# Patient Record
Sex: FEMALE | Race: WHITE | HISPANIC OR LATINO | Employment: OTHER | URBAN - METROPOLITAN AREA
[De-identification: names, ages, dates, MRNs, and addresses within clinical notes are randomized per-mention and may not be internally consistent; named-entity substitution may affect disease eponyms.]

---

## 2019-04-04 ENCOUNTER — HOSPITAL ENCOUNTER (EMERGENCY)
Facility: MEDICAL CENTER | Age: 64
End: 2019-04-04

## 2019-04-04 VITALS
WEIGHT: 139.33 LBS | BODY MASS INDEX: 27.35 KG/M2 | OXYGEN SATURATION: 97 % | HEART RATE: 79 BPM | TEMPERATURE: 97.2 F | SYSTOLIC BLOOD PRESSURE: 134 MMHG | DIASTOLIC BLOOD PRESSURE: 80 MMHG | RESPIRATION RATE: 16 BRPM | HEIGHT: 60 IN

## 2019-04-04 LAB
ALBUMIN SERPL BCP-MCNC: 3.9 G/DL (ref 3.2–4.9)
ALBUMIN/GLOB SERPL: 1.1 G/DL
ALP SERPL-CCNC: 84 U/L (ref 30–99)
ALT SERPL-CCNC: 12 U/L (ref 2–50)
ANION GAP SERPL CALC-SCNC: 7 MMOL/L (ref 0–11.9)
ANISOCYTOSIS BLD QL SMEAR: ABNORMAL
AST SERPL-CCNC: 22 U/L (ref 12–45)
BASOPHILS # BLD AUTO: 0.8 % (ref 0–1.8)
BASOPHILS # BLD: 0.04 K/UL (ref 0–0.12)
BILIRUB SERPL-MCNC: 0.4 MG/DL (ref 0.1–1.5)
BUN SERPL-MCNC: 18 MG/DL (ref 8–22)
CALCIUM SERPL-MCNC: 9.4 MG/DL (ref 8.5–10.5)
CHLORIDE SERPL-SCNC: 106 MMOL/L (ref 96–112)
CO2 SERPL-SCNC: 27 MMOL/L (ref 20–33)
CREAT SERPL-MCNC: 0.64 MG/DL (ref 0.5–1.4)
EOSINOPHIL # BLD AUTO: 0.21 K/UL (ref 0–0.51)
EOSINOPHIL NFR BLD: 4.1 % (ref 0–6.9)
ERYTHROCYTE [DISTWIDTH] IN BLOOD BY AUTOMATED COUNT: 67.2 FL (ref 35.9–50)
GLOBULIN SER CALC-MCNC: 3.6 G/DL (ref 1.9–3.5)
GLUCOSE SERPL-MCNC: 97 MG/DL (ref 65–99)
HCT VFR BLD AUTO: 39.9 % (ref 37–47)
HGB BLD-MCNC: 13.1 G/DL (ref 12–16)
LIPASE SERPL-CCNC: 23 U/L (ref 11–82)
LYMPHOCYTES # BLD AUTO: 0.84 K/UL (ref 1–4.8)
LYMPHOCYTES NFR BLD: 16.1 % (ref 22–41)
MACROCYTES BLD QL SMEAR: ABNORMAL
MANUAL DIFF BLD: NORMAL
MCH RBC QN AUTO: 34.3 PG (ref 27–33)
MCHC RBC AUTO-ENTMCNC: 32.8 G/DL (ref 33.6–35)
MCV RBC AUTO: 104.5 FL (ref 81.4–97.8)
MONOCYTES # BLD AUTO: 0.21 K/UL (ref 0–0.85)
MONOCYTES NFR BLD AUTO: 4 % (ref 0–13.4)
MORPHOLOGY BLD-IMP: NORMAL
NEUTROPHILS # BLD AUTO: 3.9 K/UL (ref 2–7.15)
NEUTROPHILS NFR BLD: 74.2 % (ref 44–72)
NEUTS BAND NFR BLD MANUAL: 0.8 % (ref 0–10)
NRBC # BLD AUTO: 0 K/UL
NRBC BLD-RTO: 0 /100 WBC
PLATELET # BLD AUTO: 236 K/UL (ref 164–446)
PLATELET BLD QL SMEAR: NORMAL
PMV BLD AUTO: 9.4 FL (ref 9–12.9)
POTASSIUM SERPL-SCNC: 3.9 MMOL/L (ref 3.6–5.5)
PROT SERPL-MCNC: 7.5 G/DL (ref 6–8.2)
RBC # BLD AUTO: 3.82 M/UL (ref 4.2–5.4)
RBC BLD AUTO: PRESENT
SODIUM SERPL-SCNC: 140 MMOL/L (ref 135–145)
WBC # BLD AUTO: 5.2 K/UL (ref 4.8–10.8)

## 2019-04-04 PROCEDURE — 83690 ASSAY OF LIPASE: CPT

## 2019-04-04 PROCEDURE — 85027 COMPLETE CBC AUTOMATED: CPT

## 2019-04-04 PROCEDURE — 85007 BL SMEAR W/DIFF WBC COUNT: CPT

## 2019-04-04 PROCEDURE — 302449 STATCHG TRIAGE ONLY (STATISTIC)

## 2019-04-04 PROCEDURE — 80053 COMPREHEN METABOLIC PANEL: CPT

## 2019-04-05 NOTE — ED TRIAGE NOTES
.  Chief Complaint   Patient presents with   • Abdominal Pain     2-3 day    • Low Back Pain   • Dizziness     Ambulated to triage with daughter. Pt moved to [place] from [place] 1 month ago. Has hx breast and ovarian ca with mets to bone. Pt c/o abdominal pain and low back pain. X 2-3 days. Nausea w/o emesis.

## 2019-05-14 ENCOUNTER — HOSPITAL ENCOUNTER (OUTPATIENT)
Dept: RADIOLOGY | Facility: MEDICAL CENTER | Age: 64
End: 2019-05-14
Attending: INTERNAL MEDICINE
Payer: MEDICAID

## 2019-05-14 DIAGNOSIS — C50.919 MALIGNANT NEOPLASM OF FEMALE BREAST, UNSPECIFIED ESTROGEN RECEPTOR STATUS, UNSPECIFIED LATERALITY, UNSPECIFIED SITE OF BREAST (HCC): ICD-10-CM

## 2019-05-14 PROCEDURE — A9503 TC99M MEDRONATE: HCPCS

## 2019-05-20 PROCEDURE — 99358 PROLONG SERVICE W/O CONTACT: CPT | Performed by: MEDICAL GENETICS

## 2019-05-20 NOTE — PROGRESS NOTES
"  Non face to face prolonged services of clinical data and chart information reviewed for a total time of 30 minutes performed on 5/20 for Christine Malhotra    Reviewed were:    Referral    Previous encounters    Medications    Imaging    Previous procedures    Other Orders    Letters    Notes    Media    Miscellaneous reports    Patient summaries        Counseling, testing information and materials prepared    \"I spent 30 minutes  from 1300 to 1330 reviewing past records, prior to visit pertaining to genetic risk.\"     Caleb Fernández M.D.    "

## 2019-05-21 ENCOUNTER — OFFICE VISIT (OUTPATIENT)
Dept: HEMATOLOGY ONCOLOGY | Facility: MEDICAL CENTER | Age: 64
End: 2019-05-21
Payer: MEDICAID

## 2019-05-21 ENCOUNTER — NON-PROVIDER VISIT (OUTPATIENT)
Dept: HEMATOLOGY ONCOLOGY | Facility: MEDICAL CENTER | Age: 64
End: 2019-05-21
Payer: MEDICAID

## 2019-05-21 VITALS
WEIGHT: 138 LBS | HEART RATE: 71 BPM | HEIGHT: 55 IN | DIASTOLIC BLOOD PRESSURE: 60 MMHG | RESPIRATION RATE: 16 BRPM | OXYGEN SATURATION: 99 % | BODY MASS INDEX: 31.94 KG/M2 | SYSTOLIC BLOOD PRESSURE: 118 MMHG | TEMPERATURE: 98.1 F

## 2019-05-21 VITALS
DIASTOLIC BLOOD PRESSURE: 60 MMHG | WEIGHT: 138.01 LBS | SYSTOLIC BLOOD PRESSURE: 118 MMHG | HEIGHT: 55 IN | HEART RATE: 71 BPM | OXYGEN SATURATION: 99 % | RESPIRATION RATE: 16 BRPM | TEMPERATURE: 98.1 F | BODY MASS INDEX: 31.94 KG/M2

## 2019-05-21 DIAGNOSIS — Z80.3 FAMILY HISTORY OF MALIGNANT NEOPLASM OF BREAST: ICD-10-CM

## 2019-05-21 DIAGNOSIS — Z15.01 BREAST CANCER GENETIC SUSCEPTIBILITY: ICD-10-CM

## 2019-05-21 DIAGNOSIS — Z80.0 FAMILY HISTORY OF MALIGNANT NEOPLASM OF GASTROINTESTINAL TRACT: ICD-10-CM

## 2019-05-21 DIAGNOSIS — Z80.3 FAMILY HISTORY OF BREAST CANCER: ICD-10-CM

## 2019-05-21 DIAGNOSIS — C50.411 MALIGNANT NEOPLASM OF UPPER-OUTER QUADRANT OF RIGHT FEMALE BREAST, UNSPECIFIED ESTROGEN RECEPTOR STATUS (HCC): ICD-10-CM

## 2019-05-21 DIAGNOSIS — Z80.8 FAMILY HISTORY OF MALIGNANT NEOPLASM OF THYROID: ICD-10-CM

## 2019-05-21 DIAGNOSIS — Z80.41 FAMILY HISTORY OF MALIGNANT NEOPLASM OF OVARY: ICD-10-CM

## 2019-05-21 DIAGNOSIS — Z80.0 FAMILY HISTORY OF COLON CANCER: ICD-10-CM

## 2019-05-21 DIAGNOSIS — Z80.8 FAMILY HISTORY OF THYROID CANCER: ICD-10-CM

## 2019-05-21 DIAGNOSIS — Z80.41 FAMILY HISTORY OF OVARIAN CANCER: ICD-10-CM

## 2019-05-21 DIAGNOSIS — Z15.01 GENETIC SUSCEPTIBILITY TO MALIGNANT NEOPLASM OF BREAST: ICD-10-CM

## 2019-05-21 PROCEDURE — 99203 OFFICE O/P NEW LOW 30 MIN: CPT | Performed by: MEDICAL GENETICS

## 2019-05-21 PROCEDURE — 99000 SPECIMEN HANDLING OFFICE-LAB: CPT | Performed by: MEDICAL GENETICS

## 2019-05-21 ASSESSMENT — PAIN SCALES - GENERAL
PAINLEVEL: NO PAIN
PAINLEVEL: NO PAIN

## 2019-05-21 NOTE — PROGRESS NOTES
GENETIC RISK ASSESSMENT    Christine Malhotra is a 63 y.o. year old patient who was referred by [facility] for cancer genetic risk assessment.    Chief Complaint: breast cancer and family history of breast, ovary, colon and thyroid cancer    HPI: The patient was well until 2014 (age 58) at which time a suspicious physical exam caused the patient to be referred for imaging ). A suspicious (mammogram study identified a (right) breast mass. A biopsy  was performed and a specimen was submitted to pathology.     A diagnosis of  carcinoma was made.   The patient's family history is positive for ovary, breast, thyroid and colon cancer  Review of personal and family histories suggested that a cancer genetic risk assessment was in order.    Review of Systems (ROS):  Constitutional normal  Eyes normal  ENT normal   Respiratory normal  Cardiovascular normal  Gastrointestinal normal  Genitourinary normal  Musculoskeletal normal  Skin normal  Neurological normal  Endocrine normal  Psychiatric normal  Hematologic/lymphatic normal  Allergic/Immunologic none  All other systems reviewed and are negative.    History (Past/Family)  Family History:   No family history on file.   3 generation pedigree built   Yes   Aspener-Pamela empiric risk calculation No   Boone II empiric risk calculation  No    Social History:       Social History     Social History   • Marital status:      Spouse name: N/A   • Number of children: N/A   • Years of education: N/A     Social History Main Topics   • Smoking status: Never Smoker   • Smokeless tobacco: Not on file   • Alcohol use No   • Drug use: No   • Sexual activity: Not on file     Other Topics Concern   • Not on file     Social History Narrative   • No narrative on file       Patient Past History:  Past Medical History:   Diagnosis Date   • Cancer (HCC)     breast, ovarian, mets to bone           NCCN Testing Criteria Met                            Yes    Physical Exam  Constitutional     There were no vitals taken for this visit.        General appearance: normal   Head Circumference:   ([place])  Eyes    Conjunctiva: normla   Pupils: reactive     ENMT    External inspection: noraml   Assessment of Hearing: normal   Lips/cheeks/gums: no lesions  Neck   External exam: normal   Thyroid: no masses  Respiratory   Auscultation: clear    Cardiovascular   Auscultation: RRR   Edema : none  Chest   Breasts (exam): not done   Palpation:   GI   External exam and palpation: normal      Pelvic (female): not done   External exam (male):   Lymphatic   Palpation in 2 areas: normal    Musculoskeletal   Gait: normal   Digits and Nails: no lesions  Skin   Inspection:  normal   Palpation: no masses                  Fibrofolliculoma: absent                  Trichodiscoma: absent                   Akrochordon: absent                   CAfe-au-lait:  absent                  Hypopigmentation: absent                  Mucosal freckling:  absent  Neurologic   Cranial nerves: intact   DTR’s: 2+       Assessment and Plan:  Patient with diagnosis of metastatic breast cancer (age 58) and family history of breast, colon, ovary and thyroid cancer    I spent a total of 40 minutes of face to face time with >50% of time spent in counseling and coordination of care discussing matters stated in above note.    Leia panel ordered      Caleb Fernández M.D.

## 2019-05-26 ENCOUNTER — APPOINTMENT (OUTPATIENT)
Dept: RADIOLOGY | Facility: IMAGING CENTER | Age: 64
End: 2019-05-26
Attending: PHYSICIAN ASSISTANT
Payer: MEDICAID

## 2019-05-26 ENCOUNTER — OFFICE VISIT (OUTPATIENT)
Dept: URGENT CARE | Facility: CLINIC | Age: 64
End: 2019-05-26
Payer: MEDICAID

## 2019-05-26 ENCOUNTER — HOSPITAL ENCOUNTER (OUTPATIENT)
Dept: RADIOLOGY | Facility: MEDICAL CENTER | Age: 64
End: 2019-05-26
Attending: PHYSICIAN ASSISTANT
Payer: MEDICAID

## 2019-05-26 VITALS
OXYGEN SATURATION: 95 % | BODY MASS INDEX: 27.29 KG/M2 | DIASTOLIC BLOOD PRESSURE: 74 MMHG | SYSTOLIC BLOOD PRESSURE: 122 MMHG | TEMPERATURE: 98.1 F | RESPIRATION RATE: 18 BRPM | WEIGHT: 139 LBS | HEART RATE: 89 BPM | HEIGHT: 60 IN

## 2019-05-26 DIAGNOSIS — S32.020A CLOSED COMPRESSION FRACTURE OF SECOND LUMBAR VERTEBRA, INITIAL ENCOUNTER: ICD-10-CM

## 2019-05-26 DIAGNOSIS — M54.41 ACUTE MIDLINE LOW BACK PAIN WITH BILATERAL SCIATICA: ICD-10-CM

## 2019-05-26 DIAGNOSIS — M54.42 ACUTE MIDLINE LOW BACK PAIN WITH BILATERAL SCIATICA: ICD-10-CM

## 2019-05-26 DIAGNOSIS — R10.30 LOWER ABDOMINAL PAIN: ICD-10-CM

## 2019-05-26 LAB
APPEARANCE UR: CLEAR
BILIRUB UR STRIP-MCNC: NEGATIVE MG/DL
COLOR UR AUTO: YELLOW
GLUCOSE UR STRIP.AUTO-MCNC: NEGATIVE MG/DL
KETONES UR STRIP.AUTO-MCNC: NEGATIVE MG/DL
LEUKOCYTE ESTERASE UR QL STRIP.AUTO: NEGATIVE
NITRITE UR QL STRIP.AUTO: NEGATIVE
PH UR STRIP.AUTO: 7 [PH] (ref 5–8)
PROT UR QL STRIP: NEGATIVE MG/DL
RBC UR QL AUTO: NORMAL
SP GR UR STRIP.AUTO: 1.01
UROBILINOGEN UR STRIP-MCNC: 0.2 MG/DL

## 2019-05-26 PROCEDURE — 81002 URINALYSIS NONAUTO W/O SCOPE: CPT | Performed by: PHYSICIAN ASSISTANT

## 2019-05-26 PROCEDURE — 700117 HCHG RX CONTRAST REV CODE 255: Performed by: PHYSICIAN ASSISTANT

## 2019-05-26 PROCEDURE — 99214 OFFICE O/P EST MOD 30 MIN: CPT | Mod: 25 | Performed by: PHYSICIAN ASSISTANT

## 2019-05-26 PROCEDURE — 74177 CT ABD & PELVIS W/CONTRAST: CPT

## 2019-05-26 PROCEDURE — 72100 X-RAY EXAM L-S SPINE 2/3 VWS: CPT | Performed by: FAMILY MEDICINE

## 2019-05-26 RX ORDER — HYDROCODONE BITARTRATE AND ACETAMINOPHEN 5; 325 MG/1; MG/1
1 TABLET ORAL EVERY 6 HOURS PRN
Qty: 20 TAB | Refills: 0 | Status: SHIPPED | OUTPATIENT
Start: 2019-05-26 | End: 2019-06-02

## 2019-05-26 RX ORDER — CAPECITABINE 500 MG/1
1250 TABLET, FILM COATED ORAL 2 TIMES DAILY
COMMUNITY
End: 2020-03-04

## 2019-05-26 RX ADMIN — IOHEXOL 25 ML: 240 INJECTION, SOLUTION INTRATHECAL; INTRAVASCULAR; INTRAVENOUS; ORAL at 17:34

## 2019-05-26 RX ADMIN — IOHEXOL 100 ML: 350 INJECTION, SOLUTION INTRAVENOUS at 17:34

## 2019-05-26 ASSESSMENT — ENCOUNTER SYMPTOMS
DIARRHEA: 0
LEG PAIN: 1
CONSTIPATION: 0
SHORTNESS OF BREATH: 0
BOWEL INCONTINENCE: 0
FEVER: 0
VOMITING: 0
ABDOMINAL PAIN: 1
PARESTHESIAS: 0
FALLS: 1
NAUSEA: 0
BACK PAIN: 1
DIZZINESS: 0
CHILLS: 0

## 2019-05-26 NOTE — PATIENT INSTRUCTIONS
Fractura por aplastamiento vertebral  (Spinal Compression Fracture)  La fractura por aplastamiento vertebral es la quebradura de los huesos que aimee la columna (vértebras). En belle tipo de fractura, las vértebras se aplastan y adquieren forma de cuña. La mayoría de las fracturas por aplastamiento se producen en la sección central o inferior de la columna vertebral.  CAUSAS  Belle trastorno puede ser causado por lo siguiente:  · El debilitamiento y la pérdida de la densidad ósea (osteoporosis). Ésta es la causa más frecuente.  · Ileana caída.  · Un accidente de automóvil o motocicleta.  · Cáncer.  · Un traumatismo, arlyn un golpe jayde y directo en la kenny.  FACTORES DE RIESGO  Puede correr más riesgo de sufrir ileana fractura por aplastamiento vertebral si:  · Es mayor de 50 años.  · Tiene osteoporosis.  · Tiene ciertos tipos de cáncer, entre ellos:  ¨ Mieloma múltiple.  ¨ Linfomas.  ¨ Cáncer de próstata.  ¨ Cáncer de pulmón.  ¨ Cáncer de mama.  SÍNTOMAS  Los síntomas de esta afección incluyen lo siguiente:  · Dolor intenso.  · Dolor que empeora con el tiempo.  · Dolor que empeora al ponerse de pie, caminar, sentarse o inclinarse.  · Dolor repentino que es tan intenso que dificulta el movimiento.  · Columna vertebral curvada o jorobada.  · Disminución gradual de la estatura.  · Entumecimiento, hormigueo o debilidad en la espalda y las piernas.  · Dificultad para caminar.  Los síntomas dependerán de la causa de la fractura y de la rapidez de fuentes evolución. Por ejemplo, las fracturas causadas por osteoporosis pueden producir pocos síntomas, ningún síntoma o síntomas que evolucionan lentamente con el tiempo.  DIAGNÓSTICO  Esta afección se puede diagnosticar en función de los síntomas, la historia clínica y un examen físico. Sherry el examen físico, el médico puede darle golpes suaves a lo live de la columna vertebral para determinar si hay dolor a la palpación. Pueden hacerse estudios para confirmar el diagnóstico. Estos  pueden incluir los siguientes:  · Amena densitometría ósea para determinar la presencia de osteoporosis.  · Estudios de diagnóstico por imágenes, arlyn amena radiografía de la columna vertebral, amena tomografía computarizada (TC) o amena resonancia magnética (RM).  TRATAMIENTO  El tratamiento de esta afección depende de la causa y la gravedad. Algunas fracturas, por ejemplo, las causadas por osteoporosis, pueden consolidarse por sí solas con tratamiento complementario. Bruno puede incluir lo siguiente:  · Analgésicos.  · Reposo.  · Un soporte para la espalda.  · Ejercicios de fisioterapia.  · Medicamentos para aliviar el dolor de huesos.  · Suplementos de calcio y vitamina D.  Las fracturas que causan deformidad de la espalda, neuralgia o debilidad, o que no responden a otros tratamientos se pueden tratar con un procedimiento quirúrgico, por ejemplo:  · Vertebroplastia. En kaykay procedimiento, se inyecta cemento óseo en las vértebras aplastadas para estabilizarlas.  · Cifoplastia con balón. En kaykay procedimiento, las vértebras aplastadas se expanden con un balón y luego se les inyecta el cemento óseo.  · Fusión vertebral. En kaykay procedimiento, las vértebras aplastadas se fusionan con las vértebras normales.  INSTRUCCIONES PARA EL CUIDADO EN EL HOGAR  Instrucciones generales   · [place] los medicamentos solamente arlyn se lo haya indicado el médico.  · No conduzca ni opere maquinaria pesada mientras lucy analgésicos.  · Si se lo indican, aplique hielo sobre la wood lesionada:  ¨ Ponga el hielo en amena bolsa plástica.  ¨ Coloque amena toalla entre la piel y la bolsa de hielo.  ¨ Al principio, deje el hielo en el lugar leanna 30 minutos cada dos horas. Luego aplíquese hielo cuando sea necesario.  · Use el soporte para el flora o la espalda arlyn se lo haya indicado el médico.  · No humaira alcohol. El alcohol puede interferir en el tratamiento.  · Concurra a todas las visitas de control arlyn se lo haya indicado el médico. Bruno es  importante. Puede ayudar a evitar las lesiones permanentes, la discapacidad y el dolor prolongado (crónico).  Actividad   · Haim reposo en cama solo arlyn se lo haya indicado el médico. El reposo en cama muy prolongado puede empeorar la afección.  · Reanude lux actividades normales arlyn se lo haya indicado el médico. Pregunte qué actividades son seguras para usted.  · Haim ejercicios para mejorar el movimiento y fortalecer la espalda (fisioterapia) arlyn se lo haya recomendado el médico.  · Haim ejercicio regularmente arlyn se lo haya indicado el médico.  SOLICITE ATENCIÓN MÉDICA SI:  · Tiene fiebre.  · Tiene tos que hace que el dolor sea más intenso.  · El medicamento no le calma el dolor.  · El dolor no mejora con el tiempo.  · No puede retomar las actividades normales según lo planeado o lo esperado.  SOLICITE ATENCIÓN MÉDICA DE INMEDIATO SI:  · El dolor es muy intenso y empeora repentinamente.  · No puede  alguna parte del cuerpo (parálisis) por debajo del nivel de la lesión.  · Tiene entumecimiento, hormigueo o debilidad en alguna parte del cuerpo por debajo del nivel de la lesión.  · No puede controlar los intestinos o la vejiga.  Esta información no tiene arlyn fin reemplazar el consejo del médico. Asegúrese de hacerle al médico cualquier pregunta que tenga.  Document Released: 12/18/2006 Document Revised: 05/03/2016 Document Reviewed: 12/22/2015  Elsevier Interactive Patient Education © 2017 Elsevier Inc.

## 2019-05-27 NOTE — PROGRESS NOTES
Subjective:      Christine Malhotra is a 63 y.o. female who presents with Back Pain (fell on stairs on Wednesday- lower back and abdominal pain)        Patient is accompanied by her daughter, both are Libyan speaking. Video translation services were utilized throughout exam.     Back Pain   This is a new problem. The current episode started in the past 7 days (4 days). The problem occurs constantly. The problem is unchanged. The pain is present in the lumbar spine. The quality of the pain is described as aching. Radiates to: lower abdomen. The pain is at a severity of 9/10. The pain is severe. The symptoms are aggravated by bending and position. Associated symptoms include abdominal pain, leg pain and pelvic pain. Pertinent negatives include no bladder incontinence, bowel incontinence, chest pain, dysuria, fever or paresthesias. Risk factors include recent trauma. She has tried NSAIDs for the symptoms. The treatment provided mild relief.     Patient presents to urgent care reporting a 4 day history of midline lower back pain starting after she fell off a ladder at a height of about 3-4 ft directly onto the back. The pain is described as dull, radiating to her lower abdomen. No history of prior back injuries. She reports intermittent pain radiating to her knees bilaterally. No bowel/bladder incontinence. She denies constipation, diarrhea, nausea, vomiting, or urinary symptoms.     Review of Systems   Constitutional: Negative for chills and fever.   HENT: Negative for congestion.    Respiratory: Negative for shortness of breath.    Cardiovascular: Negative for chest pain.   Gastrointestinal: Positive for abdominal pain. Negative for bowel incontinence, constipation, diarrhea, nausea and vomiting.   Genitourinary: Positive for pelvic pain. Negative for bladder incontinence and dysuria.   Musculoskeletal: Positive for back pain and falls.   Skin: Negative for rash.   Neurological: Negative for dizziness and  paresthesias.        Objective:     /74 (BP Location: Left arm, Patient Position: Sitting, BP Cuff Size: Adult)   Pulse 89   Temp 36.7 °C (98.1 °F) (Temporal)   Resp 18   Ht 1.524 m (5')   Wt 63 kg (139 lb)   SpO2 95%   BMI 27.15 kg/m²      Physical Exam   Constitutional: She is oriented to person, place, and time. She appears well-developed and well-nourished. No distress.   HENT:   Head: Normocephalic and atraumatic.   Eyes: Pupils are equal, round, and reactive to light.   Neck: Normal range of motion.   Cardiovascular: Normal rate.    Pulmonary/Chest: Effort normal.   Abdominal: Soft. Normal appearance and bowel sounds are normal. She exhibits no distension. There is tenderness in the right lower quadrant, suprapubic area and left lower quadrant. There is no rebound, no guarding, no CVA tenderness, no tenderness at McBurney's point and negative Barrios's sign.       No CVAT bilaterally   Musculoskeletal:        Lumbar back: She exhibits decreased range of motion, tenderness and pain. She exhibits no bony tenderness and no spasm.        Back:    Neurological: She is alert and oriented to person, place, and time.   Skin: Skin is warm and dry. She is not diaphoretic.   Psychiatric: She has a normal mood and affect. Her behavior is normal.   Nursing note and vitals reviewed.         PMH:  has a past medical history of Cancer (HCC).  MEDS:   Current Outpatient Prescriptions:   •  rivaroxaban (XARELTO) 20 MG Tab tablet, Take 20 mg by mouth with dinner., Disp: , Rfl:   •  capecitabine (XELODA) 500 MG tablet, Take 1,250 mg/m2 by mouth 2 Times a Day., Disp: , Rfl:   •  HYDROcodone-acetaminophen (NORCO) 5-325 MG Tab per tablet, Take 1 Tab by mouth every 6 hours as needed for up to 7 days., Disp: 20 Tab, Rfl: 0  ALLERGIES: No Known Allergies  SURGHX:   Past Surgical History:   Procedure Laterality Date   • GYN SURGERY      hysterectomy     SOCHX:  reports that she has never smoked. She does not have any  smokeless tobacco history on file. She reports that she does not drink alcohol or use drugs.  FH: family history is not on file.     POCT Urinalysis:  Component Results     Component Value Ref Range & Units Status   POC Color yellow  Negative Final   POC Appearance clear  Negative Final   POC Leukocyte Esterase negative  Negative Final   POC Nitrites negative  Negative Final   POC Urobiligen 0.2  Negative (0.2) mg/dL Final   POC Protein negative  Negative mg/dL Final   POC Urine PH 7.0  5.0 - 8.0 Final   POC Blood trace-intact  Negative Final   POC Specific Gravity 1.010  <1.005 - >1.030 Final   POC Ketones negative  Negative mg/dL Final   POC Bilirubin negative  Negative mg/dL Final   POC Glucose negative  Negative mg/dL Final   Last Resulted Time   Sun May 26, 2019 12:40 PM       Assessment/Plan:     1. Closed compression fracture of second lumbar vertebra, initial encounter (HCC)  - DX-LUMBAR SPINE-2 OR 3 VIEWS; Future  Impression       1.  Mild superior endplate compression fracture is identified at the L2 level of the lumbar spine.    2.  Degenerative disc disease and facet arthropathy are present.       - REFERRAL TO SPINE SURGERY  - HYDROcodone-acetaminophen (NORCO) 5-325 MG Tab per tablet; Take 1 Tab by mouth every 6 hours as needed for up to 7 days.  Dispense: 20 Tab; Refill: 0   - Will cause sedation, avoid driving, operating heavy machinery, and drinking alcohol    - Consent for Opiate Prescription    Discussed x-ray results with patient. Norco provided for severe pain, Advised to take OTC tylenol or ibuprofen as needed for mild-moderate pain. She will follow up with spine for further evaluation and management. The patient demonstrated a good understanding and agreed with the treatment plan.    [facility] Aware web site evaluation: I have obtained and reviewed patient utilization report from [facility] pharmacy database prior to writing prescription for controlled substance II, III or IV per [place] bill  . Based on the report and my clinical assessment the prescription is medically necessary.     2. Lower abdominal pain  - POCT Urinalysis --> normal   - CT-ABDOMEN-PELVIS WITH; Future  Impression       1.  No acute abnormalities noted in the abdomen or pelvis.    2.  Low-attenuation lesions in the spleen could represent hemangiomas.    3.  Small renal cysts.      No evidence on acute abdomen on CT, patient states understanding and is relieve. Most likely radiating pain from back. Monitor closely and seek medical attention if needed. The patient demonstrated a good understanding and agreed with the treatment plan.

## 2019-11-26 ENCOUNTER — OFFICE VISIT (OUTPATIENT)
Dept: CARDIOLOGY | Facility: MEDICAL CENTER | Age: 64
End: 2019-11-26
Payer: MEDICAID

## 2019-11-26 VITALS
HEIGHT: 60 IN | HEART RATE: 70 BPM | BODY MASS INDEX: 25.91 KG/M2 | WEIGHT: 132 LBS | DIASTOLIC BLOOD PRESSURE: 60 MMHG | OXYGEN SATURATION: 98 % | SYSTOLIC BLOOD PRESSURE: 116 MMHG

## 2019-11-26 DIAGNOSIS — I26.99 PULMONARY EMBOLISM, OTHER, UNSPECIFIED CHRONICITY, UNSPECIFIED WHETHER ACUTE COR PULMONALE PRESENT (HCC): ICD-10-CM

## 2019-11-26 DIAGNOSIS — R07.89 OTHER CHEST PAIN: ICD-10-CM

## 2019-11-26 DIAGNOSIS — Z00.00 HEALTHCARE MAINTENANCE: ICD-10-CM

## 2019-11-26 DIAGNOSIS — R07.9 CHEST PAIN, UNSPECIFIED TYPE: ICD-10-CM

## 2019-11-26 LAB — EKG IMPRESSION: NORMAL

## 2019-11-26 PROCEDURE — 99203 OFFICE O/P NEW LOW 30 MIN: CPT | Performed by: INTERNAL MEDICINE

## 2019-11-26 PROCEDURE — 93000 ELECTROCARDIOGRAM COMPLETE: CPT | Performed by: INTERNAL MEDICINE

## 2019-11-26 ASSESSMENT — ENCOUNTER SYMPTOMS
WEIGHT GAIN: 0
PALPITATIONS: 0
DEPRESSION: 0
IRREGULAR HEARTBEAT: 0
COUGH: 0
ORTHOPNEA: 0
ABDOMINAL PAIN: 0
DYSPNEA ON EXERTION: 0
CONSTIPATION: 0
SHORTNESS OF BREATH: 0
FEVER: 0
WEIGHT LOSS: 0
BACK PAIN: 0
DECREASED APPETITE: 0
PND: 0
BLURRED VISION: 0
CLAUDICATION: 0
DIARRHEA: 0
VOMITING: 0
HEARTBURN: 0
SYNCOPE: 0
ALTERED MENTAL STATUS: 0
FLANK PAIN: 0
NEAR-SYNCOPE: 0
NAUSEA: 0
DIZZINESS: 0

## 2019-11-26 NOTE — ASSESSMENT & PLAN NOTE
R/o pulm htn with TTE. Check for residual clot burden with pulmonary v/q scan. Cont lifelong anticoagulation as unprovoked and due to non-reversable cause. If no burden remaining, can consider lower dose xarelto.

## 2019-11-26 NOTE — PROGRESS NOTES
Cardiology Note    Chief Complaint   Patient presents with   • Chest Pain     New Patient       History of Present Illness: Christine Malhotra is a 64 y.o. female PMH mid back pain after fall from ladder, breast cancer s/p chemo/radiation/surgery c/b pulmonary embolism (found in [place], [place] 2018) who presents for initial visit.    Feels left sided, rad to shoulder, lasting about 5 min, associated with light headedness. Over last month occurred twice. Denies shortness of breath or palpitations. Can walk up to one hour without symptoms.    Adds that had pulm embolism last year around time was being treated for breast cancer. She was treated with xarelto which she has tolerated well. Denies bleeding episodes.    Review of Systems   Constitution: Negative for decreased appetite, fever, malaise/fatigue, weight gain and weight loss.   HENT: Negative for congestion and nosebleeds.    Eyes: Negative for blurred vision.   Cardiovascular: Positive for chest pain. Negative for claudication, dyspnea on exertion, irregular heartbeat, leg swelling, near-syncope, orthopnea, palpitations, paroxysmal nocturnal dyspnea and syncope.   Respiratory: Negative for cough and shortness of breath.    Endocrine: Negative for cold intolerance and heat intolerance.   Skin: Negative for rash.   Musculoskeletal: Negative for back pain.   Gastrointestinal: Negative for abdominal pain, constipation, diarrhea, heartburn, melena, nausea and vomiting.   Genitourinary: Negative for dysuria, flank pain and hematuria.   Neurological: Negative for dizziness.   Psychiatric/Behavioral: Negative for altered mental status and depression.         Past Medical History:   Diagnosis Date   • Cancer (HCC)     breast, ovarian, mets to bone         Past Surgical History:   Procedure Laterality Date   • GYN SURGERY      hysterectomy         Current Outpatient Medications   Medication Sig Dispense Refill   • rivaroxaban (XARELTO) 20 MG Tab tablet Take 20 mg  by mouth with dinner.     • capecitabine (XELODA) 500 MG tablet Take 1,250 mg/m2 by mouth 2 Times a Day.       No current facility-administered medications for this visit.          No Known Allergies      No family history on file.      Social History     Socioeconomic History   • Marital status:      Spouse name: Not on file   • Number of children: Not on file   • Years of education: Not on file   • Highest education level: Not on file   Occupational History   • Not on file   Social Needs   • Financial resource strain: Not on file   • Food insecurity:     Worry: Not on file     Inability: Not on file   • Transportation needs:     Medical: Not on file     Non-medical: Not on file   Tobacco Use   • Smoking status: Never Smoker   • Smokeless tobacco: Never Used   Substance and Sexual Activity   • Alcohol use: No   • Drug use: No   • Sexual activity: Not on file   Lifestyle   • Physical activity:     Days per week: Not on file     Minutes per session: Not on file   • Stress: Not on file   Relationships   • Social connections:     Talks on phone: Not on file     Gets together: Not on file     Attends [employer]ism service: Not on file     Active member of club or organization: Not on file     Attends meetings of clubs or organizations: Not on file     Relationship status: Not on file   • Intimate partner violence:     Fear of current or ex partner: Not on file     Emotionally abused: Not on file     Physically abused: Not on file     Forced sexual activity: Not on file   Other Topics Concern   • Not on file   Social History Narrative   • Not on file         Physical Exam:  Ambulatory Vitals  /60 (BP Location: Left arm, Patient Position: Sitting, BP Cuff Size: Adult)   Pulse 70   Ht 1.524 m (5')   Wt 59.9 kg (132 lb)   SpO2 98%    BP Readings from Last 4 Encounters:   11/26/19 116/60   05/26/19 122/74   05/21/19 118/60   05/21/19 118/60     Weight/BMI:   Vitals:    11/26/19 1009   BP: 116/60   Weight: 59.9  kg (132 lb)   Height: 1.524 m (5')    Body mass index is 25.78 kg/m².  Wt Readings from Last 4 Encounters:   11/26/19 59.9 kg (132 lb)   05/26/19 63 kg (139 lb)   05/21/19 62.6 kg (138 lb)   05/21/19 62.6 kg (138 lb 0.1 oz)       Physical Exam   Constitutional: She is oriented to person, place, and time and well-developed, well-nourished, and in no distress. No distress.   HENT:   Head: Normocephalic and atraumatic.   Eyes: Pupils are equal, round, and reactive to light. Conjunctivae are normal.   Neck: Normal range of motion. Neck supple. No JVD present.   Cardiovascular: Normal rate, regular rhythm, normal heart sounds and intact distal pulses. Exam reveals no gallop and no friction rub.   No murmur heard.  Pulmonary/Chest: Effort normal and breath sounds normal. No respiratory distress. She has no wheezes. She has no rales. She exhibits no tenderness.   Abdominal: Soft. Bowel sounds are normal. She exhibits no distension.   Musculoskeletal:         General: No edema.   Neurological: She is alert and oriented to person, place, and time.   Skin: Skin is warm and dry.   Psychiatric: Affect and judgment normal.       Lab Data Review:  No results found for: CHOLSTRLTOT, LDL, HDL, TRIGLYCERIDE    Lab Results   Component Value Date/Time    SODIUM 140 04/04/2019 06:27 PM    POTASSIUM 3.9 04/04/2019 06:27 PM    CHLORIDE 106 04/04/2019 06:27 PM    CO2 27 04/04/2019 06:27 PM    GLUCOSE 97 04/04/2019 06:27 PM    BUN 18 04/04/2019 06:27 PM    CREATININE 0.64 04/04/2019 06:27 PM     CrCl cannot be calculated (Patient's most recent lab result is older than the maximum 7 days allowed.).  Lab Results   Component Value Date/Time    ALKPHOSPHAT 84 04/04/2019 06:27 PM    ASTSGOT 22 04/04/2019 06:27 PM    ALTSGPT 12 04/04/2019 06:27 PM    TBILIRUBIN 0.4 04/04/2019 06:27 PM      Lab Results   Component Value Date/Time    WBC 5.2 04/04/2019 06:27 PM     No results found for: HBA1C  No components found for: TROP      Cardiac Imaging and  Procedures Review:        Medical Decision Making:  Problem List Items Addressed This Visit     Pulmonary embolism (HCC)     R/o pulm htn with TTE. Check for residual clot burden with pulmonary v/q scan. Cont lifelong anticoagulation as unprovoked and due to non-reversable cause. If no burden remaining, can consider lower dose xarelto.         Relevant Orders    EC-ECHOCARDIOGRAM COMPLETE W/O CONT    Other chest pain     Will rule out PE as cause of sx prior to further cardiac investigation.          Relevant Orders    EKG (Completed)    LIPID PANEL    HEMOGLOBIN A1C (Glycohemoglobin GHB Total/A1C with MBG Estimate)    EC-ECHOCARDIOGRAM COMPLETE W/O CONT    Healthcare maintenance     Check lipids and a1c. Obtain records from [place].         Relevant Orders    LIPID PANEL    HEMOGLOBIN A1C (Glycohemoglobin GHB Total/A1C with MBG Estimate)      Other Visit Diagnoses     Chest pain, unspecified type        Relevant Orders    NM-LUNG PERFUSION IMAGING        Obtaining records and 40min for review.    It was my pleasure to meet with Ms. Syeda Malhotra.

## 2020-02-18 ENCOUNTER — TELEPHONE (OUTPATIENT)
Dept: CARDIOLOGY | Facility: MEDICAL CENTER | Age: 65
End: 2020-02-18

## 2020-02-18 NOTE — TELEPHONE ENCOUNTER
Called patient and someone translated for patient, she got labs done at [facility], called and they will fax. Also gave patient imaging scheduling number to schedule testing.

## 2020-03-03 DIAGNOSIS — C50.811 MALIGNANT NEOPLASM OF OVERLAPPING SITES OF RIGHT BREAST IN FEMALE, ESTROGEN RECEPTOR POSITIVE (HCC): ICD-10-CM

## 2020-03-03 DIAGNOSIS — Z17.0 MALIGNANT NEOPLASM OF OVERLAPPING SITES OF RIGHT BREAST IN FEMALE, ESTROGEN RECEPTOR POSITIVE (HCC): ICD-10-CM

## 2020-03-03 PROBLEM — C50.919 BREAST CANCER (HCC): Status: ACTIVE | Noted: 2020-03-03

## 2020-03-03 RX ORDER — SODIUM CHLORIDE 9 MG/ML
INJECTION, SOLUTION INTRAVENOUS CONTINUOUS
Status: CANCELLED | OUTPATIENT
Start: 2020-03-12

## 2020-03-03 RX ORDER — 0.9 % SODIUM CHLORIDE 0.9 %
VIAL (ML) INJECTION PRN
Status: CANCELLED | OUTPATIENT
Start: 2020-03-10

## 2020-03-03 RX ORDER — ONDANSETRON 2 MG/ML
4 INJECTION INTRAMUSCULAR; INTRAVENOUS PRN
Status: CANCELLED | OUTPATIENT
Start: 2020-03-12

## 2020-03-03 RX ORDER — 0.9 % SODIUM CHLORIDE 0.9 %
10 VIAL (ML) INJECTION PRN
Status: CANCELLED | OUTPATIENT
Start: 2020-03-10

## 2020-03-03 RX ORDER — SODIUM CHLORIDE 9 MG/ML
INJECTION, SOLUTION INTRAVENOUS CONTINUOUS
Status: CANCELLED | OUTPATIENT
Start: 2020-03-19

## 2020-03-03 RX ORDER — 0.9 % SODIUM CHLORIDE 0.9 %
VIAL (ML) INJECTION PRN
Status: CANCELLED | OUTPATIENT
Start: 2020-03-19

## 2020-03-03 RX ORDER — 0.9 % SODIUM CHLORIDE 0.9 %
3 VIAL (ML) INJECTION PRN
Status: CANCELLED | OUTPATIENT
Start: 2020-03-10

## 2020-03-03 RX ORDER — ONDANSETRON 8 MG/1
8 TABLET, ORALLY DISINTEGRATING ORAL ONCE
Status: CANCELLED | OUTPATIENT
Start: 2020-03-19

## 2020-03-03 RX ORDER — 0.9 % SODIUM CHLORIDE 0.9 %
3 VIAL (ML) INJECTION PRN
Status: CANCELLED | OUTPATIENT
Start: 2020-03-12

## 2020-03-03 RX ORDER — PROCHLORPERAZINE MALEATE 10 MG
10 TABLET ORAL EVERY 6 HOURS PRN
Status: CANCELLED | OUTPATIENT
Start: 2020-03-12

## 2020-03-03 RX ORDER — 0.9 % SODIUM CHLORIDE 0.9 %
10 VIAL (ML) INJECTION PRN
Status: CANCELLED | OUTPATIENT
Start: 2020-03-19

## 2020-03-03 RX ORDER — ONDANSETRON 8 MG/1
8 TABLET, ORALLY DISINTEGRATING ORAL PRN
Status: CANCELLED | OUTPATIENT
Start: 2020-03-19

## 2020-03-03 RX ORDER — PROCHLORPERAZINE MALEATE 10 MG
10 TABLET ORAL EVERY 6 HOURS PRN
Status: CANCELLED | OUTPATIENT
Start: 2020-03-19

## 2020-03-03 RX ORDER — ONDANSETRON 8 MG/1
8 TABLET, ORALLY DISINTEGRATING ORAL ONCE
Status: CANCELLED | OUTPATIENT
Start: 2020-03-12

## 2020-03-03 RX ORDER — 0.9 % SODIUM CHLORIDE 0.9 %
VIAL (ML) INJECTION PRN
Status: CANCELLED | OUTPATIENT
Start: 2020-03-12

## 2020-03-03 RX ORDER — ONDANSETRON 2 MG/ML
4 INJECTION INTRAMUSCULAR; INTRAVENOUS PRN
Status: CANCELLED | OUTPATIENT
Start: 2020-03-19

## 2020-03-03 RX ORDER — 0.9 % SODIUM CHLORIDE 0.9 %
10 VIAL (ML) INJECTION PRN
Status: CANCELLED | OUTPATIENT
Start: 2020-03-12

## 2020-03-03 RX ORDER — ONDANSETRON 8 MG/1
8 TABLET, ORALLY DISINTEGRATING ORAL PRN
Status: CANCELLED | OUTPATIENT
Start: 2020-03-12

## 2020-03-03 RX ORDER — 0.9 % SODIUM CHLORIDE 0.9 %
3 VIAL (ML) INJECTION PRN
Status: CANCELLED | OUTPATIENT
Start: 2020-03-19

## 2020-03-04 ENCOUNTER — OFFICE VISIT (OUTPATIENT)
Dept: CARDIOLOGY | Facility: MEDICAL CENTER | Age: 65
End: 2020-03-04
Payer: MEDICAID

## 2020-03-04 VITALS
OXYGEN SATURATION: 98 % | DIASTOLIC BLOOD PRESSURE: 70 MMHG | BODY MASS INDEX: 26.5 KG/M2 | SYSTOLIC BLOOD PRESSURE: 122 MMHG | HEIGHT: 60 IN | HEART RATE: 80 BPM | WEIGHT: 135 LBS

## 2020-03-04 DIAGNOSIS — C50.811 MALIGNANT NEOPLASM OF OVERLAPPING SITES OF RIGHT BREAST IN FEMALE, ESTROGEN RECEPTOR POSITIVE (HCC): ICD-10-CM

## 2020-03-04 DIAGNOSIS — I26.99 PULMONARY EMBOLISM, OTHER, UNSPECIFIED CHRONICITY, UNSPECIFIED WHETHER ACUTE COR PULMONALE PRESENT (HCC): ICD-10-CM

## 2020-03-04 DIAGNOSIS — Z00.00 HEALTHCARE MAINTENANCE: ICD-10-CM

## 2020-03-04 DIAGNOSIS — Z17.0 MALIGNANT NEOPLASM OF OVERLAPPING SITES OF RIGHT BREAST IN FEMALE, ESTROGEN RECEPTOR POSITIVE (HCC): ICD-10-CM

## 2020-03-04 PROCEDURE — 99214 OFFICE O/P EST MOD 30 MIN: CPT | Performed by: INTERNAL MEDICINE

## 2020-03-04 RX ORDER — OMEPRAZOLE 20 MG/1
CAPSULE, DELAYED RELEASE ORAL
COMMUNITY
Start: 2020-01-14 | End: 2020-03-04

## 2020-03-04 ASSESSMENT — ENCOUNTER SYMPTOMS
NAUSEA: 0
PALPITATIONS: 0
DIZZINESS: 0
DYSPNEA ON EXERTION: 0
ORTHOPNEA: 0
SYNCOPE: 0
DECREASED APPETITE: 0
CONSTIPATION: 0
PND: 0
HEARTBURN: 0
FLANK PAIN: 0
BACK PAIN: 0
FEVER: 0
DIARRHEA: 0
CLAUDICATION: 0
DEPRESSION: 0
NEAR-SYNCOPE: 0
WEIGHT GAIN: 0
COUGH: 0
ALTERED MENTAL STATUS: 0
ABDOMINAL PAIN: 0
WEIGHT LOSS: 0
VOMITING: 0
BLURRED VISION: 0
SHORTNESS OF BREATH: 0
IRREGULAR HEARTBEAT: 0

## 2020-03-04 ASSESSMENT — FIBROSIS 4 INDEX: FIB4 SCORE: 1.67

## 2020-03-04 NOTE — PROGRESS NOTES
Cardiology Note    Chief Complaint   Patient presents with   • Chest Pain       History of Present Illness: Christine Malhotra is a 64 y.o. female PMH mid back pain after fall from ladder, breast cancer s/p chemo/radiation/surgery c/b pulmonary embolism (found in [place], [place] 2018) who presents for follow up.    States prior symptoms now resolved. She stopped her xarelto of her own volition as she was feeling better. Denies any other cardiac symptoms. She is pending follow up with onc to discuss new chemo regimen.    Adds that had pulm embolism 2018 around time was being treated for breast cancer. She was treated with xarelto which she has tolerated well. Denies bleeding episodes.    Review of Systems   Constitution: Negative for decreased appetite, fever, malaise/fatigue, weight gain and weight loss.   HENT: Negative for congestion and nosebleeds.    Eyes: Negative for blurred vision.   Cardiovascular: Negative for chest pain, claudication, dyspnea on exertion, irregular heartbeat, leg swelling, near-syncope, orthopnea, palpitations, paroxysmal nocturnal dyspnea and syncope.   Respiratory: Negative for cough and shortness of breath.    Endocrine: Negative for cold intolerance and heat intolerance.   Skin: Negative for rash.   Musculoskeletal: Negative for back pain.   Gastrointestinal: Negative for abdominal pain, constipation, diarrhea, heartburn, melena, nausea and vomiting.   Genitourinary: Negative for dysuria, flank pain and hematuria.   Neurological: Negative for dizziness.   Psychiatric/Behavioral: Negative for altered mental status and depression.         Past Medical History:   Diagnosis Date   • Cancer (HCC)     breast, ovarian, mets to bone         Past Surgical History:   Procedure Laterality Date   • GYN SURGERY      hysterectomy         Current Outpatient Medications   Medication Sig Dispense Refill   • rivaroxaban (XARELTO) 20 MG Tab tablet Take 20 mg by mouth with dinner.       No current  facility-administered medications for this visit.          No Known Allergies      No family history on file.      Social History     Socioeconomic History   • Marital status:      Spouse name: Not on file   • Number of children: Not on file   • Years of education: Not on file   • Highest education level: Not on file   Occupational History   • Not on file   Social Needs   • Financial resource strain: Not on file   • Food insecurity     Worry: Not on file     Inability: Not on file   • Transportation needs     Medical: Not on file     Non-medical: Not on file   Tobacco Use   • Smoking status: Never Smoker   • Smokeless tobacco: Never Used   Substance and Sexual Activity   • Alcohol use: No   • Drug use: No   • Sexual activity: Not on file   Lifestyle   • Physical activity     Days per week: Not on file     Minutes per session: Not on file   • Stress: Not on file   Relationships   • Social connections     Talks on phone: Not on file     Gets together: Not on file     Attends Faith service: Not on file     Active member of club or organization: Not on file     Attends meetings of clubs or organizations: Not on file     Relationship status: Not on file   • Intimate partner violence     Fear of current or ex partner: Not on file     Emotionally abused: Not on file     Physically abused: Not on file     Forced sexual activity: Not on file   Other Topics Concern   • Not on file   Social History Narrative   • Not on file         Physical Exam:  Ambulatory Vitals  /70 (BP Location: Left arm, Patient Position: Sitting, BP Cuff Size: Adult)   Pulse 80   Ht 1.524 m (5')   Wt 61.2 kg (135 lb)   SpO2 98%    BP Readings from Last 4 Encounters:   03/04/20 122/70   11/26/19 116/60   05/26/19 122/74   05/21/19 118/60     Weight/BMI:   Vitals:    03/04/20 1242   BP: 122/70   Weight: 61.2 kg (135 lb)   Height: 1.524 m (5')    Body mass index is 26.37 kg/m².  Wt Readings from Last 4 Encounters:   03/04/20 61.2 kg  (135 lb)   11/26/19 59.9 kg (132 lb)   05/26/19 63 kg (139 lb)   05/21/19 62.6 kg (138 lb)       Physical Exam   Constitutional: She is oriented to person, place, and time and well-developed, well-nourished, and in no distress. No distress.   HENT:   Head: Normocephalic and atraumatic.   Eyes: Pupils are equal, round, and reactive to light. Conjunctivae are normal.   Neck: Normal range of motion. Neck supple. No JVD present.   Cardiovascular: Normal rate, regular rhythm, normal heart sounds and intact distal pulses. Exam reveals no gallop and no friction rub.   No murmur heard.  Pulmonary/Chest: Effort normal and breath sounds normal. No respiratory distress. She has no wheezes. She has no rales. She exhibits no tenderness.   Abdominal: Soft. Bowel sounds are normal. She exhibits no distension.   Musculoskeletal:         General: No edema.   Neurological: She is alert and oriented to person, place, and time.   Skin: Skin is warm and dry.   Psychiatric: Affect and judgment normal.       Lab Data Review:  No results found for: CHOLSTRLTOT, LDL, HDL, TRIGLYCERIDE    Lab Results   Component Value Date/Time    SODIUM 140 04/04/2019 06:27 PM    POTASSIUM 3.9 04/04/2019 06:27 PM    CHLORIDE 106 04/04/2019 06:27 PM    CO2 27 04/04/2019 06:27 PM    GLUCOSE 97 04/04/2019 06:27 PM    BUN 18 04/04/2019 06:27 PM    CREATININE 0.64 04/04/2019 06:27 PM     CrCl cannot be calculated (Patient's most recent lab result is older than the maximum 7 days allowed.).  Lab Results   Component Value Date/Time    ALKPHOSPHAT 84 04/04/2019 06:27 PM    ASTSGOT 22 04/04/2019 06:27 PM    ALTSGPT 12 04/04/2019 06:27 PM    TBILIRUBIN 0.4 04/04/2019 06:27 PM      Lab Results   Component Value Date/Time    WBC 5.2 04/04/2019 06:27 PM     No results found for: HBA1C  No components found for: TROP    Outside labs: 11/2019  Tot chol 292, HDL 32, trig 1225, non  (patient states ate prior to testing)    Cardiac Imaging and Procedures Review:         Medical Decision Making:  Problem List Items Addressed This Visit     Pulmonary embolism (HCC)    Healthcare maintenance    Breast cancer (HCC)    Relevant Orders    EC-ECHOCARDIOGRAM COMPLETE W/O CONT        Pulmonary embolism most likely non provoked setting of breast cancer. Continue xarelto lifelong.  Breast CA planning new chemo. Check TTE prior.  Repeat lipids. Ensure fasting.    It was my pleasure to meet with Ms. Syeda Malhotra.

## 2020-03-12 ENCOUNTER — DOCUMENTATION (OUTPATIENT)
Dept: NUTRITION | Facility: MEDICAL CENTER | Age: 65
End: 2020-03-12

## 2020-03-12 ENCOUNTER — HOSPITAL ENCOUNTER (OUTPATIENT)
Dept: CARDIOLOGY | Facility: MEDICAL CENTER | Age: 65
End: 2020-03-12
Attending: INTERNAL MEDICINE
Payer: MEDICAID

## 2020-03-12 ENCOUNTER — OUTPATIENT INFUSION SERVICES (OUTPATIENT)
Dept: ONCOLOGY | Facility: MEDICAL CENTER | Age: 65
End: 2020-03-12
Attending: INTERNAL MEDICINE
Payer: MEDICAID

## 2020-03-12 VITALS
TEMPERATURE: 98.5 F | HEIGHT: 59 IN | SYSTOLIC BLOOD PRESSURE: 119 MMHG | HEART RATE: 71 BPM | DIASTOLIC BLOOD PRESSURE: 61 MMHG | OXYGEN SATURATION: 91 % | BODY MASS INDEX: 27.11 KG/M2 | WEIGHT: 134.48 LBS | RESPIRATION RATE: 18 BRPM

## 2020-03-12 DIAGNOSIS — Z17.0 MALIGNANT NEOPLASM OF OVERLAPPING SITES OF RIGHT BREAST IN FEMALE, ESTROGEN RECEPTOR POSITIVE (HCC): ICD-10-CM

## 2020-03-12 DIAGNOSIS — C50.811 MALIGNANT NEOPLASM OF OVERLAPPING SITES OF RIGHT BREAST IN FEMALE, ESTROGEN RECEPTOR POSITIVE (HCC): ICD-10-CM

## 2020-03-12 LAB
ALBUMIN SERPL BCP-MCNC: 3.7 G/DL (ref 3.2–4.9)
ALBUMIN/GLOB SERPL: 1.2 G/DL
ALP SERPL-CCNC: 89 U/L (ref 30–99)
ALT SERPL-CCNC: 11 U/L (ref 2–50)
ANION GAP SERPL CALC-SCNC: 6 MMOL/L (ref 7–16)
AST SERPL-CCNC: 18 U/L (ref 12–45)
BASOPHILS # BLD AUTO: 0.4 % (ref 0–1.8)
BASOPHILS # BLD: 0.03 K/UL (ref 0–0.12)
BILIRUB SERPL-MCNC: 0.4 MG/DL (ref 0.1–1.5)
BUN SERPL-MCNC: 14 MG/DL (ref 8–22)
CALCIUM SERPL-MCNC: 9.8 MG/DL (ref 8.5–10.5)
CHLORIDE SERPL-SCNC: 104 MMOL/L (ref 96–112)
CO2 SERPL-SCNC: 29 MMOL/L (ref 20–33)
CREAT SERPL-MCNC: 0.69 MG/DL (ref 0.5–1.4)
EKG IMPRESSION: NORMAL
EOSINOPHIL # BLD AUTO: 0.11 K/UL (ref 0–0.51)
EOSINOPHIL NFR BLD: 1.6 % (ref 0–6.9)
ERYTHROCYTE [DISTWIDTH] IN BLOOD BY AUTOMATED COUNT: 59.7 FL (ref 35.9–50)
GLOBULIN SER CALC-MCNC: 3 G/DL (ref 1.9–3.5)
GLUCOSE SERPL-MCNC: 100 MG/DL (ref 65–99)
HCT VFR BLD AUTO: 39 % (ref 37–47)
HGB BLD-MCNC: 12.8 G/DL (ref 12–16)
IMM GRANULOCYTES # BLD AUTO: 0.04 K/UL (ref 0–0.11)
IMM GRANULOCYTES NFR BLD AUTO: 0.6 % (ref 0–0.9)
LYMPHOCYTES # BLD AUTO: 1.18 K/UL (ref 1–4.8)
LYMPHOCYTES NFR BLD: 17.4 % (ref 22–41)
MAGNESIUM SERPL-MCNC: 2.1 MG/DL (ref 1.5–2.5)
MCH RBC QN AUTO: 33.8 PG (ref 27–33)
MCHC RBC AUTO-ENTMCNC: 32.8 G/DL (ref 33.6–35)
MCV RBC AUTO: 102.9 FL (ref 81.4–97.8)
MONOCYTES # BLD AUTO: 0.45 K/UL (ref 0–0.85)
MONOCYTES NFR BLD AUTO: 6.6 % (ref 0–13.4)
NEUTROPHILS # BLD AUTO: 4.98 K/UL (ref 2–7.15)
NEUTROPHILS NFR BLD: 73.4 % (ref 44–72)
NRBC # BLD AUTO: 0 K/UL
NRBC BLD-RTO: 0 /100 WBC
PLATELET # BLD AUTO: 256 K/UL (ref 164–446)
PMV BLD AUTO: 9.1 FL (ref 9–12.9)
POTASSIUM SERPL-SCNC: 4.2 MMOL/L (ref 3.6–5.5)
PROT SERPL-MCNC: 6.7 G/DL (ref 6–8.2)
RBC # BLD AUTO: 3.79 M/UL (ref 4.2–5.4)
SODIUM SERPL-SCNC: 139 MMOL/L (ref 135–145)
WBC # BLD AUTO: 6.8 K/UL (ref 4.8–10.8)

## 2020-03-12 PROCEDURE — 80053 COMPREHEN METABOLIC PANEL: CPT

## 2020-03-12 PROCEDURE — 93005 ELECTROCARDIOGRAM TRACING: CPT | Performed by: INTERNAL MEDICINE

## 2020-03-12 PROCEDURE — 96409 CHEMO IV PUSH SNGL DRUG: CPT

## 2020-03-12 PROCEDURE — 85025 COMPLETE CBC W/AUTO DIFF WBC: CPT

## 2020-03-12 PROCEDURE — 700111 HCHG RX REV CODE 636 W/ 250 OVERRIDE (IP): Performed by: INTERNAL MEDICINE

## 2020-03-12 PROCEDURE — 83735 ASSAY OF MAGNESIUM: CPT

## 2020-03-12 PROCEDURE — 93010 ELECTROCARDIOGRAM REPORT: CPT | Performed by: INTERNAL MEDICINE

## 2020-03-12 RX ORDER — LEVOTHYROXINE SODIUM 0.05 MG/1
1 TABLET ORAL DAILY
COMMUNITY
Start: 2020-03-03 | End: 2020-11-16

## 2020-03-12 RX ORDER — ONDANSETRON 8 MG/1
8 TABLET, ORALLY DISINTEGRATING ORAL ONCE
Status: COMPLETED | OUTPATIENT
Start: 2020-03-12 | End: 2020-03-12

## 2020-03-12 RX ADMIN — ONDANSETRON 8 MG: 8 TABLET, ORALLY DISINTEGRATING ORAL at 13:50

## 2020-03-12 RX ADMIN — ERIBULIN MESYLATE 2.23 MG: 0.5 INJECTION INTRAVENOUS at 14:18

## 2020-03-12 ASSESSMENT — FIBROSIS 4 INDEX: FIB4 SCORE: 1.67

## 2020-03-12 NOTE — PROGRESS NOTES
"Pharmacy Chemotherapy Calculations Note:    Dx: Stage IV Breast  Cancer (ER/NV+, HER2 neg)  Cycle:  1 Day 1   Previous treatment: dx 2014, most recently on Xeloda     Protocol: Halaven  Eribulin (Halaven) 1.4 mg/m2 IV on Days 1 and 8  21-day cycle until disease progression or unacceptable toxicity  NCCN Guidelines for Breast Cancer. V.1.2019.  Artem J, et al. Lancet. 2011;377(3694):479-23.          /61   Pulse 71   Temp 36.9 °C (98.5 °F) (Temporal)   Resp 18   Ht 1.5 m (4' 11.06\")   Wt 61 kg (134 lb 7.7 oz)   SpO2 91%   BMI 27.11 kg/m²  Body surface area is 1.59 meters squared.    Labs from 3/12/20 reviewed - all within treatment parameters. QTc = 423       Erubilin (Halaven) 1.4 mg/m² x 1.59 m² = 2.226 mg   <10% difference, okay to treat with final dose = 2.225 mg IV      Anny Camilo, PharmD, BCOP             "

## 2020-03-12 NOTE — PROGRESS NOTES
Chemotherapy Verification - SECONDARY RN       Height = 150 cm  Weight = 61 kg  BSA = 1.59 m2       Medication: Halaven  Dose: 1.4 mg/m2  Calculated Dose: 2.226 mg                             (In mg/m2, AUC, mg/kg)         I confirm that this process was performed independently.

## 2020-03-12 NOTE — PROGRESS NOTES
Chemotherapy Verification - PRIMARY RN      Height = 150 cm  Weight = 61 kg  BSA = 1.59 m^2       Medication: Halaven  Dose: 1.4 mg/m^2  Calculated Dose: 2.23 mg                             (In mg/m2, AUC, mg/kg)       I confirm this process was performed independently with the BSA and all final chemotherapy dosing calculations congruent.  Any discrepancies of 10% or greater have been addressed with the chemotherapy pharmacist. The resolution of the discrepancy has been documented in the EPIC progress notes.

## 2020-03-12 NOTE — PROGRESS NOTES
Nutrition Services: RD Consultation/ New Chemo Start  64 year old female with diagnosis of breast cancer.       Past Medical History:   Diagnosis Date   • Cancer (HCC)     breast, ovarian, mets to bone     RD met with pt to assess current intake, appetite, and nutritional status.  Pt presents to appointment with her daughter.  After speaking with RN and daughter, patient would benefit from visit utilizing  Ipad for ease of communication.  Utilized Ipad with : [id number].    Patient notes that her weight 3-5 years ago was 148 pounds.  Her daughter notes that the patients appetite has been slightly smaller at this time.  She typically eats two meals per day and does not snacks.  She does not drink protein shakes or supplements at this time.  Patient notes that when she has chemotherapy previously she did not experience any negative side effects.     We discussed nutrition related goals during treatment as well and RD role. We discussed the potential side effects of treatment and their impacts on nutrition. We discussed the benefit of small, frequent meals and snacks focusing on high calorie/protein items as well as the benefits of maintaining weight and lean muscle mass throughout treatment. Provided and went over handouts/food lists regarding a general healthy diet, healthy snack ideas, foods high in protein and the benefits of a plant based diet (in [place]ian). Pt did not express any further nutrition-related questions or concerns at this time.     Assessment:  • Pertinent Labs (3/12): Glucose 100  • Pertinent Meds: synthroid  • Weight: 134 pounds/61 kg  • Height: 4'11''  • BMI: 27  • Weight appears to be up and down in the last year ~ 5-7 pounds. Weight has trended up per chart review in the last three months.     Weight History from Chart:  4/4/2019: 139 pounds  5/21/2019: 138 pounds  11/26/2019: 132 pounds    Plan/Recommendations:  • Provided and discussed handout regarding general nutrition  guidelines as well as nutritional recommendations for patient's with cancer, including tips/tricks should side effects of tx occur.   • Increase meal and snack frequency - add 1-2 snacks per day.   • Focus on high calorie and protein foods, fruits and vegetables with all meals/snacks - handout provided.    Pt reports understanding and was receptive to information provided.  RD provided contact information. Encouraged pt to reach out as questions/concerns arise.  RD following and to make further recommendations as indicated.  [phone]

## 2020-03-12 NOTE — PROGRESS NOTES
"Pharmacy Chemotherapy Calculation    Patient Name: Christine Malhotra   Dx: Breast Cancer (Stage IV, ER/KS +, HER2 negative)  Protocol: EriBULin     *Dosing Reference*  EriBULin 1.4 mg/m2 IV push on Days 1 and 8   21 day cycle until disease progression or unacceptable toxicity  NCCN Guidelines for Breast Cancer V.1.2019.  Artem WADE, et al. Lancet. 2011;377(2123):018-85.    Allergies:  Patient has no known allergies.     /61   Pulse 71   Temp 36.9 °C (98.5 °F) (Temporal)   Resp 18   Ht 1.5 m (4' 11.06\")   Wt 61 kg (134 lb 7.7 oz)   SpO2 91%   BMI 27.11 kg/m²  Body surface area is 1.59 meters squared.    Labs 3/12/20:  ANC~ 4980 Plt = 256k   Hgb = 12.8     SCr = 0.69 mg/dL CrCl ~ 80 mL/min   LFT's = WNL TBili = 0.4   K = 4.2  Mag = 2.1    3/12/20 EKG =     Drug Order   (Drug name, dose, route, IV Fluid & volume, frequency, number of doses) Cycle 1      Previous treatment: s/p 6 cycles of TC 12/2014; Arimidex on 1/2015     Medication = eriBULin (Halaven)  Base Dose= 1.4 mg/m2  Calc Dose: Base Dose x 1.59 m2 = 2.226 mg  Final Dose = 2.225 mg  Route = IVP  Fluid & Volume = 4.45 mL in syringe   Conc = 0.5 mg/mL  Admin Duration = Over 2-5 minutes          <10% difference, okay to treat with final dose     By my signature below, I confirm this process was performed independently with the BSA and all final chemotherapy dosing calculations congruent. I have reviewed the above chemotherapy order and that my calculation of the final dose and BSA (when applicable) corroborate those calculations of the  pharmacist. Discrepancies of 10% or greater in the written dose have been addressed and documented within the [facility] Progress notes.    Matt Humphries, PharmD      "

## 2020-03-13 NOTE — PROGRESS NOTES
Pt presented for D1C1 Halaven. In house  [place] present and translated, pt's daughter speaks proficient English but more comfortable having  for crucial conversations. POC discussed, assessment completed. EKG already completed before visit. PIV started with some difficulty, labs drawn. Results OK for treatment. Pre-med given. Halaven given by IV push over 5 mins without issue. PIV with brisk blood return after, line flushed and removed, gauze drsg placed. Next appts made, printout given to daughter. Left on foot to self care.

## 2020-03-18 RX ORDER — ACETAMINOPHEN 325 MG/1
650 TABLET ORAL ONCE
Status: CANCELLED | OUTPATIENT
Start: 2020-03-18

## 2020-03-18 RX ORDER — ACETAMINOPHEN 325 MG/1
650 TABLET ORAL PRN
Status: CANCELLED | OUTPATIENT
Start: 2020-03-18

## 2020-03-18 RX ORDER — DIPHENHYDRAMINE HYDROCHLORIDE 50 MG/ML
25 INJECTION INTRAMUSCULAR; INTRAVENOUS PRN
Status: CANCELLED | OUTPATIENT
Start: 2020-03-18

## 2020-03-18 RX ORDER — DIPHENHYDRAMINE HCL 25 MG
25 TABLET ORAL ONCE
Status: CANCELLED | OUTPATIENT
Start: 2020-03-18

## 2020-03-19 ENCOUNTER — OUTPATIENT INFUSION SERVICES (OUTPATIENT)
Dept: ONCOLOGY | Facility: MEDICAL CENTER | Age: 65
End: 2020-03-19
Attending: INTERNAL MEDICINE
Payer: MEDICAID

## 2020-03-19 VITALS
BODY MASS INDEX: 27.64 KG/M2 | HEART RATE: 80 BPM | SYSTOLIC BLOOD PRESSURE: 143 MMHG | DIASTOLIC BLOOD PRESSURE: 70 MMHG | HEIGHT: 59 IN | TEMPERATURE: 98.6 F | RESPIRATION RATE: 18 BRPM | OXYGEN SATURATION: 98 % | WEIGHT: 137.13 LBS

## 2020-03-19 DIAGNOSIS — Z17.0 MALIGNANT NEOPLASM OF OVERLAPPING SITES OF RIGHT BREAST IN FEMALE, ESTROGEN RECEPTOR POSITIVE (HCC): ICD-10-CM

## 2020-03-19 DIAGNOSIS — C50.811 MALIGNANT NEOPLASM OF OVERLAPPING SITES OF RIGHT BREAST IN FEMALE, ESTROGEN RECEPTOR POSITIVE (HCC): ICD-10-CM

## 2020-03-19 LAB
BASOPHILS # BLD AUTO: 2.4 % (ref 0–1.8)
BASOPHILS # BLD: 0.08 K/UL (ref 0–0.12)
EOSINOPHIL # BLD AUTO: 0.01 K/UL (ref 0–0.51)
EOSINOPHIL NFR BLD: 0.3 % (ref 0–6.9)
ERYTHROCYTE [DISTWIDTH] IN BLOOD BY AUTOMATED COUNT: 57.1 FL (ref 35.9–50)
HCT VFR BLD AUTO: 36.8 % (ref 37–47)
HGB BLD-MCNC: 12.2 G/DL (ref 12–16)
IMM GRANULOCYTES # BLD AUTO: 0.12 K/UL (ref 0–0.11)
IMM GRANULOCYTES NFR BLD AUTO: 3.7 % (ref 0–0.9)
LYMPHOCYTES # BLD AUTO: 0.9 K/UL (ref 1–4.8)
LYMPHOCYTES NFR BLD: 27.5 % (ref 22–41)
MCH RBC QN AUTO: 33.6 PG (ref 27–33)
MCHC RBC AUTO-ENTMCNC: 33.2 G/DL (ref 33.6–35)
MCV RBC AUTO: 101.4 FL (ref 81.4–97.8)
MONOCYTES # BLD AUTO: 0.27 K/UL (ref 0–0.85)
MONOCYTES NFR BLD AUTO: 8.3 % (ref 0–13.4)
NEUTROPHILS # BLD AUTO: 1.89 K/UL (ref 2–7.15)
NEUTROPHILS NFR BLD: 57.8 % (ref 44–72)
NRBC # BLD AUTO: 0 K/UL
NRBC BLD-RTO: 0 /100 WBC
PLATELET # BLD AUTO: 238 K/UL (ref 164–446)
PMV BLD AUTO: 8.8 FL (ref 9–12.9)
RBC # BLD AUTO: 3.63 M/UL (ref 4.2–5.4)
WBC # BLD AUTO: 3.3 K/UL (ref 4.8–10.8)

## 2020-03-19 PROCEDURE — 85025 COMPLETE CBC W/AUTO DIFF WBC: CPT

## 2020-03-19 PROCEDURE — 96409 CHEMO IV PUSH SNGL DRUG: CPT

## 2020-03-19 PROCEDURE — 700111 HCHG RX REV CODE 636 W/ 250 OVERRIDE (IP): Performed by: INTERNAL MEDICINE

## 2020-03-19 RX ORDER — OMEPRAZOLE 20 MG/1
20 CAPSULE, DELAYED RELEASE ORAL DAILY
COMMUNITY

## 2020-03-19 RX ORDER — ONDANSETRON 8 MG/1
8 TABLET, ORALLY DISINTEGRATING ORAL ONCE
Status: COMPLETED | OUTPATIENT
Start: 2020-03-19 | End: 2020-03-19

## 2020-03-19 RX ORDER — LOPERAMIDE HYDROCHLORIDE 2 MG/1
2 CAPSULE ORAL 4 TIMES DAILY PRN
COMMUNITY
End: 2022-01-20 | Stop reason: SDUPTHER

## 2020-03-19 RX ADMIN — ONDANSETRON 8 MG: 8 TABLET, ORALLY DISINTEGRATING ORAL at 08:50

## 2020-03-19 RX ADMIN — ERIBULIN MESYLATE 2.25 MG: 0.5 INJECTION INTRAVENOUS at 09:30

## 2020-03-19 ASSESSMENT — FIBROSIS 4 INDEX: FIB4 SCORE: 1.31

## 2020-03-19 NOTE — PROGRESS NOTES
"Pharmacy Chemotherapy Calculations Note:    Dx: Stage IV Breast  Cancer (ER/TN+, HER2 neg)    Cycle 1, Day 8  Previous treatment: C1D1 on 3/12/20     Protocol: Halaven  *Dosing Reference*  Eribulin (Halaven) 1.4 mg/m2 IV on Days 1 and 8  21-day cycle until disease progression or unacceptable toxicity  NCCN Guidelines for Breast Cancer. V.1.2019.  Artem J, et al. Lancet. 2011;377(7288):907-23.          /70   Pulse 80   Temp 37 °C (98.6 °F) (Temporal)   Resp 18   Ht 1.495 m (4' 10.86\")   Wt 62.2 kg (137 lb 2 oz)   SpO2 98%   BMI 27.83 kg/m²  Body surface area is 1.61 meters squared.    All labs (3/19/20) within treatment plan parameters.   QTc = 423       Erubilin (Halaven) 1.4 mg/m² x 1.61 m² = 2.254 mg   <10% difference, okay to treat with final dose = 2.255 mg IV      Blanca Yap, PharmD  "

## 2020-03-19 NOTE — PROGRESS NOTES
"Pharmacy Chemotherapy Calculation    Patient Name: Christine Malhotra   Dx: Breast Cancer (Stage IV, ER/CT +, HER2 negative)  Protocol: EriBULin     *Dosing Reference*  EriBULin 1.4 mg/m2 IV push on Days 1 and 8   21 day cycle until disease progression or unacceptable toxicity  NCCN Guidelines for Breast Cancer V.1.2019.  Artem WADE, et al. Lancet. 2011;377(8740):194-15.    Allergies:  Patient has no known allergies.     /70   Pulse 80   Temp 37 °C (98.6 °F) (Temporal)   Resp 18   Ht 1.495 m (4' 10.86\")   Wt 62.2 kg (137 lb 2 oz)   SpO2 98%   BMI 27.83 kg/m²  Body surface area is 1.61 meters squared.    Labs 3/19/20:  ANC~ 1890 Plt = 238k   Hgb = 12.2       3/12/20 EKG =     Drug Order   (Drug name, dose, route, IV Fluid & volume, frequency, number of doses) Cycle 1 Day 8  Previous treatment: C1D1 3/12/20     Medication = eriBULin (Halaven)  Base Dose= 1.4 mg/m2  Calc Dose: Base Dose x 1.61 m2 = 2.254 mg  Final Dose = 2.255 mg  Route = IVP  Fluid & Volume = 4.51 mL in syringe   Conc = 0.5 mg/mL  Admin Duration = Over 2-5 minutes          <10% difference, okay to treat with final dose     By my signature below, I confirm this process was performed independently with the BSA and all final chemotherapy dosing calculations congruent. I have reviewed the above chemotherapy order and that my calculation of the final dose and BSA (when applicable) corroborate those calculations of the  pharmacist. Discrepancies of 10% or greater in the written dose have been addressed and documented within the [facility] Progress notes.    Matt Humphries, PharmD      "

## 2020-03-19 NOTE — PROGRESS NOTES
into [facility] for Day 8 / Cycle 1 of Halaven for Right breast cancer. Pt denied having any new or acute complaints today, reports tolerating past treatments well. PIV started, had + blood return flushed briskly. Pt given anticancer therapy as prescribed, tolerated well, denied having any complaints during or after infusion. PIV discontinued, bleeding controlled with gauze and coban. Discharge home to self care in [facility]. Appointment confirm for next treatment.

## 2020-03-19 NOTE — PROGRESS NOTES
Chemotherapy Verification - PRIMARY RN      Height = 50.86 IN  Weight = 137 LB  BSA = 1.61 m2       Medication: Halaven  Dose: 1.4 mg/m2  Calculated Dose: 2.254 mg                             (In mg/m2, AUC, mg/kg)     I confirm this process was performed independently with the BSA and all final chemotherapy dosing calculations congruent.  Any discrepancies of 10% or greater have been addressed with the chemotherapy pharmacist. The resolution of the discrepancy has been documented in the EPIC progress notes.

## 2020-03-31 ENCOUNTER — OUTPATIENT INFUSION SERVICES (OUTPATIENT)
Dept: ONCOLOGY | Facility: MEDICAL CENTER | Age: 65
End: 2020-03-31
Attending: INTERNAL MEDICINE
Payer: MEDICAID

## 2020-03-31 VITALS
SYSTOLIC BLOOD PRESSURE: 134 MMHG | TEMPERATURE: 97.3 F | HEIGHT: 58 IN | DIASTOLIC BLOOD PRESSURE: 60 MMHG | BODY MASS INDEX: 28.23 KG/M2 | OXYGEN SATURATION: 96 % | HEART RATE: 78 BPM | RESPIRATION RATE: 18 BRPM | WEIGHT: 134.48 LBS

## 2020-03-31 DIAGNOSIS — C50.811 MALIGNANT NEOPLASM OF OVERLAPPING SITES OF RIGHT BREAST IN FEMALE, ESTROGEN RECEPTOR POSITIVE (HCC): ICD-10-CM

## 2020-03-31 DIAGNOSIS — Z17.0 MALIGNANT NEOPLASM OF OVERLAPPING SITES OF RIGHT BREAST IN FEMALE, ESTROGEN RECEPTOR POSITIVE (HCC): ICD-10-CM

## 2020-03-31 LAB
ALBUMIN SERPL BCP-MCNC: 3.6 G/DL (ref 3.2–4.9)
ALBUMIN/GLOB SERPL: 1 G/DL
ALP SERPL-CCNC: 80 U/L (ref 30–99)
ALT SERPL-CCNC: 13 U/L (ref 2–50)
ANION GAP SERPL CALC-SCNC: 12 MMOL/L (ref 7–16)
AST SERPL-CCNC: 17 U/L (ref 12–45)
BASOPHILS # BLD AUTO: 0.9 % (ref 0–1.8)
BASOPHILS # BLD: 0.04 K/UL (ref 0–0.12)
BILIRUB SERPL-MCNC: 0.2 MG/DL (ref 0.1–1.5)
BUN SERPL-MCNC: 19 MG/DL (ref 8–22)
CALCIUM SERPL-MCNC: 9.2 MG/DL (ref 8.5–10.5)
CHLORIDE SERPL-SCNC: 101 MMOL/L (ref 96–112)
CO2 SERPL-SCNC: 26 MMOL/L (ref 20–33)
CREAT SERPL-MCNC: 0.61 MG/DL (ref 0.5–1.4)
EOSINOPHIL # BLD AUTO: 0 K/UL (ref 0–0.51)
EOSINOPHIL NFR BLD: 0 % (ref 0–6.9)
ERYTHROCYTE [DISTWIDTH] IN BLOOD BY AUTOMATED COUNT: 57.7 FL (ref 35.9–50)
GLOBULIN SER CALC-MCNC: 3.5 G/DL (ref 1.9–3.5)
GLUCOSE SERPL-MCNC: 104 MG/DL (ref 65–99)
HCT VFR BLD AUTO: 37.9 % (ref 37–47)
HGB BLD-MCNC: 12.4 G/DL (ref 12–16)
IMM GRANULOCYTES # BLD AUTO: 0.17 K/UL (ref 0–0.11)
IMM GRANULOCYTES NFR BLD AUTO: 3.6 % (ref 0–0.9)
LYMPHOCYTES # BLD AUTO: 1.11 K/UL (ref 1–4.8)
LYMPHOCYTES NFR BLD: 23.7 % (ref 22–41)
MAGNESIUM SERPL-MCNC: 2 MG/DL (ref 1.5–2.5)
MCH RBC QN AUTO: 32.9 PG (ref 27–33)
MCHC RBC AUTO-ENTMCNC: 32.7 G/DL (ref 33.6–35)
MCV RBC AUTO: 100.5 FL (ref 81.4–97.8)
MONOCYTES # BLD AUTO: 0.44 K/UL (ref 0–0.85)
MONOCYTES NFR BLD AUTO: 9.4 % (ref 0–13.4)
NEUTROPHILS # BLD AUTO: 2.93 K/UL (ref 2–7.15)
NEUTROPHILS NFR BLD: 62.4 % (ref 44–72)
NRBC # BLD AUTO: 0 K/UL
NRBC BLD-RTO: 0 /100 WBC
PLATELET # BLD AUTO: 358 K/UL (ref 164–446)
PMV BLD AUTO: 8.9 FL (ref 9–12.9)
POTASSIUM SERPL-SCNC: 4.2 MMOL/L (ref 3.6–5.5)
PROT SERPL-MCNC: 7.1 G/DL (ref 6–8.2)
RBC # BLD AUTO: 3.77 M/UL (ref 4.2–5.4)
SODIUM SERPL-SCNC: 139 MMOL/L (ref 135–145)
WBC # BLD AUTO: 4.7 K/UL (ref 4.8–10.8)

## 2020-03-31 PROCEDURE — 96409 CHEMO IV PUSH SNGL DRUG: CPT

## 2020-03-31 PROCEDURE — 80053 COMPREHEN METABOLIC PANEL: CPT

## 2020-03-31 PROCEDURE — 85025 COMPLETE CBC W/AUTO DIFF WBC: CPT

## 2020-03-31 PROCEDURE — 83735 ASSAY OF MAGNESIUM: CPT

## 2020-03-31 PROCEDURE — 700111 HCHG RX REV CODE 636 W/ 250 OVERRIDE (IP): Performed by: INTERNAL MEDICINE

## 2020-03-31 RX ORDER — 0.9 % SODIUM CHLORIDE 0.9 %
3 VIAL (ML) INJECTION PRN
Status: CANCELLED | OUTPATIENT
Start: 2020-04-01

## 2020-03-31 RX ORDER — SODIUM CHLORIDE 9 MG/ML
INJECTION, SOLUTION INTRAVENOUS CONTINUOUS
Status: CANCELLED | OUTPATIENT
Start: 2020-04-09

## 2020-03-31 RX ORDER — SODIUM CHLORIDE 9 MG/ML
INJECTION, SOLUTION INTRAVENOUS CONTINUOUS
Status: CANCELLED | OUTPATIENT
Start: 2020-04-02

## 2020-03-31 RX ORDER — 0.9 % SODIUM CHLORIDE 0.9 %
VIAL (ML) INJECTION PRN
Status: CANCELLED | OUTPATIENT
Start: 2020-04-01

## 2020-03-31 RX ORDER — 0.9 % SODIUM CHLORIDE 0.9 %
10 VIAL (ML) INJECTION PRN
Status: CANCELLED | OUTPATIENT
Start: 2020-04-02

## 2020-03-31 RX ORDER — PROCHLORPERAZINE MALEATE 10 MG
10 TABLET ORAL EVERY 6 HOURS PRN
Status: CANCELLED | OUTPATIENT
Start: 2020-04-02

## 2020-03-31 RX ORDER — ONDANSETRON 2 MG/ML
4 INJECTION INTRAMUSCULAR; INTRAVENOUS PRN
Status: CANCELLED | OUTPATIENT
Start: 2020-04-09

## 2020-03-31 RX ORDER — 0.9 % SODIUM CHLORIDE 0.9 %
10 VIAL (ML) INJECTION PRN
Status: CANCELLED | OUTPATIENT
Start: 2020-04-09

## 2020-03-31 RX ORDER — ONDANSETRON 8 MG/1
8 TABLET, ORALLY DISINTEGRATING ORAL ONCE
Status: COMPLETED | OUTPATIENT
Start: 2020-03-31 | End: 2020-03-31

## 2020-03-31 RX ORDER — ONDANSETRON 8 MG/1
8 TABLET, ORALLY DISINTEGRATING ORAL PRN
Status: CANCELLED | OUTPATIENT
Start: 2020-04-09

## 2020-03-31 RX ORDER — ONDANSETRON 8 MG/1
8 TABLET, ORALLY DISINTEGRATING ORAL ONCE
Status: CANCELLED | OUTPATIENT
Start: 2020-04-02

## 2020-03-31 RX ORDER — 0.9 % SODIUM CHLORIDE 0.9 %
3 VIAL (ML) INJECTION PRN
Status: CANCELLED | OUTPATIENT
Start: 2020-04-09

## 2020-03-31 RX ORDER — 0.9 % SODIUM CHLORIDE 0.9 %
10 VIAL (ML) INJECTION PRN
Status: CANCELLED | OUTPATIENT
Start: 2020-04-01

## 2020-03-31 RX ORDER — ONDANSETRON 2 MG/ML
4 INJECTION INTRAMUSCULAR; INTRAVENOUS PRN
Status: CANCELLED | OUTPATIENT
Start: 2020-04-02

## 2020-03-31 RX ORDER — 0.9 % SODIUM CHLORIDE 0.9 %
3 VIAL (ML) INJECTION PRN
Status: CANCELLED | OUTPATIENT
Start: 2020-04-02

## 2020-03-31 RX ORDER — 0.9 % SODIUM CHLORIDE 0.9 %
VIAL (ML) INJECTION PRN
Status: CANCELLED | OUTPATIENT
Start: 2020-04-02

## 2020-03-31 RX ORDER — ONDANSETRON 8 MG/1
8 TABLET, ORALLY DISINTEGRATING ORAL PRN
Status: CANCELLED | OUTPATIENT
Start: 2020-04-02

## 2020-03-31 RX ORDER — PROCHLORPERAZINE MALEATE 10 MG
10 TABLET ORAL EVERY 6 HOURS PRN
Status: CANCELLED | OUTPATIENT
Start: 2020-04-09

## 2020-03-31 RX ORDER — ONDANSETRON 8 MG/1
8 TABLET, ORALLY DISINTEGRATING ORAL ONCE
Status: CANCELLED | OUTPATIENT
Start: 2020-04-09

## 2020-03-31 RX ORDER — 0.9 % SODIUM CHLORIDE 0.9 %
VIAL (ML) INJECTION PRN
Status: CANCELLED | OUTPATIENT
Start: 2020-04-09

## 2020-03-31 RX ADMIN — ERIBULIN MESYLATE 2.21 MG: 0.5 INJECTION INTRAVENOUS at 12:17

## 2020-03-31 RX ADMIN — ONDANSETRON 8 MG: 8 TABLET, ORALLY DISINTEGRATING ORAL at 12:12

## 2020-03-31 ASSESSMENT — FIBROSIS 4 INDEX: FIB4 SCORE: 1.41

## 2020-03-31 NOTE — PROGRESS NOTES
Chemotherapy Verification - SECONDARY RN       Height = 58.27  Weight = 61 kg  BSA = 1.58 m2       Medication: Eribulin mesylate (Halaven)  Dose: 1.4 mg/m2  Calculated Dose: 2.21 mg                             (In mg/m2, AUC, mg/kg)         I confirm that this process was performed independently.

## 2020-03-31 NOTE — PROGRESS NOTES
"Pharmacy Chemotherapy Calculations Note:    Dx: Stage IV Breast  Cancer (ER/FL+, HER2 neg)    Cycle 2 Day 1   Previous treatment: C1D8 on 3/19/20     Protocol: Halaven  *Dosing Reference*  Eribulin (Halaven) 1.4 mg/m2 IV on Days 1 and 8   21-day cycle until disease progression or unacceptable toxicity  NCCN Guidelines for Breast Cancer. V.1.2019.  Artem J, et al. Lancet. 2011;377(4310):201-23.          /60   Pulse 78   Temp 36.3 °C (97.3 °F) (Temporal)   Resp 18   Ht 1.48 m (4' 10.27\")   Wt 61 kg (134 lb 7.7 oz)   SpO2 96%   BMI 27.85 kg/m²  Body surface area is 1.58 meters squared.    Labs 3/31/20:  ANC~ 2930 Plt = 358k   Hgb = 12.4     SCr = 0.61mg/dL CrCl ~ 92 mL/min   LFT's = WNL TBili = 0.2   K = 4.2  Mag = 2    3/12/20 EKG = QTc = 423    Erubilin (Halaven) 1.4 mg/m² x 1.58 m² = 2.212 mg   <10% difference, okay to treat with final dose = 2.21 mg IV      Matt Humphries, PharmD  "

## 2020-03-31 NOTE — PROGRESS NOTES
Pt arrives to [facility] for Halaven.  Discussed plan of care using Romanian interpretor phone.  Pt denies s/sx of infections or other complaints.  PIV established to L-AC by BELLO Francois and labs were drawn.  Labs reviewed and pt meets parameters for treatment.  Halaven given without issue.  PIV flushed and site removed.  Site wrapped with pressure dressing.  Spoke to scheduling dept to schedule more appts and gave pt a copy of schedule.  Pt dc home to self care.

## 2020-03-31 NOTE — PROGRESS NOTES
Chemotherapy Verification - PRIMARY RN    C2D1    Height = 148 cm  Weight = 61 kg  BSA = 1.58 m2       Medication: Halaven  Dose: 1.4 mg/m2  Calculated Dose: 2.21 mg                             (In mg/m2, AUC, mg/kg)      I confirm this process was performed independently with the BSA and all final chemotherapy dosing calculations congruent.  Any discrepancies of 10% or greater have been addressed with the chemotherapy pharmacist. The resolution of the discrepancy has been documented in the EPIC progress notes.

## 2020-03-31 NOTE — PROGRESS NOTES
"Pharmacy Chemotherapy Calculation    Patient Name: Christine Malhotra   Dx: Breast Cancer (Stage IV, ER/FL +, HER2 negative)  Protocol: EriBULin     *Dosing Reference*  EriBULin 1.4 mg/m2 IV push on Days 1 and 8   21 day cycle until disease progression or unacceptable toxicity  NCCN Guidelines for Breast Cancer V.1.2019.  Artem WADE, et al. Lancet. 2011;377(4136):669-65.    Allergies:  Patient has no known allergies.     /60   Pulse 78   Temp 36.3 °C (97.3 °F) (Temporal)   Resp 18   Ht 1.48 m (4' 10.27\")   Wt 61 kg (134 lb 7.7 oz)   SpO2 96%   BMI 27.85 kg/m²  Body surface area is 1.58 meters squared.    Labs 3/31/20  ANC~ 2930 Plt = 358k   Hgb = 12.4     SCr = 0.61 mg/dL CrCl ~ 79.5 mL/min (minimum SCr of 0.7)   LFT's = WNLs  TBili = 0.2   K = 4.2  Mg = 2    3/12/20 EKG =     Drug Order   (Drug name, dose, route, IV Fluid & volume, frequency, number of doses) Cycle 2 Day 1  Previous treatment: C1D8 on 3/19/20     Medication = eriBULin (Halaven)  Base Dose= 1.4 mg/m2  Calc Dose: Base Dose x 1.58 m2 = 2.212 mg  Final Dose = 2.21 mg  Route = IVP  Fluid & Volume = 4.42 mL in syringe   Conc = 0.5 mg/mL  Admin Duration = Over 2-5 minutes          <10% difference, okay to treat with final dose     By my signature below, I confirm this process was performed independently with the BSA and all final chemotherapy dosing calculations congruent. I have reviewed the above chemotherapy order and that my calculation of the final dose and BSA (when applicable) corroborate those calculations of the  pharmacist. Discrepancies of 10% or greater in the written dose have been addressed and documented within the [facility] Progress notes.    Pepito Merritt, PharmD      "

## 2020-04-04 NOTE — PROGRESS NOTES
"Pharmacy Chemotherapy Verification    Patient Name: Christine Malhotra   Dx: Breast Cancer (Stage IV, ER/DE +, HER2 negative)  Protocol: EriBULin     *Dosing Reference*  EriBULin 1.4 mg/m2 IV push on Days 1 and 8  21 day cycle until disease progression or unacceptable toxicity  NCCN Guidelines for Breast Cancer V.1.2019.  Artem WADE, et al. Lancet. 2011;377(2133):976-96.    Allergies:  Patient has no known allergies.     /50   Pulse 77   Temp 36.3 °C (97.4 °F) (Temporal)   Resp 18   Ht 1.48 m (4' 10.27\")   Wt 61 kg (134 lb 7.7 oz)   SpO2 96%   BMI 27.85 kg/m²  Body surface area is 1.58 meters squared.    Labs 4/7/20  ANC 3960 Hgb 12   Plt 314k  K = 4.2  Mg = 2    Labs 3/31/20   SCr 0.61 CrCl 79.5 mL/min   AST/ALT/AP = 17/13/80 Tbili = 0.2    EKG 3/12/20      Drug Order   (Drug name, dose, route, IV Fluid & volume, frequency, number of doses) Cycle 2 Day 8  Previous treatment: C2D1 on 3/31/20     Medication = eriBULin (Halaven)  Base Dose= 1.4 mg/m2  Calc Dose: Base Dose x 1.58 m2 = 2.212 mg  Final Dose = 2.21 mg  Route = IVP  Fluid & Volume = 4.42 mL in syringe   Concentration = 0.5 mg/mL  Admin Duration = Over 2-5 minutes          <10% difference, okay to treat with final dose     By my signature below, I confirm this process was performed independently with the BSA and all final chemotherapy dosing calculations congruent. I have reviewed the above chemotherapy order and that my calculation of the final dose and BSA (when applicable) corroborate those calculations of the  pharmacist. Discrepancies of 10% or greater in the written dose have been addressed and documented within the [facility] Progress notes.    Roseanne Gross, PharmD, BCOP      "

## 2020-04-06 NOTE — PROGRESS NOTES
"Pharmacy Chemotherapy Calculations Note:    Dx: Stage IV Breast  Cancer (ER/TN+, HER2 neg)    Cycle 2 Day 8  Previous treatment: C2D1 3/31/20     Protocol: Halaven  *Dosing Reference*  Eribulin (Halaven) 1.4 mg/m2 IV on Days 1 and 8   21-day cycle until disease progression or unacceptable toxicity  NCCN Guidelines for Breast Cancer. V.1.2019.  Artem J, et al. Lancet. 2011;377(6631):279-23.          /50   Pulse 77   Temp 36.3 °C (97.4 °F) (Temporal)   Resp 18   Ht 1.48 m (4' 10.27\")   Wt 61 kg (134 lb 7.7 oz)   SpO2 96%   BMI 27.85 kg/m²  Body surface area is 1.58 meters squared.    Labs 4/7/20:  ANC~ 3960 Plt = 314k   Hgb = 12       3/12/20 EKG = QTc = 423    Erubilin (Halaven) 1.4 mg/m² x 1.58 m² = 2.212 mg   <10% difference, okay to treat with final dose = 2.21 mg IV      Matt Humphries, PharmD  "

## 2020-04-07 ENCOUNTER — OUTPATIENT INFUSION SERVICES (OUTPATIENT)
Dept: ONCOLOGY | Facility: MEDICAL CENTER | Age: 65
End: 2020-04-07
Attending: INTERNAL MEDICINE
Payer: MEDICAID

## 2020-04-07 VITALS
SYSTOLIC BLOOD PRESSURE: 121 MMHG | BODY MASS INDEX: 28.23 KG/M2 | DIASTOLIC BLOOD PRESSURE: 50 MMHG | RESPIRATION RATE: 18 BRPM | OXYGEN SATURATION: 96 % | TEMPERATURE: 97.4 F | HEART RATE: 77 BPM | WEIGHT: 134.48 LBS | HEIGHT: 58 IN

## 2020-04-07 DIAGNOSIS — Z17.0 MALIGNANT NEOPLASM OF OVERLAPPING SITES OF RIGHT BREAST IN FEMALE, ESTROGEN RECEPTOR POSITIVE (HCC): ICD-10-CM

## 2020-04-07 DIAGNOSIS — C50.811 MALIGNANT NEOPLASM OF OVERLAPPING SITES OF RIGHT BREAST IN FEMALE, ESTROGEN RECEPTOR POSITIVE (HCC): ICD-10-CM

## 2020-04-07 LAB
BASOPHILS # BLD AUTO: 1.8 % (ref 0–1.8)
BASOPHILS # BLD: 0.1 K/UL (ref 0–0.12)
EOSINOPHIL # BLD AUTO: 0 K/UL (ref 0–0.51)
EOSINOPHIL NFR BLD: 0 % (ref 0–6.9)
ERYTHROCYTE [DISTWIDTH] IN BLOOD BY AUTOMATED COUNT: 55.6 FL (ref 35.9–50)
HCT VFR BLD AUTO: 36.1 % (ref 37–47)
HGB BLD-MCNC: 12 G/DL (ref 12–16)
IMM GRANULOCYTES # BLD AUTO: 0.16 K/UL (ref 0–0.11)
IMM GRANULOCYTES NFR BLD AUTO: 2.8 % (ref 0–0.9)
LYMPHOCYTES # BLD AUTO: 1.11 K/UL (ref 1–4.8)
LYMPHOCYTES NFR BLD: 19.6 % (ref 22–41)
MCH RBC QN AUTO: 32.7 PG (ref 27–33)
MCHC RBC AUTO-ENTMCNC: 33.2 G/DL (ref 33.6–35)
MCV RBC AUTO: 98.4 FL (ref 81.4–97.8)
MONOCYTES # BLD AUTO: 0.33 K/UL (ref 0–0.85)
MONOCYTES NFR BLD AUTO: 5.8 % (ref 0–13.4)
NEUTROPHILS # BLD AUTO: 3.96 K/UL (ref 2–7.15)
NEUTROPHILS NFR BLD: 70 % (ref 44–72)
NRBC # BLD AUTO: 0 K/UL
NRBC BLD-RTO: 0 /100 WBC
PLATELET # BLD AUTO: 314 K/UL (ref 164–446)
PMV BLD AUTO: 9.2 FL (ref 9–12.9)
RBC # BLD AUTO: 3.67 M/UL (ref 4.2–5.4)
WBC # BLD AUTO: 5.7 K/UL (ref 4.8–10.8)

## 2020-04-07 PROCEDURE — 700111 HCHG RX REV CODE 636 W/ 250 OVERRIDE (IP): Performed by: INTERNAL MEDICINE

## 2020-04-07 PROCEDURE — 85025 COMPLETE CBC W/AUTO DIFF WBC: CPT

## 2020-04-07 PROCEDURE — 96409 CHEMO IV PUSH SNGL DRUG: CPT

## 2020-04-07 RX ORDER — ONDANSETRON 8 MG/1
8 TABLET, ORALLY DISINTEGRATING ORAL ONCE
Status: COMPLETED | OUTPATIENT
Start: 2020-04-07 | End: 2020-04-07

## 2020-04-07 RX ADMIN — ONDANSETRON 8 MG: 8 TABLET, ORALLY DISINTEGRATING ORAL at 10:36

## 2020-04-07 RX ADMIN — ERIBULIN MESYLATE 2.21 MG: 0.5 INJECTION INTRAVENOUS at 10:57

## 2020-04-07 ASSESSMENT — FIBROSIS 4 INDEX: FIB4 SCORE: 0.82

## 2020-04-07 NOTE — PROGRESS NOTES
Chemotherapy Verification - PRIMARY RN      Height = 1.48 m  Weight = 61 kg  BSA = 1.58 m^2       Medication: Eribulin mesylate  Dose: 1.4 mg/m^2  Calculated Dose: 2.21 mg                             (In mg/m2, AUC, mg/kg)       I confirm this process was performed independently with the BSA and all final chemotherapy dosing calculations congruent.  Any discrepancies of 10% or greater have been addressed with the chemotherapy pharmacist. The resolution of the discrepancy has been documented in the EPIC progress notes.

## 2020-04-07 NOTE — PROGRESS NOTES
Chemotherapy Verification - SECONDARY RN       Height = 148 cm  Weight = 61 kg  BSA = 1.58 m2       Medication: Halaven  Dose: 1.4 mg/m2  Calculated Dose: 2.2 mg                             (In mg/m2, AUC, mg/kg)       I confirm that this process was performed independently.

## 2020-04-07 NOTE — PROGRESS NOTES
Christine into [facility] for Day 8/ Cycle 2 of Halaven.  used for communication. Pt denied having any new or acute complaints today, reports tolerating past treatments well. PIV started, had + blood return flushed briskly. Pt given zofran and halaven as prescribed, tolerated well, denied having any complaints during or after infusion. PIV discontinued, bleeding controlled with gauze and coban. Next appointment scheduled. Patient discharged home to self care.

## 2020-04-08 ENCOUNTER — HOSPITAL ENCOUNTER (OUTPATIENT)
Dept: CARDIOLOGY | Facility: MEDICAL CENTER | Age: 65
End: 2020-04-08
Attending: INTERNAL MEDICINE
Payer: MEDICAID

## 2020-04-08 DIAGNOSIS — Z17.0 MALIGNANT NEOPLASM OF OVERLAPPING SITES OF RIGHT BREAST IN FEMALE, ESTROGEN RECEPTOR POSITIVE (HCC): ICD-10-CM

## 2020-04-08 DIAGNOSIS — C50.811 MALIGNANT NEOPLASM OF OVERLAPPING SITES OF RIGHT BREAST IN FEMALE, ESTROGEN RECEPTOR POSITIVE (HCC): ICD-10-CM

## 2020-04-08 LAB
LV EJECT FRACT MOD 2C 99903: 71.48
LV EJECT FRACT MOD 4C 99902: 67.85
LV EJECT FRACT MOD BP 99901: 66.5

## 2020-04-08 PROCEDURE — 93306 TTE W/DOPPLER COMPLETE: CPT | Mod: 26 | Performed by: INTERNAL MEDICINE

## 2020-04-08 PROCEDURE — 93306 TTE W/DOPPLER COMPLETE: CPT

## 2020-04-09 ENCOUNTER — TELEPHONE (OUTPATIENT)
Dept: CARDIOLOGY | Facility: MEDICAL CENTER | Age: 65
End: 2020-04-09

## 2020-04-09 NOTE — TELEPHONE ENCOUNTER
Result Notes for EC-ECHOCARDIOGRAM COMPLETE W/O CONT   Notes recorded by Misha Welch M.D. on 4/9/2020 at 7:10 AM PDT    Normal echo. Not on mychart. Thank you!      called pt and notified of results.

## 2020-04-20 RX ORDER — 0.9 % SODIUM CHLORIDE 0.9 %
10 VIAL (ML) INJECTION PRN
Status: CANCELLED | OUTPATIENT
Start: 2020-04-20

## 2020-04-20 RX ORDER — 0.9 % SODIUM CHLORIDE 0.9 %
VIAL (ML) INJECTION PRN
Status: CANCELLED | OUTPATIENT
Start: 2020-04-20

## 2020-04-20 RX ORDER — 0.9 % SODIUM CHLORIDE 0.9 %
3 VIAL (ML) INJECTION PRN
Status: CANCELLED | OUTPATIENT
Start: 2020-04-20

## 2020-04-21 ENCOUNTER — OUTPATIENT INFUSION SERVICES (OUTPATIENT)
Dept: ONCOLOGY | Facility: MEDICAL CENTER | Age: 65
End: 2020-04-21
Attending: INTERNAL MEDICINE
Payer: MEDICAID

## 2020-04-21 ENCOUNTER — DOCUMENTATION (OUTPATIENT)
Dept: NUTRITION | Facility: MEDICAL CENTER | Age: 65
End: 2020-04-21

## 2020-04-21 VITALS
HEART RATE: 73 BPM | BODY MASS INDEX: 28.32 KG/M2 | OXYGEN SATURATION: 97 % | HEIGHT: 58 IN | WEIGHT: 134.92 LBS | SYSTOLIC BLOOD PRESSURE: 119 MMHG | DIASTOLIC BLOOD PRESSURE: 70 MMHG | RESPIRATION RATE: 18 BRPM | TEMPERATURE: 97.2 F

## 2020-04-21 DIAGNOSIS — C50.811 MALIGNANT NEOPLASM OF OVERLAPPING SITES OF RIGHT BREAST IN FEMALE, ESTROGEN RECEPTOR POSITIVE (HCC): ICD-10-CM

## 2020-04-21 DIAGNOSIS — Z17.0 MALIGNANT NEOPLASM OF OVERLAPPING SITES OF RIGHT BREAST IN FEMALE, ESTROGEN RECEPTOR POSITIVE (HCC): ICD-10-CM

## 2020-04-21 LAB
ALBUMIN SERPL BCP-MCNC: 3.5 G/DL (ref 3.2–4.9)
ALBUMIN/GLOB SERPL: 1.1 G/DL
ALP SERPL-CCNC: 75 U/L (ref 30–99)
ALT SERPL-CCNC: 12 U/L (ref 2–50)
ANION GAP SERPL CALC-SCNC: 10 MMOL/L (ref 7–16)
AST SERPL-CCNC: 18 U/L (ref 12–45)
BASOPHILS # BLD AUTO: 0.5 % (ref 0–1.8)
BASOPHILS # BLD: 0.03 K/UL (ref 0–0.12)
BILIRUB SERPL-MCNC: 0.2 MG/DL (ref 0.1–1.5)
BUN SERPL-MCNC: 18 MG/DL (ref 8–22)
CALCIUM SERPL-MCNC: 9 MG/DL (ref 8.5–10.5)
CHLORIDE SERPL-SCNC: 100 MMOL/L (ref 96–112)
CO2 SERPL-SCNC: 27 MMOL/L (ref 20–33)
CREAT SERPL-MCNC: 0.6 MG/DL (ref 0.5–1.4)
EOSINOPHIL # BLD AUTO: 0 K/UL (ref 0–0.51)
EOSINOPHIL NFR BLD: 0 % (ref 0–6.9)
ERYTHROCYTE [DISTWIDTH] IN BLOOD BY AUTOMATED COUNT: 55.4 FL (ref 35.9–50)
GLOBULIN SER CALC-MCNC: 3.2 G/DL (ref 1.9–3.5)
GLUCOSE SERPL-MCNC: 104 MG/DL (ref 65–99)
HCT VFR BLD AUTO: 36.3 % (ref 37–47)
HGB BLD-MCNC: 12 G/DL (ref 12–16)
IMM GRANULOCYTES # BLD AUTO: 0.1 K/UL (ref 0–0.11)
IMM GRANULOCYTES NFR BLD AUTO: 1.7 % (ref 0–0.9)
LYMPHOCYTES # BLD AUTO: 1.13 K/UL (ref 1–4.8)
LYMPHOCYTES NFR BLD: 19.3 % (ref 22–41)
MAGNESIUM SERPL-MCNC: 1.8 MG/DL (ref 1.5–2.5)
MCH RBC QN AUTO: 31.3 PG (ref 27–33)
MCHC RBC AUTO-ENTMCNC: 33.1 G/DL (ref 33.6–35)
MCV RBC AUTO: 94.8 FL (ref 81.4–97.8)
MONOCYTES # BLD AUTO: 0.47 K/UL (ref 0–0.85)
MONOCYTES NFR BLD AUTO: 8 % (ref 0–13.4)
NEUTROPHILS # BLD AUTO: 4.12 K/UL (ref 2–7.15)
NEUTROPHILS NFR BLD: 70.5 % (ref 44–72)
NRBC # BLD AUTO: 0 K/UL
NRBC BLD-RTO: 0 /100 WBC
PLATELET # BLD AUTO: 335 K/UL (ref 164–446)
PMV BLD AUTO: 9.1 FL (ref 9–12.9)
POTASSIUM SERPL-SCNC: 4.6 MMOL/L (ref 3.6–5.5)
PROT SERPL-MCNC: 6.7 G/DL (ref 6–8.2)
RBC # BLD AUTO: 3.83 M/UL (ref 4.2–5.4)
SODIUM SERPL-SCNC: 137 MMOL/L (ref 135–145)
WBC # BLD AUTO: 5.9 K/UL (ref 4.8–10.8)

## 2020-04-21 PROCEDURE — 83735 ASSAY OF MAGNESIUM: CPT

## 2020-04-21 PROCEDURE — 80053 COMPREHEN METABOLIC PANEL: CPT

## 2020-04-21 PROCEDURE — 700111 HCHG RX REV CODE 636 W/ 250 OVERRIDE (IP): Performed by: INTERNAL MEDICINE

## 2020-04-21 PROCEDURE — 85025 COMPLETE CBC W/AUTO DIFF WBC: CPT

## 2020-04-21 PROCEDURE — 96409 CHEMO IV PUSH SNGL DRUG: CPT

## 2020-04-21 RX ORDER — SODIUM CHLORIDE 9 MG/ML
INJECTION, SOLUTION INTRAVENOUS CONTINUOUS
Status: CANCELLED | OUTPATIENT
Start: 2020-04-21

## 2020-04-21 RX ORDER — 0.9 % SODIUM CHLORIDE 0.9 %
10 VIAL (ML) INJECTION PRN
Status: CANCELLED | OUTPATIENT
Start: 2020-04-28

## 2020-04-21 RX ORDER — 0.9 % SODIUM CHLORIDE 0.9 %
10 VIAL (ML) INJECTION PRN
Status: CANCELLED | OUTPATIENT
Start: 2020-04-21

## 2020-04-21 RX ORDER — PROCHLORPERAZINE MALEATE 10 MG
10 TABLET ORAL EVERY 6 HOURS PRN
Status: CANCELLED | OUTPATIENT
Start: 2020-04-21

## 2020-04-21 RX ORDER — SODIUM CHLORIDE 9 MG/ML
INJECTION, SOLUTION INTRAVENOUS CONTINUOUS
Status: CANCELLED | OUTPATIENT
Start: 2020-04-28

## 2020-04-21 RX ORDER — 0.9 % SODIUM CHLORIDE 0.9 %
VIAL (ML) INJECTION PRN
Status: CANCELLED | OUTPATIENT
Start: 2020-04-21

## 2020-04-21 RX ORDER — 0.9 % SODIUM CHLORIDE 0.9 %
VIAL (ML) INJECTION PRN
Status: CANCELLED | OUTPATIENT
Start: 2020-04-28

## 2020-04-21 RX ORDER — ONDANSETRON 8 MG/1
8 TABLET, ORALLY DISINTEGRATING ORAL PRN
Status: CANCELLED | OUTPATIENT
Start: 2020-04-28

## 2020-04-21 RX ORDER — 0.9 % SODIUM CHLORIDE 0.9 %
3 VIAL (ML) INJECTION PRN
Status: CANCELLED | OUTPATIENT
Start: 2020-04-21

## 2020-04-21 RX ORDER — ONDANSETRON 8 MG/1
8 TABLET, ORALLY DISINTEGRATING ORAL ONCE
Status: CANCELLED | OUTPATIENT
Start: 2020-04-28

## 2020-04-21 RX ORDER — ONDANSETRON 2 MG/ML
4 INJECTION INTRAMUSCULAR; INTRAVENOUS PRN
Status: CANCELLED | OUTPATIENT
Start: 2020-04-21

## 2020-04-21 RX ORDER — ONDANSETRON 8 MG/1
8 TABLET, ORALLY DISINTEGRATING ORAL ONCE
Status: CANCELLED | OUTPATIENT
Start: 2020-04-21

## 2020-04-21 RX ORDER — ONDANSETRON 8 MG/1
8 TABLET, ORALLY DISINTEGRATING ORAL ONCE
Status: COMPLETED | OUTPATIENT
Start: 2020-04-21 | End: 2020-04-21

## 2020-04-21 RX ORDER — ONDANSETRON 2 MG/ML
4 INJECTION INTRAMUSCULAR; INTRAVENOUS PRN
Status: CANCELLED | OUTPATIENT
Start: 2020-04-28

## 2020-04-21 RX ORDER — 0.9 % SODIUM CHLORIDE 0.9 %
3 VIAL (ML) INJECTION PRN
Status: CANCELLED | OUTPATIENT
Start: 2020-04-28

## 2020-04-21 RX ORDER — ONDANSETRON 8 MG/1
8 TABLET, ORALLY DISINTEGRATING ORAL PRN
Status: CANCELLED | OUTPATIENT
Start: 2020-04-21

## 2020-04-21 RX ORDER — PROCHLORPERAZINE MALEATE 10 MG
10 TABLET ORAL EVERY 6 HOURS PRN
Status: CANCELLED | OUTPATIENT
Start: 2020-04-28

## 2020-04-21 RX ADMIN — ERIBULIN MESYLATE 2.21 MG: 0.5 INJECTION INTRAVENOUS at 11:16

## 2020-04-21 RX ADMIN — ONDANSETRON 8 MG: 8 TABLET, ORALLY DISINTEGRATING ORAL at 10:35

## 2020-04-21 ASSESSMENT — PAIN SCALES - GENERAL: PAINLEVEL: NO PAIN

## 2020-04-21 ASSESSMENT — FIBROSIS 4 INDEX: FIB4 SCORE: 0.93

## 2020-04-21 NOTE — PROGRESS NOTES
Nutrition Services: Brief Update  Weight: 134 lbs today  Weight history:  134 lbs on 3/31/20  134 lbs on 3/12/2020  132 lbs on 11/26/19  138 lbs on 5/21/19  139 lbs on 4/4/19    Weight Change: Weight has been stable for past 3 weeks. Pt does report eating small meals but is maintaining weight at this time. No new malnutrition risk identified.    Pt seen at infusion. [facility] used via Ipad with ID#[id number]. Pt reports feeling less hungry and is eating 3 smaller meals throughout the day. Pt reports drinking protein shakes which incorporate fruit every other day and eating soups with veggies. Pt denies N/V but does reports tightness in region points to upper abdomen on the right side. Pt states feeling full easily and had last BM yesterday which was normal. Pt states is eating lots of vegetables and eating little meat with ham sometimes.      Plan/Recommend:  • Encouraged pt to increase protein shakes to daily instead of every other day to provide additional calories and protein.  • Encouraged pt to monitor tightness and if any feeling of constipation increases, it could be beneficial to speak with MD about medication to help with symptoms.  • Encouraged to continue with current fruits and vegetable intake.  • No further questions at this time.    RD to continue to monitor throughout treatment.  Please contact [phone]

## 2020-04-21 NOTE — PROGRESS NOTES
Chemotherapy Verification - SECONDARY RN       Height = 147.5 cm  Weight = 61.2 kg  BSA = 1.583 m2       Medication: Halaven  Dose: 1.4 mg/m2  Calculated Dose: 2.216 mg                          (In mg/m2, AUC, mg/kg)       I confirm that this process was performed independently.

## 2020-04-21 NOTE — PROGRESS NOTES
"Pharmacy Chemotherapy Verification    Patient Name: Christine Malhotra   Dx: Breast Cancer (Stage IV, ER/[place] +, HER2 negative)  Protocol: EriBULin     *Dosing Reference*  EriBULin 1.4 mg/m2 IV push on Days 1 and 8   21 day cycle until disease progression or unacceptable toxicity  NCCN Guidelines for Breast Cancer V.1.2019.  Artem WADE, et al. Lancet. 2011;377(6593):717-54.    Allergies:  Patient has no known allergies.     /70   Pulse 73   Temp 36.2 °C (97.2 °F) (Temporal)   Resp 18   Ht 1.475 m (4' 10.07\")   Wt 61.2 kg (134 lb 14.7 oz)   SpO2 97%   BMI 28.13 kg/m²  Body surface area is 1.58 meters squared.    Labs 4/21/20:  ANC~ 4120 Plt = 335k   Hgb = 12     SCr = 0.6 mg/dL CrCl ~ 94 mL/min   LFT's = WNL TBili = 0.2   K = 4.6  Mag = 1.8     EKG 4/21/20 = QTc 423  EKG 11/26/19 =  QTc 414    Drug Order   (Drug name, dose, route, IV Fluid & volume, frequency, number of doses) Cycle 3 Day 1   Previous treatment: C2D8 on 4/7/20     Medication = eriBULin (Halaven)  Base Dose= 1.4 mg/m2  Calc Dose: Base Dose x 1.58 m2 = 2.212 mg  Final Dose = 2.21 mg  Route = IVP  Fluid & Volume = 4.42 mL in syringe   Concentration = 0.5 mg/mL  Admin Duration = Over 2-5 minutes          <10% difference, okay to treat with final dose     By my signature below, I confirm this process was performed independently with the BSA and all final chemotherapy dosing calculations congruent. I have reviewed the above chemotherapy order and that my calculation of the final dose and BSA (when applicable) corroborate those calculations of the  pharmacist. Discrepancies of 10% or greater in the written dose have been addressed and documented within the [facility] Progress notes.    Matt Humphries, PharmD        "

## 2020-04-21 NOTE — PROGRESS NOTES
"Pharmacy Chemotherapy Calculations Note:    Dx: Stage IV Breast  Cancer (ER/AK+, HER2 neg)    Cycle 3 Day 1  Previous treatment: C2D8 4/7/20     Protocol: Halaven  *Dosing Reference*  Eribulin (Halaven) 1.4 mg/m2 IV on Days 1 and 8   21-day cycle until disease progression or unacceptable toxicity  NCCN Guidelines for Breast Cancer. V.1.2019.  Artem J, et al. Lancet. 2011;377(8063):917-23.          /70   Pulse 73   Temp 36.2 °C (97.2 °F) (Temporal)   Resp 18   Ht 1.475 m (4' 10.07\")   Wt 61.2 kg (134 lb 14.7 oz)   SpO2 97%   BMI 28.13 kg/m²  Body surface area is 1.58 meters squared.    All lab results 4/21/20 within treatment parameters.   C2Day 8 EKG = QTc = 423      Erubilin (Halaven) 1.4 mg/m² x 1.58m² = 2.21mg   <10% difference, okay to treat with final dose = 2.21mg IV      Bhavana Urbina, PharmD  "

## 2020-04-21 NOTE — PROGRESS NOTES
Chemotherapy Verification - PRIMARY RN      Height = 147.5 cm  Weight = 61.2 kg  BSA = 1.58 m^2       Medication: eriBULin mesylate (Halaven)  Dose: 1.4 mg/m^2  Calculated Dose: 2.212 mg                             (In mg/m2, AUC, mg/kg)     I confirm this process was performed independently with the BSA and all final chemotherapy dosing calculations congruent.  Any discrepancies of 10% or greater have been addressed with the chemotherapy pharmacist. The resolution of the discrepancy has been documented in the EPIC progress notes.

## 2020-04-21 NOTE — PROGRESS NOTES
Patient here for Day 1, Cycle 3 eriBULin mesylate (halaven). Plan of care and assessment discussed using an . PIV established; labs drawn as ordered. Labs reviewed and within parameters to proceed with treatment today. eriBULin mesylate (halaven) given per MAR as an IV push over 2-5 minutes. PIV flushed and removed; gauze/coban applied to site. Next appointment scheduled. Discharged to self care; no apparent distress noted.

## 2020-04-28 ENCOUNTER — OUTPATIENT INFUSION SERVICES (OUTPATIENT)
Dept: ONCOLOGY | Facility: MEDICAL CENTER | Age: 65
End: 2020-04-28
Attending: INTERNAL MEDICINE
Payer: MEDICAID

## 2020-04-28 VITALS
WEIGHT: 135.36 LBS | DIASTOLIC BLOOD PRESSURE: 60 MMHG | TEMPERATURE: 97.1 F | BODY MASS INDEX: 26.58 KG/M2 | RESPIRATION RATE: 18 BRPM | SYSTOLIC BLOOD PRESSURE: 111 MMHG | HEIGHT: 60 IN | OXYGEN SATURATION: 97 % | HEART RATE: 72 BPM

## 2020-04-28 DIAGNOSIS — C50.811 MALIGNANT NEOPLASM OF OVERLAPPING SITES OF RIGHT BREAST IN FEMALE, ESTROGEN RECEPTOR POSITIVE (HCC): ICD-10-CM

## 2020-04-28 DIAGNOSIS — Z17.0 MALIGNANT NEOPLASM OF OVERLAPPING SITES OF RIGHT BREAST IN FEMALE, ESTROGEN RECEPTOR POSITIVE (HCC): ICD-10-CM

## 2020-04-28 LAB
BASOPHILS # BLD AUTO: 2.2 % (ref 0–1.8)
BASOPHILS # BLD: 0.1 K/UL (ref 0–0.12)
EKG IMPRESSION: NORMAL
EOSINOPHIL # BLD AUTO: 0 K/UL (ref 0–0.51)
EOSINOPHIL NFR BLD: 0 % (ref 0–6.9)
ERYTHROCYTE [DISTWIDTH] IN BLOOD BY AUTOMATED COUNT: 55.6 FL (ref 35.9–50)
HCT VFR BLD AUTO: 36.1 % (ref 37–47)
HGB BLD-MCNC: 11.6 G/DL (ref 12–16)
IMM GRANULOCYTES # BLD AUTO: 0.12 K/UL (ref 0–0.11)
IMM GRANULOCYTES NFR BLD AUTO: 2.7 % (ref 0–0.9)
LYMPHOCYTES # BLD AUTO: 0.96 K/UL (ref 1–4.8)
LYMPHOCYTES NFR BLD: 21.5 % (ref 22–41)
MCH RBC QN AUTO: 30.7 PG (ref 27–33)
MCHC RBC AUTO-ENTMCNC: 32.1 G/DL (ref 33.6–35)
MCV RBC AUTO: 95.5 FL (ref 81.4–97.8)
MONOCYTES # BLD AUTO: 0.25 K/UL (ref 0–0.85)
MONOCYTES NFR BLD AUTO: 5.6 % (ref 0–13.4)
NEUTROPHILS # BLD AUTO: 3.03 K/UL (ref 2–7.15)
NEUTROPHILS NFR BLD: 68 % (ref 44–72)
NRBC # BLD AUTO: 0 K/UL
NRBC BLD-RTO: 0 /100 WBC
PLATELET # BLD AUTO: 318 K/UL (ref 164–446)
PMV BLD AUTO: 9.3 FL (ref 9–12.9)
RBC # BLD AUTO: 3.78 M/UL (ref 4.2–5.4)
WBC # BLD AUTO: 4.5 K/UL (ref 4.8–10.8)

## 2020-04-28 PROCEDURE — 93005 ELECTROCARDIOGRAM TRACING: CPT | Performed by: INTERNAL MEDICINE

## 2020-04-28 PROCEDURE — 96409 CHEMO IV PUSH SNGL DRUG: CPT

## 2020-04-28 PROCEDURE — 93010 ELECTROCARDIOGRAM REPORT: CPT | Performed by: INTERNAL MEDICINE

## 2020-04-28 PROCEDURE — 700111 HCHG RX REV CODE 636 W/ 250 OVERRIDE (IP): Performed by: INTERNAL MEDICINE

## 2020-04-28 PROCEDURE — 85025 COMPLETE CBC W/AUTO DIFF WBC: CPT

## 2020-04-28 RX ORDER — ONDANSETRON 8 MG/1
8 TABLET, ORALLY DISINTEGRATING ORAL ONCE
Status: COMPLETED | OUTPATIENT
Start: 2020-04-28 | End: 2020-04-28

## 2020-04-28 RX ADMIN — ERIBULIN MESYLATE 2.25 MG: 0.5 INJECTION INTRAVENOUS at 11:37

## 2020-04-28 RX ADMIN — ONDANSETRON 8 MG: 8 TABLET, ORALLY DISINTEGRATING ORAL at 11:09

## 2020-04-28 ASSESSMENT — FIBROSIS 4 INDEX: FIB4 SCORE: 0.96

## 2020-04-28 NOTE — PROGRESS NOTES
Pharmacy Chemotherapy Verification    Patient Name: Christine Malhotra   Dx: Breast Cancer (Stage IV, ER/[place] +, HER2 negative)  Protocol: EriBULin     *Dosing Reference*  EriBULin 1.4 mg/m2 IV push on Days 1 and 8   21 day cycle until disease progression or unacceptable toxicity  NCCN Guidelines for Breast Cancer V.1.2019.  Artem WADE, et al. Lancet. 2011;377(2675):741-47.    Allergies:  Patient has no known allergies.     /60   Pulse 72   Temp 36.2 °C (97.1 °F) (Temporal)   Resp 18   Ht 1.524 m (5')   Wt 61.4 kg (135 lb 5.8 oz)   SpO2 97%   BMI 26.44 kg/m²  Body surface area is 1.61 meters squared.    Labs 4/28/20  ANC~ 3030 Plt = 318k   Hgb = 11.6       Labs 4/21/20     SCr = 0.6 mg/dL CrCl ~ 79.9  mL/min (minimum SCr of 0.7)   LFT's = WNLs  TBili = 0.2   K = 4.6  Mag = 1.8     EKC 4/28/20 = QTc 414  EKG 3/12/20 = QTc 423  EKG 11/26/19 =  QTc 414    Drug Order   (Drug name, dose, route, IV Fluid & volume, frequency, number of doses) Cycle 3 Day 8  Previous treatment: C3D1 on 4/21/20     Medication = eriBULin (Halaven)  Base Dose= 1.4 mg/m2  Calc Dose: Base Dose x 1.61 m2 = 2.254 mg  Final Dose = 2.255 mg  Route = IVP  Fluid & Volume = 4.51 mL in syringe   Concentration = 0.5 mg/mL  Admin Duration = Over 2-5 minutes          <10% difference, okay to treat with final dose     By my signature below, I confirm this process was performed independently with the BSA and all final chemotherapy dosing calculations congruent. I have reviewed the above chemotherapy order and that my calculation of the final dose and BSA (when applicable) corroborate those calculations of the  pharmacist. Discrepancies of 10% or greater in the written dose have been addressed and documented within the [facility] Progress notes.    Pepito Merritt, AyannaD

## 2020-04-28 NOTE — PROGRESS NOTES
Patient to [facility] for chemotherapy IV push. PIV inserted into left AC, flushed with + blood return. EKG performed and Dr. Jarvis informed of results and stated ok to treat. CBC drawn and patient meets parameters for Halaven IV push. Premeds given. Halaven pushed over 5 minutes. PIV flushed with + blood return.Patient has future appointment.  Ipad used. Patient to home in care of self.

## 2020-04-28 NOTE — PROGRESS NOTES
Chemotherapy Verification - PRIMARY RN      Height = 1.524m  Weight = 61.4kg  BSA = 1.61m2       Medication: eribulin mesylate  Dose: 1.4mg/m2  Calculated Dose: 2.255mg                            (In mg/m2, AUC, mg/kg)       I confirm this process was performed independently with the BSA and all final chemotherapy dosing calculations congruent.  Any discrepancies of 10% or greater have been addressed with the chemotherapy pharmacist. The resolution of the discrepancy has been documented in the EPIC progress notes.

## 2020-04-28 NOTE — PROGRESS NOTES
Chemotherapy Verification - SECONDARY RN       Height = 152.4 cm  Weight = 61.4 kg  BSA = 1.61 m2       Medication: Halaven  Dose: 1.4 mg/m2  Calculated Dose: 2.25 mg                             (In mg/m2, AUC, mg/kg)     I confirm that this process was performed independently.

## 2020-04-28 NOTE — PROGRESS NOTES
Pharmacy Chemotherapy Calculations Note:    Dx: Stage IV Breast  Cancer (ER/[place]+, HER2 neg)    Cycle 3 Day 8  Previous treatment: C3D1 4/21/20     Protocol: Halaven  *Dosing Reference*  Eribulin (Halaven) 1.4 mg/m2 IV on Days 1 and 8   21-day cycle until disease progression or unacceptable toxicity  NCCN Guidelines for Breast Cancer. V.1.2019.  Artem J, et al. Lancet. 2011;377(2912):582-23.          /60   Pulse 72   Temp 36.2 °C (97.1 °F) (Temporal)   Resp 18   Ht 1.524 m (5')   Wt 61.4 kg (135 lb 5.8 oz)   SpO2 97%   BMI 26.44 kg/m²  Body surface area is 1.61 meters squared.    All lab results 4/28/20  within treatment parameters.   C3Day 8 EKG = QTc = 414      Erubilin (Halaven) 1.4 mg/m² x 1.61m² = 2.25mg   <10% difference, okay to treat with final dose = 2.255mg IV      Bhavana Urbina, PharmD

## 2020-05-12 ENCOUNTER — HOSPITAL ENCOUNTER (EMERGENCY)
Facility: MEDICAL CENTER | Age: 65
End: 2020-05-12
Attending: EMERGENCY MEDICINE
Payer: MEDICAID

## 2020-05-12 ENCOUNTER — OUTPATIENT INFUSION SERVICES (OUTPATIENT)
Dept: ONCOLOGY | Facility: MEDICAL CENTER | Age: 65
End: 2020-05-12
Attending: INTERNAL MEDICINE
Payer: MEDICAID

## 2020-05-12 ENCOUNTER — DOCUMENTATION (OUTPATIENT)
Dept: NUTRITION | Facility: MEDICAL CENTER | Age: 65
End: 2020-05-12

## 2020-05-12 ENCOUNTER — APPOINTMENT (OUTPATIENT)
Dept: RADIOLOGY | Facility: MEDICAL CENTER | Age: 65
End: 2020-05-12
Attending: EMERGENCY MEDICINE
Payer: MEDICAID

## 2020-05-12 VITALS
HEART RATE: 57 BPM | SYSTOLIC BLOOD PRESSURE: 122 MMHG | WEIGHT: 134.48 LBS | DIASTOLIC BLOOD PRESSURE: 59 MMHG | BODY MASS INDEX: 28.23 KG/M2 | HEIGHT: 58 IN | TEMPERATURE: 96.3 F | RESPIRATION RATE: 29 BRPM | OXYGEN SATURATION: 90 %

## 2020-05-12 VITALS
RESPIRATION RATE: 18 BRPM | OXYGEN SATURATION: 95 % | BODY MASS INDEX: 29.01 KG/M2 | DIASTOLIC BLOOD PRESSURE: 57 MMHG | SYSTOLIC BLOOD PRESSURE: 105 MMHG | HEART RATE: 48 BPM | HEIGHT: 57 IN | WEIGHT: 134.48 LBS | TEMPERATURE: 97.7 F

## 2020-05-12 DIAGNOSIS — R05.9 COUGH: ICD-10-CM

## 2020-05-12 LAB
ALBUMIN SERPL BCP-MCNC: 3.8 G/DL (ref 3.2–4.9)
ALBUMIN/GLOB SERPL: 1.2 G/DL
ALP SERPL-CCNC: 68 U/L (ref 30–99)
ALT SERPL-CCNC: 11 U/L (ref 2–50)
ANION GAP SERPL CALC-SCNC: 12 MMOL/L (ref 7–16)
AST SERPL-CCNC: 16 U/L (ref 12–45)
BASOPHILS # BLD AUTO: 0.6 % (ref 0–1.8)
BASOPHILS # BLD: 0.04 K/UL (ref 0–0.12)
BILIRUB SERPL-MCNC: 0.2 MG/DL (ref 0.1–1.5)
BUN SERPL-MCNC: 20 MG/DL (ref 8–22)
CALCIUM SERPL-MCNC: 9.6 MG/DL (ref 8.5–10.5)
CHLORIDE SERPL-SCNC: 101 MMOL/L (ref 96–112)
CO2 SERPL-SCNC: 26 MMOL/L (ref 20–33)
CREAT SERPL-MCNC: 0.58 MG/DL (ref 0.5–1.4)
EOSINOPHIL # BLD AUTO: 0 K/UL (ref 0–0.51)
EOSINOPHIL NFR BLD: 0 % (ref 0–6.9)
ERYTHROCYTE [DISTWIDTH] IN BLOOD BY AUTOMATED COUNT: 55 FL (ref 35.9–50)
GLOBULIN SER CALC-MCNC: 3.2 G/DL (ref 1.9–3.5)
GLUCOSE SERPL-MCNC: 102 MG/DL (ref 65–99)
HCT VFR BLD AUTO: 37.6 % (ref 37–47)
HGB BLD-MCNC: 12.4 G/DL (ref 12–16)
IMM GRANULOCYTES # BLD AUTO: 0.06 K/UL (ref 0–0.11)
IMM GRANULOCYTES NFR BLD AUTO: 0.9 % (ref 0–0.9)
LACTATE BLD-SCNC: 1.5 MMOL/L (ref 0.5–2)
LDH SERPL L TO P-CCNC: 181 U/L (ref 107–266)
LYMPHOCYTES # BLD AUTO: 1.46 K/UL (ref 1–4.8)
LYMPHOCYTES NFR BLD: 22.1 % (ref 22–41)
MCH RBC QN AUTO: 30.5 PG (ref 27–33)
MCHC RBC AUTO-ENTMCNC: 33 G/DL (ref 33.6–35)
MCV RBC AUTO: 92.4 FL (ref 81.4–97.8)
MONOCYTES # BLD AUTO: 0.53 K/UL (ref 0–0.85)
MONOCYTES NFR BLD AUTO: 8 % (ref 0–13.4)
NEUTROPHILS # BLD AUTO: 4.51 K/UL (ref 2–7.15)
NEUTROPHILS NFR BLD: 68.4 % (ref 44–72)
NRBC # BLD AUTO: 0 K/UL
NRBC BLD-RTO: 0 /100 WBC
PLATELET # BLD AUTO: 337 K/UL (ref 164–446)
PMV BLD AUTO: 9.3 FL (ref 9–12.9)
POTASSIUM SERPL-SCNC: 4.3 MMOL/L (ref 3.6–5.5)
PROCALCITONIN SERPL-MCNC: <0.05 NG/ML
PROT SERPL-MCNC: 7 G/DL (ref 6–8.2)
RBC # BLD AUTO: 4.07 M/UL (ref 4.2–5.4)
SODIUM SERPL-SCNC: 139 MMOL/L (ref 135–145)
WBC # BLD AUTO: 6.6 K/UL (ref 4.8–10.8)

## 2020-05-12 PROCEDURE — 80053 COMPREHEN METABOLIC PANEL: CPT

## 2020-05-12 PROCEDURE — 99284 EMERGENCY DEPT VISIT MOD MDM: CPT

## 2020-05-12 PROCEDURE — 85025 COMPLETE CBC W/AUTO DIFF WBC: CPT

## 2020-05-12 PROCEDURE — A9270 NON-COVERED ITEM OR SERVICE: HCPCS | Performed by: EMERGENCY MEDICINE

## 2020-05-12 PROCEDURE — 700102 HCHG RX REV CODE 250 W/ 637 OVERRIDE(OP): Performed by: EMERGENCY MEDICINE

## 2020-05-12 PROCEDURE — 84145 PROCALCITONIN (PCT): CPT

## 2020-05-12 PROCEDURE — 71045 X-RAY EXAM CHEST 1 VIEW: CPT

## 2020-05-12 PROCEDURE — 83605 ASSAY OF LACTIC ACID: CPT

## 2020-05-12 PROCEDURE — 306780 HCHG STAT FOR TRANSFUSION PER CASE

## 2020-05-12 PROCEDURE — 83615 LACTATE (LD) (LDH) ENZYME: CPT

## 2020-05-12 PROCEDURE — 87040 BLOOD CULTURE FOR BACTERIA: CPT

## 2020-05-12 PROCEDURE — 36415 COLL VENOUS BLD VENIPUNCTURE: CPT

## 2020-05-12 RX ORDER — ONDANSETRON 8 MG/1
8 TABLET, ORALLY DISINTEGRATING ORAL ONCE
Status: CANCELLED | OUTPATIENT
Start: 2020-05-26

## 2020-05-12 RX ORDER — ONDANSETRON 2 MG/ML
4 INJECTION INTRAMUSCULAR; INTRAVENOUS PRN
Status: CANCELLED | OUTPATIENT
Start: 2020-05-26

## 2020-05-12 RX ORDER — ONDANSETRON 2 MG/ML
4 INJECTION INTRAMUSCULAR; INTRAVENOUS PRN
Status: CANCELLED | OUTPATIENT
Start: 2020-05-19

## 2020-05-12 RX ORDER — PROCHLORPERAZINE MALEATE 10 MG
10 TABLET ORAL EVERY 6 HOURS PRN
Status: CANCELLED | OUTPATIENT
Start: 2020-05-26

## 2020-05-12 RX ORDER — 0.9 % SODIUM CHLORIDE 0.9 %
VIAL (ML) INJECTION PRN
Status: CANCELLED | OUTPATIENT
Start: 2020-05-18

## 2020-05-12 RX ORDER — 0.9 % SODIUM CHLORIDE 0.9 %
VIAL (ML) INJECTION PRN
Status: CANCELLED | OUTPATIENT
Start: 2020-05-26

## 2020-05-12 RX ORDER — ONDANSETRON 8 MG/1
8 TABLET, ORALLY DISINTEGRATING ORAL PRN
Status: CANCELLED | OUTPATIENT
Start: 2020-05-26

## 2020-05-12 RX ORDER — 0.9 % SODIUM CHLORIDE 0.9 %
10 VIAL (ML) INJECTION PRN
Status: CANCELLED | OUTPATIENT
Start: 2020-05-26

## 2020-05-12 RX ORDER — PROCHLORPERAZINE MALEATE 10 MG
10 TABLET ORAL EVERY 6 HOURS PRN
Status: CANCELLED | OUTPATIENT
Start: 2020-05-19

## 2020-05-12 RX ORDER — SODIUM CHLORIDE 9 MG/ML
INJECTION, SOLUTION INTRAVENOUS CONTINUOUS
Status: CANCELLED | OUTPATIENT
Start: 2020-05-26

## 2020-05-12 RX ORDER — 0.9 % SODIUM CHLORIDE 0.9 %
3 VIAL (ML) INJECTION PRN
Status: CANCELLED | OUTPATIENT
Start: 2020-05-18

## 2020-05-12 RX ORDER — ONDANSETRON 8 MG/1
8 TABLET, ORALLY DISINTEGRATING ORAL PRN
Status: CANCELLED | OUTPATIENT
Start: 2020-05-19

## 2020-05-12 RX ORDER — AZITHROMYCIN 250 MG/1
500 TABLET, FILM COATED ORAL ONCE
Status: COMPLETED | OUTPATIENT
Start: 2020-05-12 | End: 2020-05-12

## 2020-05-12 RX ORDER — 0.9 % SODIUM CHLORIDE 0.9 %
10 VIAL (ML) INJECTION PRN
Status: CANCELLED | OUTPATIENT
Start: 2020-05-19

## 2020-05-12 RX ORDER — 0.9 % SODIUM CHLORIDE 0.9 %
10 VIAL (ML) INJECTION PRN
Status: CANCELLED | OUTPATIENT
Start: 2020-05-18

## 2020-05-12 RX ORDER — SODIUM CHLORIDE 9 MG/ML
INJECTION, SOLUTION INTRAVENOUS CONTINUOUS
Status: CANCELLED | OUTPATIENT
Start: 2020-05-19

## 2020-05-12 RX ORDER — 0.9 % SODIUM CHLORIDE 0.9 %
3 VIAL (ML) INJECTION PRN
Status: CANCELLED | OUTPATIENT
Start: 2020-05-26

## 2020-05-12 RX ORDER — 0.9 % SODIUM CHLORIDE 0.9 %
VIAL (ML) INJECTION PRN
Status: CANCELLED | OUTPATIENT
Start: 2020-05-19

## 2020-05-12 RX ORDER — ONDANSETRON 8 MG/1
8 TABLET, ORALLY DISINTEGRATING ORAL ONCE
Status: CANCELLED | OUTPATIENT
Start: 2020-05-19

## 2020-05-12 RX ORDER — AZITHROMYCIN 500 MG/1
500 INJECTION, POWDER, LYOPHILIZED, FOR SOLUTION INTRAVENOUS ONCE
Status: DISCONTINUED | OUTPATIENT
Start: 2020-05-12 | End: 2020-05-12

## 2020-05-12 RX ORDER — 0.9 % SODIUM CHLORIDE 0.9 %
3 VIAL (ML) INJECTION PRN
Status: CANCELLED | OUTPATIENT
Start: 2020-05-19

## 2020-05-12 RX ORDER — AZITHROMYCIN 250 MG/1
250 TABLET, FILM COATED ORAL DAILY
Qty: 6 TAB | Refills: 0 | Status: SHIPPED | OUTPATIENT
Start: 2020-05-12 | End: 2020-05-17

## 2020-05-12 RX ADMIN — AZITHROMYCIN MONOHYDRATE 500 MG: 250 TABLET ORAL at 12:55

## 2020-05-12 ASSESSMENT — LIFESTYLE VARIABLES
HAVE YOU EVER FELT YOU SHOULD CUT DOWN ON YOUR DRINKING: NO
TOTAL SCORE: 0
EVER FELT BAD OR GUILTY ABOUT YOUR DRINKING: NO
DO YOU DRINK ALCOHOL: NO
CONSUMPTION TOTAL: NEGATIVE
HAVE PEOPLE ANNOYED YOU BY CRITICIZING YOUR DRINKING: NO
AVERAGE NUMBER OF DAYS PER WEEK YOU HAVE A DRINK CONTAINING ALCOHOL: 0
HOW MANY TIMES IN THE PAST YEAR HAVE YOU HAD 5 OR MORE DRINKS IN A DAY: 0
TOTAL SCORE: 0
TOTAL SCORE: 0
EVER HAD A DRINK FIRST THING IN THE MORNING TO STEADY YOUR NERVES TO GET RID OF A HANGOVER: NO
ON A TYPICAL DAY WHEN YOU DRINK ALCOHOL HOW MANY DRINKS DO YOU HAVE: 0

## 2020-05-12 ASSESSMENT — FIBROSIS 4 INDEX
FIB4 SCORE: 1.01
FIB4 SCORE: 1.01

## 2020-05-12 NOTE — ED TRIAGE NOTES
Chief Complaint   Patient presents with   • Cough     patient arrives from infusion clinic with c/o cough x 2 days. pt states nonproductive, + chills, denies other complaints or symptoms, did not received chemo today. last chemo 15 days ago. patient has not been in contact with anyone tested or suspected to have covid-19

## 2020-05-12 NOTE — PROGRESS NOTES
[facility]. Assessment, POC and meds/allergies obtained via ipad  Oliver[place],  #[id number]. Via , pt states she has had a dry cough over the last 2 days and chills. Pt denies fever/any other symptoms at this time and states she does not have a cough at this moment. Notified Yoshi Faustin RN, at this time where call was placed to MD (see RN note). Pt taken via wheelchair to ED for evaluation for above symptoms.     No charge from clinic.

## 2020-05-12 NOTE — PROGRESS NOTES
Call placed to Dr. Jarvis. Informed him that the patient reports a cough for two days with chills. MD states to have patient evaluated in ED for rule out COVID. Patient not to resume chemotherapy until she follows up with his office.   Evaluation    Today's date: 2018  Patient name: Zonia Narvaez  : 1980  MRN: [id number]  Referring provider: Brian Butler DO  Dx:   Encounter Diagnosis   Name Primary?  Lumbar radiculopathy Yes                   Assessment  Impairments: abnormal muscle firing, abnormal or restricted ROM and impaired physical strength    Assessment details: Pt is a 30 you male whose LBP and leg pain began last March when bending to put on his shoes  Initially his LE weakness required him to walk with a walker and he had severe episodes of shooting pain down both legs  He has progressed with physical therapy and a home exercise program and now presents with mild left LE weakness, right low back pain with lumbar extension and tightness in the left psoas, piriformis and hamstrings  He also presents with dural tension bilaterally  He would benefit from physical therapy to restore flexibility and strength to enable him to resume recreational activities and 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 along with preventing recurrence of pain and dysfunction  Barriers to therapy: Pain has been present for 10 months  Understanding of Dx/Px/POC: excellent   Prognosis: good    Goals  STG: increase flexibility of piriformis and psoas 25%  Pt is consistently performing stretches every day    LTG: resume bowling  Stand during a busy day at work without LBP  Increase lumbar ROM to only moderate restriction    Plan  Planned therapy interventions: manual therapy, patient education, body mechanics training, strengthening and stretching  Frequency: 2x week  Duration in weeks: 12  Plan details: Pt has a limit of 20 visits per year so we will limit the number of visits by transitioning to [facility]           Subjective Evaluation    History of Present Illness  Date of onset: 3/1/2017  Mechanism of injury: Acute onset of severe low back pain radiating down both legs with putting on his shoes   Quality of life: excellent    Pain  Current pain ratin  At best pain ratin  At worst pain ratin  Location: sciatic nerve pain down the left leg to the knee  Quality: sharp  Relieving factors: change in position  Progression: improved          Objective     Postural Observations  Seated posture: good  Standing posture: good        Palpation   Left   Hypotonic in the iliopsoas  Tenderness of the iliopsoas  Right   No palpable tenderness to the iliopsoas  Tenderness     Lumbar Spine  Tenderness in the spinous process  Additional Tenderness Details  Tender over the spinous processes of L4 and L5    Active Range of Motion     Additional Active Range of Motion Details  Flex: mod limit  Ext: av limit  Sb: av limit    SHANTEL: 5 reps no effect  RFIS: 5 reps no effect    Strength/Myotome Testing     Left Hip   Planes of Motion   Flexion: 4+  External rotation: 5  Internal rotation: 5    Right Hip   Planes of Motion   Flexion: 5  External rotation: 5  Internal rotation: 5    Left Knee   Flexion: 5  Extension: 5    Right Knee   Flexion: 5  Extension: 5    Left Ankle/Foot   Dorsiflexion: 5  Plantar flexion: 4+  Inversion: 4  Eversion: 4  Great toe flexion: 5  Great toe extension: 4    Right Ankle/Foot   Dorsiflexion: 5  Plantar flexion: 5  Inversion: 4  Eversion: 5  Great toe flexion: 5  Great toe extension: 5    Muscle Activation     Additional Muscle Activation Details  Difficulty with activation of the erector spinae and gluts    Tests       Thoracic   Positive slump  Lumbar   Positive slumped  Left   Positive passive SLR  Negative crossed SLR  Right   Positive passive SLR  Negative crossed SLR       Additional Tests Details  Piriformis very tight on the left        Precautions: DM    Daily Treatment Diary     Manual  2/            Hamstring stretch 4 min            Psoas release 4 min                                                       Exercise Diary              Treadmill 12'            EOT hip flexor st 1' x2            Piriformis stretch 30" x2            1/2 kneel hip flexor str 30" x2                                                                                                                                                                                                                                Modalities

## 2020-05-12 NOTE — ED NOTES
Patient is Turkmen speaking,  used for triage & assessment. Patient resting on cart, awake, alert, oriented x 4. Patient updated on POC by ERP, patient verbalizes understanding.  Denies needs or questions, call light within reach, will continue to monitor. Patient remains on monitoring. Provided with warm blanket, siderailsx2 for safety.

## 2020-05-12 NOTE — ED NOTES
used for translation. Patient resting on cart, awake, alert, oriented x 4. Patient updated on POC, verbalizes understanding. Assessment unchanged. Denies needs or questions, call light within reach, will continue to monitor. Patient remains on monitoring. Siderailsx1, blankets in place. Cart in low, locked, position.

## 2020-05-12 NOTE — ED NOTES
Patient ambulatory to restroom with steady upright gait and back to room. Remains on monitoring, call light within reach, will continue to monitor.

## 2020-05-12 NOTE — ED PROVIDER NOTES
ED Provider Note    CHIEF COMPLAINT  Chief Complaint   Patient presents with   • Cough     patient arrives from infusion clinic with c/o cough x 2 days. pt states nonproductive, + chills, denies other complaints or symptoms, did not received chemo today. last chemo 15 days ago. patient has not been in contact with anyone tested or suspected to have covid-19       HPI  Christine Malhotra is a 64 y.o. female who presents for evaluation of cough.  The patient has no high fever no known COVID-19 positive contacts.  Last chemotherapy was apparently 2 weeks ago.  She has not had any leg swelling or chest pain.  No associated nausea vomiting or diarrhea.  She has a history of breast cancer.  No nausea vomiting or diarrhea.  No other symptoms reported    REVIEW OF SYSTEMS  See HPI for further details.  No night sweats weight loss numbness tingling weakness rash all other systems are negative.     PAST MEDICAL HISTORY  Past Medical History:   Diagnosis Date   • Cancer (HCC)     breast, ovarian, mets to bone       FAMILY HISTORY  Noncontributory    SOCIAL HISTORY  Social History     Socioeconomic History   • Marital status:      Spouse name: Not on file   • Number of children: Not on file   • Years of education: Not on file   • Highest education level: Not on file   Occupational History   • Not on file   Social Needs   • Financial resource strain: Not on file   • Food insecurity     Worry: Not on file     Inability: Not on file   • Transportation needs     Medical: Not on file     Non-medical: Not on file   Tobacco Use   • Smoking status: Never Smoker   • Smokeless tobacco: Never Used   Substance and Sexual Activity   • Alcohol use: No   • Drug use: Never   • Sexual activity: Not on file   Lifestyle   • Physical activity     Days per week: Not on file     Minutes per session: Not on file   • Stress: Not on file   Relationships   • Social connections     Talks on phone: Not on file     Gets together: Not on file  "    Attends Oriental orthodox service: Not on file     Active member of club or organization: Not on file     Attends meetings of clubs or organizations: Not on file     Relationship status: Not on file   • Intimate partner violence     Fear of current or ex partner: Not on file     Emotionally abused: Not on file     Physically abused: Not on file     Forced sexual activity: Not on file   Other Topics Concern   • Not on file   Social History Narrative   • Not on file   Denies IV drug    SURGICAL HISTORY  Past Surgical History:   Procedure Laterality Date   • GYN SURGERY      hysterectomy   • MASTECTOMY         CURRENT MEDICATIONS  No current facility-administered medications for this encounter.     Current Outpatient Medications:   •  omeprazole (PRILOSEC) 20 MG delayed-release capsule, Take 20 mg by mouth every day., Disp: , Rfl:   •  loperamide (IMODIUM) 2 MG Cap, Take 2 mg by mouth 4 times a day as needed for Diarrhea., Disp: , Rfl:   •  levothyroxine (SYNTHROID) 50 MCG Tab, Take 1 Tab by mouth every day., Disp: , Rfl:   •  rivaroxaban (XARELTO) 20 MG Tab tablet, Take 20 mg by mouth with dinner., Disp: , Rfl:       ALLERGIES  No Known Allergies    PHYSICAL EXAM  VITAL SIGNS: /99   Pulse 76   Temp (!) 35.7 °C (96.3 °F) (Oral)   Resp 18   Ht 1.473 m (4' 10\")   Wt 61 kg (134 lb 7.7 oz)   SpO2 99%   BMI 28.11 kg/m²       Constitutional: Well developed, Well nourished, No acute distress, Non-toxic appearance.   HENT: Normocephalic, Atraumatic, Bilateral external ears normal, Oropharynx moist, No oral exudates, Nose normal.   Eyes: PERRLA, EOMI, Conjunctiva normal, No discharge.   Neck: Normal range of motion, No tenderness, Supple, No stridor.   Cardiovascular: Normal heart rate, Normal rhythm, No murmurs, No rubs, No gallops.   Thorax & Lungs: Normal breath sounds, No respiratory distress, No wheezing, No chest tenderness.   Abdomen: Bowel sounds normal, Soft, No tenderness, No masses, No pulsatile masses. "   Skin: Warm, Dry, No erythema, No rash.   Back: No tenderness, No CVA tenderness.   Extremities: Intact distal pulses, No edema, No tenderness, No cyanosis, No clubbing.   Musculoskeletal: Good range of motion in all major joints. No tenderness to palpation or major deformities noted.   Neurologic: Alert & oriented x 3, Normal motor function, Normal sensory function, No focal deficits noted.   Psychiatric: Anxious  DX-CHEST-PORTABLE (1 VIEW)   Final Result      Minimal left basilar atelectasis or pneumonitis is not excluded.          Results for orders placed or performed during the hospital encounter of 05/12/20   PROCALCITONIN   Result Value Ref Range    Procalcitonin <0.05 <0.25 ng/mL   LDH   Result Value Ref Range    LDH Total 181 107 - 266 U/L   CBC WITH DIFFERENTIAL   Result Value Ref Range    WBC 6.6 4.8 - 10.8 K/uL    RBC 4.07 (L) 4.20 - 5.40 M/uL    Hemoglobin 12.4 12.0 - 16.0 g/dL    Hematocrit 37.6 37.0 - 47.0 %    MCV 92.4 81.4 - 97.8 fL    MCH 30.5 27.0 - 33.0 pg    MCHC 33.0 (L) 33.6 - 35.0 g/dL    RDW 55.0 (H) 35.9 - 50.0 fL    Platelet Count 337 164 - 446 K/uL    MPV 9.3 9.0 - 12.9 fL    Neutrophils-Polys 68.40 44.00 - 72.00 %    Lymphocytes 22.10 22.00 - 41.00 %    Monocytes 8.00 0.00 - 13.40 %    Eosinophils 0.00 0.00 - 6.90 %    Basophils 0.60 0.00 - 1.80 %    Immature Granulocytes 0.90 0.00 - 0.90 %    Nucleated RBC 0.00 /100 WBC    Neutrophils (Absolute) 4.51 2.00 - 7.15 K/uL    Lymphs (Absolute) 1.46 1.00 - 4.80 K/uL    Monos (Absolute) 0.53 0.00 - 0.85 K/uL    Eos (Absolute) 0.00 0.00 - 0.51 K/uL    Baso (Absolute) 0.04 0.00 - 0.12 K/uL    Immature Granulocytes (abs) 0.06 0.00 - 0.11 K/uL    NRBC (Absolute) 0.00 K/uL   Comp Metabolic Panel   Result Value Ref Range    Sodium 139 135 - 145 mmol/L    Potassium 4.3 3.6 - 5.5 mmol/L    Chloride 101 96 - 112 mmol/L    Co2 26 20 - 33 mmol/L    Anion Gap 12.0 7.0 - 16.0    Glucose 102 (H) 65 - 99 mg/dL    Bun 20 8 - 22 mg/dL    Creatinine 0.58 0.50 -  1.40 mg/dL    Calcium 9.6 8.5 - 10.5 mg/dL    AST(SGOT) 16 12 - 45 U/L    ALT(SGPT) 11 2 - 50 U/L    Alkaline Phosphatase 68 30 - 99 U/L    Total Bilirubin 0.2 0.1 - 1.5 mg/dL    Albumin 3.8 3.2 - 4.9 g/dL    Total Protein 7.0 6.0 - 8.2 g/dL    Globulin 3.2 1.9 - 3.5 g/dL    A-G Ratio 1.2 g/dL   LACTIC ACID   Result Value Ref Range    Lactic Acid 1.5 0.5 - 2.0 mmol/L   ESTIMATED GFR   Result Value Ref Range    GFR If African American >60 >60 mL/min/1.73 m 2    GFR If Non African American >60 >60 mL/min/1.73 m 2      COURSE & MEDICAL DECISION MAKING  Pertinent Labs & Imaging studies reviewed. (See chart for details)    Patient does not appear clinically toxic.  Specifically she has no high fever tachycardia leukocytosis.  Her procalcitonin level is nondetectable.  Chest x-ray has nonspecific atelectasis but no clear COVID pattern and her LDH is normal.  Blood cultures are pending.  I will treat her with azithromycin for now.  She has no evidence of neutropenia.  My preference would be to try to keep her out of the hospital given the current COVID crisis.  No indication for COVID testing.  Patient will be discharged after first dose of azithromycin  FINAL IMPRESSION  1.   1. Cough                Electronically signed by: Vipul Austin M.D., 5/12/2020 10:50 AM

## 2020-05-17 LAB
BACTERIA BLD CULT: NORMAL
BACTERIA BLD CULT: NORMAL
SIGNIFICANT IND 70042: NORMAL
SIGNIFICANT IND 70042: NORMAL
SITE SITE: NORMAL
SITE SITE: NORMAL
SOURCE SOURCE: NORMAL
SOURCE SOURCE: NORMAL

## 2020-05-18 NOTE — PROGRESS NOTES
"Pharmacy Chemotherapy Calculations:    Dx: Stage IV Breast  Cancer (ER/[place]+, HER2 neg)    Cycle 4 Day 1  Previous treatment: C3D8 4/28/20     Protocol: Halaven  *Dosing Reference*  Eribulin (Halaven) 1.4 mg/m2 IV on Days 1 and 8   21-day cycle until disease progression or unacceptable toxicity  NCCN Guidelines for Breast Cancer. V.1.2019.  Artem J, et al. Lancet. 2011;377(2965):351-23.          /50   Pulse 70   Temp 36.2 °C (97.2 °F) (Temporal)   Resp 18   Ht 1.473 m (4' 10\")   Wt 61.1 kg (134 lb 11.2 oz)   SpO2 100%   BMI 28.15 kg/m²  Body surface area is 1.58 meters squared.    Labs 5/19/20:  ANC~ 3420 Plt = 274k   Hgb = 12     SCr = 0.63 mg/dL CrCl ~ 80 mL/min   LFT's = WNL TBili = 0.2   Mag = 1.8 K = 4.6    4/28/20 EKG = QTc = 414 (EKG only on day 8 of treatment)      Erubilin (Halaven) 1.4 mg/m² x 1.58 m² = 2.212 mg   <10% difference, okay to treat with final dose = 2.21 mg IV      Matt Humphries, PharmD  "

## 2020-05-19 ENCOUNTER — OUTPATIENT INFUSION SERVICES (OUTPATIENT)
Dept: ONCOLOGY | Facility: MEDICAL CENTER | Age: 65
End: 2020-05-19
Attending: INTERNAL MEDICINE
Payer: MEDICAID

## 2020-05-19 VITALS
WEIGHT: 134.7 LBS | OXYGEN SATURATION: 100 % | BODY MASS INDEX: 28.28 KG/M2 | SYSTOLIC BLOOD PRESSURE: 125 MMHG | HEART RATE: 70 BPM | DIASTOLIC BLOOD PRESSURE: 50 MMHG | HEIGHT: 58 IN | TEMPERATURE: 97.2 F | RESPIRATION RATE: 18 BRPM

## 2020-05-19 DIAGNOSIS — Z17.0 MALIGNANT NEOPLASM OF OVERLAPPING SITES OF RIGHT BREAST IN FEMALE, ESTROGEN RECEPTOR POSITIVE (HCC): ICD-10-CM

## 2020-05-19 DIAGNOSIS — C50.811 MALIGNANT NEOPLASM OF OVERLAPPING SITES OF RIGHT BREAST IN FEMALE, ESTROGEN RECEPTOR POSITIVE (HCC): ICD-10-CM

## 2020-05-19 LAB
ALBUMIN SERPL BCP-MCNC: 3.9 G/DL (ref 3.2–4.9)
ALBUMIN/GLOB SERPL: 1.3 G/DL
ALP SERPL-CCNC: 70 U/L (ref 30–99)
ALT SERPL-CCNC: 12 U/L (ref 2–50)
ANION GAP SERPL CALC-SCNC: 8 MMOL/L (ref 7–16)
AST SERPL-CCNC: 17 U/L (ref 12–45)
BASOPHILS # BLD AUTO: 0.8 % (ref 0–1.8)
BASOPHILS # BLD: 0.04 K/UL (ref 0–0.12)
BILIRUB SERPL-MCNC: 0.2 MG/DL (ref 0.1–1.5)
BUN SERPL-MCNC: 21 MG/DL (ref 8–22)
CALCIUM SERPL-MCNC: 9.3 MG/DL (ref 8.5–10.5)
CHLORIDE SERPL-SCNC: 103 MMOL/L (ref 96–112)
CO2 SERPL-SCNC: 27 MMOL/L (ref 20–33)
CREAT SERPL-MCNC: 0.63 MG/DL (ref 0.5–1.4)
EOSINOPHIL # BLD AUTO: 0.03 K/UL (ref 0–0.51)
EOSINOPHIL NFR BLD: 0.6 % (ref 0–6.9)
ERYTHROCYTE [DISTWIDTH] IN BLOOD BY AUTOMATED COUNT: 57.2 FL (ref 35.9–50)
GLOBULIN SER CALC-MCNC: 2.9 G/DL (ref 1.9–3.5)
GLUCOSE SERPL-MCNC: 102 MG/DL (ref 65–99)
HCT VFR BLD AUTO: 38.3 % (ref 37–47)
HGB BLD-MCNC: 12 G/DL (ref 12–16)
IMM GRANULOCYTES # BLD AUTO: 0.03 K/UL (ref 0–0.11)
IMM GRANULOCYTES NFR BLD AUTO: 0.6 % (ref 0–0.9)
LYMPHOCYTES # BLD AUTO: 1.21 K/UL (ref 1–4.8)
LYMPHOCYTES NFR BLD: 23.9 % (ref 22–41)
MAGNESIUM SERPL-MCNC: 1.8 MG/DL (ref 1.5–2.5)
MCH RBC QN AUTO: 29.6 PG (ref 27–33)
MCHC RBC AUTO-ENTMCNC: 31.3 G/DL (ref 33.6–35)
MCV RBC AUTO: 94.3 FL (ref 81.4–97.8)
MONOCYTES # BLD AUTO: 0.33 K/UL (ref 0–0.85)
MONOCYTES NFR BLD AUTO: 6.5 % (ref 0–13.4)
NEUTROPHILS # BLD AUTO: 3.42 K/UL (ref 2–7.15)
NEUTROPHILS NFR BLD: 67.6 % (ref 44–72)
NRBC # BLD AUTO: 0 K/UL
NRBC BLD-RTO: 0 /100 WBC
PLATELET # BLD AUTO: 274 K/UL (ref 164–446)
PMV BLD AUTO: 9.7 FL (ref 9–12.9)
POTASSIUM SERPL-SCNC: 4.6 MMOL/L (ref 3.6–5.5)
PROT SERPL-MCNC: 6.8 G/DL (ref 6–8.2)
RBC # BLD AUTO: 4.06 M/UL (ref 4.2–5.4)
SODIUM SERPL-SCNC: 138 MMOL/L (ref 135–145)
WBC # BLD AUTO: 5.1 K/UL (ref 4.8–10.8)

## 2020-05-19 PROCEDURE — 700111 HCHG RX REV CODE 636 W/ 250 OVERRIDE (IP): Performed by: INTERNAL MEDICINE

## 2020-05-19 PROCEDURE — 83735 ASSAY OF MAGNESIUM: CPT

## 2020-05-19 PROCEDURE — 85025 COMPLETE CBC W/AUTO DIFF WBC: CPT

## 2020-05-19 PROCEDURE — 80053 COMPREHEN METABOLIC PANEL: CPT

## 2020-05-19 PROCEDURE — 96409 CHEMO IV PUSH SNGL DRUG: CPT

## 2020-05-19 RX ORDER — ONDANSETRON 8 MG/1
8 TABLET, ORALLY DISINTEGRATING ORAL ONCE
Status: COMPLETED | OUTPATIENT
Start: 2020-05-19 | End: 2020-05-19

## 2020-05-19 RX ADMIN — ONDANSETRON 8 MG: 8 TABLET, ORALLY DISINTEGRATING ORAL at 10:45

## 2020-05-19 RX ADMIN — ERIBULIN MESYLATE 2.21 MG: 0.5 INJECTION INTRAVENOUS at 11:08

## 2020-05-19 ASSESSMENT — FIBROSIS 4 INDEX: FIB4 SCORE: 0.89

## 2020-05-19 NOTE — PROGRESS NOTES
Chemotherapy Verification - SECONDARY RN       Height = 58 inches  Weight = 61.1 kg  BSA = 1.58 m2       Medication: Eribulin mesylate (Halaven)  Dose: 1.4 mg/m2  Calculated Dose: 2.21 mg                             (In mg/m2, AUC, mg/kg)         I confirm that this process was performed independently.

## 2020-05-19 NOTE — PROGRESS NOTES
"Pharmacy Chemotherapy Verification    Patient Name: Christine Malhotra   Dx: Breast Cancer (Stage IV, ER/[place] +, HER2 negative)  Protocol: EriBULin     *Dosing Reference*  EriBULin 1.4 mg/m2 IV push on Days 1 and 8   21 day cycle until disease progression or unacceptable toxicity  NCCN Guidelines for Breast Cancer V.1.2019.  Artem WADE, et al. Lancet. 2011;377(2983):747-67.    Allergies:  Patient has no known allergies.     /50   Pulse 70   Temp 36.2 °C (97.2 °F) (Temporal)   Resp 18   Ht 1.473 m (4' 10\")   Wt 61.1 kg (134 lb 11.2 oz)   SpO2 100%   BMI 28.15 kg/m²  Body surface area is 1.58 meters squared.    Labs 5/19/20  ANC~ 3420 Plt = 274k   Hgb = 12     SCr = 0.63 mg/dL CrCl ~ 79.5 mL/min   LFT's = WNLs  TBili = 0.2   Mg = 1.8     EKC 4/28/20 = QTc 414  EKG 3/12/20 = QTc 423  EKG 11/26/19 =  QTc 414    Drug Order   (Drug name, dose, route, IV Fluid & volume, frequency, number of doses) Cycle 4 Day 1  Previous treatment: C3D8 on 4/28/20     Medication = eriBULin (Halaven)  Base Dose= 1.4 mg/m2  Calc Dose: Base Dose x 1.58 m2 = 2.212 mg  Final Dose = 2.21 mg  Route = IVP  Fluid & Volume = 4.42 mL in syringe   Concentration = 0.5 mg/mL  Admin Duration = Over 2-5 minutes          <10% difference, okay to treat with final dose     By my signature below, I confirm this process was performed independently with the BSA and all final chemotherapy dosing calculations congruent. I have reviewed the above chemotherapy order and that my calculation of the final dose and BSA (when applicable) corroborate those calculations of the  pharmacist. Discrepancies of 10% or greater in the written dose have been addressed and documented within the [facility] Progress notes.    Pepito Merritt, PharmD        "

## 2020-05-19 NOTE — PROGRESS NOTES
Chemotherapy Verification - PRIMARY RN      Height = 1.473m  Weight = 61.1kg  BSA = 1.58m2       Medication: eribulin mesylate  Dose: 1.4mg/m2  Calculated Dose: 2.21mg                            (In mg/m2, AUC, mg/kg)       I confirm this process was performed independently with the BSA and all final chemotherapy dosing calculations congruent.  Any discrepancies of 10% or greater have been addressed with the chemotherapy pharmacist. The resolution of the discrepancy has been documented in the EPIC progress notes.

## 2020-05-19 NOTE — PROGRESS NOTES
[facility] to [facility] for chemotherapy infusion. D1C4. PIV inserted into left AC, flushed with + blood return, labs drawn. Premed given. Halaven given IV push over 5 minutes. No s/s of infusion reaction. Future appointment scheduled. PIV flushed with + blood return and removed. Patient to home in care of self.

## 2020-05-21 ENCOUNTER — HOSPITAL ENCOUNTER (OUTPATIENT)
Dept: RADIOLOGY | Facility: MEDICAL CENTER | Age: 65
End: 2020-05-21
Attending: INTERNAL MEDICINE
Payer: MEDICAID

## 2020-05-21 DIAGNOSIS — C50.611 MALIGNANT NEOPLASM OF AXILLARY TAIL OF RIGHT FEMALE BREAST, UNSPECIFIED ESTROGEN RECEPTOR STATUS (HCC): ICD-10-CM

## 2020-05-21 PROCEDURE — 700117 HCHG RX CONTRAST REV CODE 255: Performed by: INTERNAL MEDICINE

## 2020-05-21 PROCEDURE — 71260 CT THORAX DX C+: CPT

## 2020-05-21 RX ADMIN — IOHEXOL 100 ML: 350 INJECTION, SOLUTION INTRAVENOUS at 12:15

## 2020-05-21 RX ADMIN — IOHEXOL 25 ML: 240 INJECTION, SOLUTION INTRATHECAL; INTRAVASCULAR; INTRAVENOUS; ORAL at 12:15

## 2020-05-25 NOTE — PROGRESS NOTES
"Pharmacy Chemotherapy Verification  Dx: Stage IV Breast  Cancer ([place]+, HER2 neg)  Cycle 4 Day 8  Previous treatment: C4D1 5/19/20     Protocol: Halaven  *Dosing Reference*  Eribulin (Halaven) 1.4 mg/m2 IV on Days 1 and 8   21-day cycle until disease progression or unacceptable toxicity  NCCN Guidelines for Breast Cancer. V.1.2019.  Artem J, et al. Lancet. 2011;377(2055):162-96.          Allergies:Patient has no known allergies.  /73   Pulse 76   Temp 36.7 °C (98 °F) (Temporal)   Resp 18   Ht 1.473 m (4' 9.99\")   Wt 61.3 kg (135 lb 2.3 oz)   SpO2 97%   BMI 28.25 kg/m²  Body surface area is 1.58 meters squared.    Labs  5/26/20  ANC 2010 Hgb 11.8 Plt 263k    Labs 5/19/20  SCr 0.63 CrCl 79.7 mL/min   AST/ALT/AP = 17/12/70 Tbili = 0.2  K = 4.6  Mg = 1.8    EKG 5/26/20  QTc = 424 msec    Erubilin (Halaven) 1.4 mg/m² x 1.58 m² = 2.212 mg   <10% difference, okay to treat with final dose = 2.21 mg IV    Roseanne Gross, PharmD, BCOP    "

## 2020-05-26 ENCOUNTER — OUTPATIENT INFUSION SERVICES (OUTPATIENT)
Dept: ONCOLOGY | Facility: MEDICAL CENTER | Age: 65
End: 2020-05-26
Attending: INTERNAL MEDICINE
Payer: MEDICAID

## 2020-05-26 VITALS
RESPIRATION RATE: 18 BRPM | HEART RATE: 76 BPM | SYSTOLIC BLOOD PRESSURE: 126 MMHG | WEIGHT: 135.14 LBS | HEIGHT: 58 IN | TEMPERATURE: 98 F | OXYGEN SATURATION: 97 % | BODY MASS INDEX: 28.37 KG/M2 | DIASTOLIC BLOOD PRESSURE: 73 MMHG

## 2020-05-26 DIAGNOSIS — C50.811 MALIGNANT NEOPLASM OF OVERLAPPING SITES OF RIGHT BREAST IN FEMALE, ESTROGEN RECEPTOR POSITIVE (HCC): ICD-10-CM

## 2020-05-26 DIAGNOSIS — Z17.0 MALIGNANT NEOPLASM OF OVERLAPPING SITES OF RIGHT BREAST IN FEMALE, ESTROGEN RECEPTOR POSITIVE (HCC): ICD-10-CM

## 2020-05-26 LAB
BASOPHILS # BLD AUTO: 1.4 % (ref 0–1.8)
BASOPHILS # BLD: 0.05 K/UL (ref 0–0.12)
EKG IMPRESSION: NORMAL
EOSINOPHIL # BLD AUTO: 0.04 K/UL (ref 0–0.51)
EOSINOPHIL NFR BLD: 1.2 % (ref 0–6.9)
ERYTHROCYTE [DISTWIDTH] IN BLOOD BY AUTOMATED COUNT: 54.3 FL (ref 35.9–50)
HCT VFR BLD AUTO: 37.2 % (ref 37–47)
HGB BLD-MCNC: 11.8 G/DL (ref 12–16)
IMM GRANULOCYTES # BLD AUTO: 0.06 K/UL (ref 0–0.11)
IMM GRANULOCYTES NFR BLD AUTO: 1.7 % (ref 0–0.9)
LYMPHOCYTES # BLD AUTO: 1.08 K/UL (ref 1–4.8)
LYMPHOCYTES NFR BLD: 31.3 % (ref 22–41)
MCH RBC QN AUTO: 29.7 PG (ref 27–33)
MCHC RBC AUTO-ENTMCNC: 31.7 G/DL (ref 33.6–35)
MCV RBC AUTO: 93.7 FL (ref 81.4–97.8)
MONOCYTES # BLD AUTO: 0.21 K/UL (ref 0–0.85)
MONOCYTES NFR BLD AUTO: 6.1 % (ref 0–13.4)
NEUTROPHILS # BLD AUTO: 2.01 K/UL (ref 2–7.15)
NEUTROPHILS NFR BLD: 58.3 % (ref 44–72)
NRBC # BLD AUTO: 0 K/UL
NRBC BLD-RTO: 0 /100 WBC
PLATELET # BLD AUTO: 263 K/UL (ref 164–446)
PMV BLD AUTO: 9.7 FL (ref 9–12.9)
RBC # BLD AUTO: 3.97 M/UL (ref 4.2–5.4)
WBC # BLD AUTO: 3.5 K/UL (ref 4.8–10.8)

## 2020-05-26 PROCEDURE — 700105 HCHG RX REV CODE 258: Performed by: INTERNAL MEDICINE

## 2020-05-26 PROCEDURE — 85025 COMPLETE CBC W/AUTO DIFF WBC: CPT

## 2020-05-26 PROCEDURE — 96409 CHEMO IV PUSH SNGL DRUG: CPT

## 2020-05-26 PROCEDURE — 93010 ELECTROCARDIOGRAM REPORT: CPT | Performed by: INTERNAL MEDICINE

## 2020-05-26 PROCEDURE — 93005 ELECTROCARDIOGRAM TRACING: CPT | Performed by: INTERNAL MEDICINE

## 2020-05-26 PROCEDURE — 700111 HCHG RX REV CODE 636 W/ 250 OVERRIDE (IP): Performed by: INTERNAL MEDICINE

## 2020-05-26 RX ORDER — ONDANSETRON 8 MG/1
8 TABLET, ORALLY DISINTEGRATING ORAL ONCE
Status: COMPLETED | OUTPATIENT
Start: 2020-05-26 | End: 2020-05-26

## 2020-05-26 RX ORDER — SODIUM CHLORIDE 9 MG/ML
INJECTION, SOLUTION INTRAVENOUS CONTINUOUS
Status: DISCONTINUED | OUTPATIENT
Start: 2020-05-26 | End: 2020-05-26 | Stop reason: HOSPADM

## 2020-05-26 RX ADMIN — ONDANSETRON 8 MG: 8 TABLET, ORALLY DISINTEGRATING ORAL at 12:25

## 2020-05-26 RX ADMIN — SODIUM CHLORIDE: 9 INJECTION, SOLUTION INTRAVENOUS at 12:25

## 2020-05-26 RX ADMIN — ERIBULIN MESYLATE 2.21 MG: 0.5 INJECTION INTRAVENOUS at 13:01

## 2020-05-26 ASSESSMENT — FIBROSIS 4 INDEX: FIB4 SCORE: 1.11

## 2020-05-26 NOTE — PROGRESS NOTES
Labs WNL. EKG same as previous. Chart to pharmacy. Awaiting medications. Report to jinny MONSALVE who will cover this RN for lunch.

## 2020-05-26 NOTE — PROGRESS NOTES
Chemotherapy Verification - PRIMARY RN      Height = 147.3cm  Weight = 61.3kg  BSA = 1.58m2       Medication: Eribulin Mesylate  Dose: 1.4mg/m2  Calculated Dose: 2.212mg                             (In mg/m2, AUC, mg/kg)         I confirm this process was performed independently with the BSA and all final chemotherapy dosing calculations congruent.  Any discrepancies of 10% or greater have been addressed with the chemotherapy pharmacist. The resolution of the discrepancy has been documented in the EPIC progress notes.

## 2020-05-26 NOTE — PROGRESS NOTES
Here for Halaven today. Denies any complaints. PIV placed with brisk blood return. Labs collected and sent. EKG notified to come to EKG. Awaiting results.

## 2020-05-26 NOTE — PROGRESS NOTES
Chemotherapy Verification - SECONDARY RN       Height = 147.3 cm  Weight = 61.3 kg  BSA = 1.58 m2       Medication: Halaven  Dose: 1.4 mg/m2  Calculated Dose: 2.21 mg                             (In mg/m2, AUC, mg/kg)         I confirm that this process was performed independently.

## 2020-05-26 NOTE — PROGRESS NOTES
"Pharmacy Chemotherapy Verification    Patient Name: Christine Malhotra   Dx: Breast Cancer (Stage IV, ER/[place] +, HER2 negative)  Protocol: EriBULin     *Dosing Reference*  EriBULin 1.4 mg/m2 IV push on Days 1 and 8   21 day cycle until disease progression or unacceptable toxicity  NCCN Guidelines for Breast Cancer V.1.2019.  Artem WADE, et al. Lancet. 2011;377(4180):625-93.    Allergies:  Patient has no known allergies.     /73   Pulse 76   Temp 36.7 °C (98 °F) (Temporal)   Resp 18   Ht 1.473 m (4' 9.99\")   Wt 61.3 kg (135 lb 2.3 oz)   SpO2 97%   BMI 28.25 kg/m²  Body surface area is 1.58 meters squared.    Labs 5/26/20  ANC~ 2010 Plt = 263k   Hgb = 11.8   Labs 5/19/20:   SCr = 0.63 mg/dL CrCl ~ 80 mL/min   LFT's = WNL TBili = 0.2   Mg = 1.8     EKG 5/26/20 = QTc 424  EKG 4/28/20 = QTc 414  EKG 3/12/20 = QTc 423  EKG 11/26/19 =  QTc 414 (baseline)    Drug Order   (Drug name, dose, route, IV Fluid & volume, frequency, number of doses) Cycle 4 Day 8  Previous treatment: C4D1 5/19/20     Medication = eriBULin (Halaven)  Base Dose= 1.4 mg/m2  Calc Dose: Base Dose x 1.58 m2 = 2.212 mg  Final Dose = 2.21 mg  Route = IVP  Fluid & Volume = 4.42 mL in syringe   Concentration = 0.5 mg/mL  Admin Duration = Over 2-5 minutes          <10% difference, okay to treat with final dose     By my signature below, I confirm this process was performed independently with the BSA and all final chemotherapy dosing calculations congruent. I have reviewed the above chemotherapy order and that my calculation of the final dose and BSA (when applicable) corroborate those calculations of the  pharmacist. Discrepancies of 10% or greater in the written dose have been addressed and documented within the [facility] Progress notes.    Matt Humphries, PharmD        "

## 2020-05-26 NOTE — PROGRESS NOTES
Tolerated Haleven. PIV flushed and removed with tip intact. Site covered with pressure dressing. Discharged in no distress. Next appointment to be made by schedulers whom agree to call patient at home and notify her of times since this RN was Unable to get hold of scheduling before patient left.

## 2020-06-10 ENCOUNTER — HOSPITAL ENCOUNTER (OUTPATIENT)
Dept: RADIOLOGY | Facility: MEDICAL CENTER | Age: 65
End: 2020-06-10
Payer: MEDICAID

## 2020-06-11 ENCOUNTER — HOSPITAL ENCOUNTER (OUTPATIENT)
Dept: RADIOLOGY | Facility: MEDICAL CENTER | Age: 65
End: 2020-06-11
Attending: INTERNAL MEDICINE
Payer: MEDICAID

## 2020-06-11 DIAGNOSIS — C50.111 MALIGNANT NEOPLASM OF CENTRAL PORTION OF RIGHT FEMALE BREAST, UNSPECIFIED ESTROGEN RECEPTOR STATUS (HCC): ICD-10-CM

## 2020-06-11 PROCEDURE — A9576 INJ PROHANCE MULTIPACK: HCPCS | Performed by: INTERNAL MEDICINE

## 2020-06-11 PROCEDURE — 70553 MRI BRAIN STEM W/O & W/DYE: CPT

## 2020-06-11 PROCEDURE — 700117 HCHG RX CONTRAST REV CODE 255: Performed by: INTERNAL MEDICINE

## 2020-06-11 RX ADMIN — GADOTERIDOL 12 ML: 279.3 INJECTION, SOLUTION INTRAVENOUS at 09:26

## 2020-06-15 ENCOUNTER — HOSPITAL ENCOUNTER (OUTPATIENT)
Dept: RADIOLOGY | Facility: MEDICAL CENTER | Age: 65
End: 2020-06-15
Attending: INTERNAL MEDICINE
Payer: MEDICAID

## 2020-06-15 DIAGNOSIS — C50.611 MALIGNANT NEOPLASM OF AXILLARY TAIL OF RIGHT FEMALE BREAST, UNSPECIFIED ESTROGEN RECEPTOR STATUS (HCC): ICD-10-CM

## 2020-06-15 PROCEDURE — 10035 PLMT SFT TISS LOCLZJ DEV 1ST: CPT

## 2020-06-15 PROCEDURE — 19084 BX BREAST ADD LESION US IMAG: CPT | Mod: RT

## 2020-06-15 PROCEDURE — 88360 TUMOR IMMUNOHISTOCHEM/MANUAL: CPT | Mod: 91

## 2020-06-15 PROCEDURE — 38505 NEEDLE BIOPSY LYMPH NODES: CPT

## 2020-06-15 PROCEDURE — 19083 BX BREAST 1ST LESION US IMAG: CPT | Mod: LT

## 2020-06-15 PROCEDURE — 88305 TISSUE EXAM BY PATHOLOGIST: CPT

## 2020-06-15 PROCEDURE — 76642 ULTRASOUND BREAST LIMITED: CPT | Mod: RT

## 2020-06-16 ENCOUNTER — APPOINTMENT (OUTPATIENT)
Dept: ONCOLOGY | Facility: MEDICAL CENTER | Age: 65
End: 2020-06-16
Attending: INTERNAL MEDICINE
Payer: MEDICAID

## 2020-06-16 ENCOUNTER — TELEPHONE (OUTPATIENT)
Dept: RADIOLOGY | Facility: MEDICAL CENTER | Age: 65
End: 2020-06-16

## 2020-06-16 LAB — PATHOLOGY CONSULT NOTE: NORMAL

## 2020-06-16 NOTE — TELEPHONE ENCOUNTER
Spoke to Matheus's office who states that they don't have pathology results/faxed them to [phone]/asked if they could call pt with results/they will follow up with us/marguerites

## 2020-06-22 RX ORDER — PROCHLORPERAZINE MALEATE 10 MG
10 TABLET ORAL EVERY 6 HOURS PRN
Status: CANCELLED | OUTPATIENT
Start: 2020-06-30

## 2020-06-22 RX ORDER — 0.9 % SODIUM CHLORIDE 0.9 %
VIAL (ML) INJECTION PRN
Status: CANCELLED | OUTPATIENT
Start: 2020-07-07

## 2020-06-22 RX ORDER — HEPARIN SODIUM (PORCINE) LOCK FLUSH IV SOLN 100 UNIT/ML 100 UNIT/ML
500 SOLUTION INTRAVENOUS PRN
Status: CANCELLED | OUTPATIENT
Start: 2020-07-07

## 2020-06-22 RX ORDER — 0.9 % SODIUM CHLORIDE 0.9 %
10 VIAL (ML) INJECTION PRN
Status: CANCELLED | OUTPATIENT
Start: 2020-07-07

## 2020-06-22 RX ORDER — SODIUM CHLORIDE 9 MG/ML
INJECTION, SOLUTION INTRAVENOUS CONTINUOUS
Status: CANCELLED | OUTPATIENT
Start: 2020-06-30

## 2020-06-22 RX ORDER — PROCHLORPERAZINE MALEATE 10 MG
10 TABLET ORAL EVERY 6 HOURS PRN
Status: CANCELLED | OUTPATIENT
Start: 2020-07-07

## 2020-06-22 RX ORDER — 0.9 % SODIUM CHLORIDE 0.9 %
10 VIAL (ML) INJECTION PRN
Status: CANCELLED | OUTPATIENT
Start: 2020-06-29

## 2020-06-22 RX ORDER — ONDANSETRON 2 MG/ML
4 INJECTION INTRAMUSCULAR; INTRAVENOUS PRN
Status: CANCELLED | OUTPATIENT
Start: 2020-06-30

## 2020-06-22 RX ORDER — ONDANSETRON 8 MG/1
8 TABLET, ORALLY DISINTEGRATING ORAL ONCE
Status: CANCELLED | OUTPATIENT
Start: 2020-07-07

## 2020-06-22 RX ORDER — HEPARIN SODIUM (PORCINE) LOCK FLUSH IV SOLN 100 UNIT/ML 100 UNIT/ML
500 SOLUTION INTRAVENOUS PRN
Status: CANCELLED | OUTPATIENT
Start: 2020-06-30

## 2020-06-22 RX ORDER — 0.9 % SODIUM CHLORIDE 0.9 %
10 VIAL (ML) INJECTION PRN
Status: CANCELLED | OUTPATIENT
Start: 2020-06-30

## 2020-06-22 RX ORDER — ONDANSETRON 8 MG/1
8 TABLET, ORALLY DISINTEGRATING ORAL PRN
Status: CANCELLED | OUTPATIENT
Start: 2020-06-30

## 2020-06-22 RX ORDER — ONDANSETRON 8 MG/1
8 TABLET, ORALLY DISINTEGRATING ORAL PRN
Status: CANCELLED | OUTPATIENT
Start: 2020-07-07

## 2020-06-22 RX ORDER — 0.9 % SODIUM CHLORIDE 0.9 %
3 VIAL (ML) INJECTION PRN
Status: CANCELLED | OUTPATIENT
Start: 2020-07-07

## 2020-06-22 RX ORDER — ONDANSETRON 8 MG/1
8 TABLET, ORALLY DISINTEGRATING ORAL ONCE
Status: CANCELLED | OUTPATIENT
Start: 2020-06-30

## 2020-06-22 RX ORDER — HEPARIN SODIUM (PORCINE) LOCK FLUSH IV SOLN 100 UNIT/ML 100 UNIT/ML
500 SOLUTION INTRAVENOUS PRN
Status: CANCELLED | OUTPATIENT
Start: 2020-06-29

## 2020-06-22 RX ORDER — 0.9 % SODIUM CHLORIDE 0.9 %
VIAL (ML) INJECTION PRN
Status: CANCELLED | OUTPATIENT
Start: 2020-06-30

## 2020-06-22 RX ORDER — 0.9 % SODIUM CHLORIDE 0.9 %
VIAL (ML) INJECTION PRN
Status: CANCELLED | OUTPATIENT
Start: 2020-06-29

## 2020-06-22 RX ORDER — SODIUM CHLORIDE 9 MG/ML
INJECTION, SOLUTION INTRAVENOUS CONTINUOUS
Status: CANCELLED | OUTPATIENT
Start: 2020-07-07

## 2020-06-22 RX ORDER — 0.9 % SODIUM CHLORIDE 0.9 %
3 VIAL (ML) INJECTION PRN
Status: CANCELLED | OUTPATIENT
Start: 2020-06-29

## 2020-06-22 RX ORDER — 0.9 % SODIUM CHLORIDE 0.9 %
3 VIAL (ML) INJECTION PRN
Status: CANCELLED | OUTPATIENT
Start: 2020-06-30

## 2020-06-22 RX ORDER — ONDANSETRON 2 MG/ML
4 INJECTION INTRAMUSCULAR; INTRAVENOUS PRN
Status: CANCELLED | OUTPATIENT
Start: 2020-07-07

## 2020-06-30 ENCOUNTER — OUTPATIENT INFUSION SERVICES (OUTPATIENT)
Dept: ONCOLOGY | Facility: MEDICAL CENTER | Age: 65
End: 2020-06-30
Attending: INTERNAL MEDICINE
Payer: MEDICAID

## 2020-06-30 VITALS
DIASTOLIC BLOOD PRESSURE: 74 MMHG | OXYGEN SATURATION: 99 % | BODY MASS INDEX: 27.58 KG/M2 | HEART RATE: 72 BPM | RESPIRATION RATE: 18 BRPM | TEMPERATURE: 97 F | SYSTOLIC BLOOD PRESSURE: 124 MMHG | HEIGHT: 58 IN | WEIGHT: 131.39 LBS

## 2020-06-30 DIAGNOSIS — Z17.0 MALIGNANT NEOPLASM OF OVERLAPPING SITES OF RIGHT BREAST IN FEMALE, ESTROGEN RECEPTOR POSITIVE (HCC): ICD-10-CM

## 2020-06-30 DIAGNOSIS — C50.811 MALIGNANT NEOPLASM OF OVERLAPPING SITES OF RIGHT BREAST IN FEMALE, ESTROGEN RECEPTOR POSITIVE (HCC): ICD-10-CM

## 2020-06-30 LAB
ALBUMIN SERPL BCP-MCNC: 4 G/DL (ref 3.2–4.9)
ALBUMIN/GLOB SERPL: 1.3 G/DL
ALP SERPL-CCNC: 66 U/L (ref 30–99)
ALT SERPL-CCNC: 18 U/L (ref 2–50)
ANION GAP SERPL CALC-SCNC: 10 MMOL/L (ref 7–16)
AST SERPL-CCNC: 24 U/L (ref 12–45)
BASOPHILS # BLD AUTO: 0.3 % (ref 0–1.8)
BASOPHILS # BLD: 0.01 K/UL (ref 0–0.12)
BILIRUB SERPL-MCNC: 0.3 MG/DL (ref 0.1–1.5)
BUN SERPL-MCNC: 16 MG/DL (ref 8–22)
CALCIUM SERPL-MCNC: 9.2 MG/DL (ref 8.5–10.5)
CHLORIDE SERPL-SCNC: 103 MMOL/L (ref 96–112)
CO2 SERPL-SCNC: 26 MMOL/L (ref 20–33)
CREAT SERPL-MCNC: 0.62 MG/DL (ref 0.5–1.4)
EOSINOPHIL # BLD AUTO: 0.02 K/UL (ref 0–0.51)
EOSINOPHIL NFR BLD: 0.5 % (ref 0–6.9)
ERYTHROCYTE [DISTWIDTH] IN BLOOD BY AUTOMATED COUNT: 56.8 FL (ref 35.9–50)
GLOBULIN SER CALC-MCNC: 3.1 G/DL (ref 1.9–3.5)
GLUCOSE SERPL-MCNC: 102 MG/DL (ref 65–99)
HCT VFR BLD AUTO: 42.5 % (ref 37–47)
HGB BLD-MCNC: 13 G/DL (ref 12–16)
IMM GRANULOCYTES # BLD AUTO: 0.02 K/UL (ref 0–0.11)
IMM GRANULOCYTES NFR BLD AUTO: 0.5 % (ref 0–0.9)
LYMPHOCYTES # BLD AUTO: 0.6 K/UL (ref 1–4.8)
LYMPHOCYTES NFR BLD: 16.4 % (ref 22–41)
MAGNESIUM SERPL-MCNC: 1.9 MG/DL (ref 1.5–2.5)
MCH RBC QN AUTO: 27.9 PG (ref 27–33)
MCHC RBC AUTO-ENTMCNC: 30.6 G/DL (ref 33.6–35)
MCV RBC AUTO: 91.2 FL (ref 81.4–97.8)
MONOCYTES # BLD AUTO: 0.43 K/UL (ref 0–0.85)
MONOCYTES NFR BLD AUTO: 11.7 % (ref 0–13.4)
NEUTROPHILS # BLD AUTO: 2.58 K/UL (ref 2–7.15)
NEUTROPHILS NFR BLD: 70.6 % (ref 44–72)
NRBC # BLD AUTO: 0 K/UL
NRBC BLD-RTO: 0 /100 WBC
PLATELET # BLD AUTO: 204 K/UL (ref 164–446)
PMV BLD AUTO: 10.4 FL (ref 9–12.9)
POTASSIUM SERPL-SCNC: 4.2 MMOL/L (ref 3.6–5.5)
PROT SERPL-MCNC: 7.1 G/DL (ref 6–8.2)
RBC # BLD AUTO: 4.66 M/UL (ref 4.2–5.4)
SODIUM SERPL-SCNC: 139 MMOL/L (ref 135–145)
WBC # BLD AUTO: 3.7 K/UL (ref 4.8–10.8)

## 2020-06-30 PROCEDURE — 85025 COMPLETE CBC W/AUTO DIFF WBC: CPT

## 2020-06-30 PROCEDURE — 80053 COMPREHEN METABOLIC PANEL: CPT

## 2020-06-30 PROCEDURE — 96409 CHEMO IV PUSH SNGL DRUG: CPT

## 2020-06-30 PROCEDURE — 700111 HCHG RX REV CODE 636 W/ 250 OVERRIDE (IP): Performed by: INTERNAL MEDICINE

## 2020-06-30 PROCEDURE — 83735 ASSAY OF MAGNESIUM: CPT

## 2020-06-30 RX ORDER — ONDANSETRON 8 MG/1
8 TABLET, ORALLY DISINTEGRATING ORAL ONCE
Status: COMPLETED | OUTPATIENT
Start: 2020-06-30 | End: 2020-06-30

## 2020-06-30 RX ADMIN — ONDANSETRON 8 MG: 8 TABLET, ORALLY DISINTEGRATING ORAL at 12:40

## 2020-06-30 RX ADMIN — ERIBULIN MESYLATE 2.19 MG: 0.5 INJECTION INTRAVENOUS at 12:45

## 2020-06-30 ASSESSMENT — FIBROSIS 4 INDEX: FIB4 SCORE: 1.18

## 2020-06-30 NOTE — PROGRESS NOTES
Patient presents for day one cycle five Halaven. Language line used [employer] #[id number]. Reviewed plan of care, patient verbalizes understanding. PIV established, flushes well with brisk blood return. Halaven given IV push, line flushed clear. PIV removed, tip intact, compression dressing to site. Patient scheduled for next appointment and released in no acute distress.

## 2020-06-30 NOTE — PROGRESS NOTES
"Pharmacy Chemotherapy Verification  Dx: Stage IV Breast  Cancer (ER/[place]+, HER2 neg)    Cycle 5 Day 1   Previous treatment: C4D8 on 5/26/20     Protocol: Halaven  *Dosing Reference*  Eribulin (Halaven) 1.4 mg/m2 IV on Days 1 and 8   21-day cycle until disease progression or unacceptable toxicity  NCCN Guidelines for Breast Cancer. V.1.2019.  Artem J, et al. Lancet. 2011;377(6981):486-15.          Allergies:Patient has no known allergies.  /74   Pulse 72   Temp 36.1 °C (97 °F) (Temporal)   Resp 18   Ht 1.473 m (4' 9.99\")   Wt 59.6 kg (131 lb 6.3 oz)   SpO2 99%   BMI 27.47 kg/m²  Body surface area is 1.56 meters squared.    Labs 6/30/20  ANC~ 2580 Plt = 204k   Hgb = 13     SCr = 0.62 mg/dL CrCl ~ 87 mL/min   LFT's = WNL TBili = 0.3  K = 4.2  Mag = 1.9    EKG 5/26/20  QTc = 424 msec    Erubilin (Halaven) 1.4 mg/m² x 1.56 m² = 2.184 mg   <10% difference, okay to treat with final dose = 2.185 mg IV    Matt Humphries, PharmD      "

## 2020-06-30 NOTE — PROGRESS NOTES
Chemotherapy Verification - PRIMARY RN      Height = 147.3 cm  Weight = 59.6 kg  BSA = 1.56       Medication: Halaven  Dose: 1.4 mg/m2  Calculated Dose: 2.184 mg                             (In mg/m2, AUC, mg/kg)           I confirm this process was performed independently with the BSA and all final chemotherapy dosing calculations congruent.  Any discrepancies of 10% or greater have been addressed with the chemotherapy pharmacist. The resolution of the discrepancy has been documented in the EPIC progress notes.

## 2020-06-30 NOTE — PROGRESS NOTES
"Pharmacy Chemotherapy Verification Note:    Patient Name: Christine Malhotra      Dx: Breast CA, stage IV (ER/[place] +, HER2 -)      Protocol: eribulin       *Dosing Reference*  Eribulin (Halaven) 1.4 mg/m² IV on Days 1 and 8              21-day cycle until disease progression or unacceptable toxicity    NCCN Guidelines for Breast Cancer. V.1.2019.  Artem J, et al. Lancet. 2011;377(7229):816-44.    Allergies:  Patient has no known allergies.     /74   Pulse 72   Temp 36.1 °C (97 °F) (Temporal)   Resp 18   Ht 1.473 m (4' 9.99\")   Wt 59.6 kg (131 lb 6.3 oz)   SpO2 99%   BMI 27.47 kg/m²  Body surface area is 1.56 meters squared.  ANC~ 2580  Plt = 204 k  SCr = 0.62 mg/dL  CrCl = 77 mL/min (using min SCr 0.7 mg/dL and current weight)  LFT = WNL  TBili = 0.3     Drug Order   (Drug name, dose, route, IV Fluid & volume, frequency, number of doses) Cycle: 5, Day 1      Previous treatment: C4D8 = 5/26/20     Medication = Eribulin (Halaven)  Base Dose = 1.4 mg/m²  Calc Dose: Base Dose x 1.56 m² = 2.18 mg  Final Dose = 2.185 mg  Route = IVP  Fluid & Volume = 4.37 mL in syringe  Conc = 0.5 mg/mL  Admin Duration = Over 2-5 minutes          <10% difference, ok to treat with final written dose     By my signature below, I confirm this process was performed independently with the BSA and all final chemotherapy dosing calculations congruent. I have reviewed the above chemotherapy order and that my calculation of the final dose and BSA (when applicable) corroborate those calculations of the  pharmacist.     Bubba Blake, PharmD, BCPS    "

## 2020-07-02 ENCOUNTER — APPOINTMENT (OUTPATIENT)
Dept: RADIOLOGY | Facility: IMAGING CENTER | Age: 65
End: 2020-07-02
Attending: NURSE PRACTITIONER
Payer: MEDICAID

## 2020-07-02 ENCOUNTER — HOSPITAL ENCOUNTER (OUTPATIENT)
Facility: MEDICAL CENTER | Age: 65
End: 2020-07-02
Attending: NURSE PRACTITIONER
Payer: MEDICAID

## 2020-07-02 ENCOUNTER — TELEPHONE (OUTPATIENT)
Dept: URGENT CARE | Facility: CLINIC | Age: 65
End: 2020-07-02

## 2020-07-02 ENCOUNTER — OFFICE VISIT (OUTPATIENT)
Dept: URGENT CARE | Facility: CLINIC | Age: 65
End: 2020-07-02
Payer: MEDICAID

## 2020-07-02 VITALS
WEIGHT: 131 LBS | RESPIRATION RATE: 12 BRPM | OXYGEN SATURATION: 93 % | DIASTOLIC BLOOD PRESSURE: 72 MMHG | BODY MASS INDEX: 28.26 KG/M2 | SYSTOLIC BLOOD PRESSURE: 118 MMHG | HEART RATE: 67 BPM | TEMPERATURE: 97.6 F | HEIGHT: 57 IN

## 2020-07-02 DIAGNOSIS — T45.1X5A IMMUNODEFICIENCY DUE TO CHEMOTHERAPY (HCC): ICD-10-CM

## 2020-07-02 DIAGNOSIS — Z20.822 CLOSE EXPOSURE TO COVID-19 VIRUS: ICD-10-CM

## 2020-07-02 DIAGNOSIS — R05.9 COUGH: ICD-10-CM

## 2020-07-02 DIAGNOSIS — D84.821 IMMUNODEFICIENCY DUE TO CHEMOTHERAPY (HCC): ICD-10-CM

## 2020-07-02 DIAGNOSIS — J22 LRTI (LOWER RESPIRATORY TRACT INFECTION): ICD-10-CM

## 2020-07-02 DIAGNOSIS — Z79.899 IMMUNODEFICIENCY DUE TO CHEMOTHERAPY (HCC): ICD-10-CM

## 2020-07-02 LAB — COVID ORDER STATUS COVID19: NORMAL

## 2020-07-02 PROCEDURE — 71046 X-RAY EXAM CHEST 2 VIEWS: CPT | Mod: TC,CS | Performed by: FAMILY MEDICINE

## 2020-07-02 PROCEDURE — U0003 INFECTIOUS AGENT DETECTION BY NUCLEIC ACID (DNA OR RNA); SEVERE ACUTE RESPIRATORY SYNDROME CORONAVIRUS 2 (SARS-COV-2) (CORONAVIRUS DISEASE [COVID-19]), AMPLIFIED PROBE TECHNIQUE, MAKING USE OF HIGH THROUGHPUT TECHNOLOGIES AS DESCRIBED BY CMS-2020-01-R: HCPCS

## 2020-07-02 PROCEDURE — 99214 OFFICE O/P EST MOD 30 MIN: CPT | Mod: CS | Performed by: NURSE PRACTITIONER

## 2020-07-02 ASSESSMENT — ENCOUNTER SYMPTOMS
NAUSEA: 0
CHILLS: 0
DIARRHEA: 0
SORE THROAT: 1
WHEEZING: 0
FEVER: 0
SPUTUM PRODUCTION: 1
EYE DISCHARGE: 0
HEADACHES: 0
COUGH: 1
ORTHOPNEA: 0
MYALGIAS: 0
SHORTNESS OF BREATH: 0

## 2020-07-02 ASSESSMENT — FIBROSIS 4 INDEX: FIB4 SCORE: 1.75

## 2020-07-02 NOTE — PATIENT INSTRUCTIONS
INSTRUCCIONES PARA LA COVID-19 O CUALQUIER OTRA  ENFERMEDAD RESPIRATORIA INFECCIOSA    Los Centros para el Control y la Prevención de Enfermedades (Centers for Disease Control and Prevention, CDC) señalan que los primeros signos de la COVID-19 incluyen tos, falta de aire, dificultad para respirar o al menos dos de los siguientes síntomas: escalofríos, temblor con escalofríos, dolor muscular, dolor de kenny, dolor de garganta y pérdida del gusto o del olfato. Los síntomas pueden ser de leves a graves y pueden manifestarse hasta dos semanas después de la exposición al virus.     La práctica del autoaislamiento y la cuarentena ayuda a proteger tanto al público arlyn a fuentes leonel al evitar la exposición a personas que tienen o podrían tener amena enfermedad contagiosa. Siga las siguientes medidas de prevención conforme a las pautas de los CDC:    CUÁNDO INTERRUMPIR EL AISLAMIENTO: Las personas con la COVID-19 o  cualquier otra enfermedad respiratoria infecciosa que presenten síntomas y que hayan recibido indicaciones de cuidarse en fuentes propio hogar podrán interrumpir el aislamiento domiciliario si se cumplen las siguientes condiciones:  · Varner transcurrido al menos 3 días (72 horas) desde la recuperación, definida arlyn la desaparición de la fiebre sin el uso de medicamentos destinados a reducirla; Y  · Varner jovana los síntomas respiratorios (por ejemplo, tos, falta de aire); Y  · Varner transcurrido al menos 10 días desde que aparecieron los síntomas por primera vez y no chen habido ninguna enfermedad subsiguiente.    CONTROLE LEA SÍNTOMAS: Si fuentes enfermedad está empeorando, busque atención médica inmediata. Si tiene amena emergencia médica y necesita llamar al 911, notifique al personal de despacho que tiene o que lo están evaluando por tratarse de un kai sospechoso o confirmado de la COVID-19 u otra enfermedad respiratoria infecciosa. Use amena mascarilla si es posible.    RESTRICCIÓN DE ACTIVIDADES: Restrinja las actividades  fuera de fuentes hogar,  excepto para recibir atención médica. No debe ir al trabajo, a la escuela ni a áreas públicas. Evite el uso de transporte público, transporte compartido o taxis.    CITAS MÉDICAS PROGRAMADAS: Notifique a fuentes proveedor que tiene o que lo están evaluando por tratarse de un kai sospechoso o confirmado de la COVID-19 u otra enfermedad respiratoria infecciosa. Cook ayudará a la oficina del proveedor de atención médica a cuidar de usted de manera mcallister y a evitar que otras personas se infecten o se expongan.    MASCARILLAS, cuándo usarlas: Siempre que esté fuera de fuentes casa o cerca de otras personas o mascotas. Si no puede usar amena mascarilla, mantenga amena distancia mínima de 6 pies de los demás.  ENTORNO DONDE VIVE: Permanezca en amena habitación separada de otras personas y mascotas. Si es posible, use un baño separado, pídales a otras personas que cuiden de lux mascotas y evite compartir artículos del hogar. Cualquier artículo que use debe lavarse juan francisco con agua y jabón. Limpie todas las superficies “de mucho uso” todos los días. Use un spray de limpieza doméstico o amena toallita, de acuerdo con las instrucciones de la etiqueta. Las superficies “de mucho uso” incluyen (entre otras cosas) mesones, mesas, pomos de agustin, accesorios de baño, inodoros, teléfonos, teclados, tabletas y mesitas de noche.    LAVADO DE TANISHA: Lávese las tanisha con frecuencia con agua y jabón leanna al menos 20 segundos, especialmente después de sonarse la nariz, toser o estornudar; después de ir al baño; antes y después de interactuar con mascotas; y antes y después de las comidas o de preparar alimentos. Si las tanisha están visiblemente sucias, use agua y jabón. Si no hay agua y jabón disponible, use un desinfectante para tanisha a base de alcohol con al menos 60 % de alcohol. Evite tocarse los ojos, la nariz y la boca antes de lavarse las tanisha. Cúbrase la boca y la nariz con un pañuelo cuando estornude o tosa. Tire los  pañuelos usados en un bote de basura con bolsa. Lávese las neelam inmediatamente.    AUTOCONTROL ACTIVO/FACILITADO: Siga las instrucciones proporcionadas por el departamento de santos o los profesionales médicos a nivel local, según corresponda.  Cuando trabaje con fuentes departamento de santos local, verifique lux horarios de disponibilidad.    CONDADO NÚMERO DE TELÉFONO   [facility] [phone]   [facility] [phone]   [facility] [address] [place], [place] [address] [phone]     SI TIENE UN RESULTADO POSITIVO CONFIRMADO DE LA COVID-19:  Las personas que se hayan recuperado por completo de la COVID-19 pueden tener anticuerpos en fuentes plasma (denominado “plasma convaleciente”) que generan amena respuesta inmune, y esto podría usarse para tratar a pacientes gravemente enfermos con COVID-19.  [facility] está entusiasmado por comenzar a trabajar con [employer] en la recolección de plasma convaleciente de personas que se simeon recuperado de la COVID-19 arlyn parte de un programa para tratar a pacientes infectados con el virus. Belle “fármaco nuevo en fase de investigación clínica de emergencia” aprobado por la Administración de Alimentos y Medicamentos (Food and Drug Administration, FDA) es un producto  hemoderivado especial con anticuerpos que pueden brindar a los pacientes un refuerzo adicional para combatir el virus.    Para ser elegible para donar plasma convaleciente, debe ayse tenido un diagnóstico previo de COVID-19 documentado a partir de amena prueba de laboratorio (o un resultado positivo de anticuerpos de SARS-CoV-2) y cumplir con requisitos de elegibilidad adicionales.  Si le interesa donar plasma convaleciente o tiene preguntas, comuníquese con el coordinador del proyecto Plasma Convaleciente de [place] [facility] [phone] o por correo electrónico a [email or web address].

## 2020-07-02 NOTE — PROGRESS NOTES
Subjective:      Christine Malhotra is a 65 y.o. female who presents with Cough (x1 day, with flemm)            HPI New. 65 year old female with cough that is wet for 2-3 days. She has mild sore throat as well. Denies fever, chills, myalgia, nausea or diarrhea. She has no loss of smell or taste. She is on chemo with last treatment on Tuesday. She has a daughter who is covid positive at this time as well. She has not been in touch with oncology for this issue.   Patient has no known allergies.  Current Outpatient Medications on File Prior to Visit   Medication Sig Dispense Refill   • omeprazole (PRILOSEC) 20 MG delayed-release capsule Take 20 mg by mouth every day.     • loperamide (IMODIUM) 2 MG Cap Take 2 mg by mouth 4 times a day as needed for Diarrhea.     • levothyroxine (SYNTHROID) 50 MCG Tab Take 1 Tab by mouth every day.     • rivaroxaban (XARELTO) 20 MG Tab tablet Take 20 mg by mouth with dinner.       No current facility-administered medications on file prior to visit.      Social History     Socioeconomic History   • Marital status:      Spouse name: Not on file   • Number of children: Not on file   • Years of education: Not on file   • Highest education level: Not on file   Occupational History   • Not on file   Social Needs   • Financial resource strain: Not on file   • Food insecurity     Worry: Not on file     Inability: Not on file   • Transportation needs     Medical: Not on file     Non-medical: Not on file   Tobacco Use   • Smoking status: Never Smoker   • Smokeless tobacco: Never Used   Substance and Sexual Activity   • Alcohol use: No   • Drug use: Never   • Sexual activity: Not on file   Lifestyle   • Physical activity     Days per week: Not on file     Minutes per session: Not on file   • Stress: Not on file   Relationships   • Social connections     Talks on phone: Not on file     Gets together: Not on file     Attends Synagogue service: Not on file     Active member of club or  "organization: Not on file     Attends meetings of clubs or organizations: Not on file     Relationship status: Not on file   • Intimate partner violence     Fear of current or ex partner: Not on file     Emotionally abused: Not on file     Physically abused: Not on file     Forced sexual activity: Not on file   Other Topics Concern   • Not on file   Social History Narrative   • Not on file     Breast Cancer-related family history is not on file.      Review of Systems   Constitutional: Positive for malaise/fatigue. Negative for chills and fever.   HENT: Positive for sore throat. Negative for congestion.    Eyes: Negative for discharge.   Respiratory: Positive for cough and sputum production. Negative for shortness of breath and wheezing.    Cardiovascular: Negative for chest pain and orthopnea.   Gastrointestinal: Negative for diarrhea and nausea.   Musculoskeletal: Negative for myalgias.   Neurological: Negative for headaches.   Endo/Heme/Allergies: Negative for environmental allergies.          Objective:     /72   Pulse 67   Temp 36.4 °C (97.6 °F) (Temporal)   Resp 12   Ht 1.448 m (4' 9\")   Wt 59.4 kg (131 lb)   SpO2 93%   BMI 28.35 kg/m²      Physical Exam  Constitutional:       General: She is not in acute distress.     Appearance: She is well-developed. She is not ill-appearing.   HENT:      Head: Normocephalic and atraumatic.      Right Ear: Ear canal and external ear normal. No middle ear effusion. Tympanic membrane is not injected or perforated.      Left Ear: Ear canal and external ear normal.  No middle ear effusion. Tympanic membrane is not injected or perforated.      Nose: Mucosal edema present.   Eyes:      General:         Right eye: No discharge.         Left eye: No discharge.      Conjunctiva/sclera: Conjunctivae normal.   Neck:      Musculoskeletal: Normal range of motion and neck supple.   Cardiovascular:      Rate and Rhythm: Normal rate and regular rhythm.      Heart sounds: " Normal heart sounds. No murmur.   Pulmonary:      Effort: Pulmonary effort is normal. No respiratory distress.      Breath sounds: Normal breath sounds.   Musculoskeletal: Normal range of motion.      Comments: Normal movement of all 4 extremities.   Lymphadenopathy:      Cervical: No cervical adenopathy.      Upper Body:      Right upper body: No supraclavicular adenopathy.      Left upper body: No supraclavicular adenopathy.   Skin:     General: Skin is warm and dry.   Neurological:      Mental Status: She is alert and oriented to person, place, and time.      Gait: Gait normal.   Psychiatric:         Behavior: Behavior normal.         Thought Content: Thought content normal.                 Assessment/Plan:       1. Cough     2. Close exposure to COVID-19 virus  DX-CHEST-2 VIEWS    COVID/SARS COV-2 PCR   3. LRTI (lower respiratory tract infection)     4. Immunodeficiency due to chemotherapy       Chest imaging clear.  covid testing done.  Advised to contact ocologist with update on this.  Patient handout on covid to patient.  ED precautions given.

## 2020-07-03 ENCOUNTER — TELEPHONE (OUTPATIENT)
Dept: URGENT CARE | Facility: PHYSICIAN GROUP | Age: 65
End: 2020-07-03

## 2020-07-03 LAB
SARS-COV-2 RNA RESP QL NAA+PROBE: DETECTED
SPECIMEN SOURCE: ABNORMAL

## 2020-07-06 NOTE — PROGRESS NOTES
Pt tested positive for COVID 19 on 7/2/20, Appts for infusion cancelled and Dr. Jarvis notified. Left message on pt's VM that appts have been cancelled and to call back for further information. Awaiting return call.

## 2020-07-07 ENCOUNTER — APPOINTMENT (OUTPATIENT)
Dept: ONCOLOGY | Facility: MEDICAL CENTER | Age: 65
End: 2020-07-07
Attending: INTERNAL MEDICINE
Payer: MEDICAID

## 2020-07-21 ENCOUNTER — APPOINTMENT (OUTPATIENT)
Dept: ONCOLOGY | Facility: MEDICAL CENTER | Age: 65
End: 2020-07-21
Attending: INTERNAL MEDICINE
Payer: MEDICAID

## 2020-07-22 RX ORDER — 0.9 % SODIUM CHLORIDE 0.9 %
10 VIAL (ML) INJECTION PRN
Status: CANCELLED | OUTPATIENT
Start: 2020-07-22

## 2020-07-22 RX ORDER — ONDANSETRON 8 MG/1
8 TABLET, ORALLY DISINTEGRATING ORAL ONCE
Status: CANCELLED | OUTPATIENT
Start: 2020-07-29

## 2020-07-22 RX ORDER — HEPARIN SODIUM (PORCINE) LOCK FLUSH IV SOLN 100 UNIT/ML 100 UNIT/ML
500 SOLUTION INTRAVENOUS PRN
Status: CANCELLED | OUTPATIENT
Start: 2020-07-22

## 2020-07-22 RX ORDER — 0.9 % SODIUM CHLORIDE 0.9 %
3 VIAL (ML) INJECTION PRN
Status: CANCELLED | OUTPATIENT
Start: 2020-07-22

## 2020-07-22 RX ORDER — SODIUM CHLORIDE 9 MG/ML
INJECTION, SOLUTION INTRAVENOUS CONTINUOUS
Status: CANCELLED | OUTPATIENT
Start: 2020-07-22

## 2020-07-22 RX ORDER — 0.9 % SODIUM CHLORIDE 0.9 %
10 VIAL (ML) INJECTION PRN
Status: CANCELLED | OUTPATIENT
Start: 2020-07-29

## 2020-07-22 RX ORDER — ONDANSETRON 8 MG/1
8 TABLET, ORALLY DISINTEGRATING ORAL PRN
Status: CANCELLED | OUTPATIENT
Start: 2020-07-22

## 2020-07-22 RX ORDER — ONDANSETRON 2 MG/ML
4 INJECTION INTRAMUSCULAR; INTRAVENOUS PRN
Status: CANCELLED | OUTPATIENT
Start: 2020-07-22

## 2020-07-22 RX ORDER — 0.9 % SODIUM CHLORIDE 0.9 %
VIAL (ML) INJECTION PRN
Status: CANCELLED | OUTPATIENT
Start: 2020-07-22

## 2020-07-22 RX ORDER — ONDANSETRON 2 MG/ML
4 INJECTION INTRAMUSCULAR; INTRAVENOUS PRN
Status: CANCELLED | OUTPATIENT
Start: 2020-07-29

## 2020-07-22 RX ORDER — HEPARIN SODIUM (PORCINE) LOCK FLUSH IV SOLN 100 UNIT/ML 100 UNIT/ML
500 SOLUTION INTRAVENOUS PRN
Status: CANCELLED | OUTPATIENT
Start: 2020-07-29

## 2020-07-22 RX ORDER — ONDANSETRON 8 MG/1
8 TABLET, ORALLY DISINTEGRATING ORAL ONCE
Status: CANCELLED | OUTPATIENT
Start: 2020-07-22

## 2020-07-22 RX ORDER — PROCHLORPERAZINE MALEATE 10 MG
10 TABLET ORAL EVERY 6 HOURS PRN
Status: CANCELLED | OUTPATIENT
Start: 2020-07-29

## 2020-07-22 RX ORDER — 0.9 % SODIUM CHLORIDE 0.9 %
3 VIAL (ML) INJECTION PRN
Status: CANCELLED | OUTPATIENT
Start: 2020-07-29

## 2020-07-22 RX ORDER — 0.9 % SODIUM CHLORIDE 0.9 %
VIAL (ML) INJECTION PRN
Status: CANCELLED | OUTPATIENT
Start: 2020-07-29

## 2020-07-22 RX ORDER — PROCHLORPERAZINE MALEATE 10 MG
10 TABLET ORAL EVERY 6 HOURS PRN
Status: CANCELLED | OUTPATIENT
Start: 2020-07-22

## 2020-07-22 RX ORDER — SODIUM CHLORIDE 9 MG/ML
INJECTION, SOLUTION INTRAVENOUS CONTINUOUS
Status: CANCELLED | OUTPATIENT
Start: 2020-07-29

## 2020-07-22 RX ORDER — ONDANSETRON 8 MG/1
8 TABLET, ORALLY DISINTEGRATING ORAL PRN
Status: CANCELLED | OUTPATIENT
Start: 2020-07-29

## 2020-07-28 ENCOUNTER — OUTPATIENT INFUSION SERVICES (OUTPATIENT)
Dept: ONCOLOGY | Facility: MEDICAL CENTER | Age: 65
End: 2020-07-28
Attending: INTERNAL MEDICINE
Payer: MEDICAID

## 2020-07-28 VITALS
WEIGHT: 129.19 LBS | TEMPERATURE: 98 F | RESPIRATION RATE: 18 BRPM | DIASTOLIC BLOOD PRESSURE: 65 MMHG | BODY MASS INDEX: 27.12 KG/M2 | HEIGHT: 58 IN | HEART RATE: 68 BPM | OXYGEN SATURATION: 97 % | SYSTOLIC BLOOD PRESSURE: 114 MMHG

## 2020-07-28 DIAGNOSIS — C50.811 MALIGNANT NEOPLASM OF OVERLAPPING SITES OF RIGHT BREAST IN FEMALE, ESTROGEN RECEPTOR POSITIVE (HCC): ICD-10-CM

## 2020-07-28 DIAGNOSIS — Z17.0 MALIGNANT NEOPLASM OF OVERLAPPING SITES OF RIGHT BREAST IN FEMALE, ESTROGEN RECEPTOR POSITIVE (HCC): ICD-10-CM

## 2020-07-28 LAB
ALBUMIN SERPL BCP-MCNC: 3.9 G/DL (ref 3.2–4.9)
ALBUMIN/GLOB SERPL: 1.3 G/DL
ALP SERPL-CCNC: 59 U/L (ref 30–99)
ALT SERPL-CCNC: 14 U/L (ref 2–50)
ANION GAP SERPL CALC-SCNC: 9 MMOL/L (ref 7–16)
AST SERPL-CCNC: 18 U/L (ref 12–45)
BASOPHILS # BLD AUTO: 0.6 % (ref 0–1.8)
BASOPHILS # BLD: 0.02 K/UL (ref 0–0.12)
BILIRUB SERPL-MCNC: 0.3 MG/DL (ref 0.1–1.5)
BUN SERPL-MCNC: 15 MG/DL (ref 8–22)
CALCIUM SERPL-MCNC: 9.6 MG/DL (ref 8.5–10.5)
CHLORIDE SERPL-SCNC: 104 MMOL/L (ref 96–112)
CO2 SERPL-SCNC: 28 MMOL/L (ref 20–33)
CREAT SERPL-MCNC: 0.62 MG/DL (ref 0.5–1.4)
EOSINOPHIL # BLD AUTO: 0.03 K/UL (ref 0–0.51)
EOSINOPHIL NFR BLD: 0.9 % (ref 0–6.9)
ERYTHROCYTE [DISTWIDTH] IN BLOOD BY AUTOMATED COUNT: 55.8 FL (ref 35.9–50)
GLOBULIN SER CALC-MCNC: 3.1 G/DL (ref 1.9–3.5)
GLUCOSE SERPL-MCNC: 83 MG/DL (ref 65–99)
HCT VFR BLD AUTO: 42.8 % (ref 37–47)
HGB BLD-MCNC: 13.3 G/DL (ref 12–16)
IMM GRANULOCYTES # BLD AUTO: 0.01 K/UL (ref 0–0.11)
IMM GRANULOCYTES NFR BLD AUTO: 0.3 % (ref 0–0.9)
LYMPHOCYTES # BLD AUTO: 0.95 K/UL (ref 1–4.8)
LYMPHOCYTES NFR BLD: 28.5 % (ref 22–41)
MAGNESIUM SERPL-MCNC: 1.9 MG/DL (ref 1.5–2.5)
MCH RBC QN AUTO: 27.6 PG (ref 27–33)
MCHC RBC AUTO-ENTMCNC: 31.1 G/DL (ref 33.6–35)
MCV RBC AUTO: 88.8 FL (ref 81.4–97.8)
MONOCYTES # BLD AUTO: 0.27 K/UL (ref 0–0.85)
MONOCYTES NFR BLD AUTO: 8.1 % (ref 0–13.4)
NEUTROPHILS # BLD AUTO: 2.05 K/UL (ref 2–7.15)
NEUTROPHILS NFR BLD: 61.6 % (ref 44–72)
NRBC # BLD AUTO: 0 K/UL
NRBC BLD-RTO: 0 /100 WBC
PLATELET # BLD AUTO: 225 K/UL (ref 164–446)
PMV BLD AUTO: 10 FL (ref 9–12.9)
POTASSIUM SERPL-SCNC: 4.3 MMOL/L (ref 3.6–5.5)
PROT SERPL-MCNC: 7 G/DL (ref 6–8.2)
RBC # BLD AUTO: 4.82 M/UL (ref 4.2–5.4)
SODIUM SERPL-SCNC: 141 MMOL/L (ref 135–145)
WBC # BLD AUTO: 3.3 K/UL (ref 4.8–10.8)

## 2020-07-28 PROCEDURE — 85025 COMPLETE CBC W/AUTO DIFF WBC: CPT

## 2020-07-28 PROCEDURE — 83735 ASSAY OF MAGNESIUM: CPT

## 2020-07-28 PROCEDURE — 80053 COMPREHEN METABOLIC PANEL: CPT

## 2020-07-28 PROCEDURE — 96409 CHEMO IV PUSH SNGL DRUG: CPT

## 2020-07-28 PROCEDURE — 700111 HCHG RX REV CODE 636 W/ 250 OVERRIDE (IP): Performed by: INTERNAL MEDICINE

## 2020-07-28 RX ORDER — ONDANSETRON 8 MG/1
TABLET, ORALLY DISINTEGRATING ORAL
COMMUNITY
Start: 2020-06-30

## 2020-07-28 RX ORDER — ONDANSETRON 8 MG/1
8 TABLET, ORALLY DISINTEGRATING ORAL ONCE
Status: COMPLETED | OUTPATIENT
Start: 2020-07-28 | End: 2020-07-28

## 2020-07-28 RX ORDER — ROSUVASTATIN CALCIUM 5 MG/1
TABLET, COATED ORAL
COMMUNITY
Start: 2020-06-02

## 2020-07-28 RX ADMIN — ONDANSETRON 8 MG: 8 TABLET, ORALLY DISINTEGRATING ORAL at 13:25

## 2020-07-28 RX ADMIN — ERIBULIN MESYLATE 2.17 MG: 0.5 INJECTION INTRAVENOUS at 14:13

## 2020-07-28 ASSESSMENT — FIBROSIS 4 INDEX: FIB4 SCORE: 1.75

## 2020-07-28 NOTE — PROGRESS NOTES
Chemotherapy Verification - SECONDARY RN       Height = 147.3 cm  Weight = 58.6 kg  BSA = 1.548 m2       Medication: Halaven  Dose: 1.4 mg/m2  Calculated Dose: 2.16 mg                             (In mg/m2, AUC, mg/kg)       I confirm that this process was performed independently.

## 2020-07-28 NOTE — PROGRESS NOTES
Chemotherapy Verification - PRIMARY RN      Height = 1.473 m  Weight = 58.6 kg  BSA = 1.55 m^2       Medication: eribulin mesylate  Dose: 1.4 mg/m^2  Calculated Dose: 2.17 mg                             (In mg/m2, AUC, mg/kg)           I confirm this process was performed independently with the BSA and all final chemotherapy dosing calculations congruent.  Any discrepancies of 10% or greater have been addressed with the chemotherapy pharmacist. The resolution of the discrepancy has been documented in the EPIC progress notes.

## 2020-07-28 NOTE — PROGRESS NOTES
"Pharmacy Chemotherapy Verification Note:    Patient Name: Christine Malhotra      Dx: Breast CA, stage IV (ER/[place] +, HER2 -)      Protocol: eribulin       *Dosing Reference*  Eribulin (Halaven) 1.4 mg/m² IV on Days 1 and 8              21-day cycle until disease progression or unacceptable toxicity    NCCN Guidelines for Breast Cancer. V.1.2019.  Artem WADE, et al. Lancet. 2011;377(3970):847-88.    Allergies:  Patient has no known allergies.     /65   Pulse 68   Temp 36.7 °C (98 °F) (Temporal)   Resp 18   Ht 1.473 m (4' 9.99\")   Wt 58.6 kg (129 lb 3 oz)   SpO2 97%   BMI 27.01 kg/m²  Body surface area is 1.55 meters squared.    Labs 7/28/20:  ANC~ 2050 Plt = 225k   Hgb = 13.3     SCr = 0.62 mg/dL CrCl ~ 86 mL/min   LFT's = WNL TBili = 0.3   Mag = 1.9     Drug Order   (Drug name, dose, route, IV Fluid & volume, frequency, number of doses) Cycle 6 Day 1 (C5D8 cancelled due to COVID infection)       Previous treatment: C5D1 = 6/30/20     Medication = Eribulin (Hal[place])  Base Dose = 1.4 mg/m²  Calc Dose: Base Dose x 1.55 m² = 2.17 mg  Final Dose = 2.17 mg  Route = IVP  Fluid & Volume = 4.34 mL in syringe  Conc = 0.5 mg/mL  Admin Duration = Over 2-5 minutes          <10% difference, okay to treat with final written dose     By my signature below, I confirm this process was performed independently with the BSA and all final chemotherapy dosing calculations congruent. I have reviewed the above chemotherapy order and that my calculation of the final dose and BSA (when applicable) corroborate those calculations of the  pharmacist.     Matt Humphries, PharmD      "

## 2020-07-28 NOTE — PROGRESS NOTES
[facility] for Day [facility]/ Cycle 6 of halaven. Pt denied having any new or acute complaints today, reports tolerating past treatments well. PIV started, had + blood return flushed briskly. PIV to LAC later infiltrated, replaced with PIV to left forearm, which exhibited brisk blood return and flushed with ease. Pt given premeds as ordered and halaven over 7 minutes, tolerated well, denied having any complaints during or after halaven push. PIV discontinued, bleeding controlled with gauze and coban. Next appointment scheduled, Christine discharged home to self care.

## 2020-07-28 NOTE — PROGRESS NOTES
"Pharmacy Chemotherapy Verification  Dx: Stage IV Breast  Cancer (ER/[place]+, HER2 neg)    Cycle 6 Day 1 delayed due to Covid infection [C5D8 cancelled]  Previous treatment: C5D1 on 6/30/20     Protocol: Halaven  *Dosing Reference*  Eribulin ([place]) 1.4 mg/m2 IV on Days 1 and 8   21-day cycle until disease progression or unacceptable toxicity  NCCN Guidelines for Breast Cancer. V.1.2019.  Artem J, et al. Lancet. 2011;377(8870):234-97.          Allergies:Patient has no known allergies.  /65   Pulse 68   Temp 36.7 °C (98 °F) (Temporal)   Resp 18   Ht 1.473 m (4' 9.99\")   Wt 58.6 kg (129 lb 3 oz)   SpO2 97%   BMI 27.01 kg/m²  Body surface area is 1.55 meters squared.     Labs from 7/28/20 reviewed- results within treatment parameters   Cr = 0.62 CrCl ~ 77 ml/min (Min Cr 0.7)    EKG 5/26/20  QTc = 424 msec    Erubilin (Halaven) 1.4 mg/m² x 1.55 m² = 2.17 mg   <10% difference, okay to treat with final dose = 2.17 mg IV    Caleb Padilla, PharmD      "

## 2020-08-04 ENCOUNTER — OUTPATIENT INFUSION SERVICES (OUTPATIENT)
Dept: ONCOLOGY | Facility: MEDICAL CENTER | Age: 65
End: 2020-08-04
Attending: INTERNAL MEDICINE
Payer: MEDICAID

## 2020-08-04 VITALS
TEMPERATURE: 97 F | HEART RATE: 83 BPM | OXYGEN SATURATION: 97 % | SYSTOLIC BLOOD PRESSURE: 153 MMHG | WEIGHT: 132.28 LBS | BODY MASS INDEX: 27.77 KG/M2 | HEIGHT: 58 IN | DIASTOLIC BLOOD PRESSURE: 63 MMHG | RESPIRATION RATE: 18 BRPM

## 2020-08-04 DIAGNOSIS — Z17.0 MALIGNANT NEOPLASM OF OVERLAPPING SITES OF RIGHT BREAST IN FEMALE, ESTROGEN RECEPTOR POSITIVE (HCC): ICD-10-CM

## 2020-08-04 DIAGNOSIS — C50.811 MALIGNANT NEOPLASM OF OVERLAPPING SITES OF RIGHT BREAST IN FEMALE, ESTROGEN RECEPTOR POSITIVE (HCC): ICD-10-CM

## 2020-08-04 LAB
BASOPHILS # BLD AUTO: 2.2 % (ref 0–1.8)
BASOPHILS # BLD: 0.06 K/UL (ref 0–0.12)
EKG IMPRESSION: NORMAL
EOSINOPHIL # BLD AUTO: 0.03 K/UL (ref 0–0.51)
EOSINOPHIL NFR BLD: 1.1 % (ref 0–6.9)
ERYTHROCYTE [DISTWIDTH] IN BLOOD BY AUTOMATED COUNT: 54.4 FL (ref 35.9–50)
HCT VFR BLD AUTO: 40.5 % (ref 37–47)
HGB BLD-MCNC: 12.7 G/DL (ref 12–16)
IMM GRANULOCYTES # BLD AUTO: 0.05 K/UL (ref 0–0.11)
IMM GRANULOCYTES NFR BLD AUTO: 1.8 % (ref 0–0.9)
LYMPHOCYTES # BLD AUTO: 1.2 K/UL (ref 1–4.8)
LYMPHOCYTES NFR BLD: 43.8 % (ref 22–41)
MCH RBC QN AUTO: 27.9 PG (ref 27–33)
MCHC RBC AUTO-ENTMCNC: 31.4 G/DL (ref 33.6–35)
MCV RBC AUTO: 89 FL (ref 81.4–97.8)
MONOCYTES # BLD AUTO: 0.21 K/UL (ref 0–0.85)
MONOCYTES NFR BLD AUTO: 7.7 % (ref 0–13.4)
NEUTROPHILS # BLD AUTO: 1.19 K/UL (ref 2–7.15)
NEUTROPHILS NFR BLD: 43.4 % (ref 44–72)
NRBC # BLD AUTO: 0 K/UL
NRBC BLD-RTO: 0 /100 WBC
PLATELET # BLD AUTO: 238 K/UL (ref 164–446)
PMV BLD AUTO: 9.8 FL (ref 9–12.9)
RBC # BLD AUTO: 4.55 M/UL (ref 4.2–5.4)
WBC # BLD AUTO: 2.7 K/UL (ref 4.8–10.8)

## 2020-08-04 PROCEDURE — 96409 CHEMO IV PUSH SNGL DRUG: CPT

## 2020-08-04 PROCEDURE — 700111 HCHG RX REV CODE 636 W/ 250 OVERRIDE (IP): Performed by: INTERNAL MEDICINE

## 2020-08-04 PROCEDURE — 93005 ELECTROCARDIOGRAM TRACING: CPT | Performed by: INTERNAL MEDICINE

## 2020-08-04 PROCEDURE — 93010 ELECTROCARDIOGRAM REPORT: CPT | Performed by: INTERNAL MEDICINE

## 2020-08-04 PROCEDURE — 85025 COMPLETE CBC W/AUTO DIFF WBC: CPT

## 2020-08-04 RX ORDER — ONDANSETRON 8 MG/1
8 TABLET, ORALLY DISINTEGRATING ORAL ONCE
Status: COMPLETED | OUTPATIENT
Start: 2020-08-04 | End: 2020-08-04

## 2020-08-04 RX ADMIN — ERIBULIN MESYLATE 2 MG: 0.5 INJECTION INTRAVENOUS at 13:13

## 2020-08-04 RX ADMIN — ONDANSETRON 8 MG: 8 TABLET, ORALLY DISINTEGRATING ORAL at 13:13

## 2020-08-04 ASSESSMENT — FIBROSIS 4 INDEX: FIB4 SCORE: 1.346996659238618899

## 2020-08-04 NOTE — PROGRESS NOTES
Chemotherapy Verification - PRIMARY RN      Height = 146.5 cm  Weight = 60 kg  BSA = 1.56 m2       Medication: Halaven  Dose: 1.4 mg/m2  Calculated Dose: 2.184 mg                             (In mg/m2, AUC, mg/kg)     I confirm this process was performed independently with the BSA and all final chemotherapy dosing calculations congruent.  Any discrepancies of 10% or greater have been addressed with the chemotherapy pharmacist. The resolution of the discrepancy has been documented in the EPIC progress notes.

## 2020-08-04 NOTE — PROGRESS NOTES
Pt arrived ambulatory to [facility] for Day 8 of Halaven.  POC discussed, translation services utilized via the iPad.  PIV started to L wrist, blood return confirmed and labs drawn as ordered.  EKG performed.  Parameters met for treatment today.  Premedication given.  Halaven administered without complication.  PIV flushed, removed, and site covered with gauze and coban.  Next appointment confirmed.  Pt discharged from [facility] in NAD under self care.

## 2020-08-04 NOTE — PROGRESS NOTES
Chemotherapy Verification - SECONDARY RN       Height = 146.5 cm  Weight = 60 kg  BSA = 1.56 m2       Medication: Halaven  Dose: 1.4 mg/m2  Calculated Dose: 2.18 mg                             (In mg/m2, AUC, mg/kg)       I confirm that this process was performed independently.

## 2020-08-04 NOTE — PROGRESS NOTES
"Pharmacy Chemotherapy Verification  Dx: Stage IV Breast  Cancer (ER/MS+, HER2 neg)    Cycle 6 Day 8  Previous treatment: C6D1 on 7/28/20     Protocol: Halaven  *Dosing Reference*  Eribulin ([place]) 1.4 mg/m2 IV on Days 1 and 8   21-day cycle until disease progression or unacceptable toxicity  NCCN Guidelines for Breast Cancer. V.1.2019.  Artem WADE, et al. Lancet. 2011;377(1536):416-04.          Allergies:Patient has no known allergies.  /63   Pulse 83   Temp 36.1 °C (97 °F) (Temporal)   Resp 18   Ht 1.465 m (4' 9.68\")   Wt 60 kg (132 lb 4.4 oz)   SpO2 97%   BMI 27.96 kg/m²  Body surface area is 1.56 meters squared.     Labs from 8/4/20 reviewed- results within treatment parameters       EKG 8/4/20  QTc = 444 msec    Erubilin (Halaven) 1.4 mg/m² x 1.56 m² = 2.18mg   <10% difference, okay to treat with final dose =  2mg IV    Bhavana Urbina, PharmD      "

## 2020-08-14 NOTE — PROGRESS NOTES
[facility]: Brief Update  Weight: 134 lbs, weighed today during [facility] appointment  Weight History:  134 lbs on 4/21/20  134 lbs on 3/31/20  134 lbs on 3/12/2020  132 lbs on 11/26/19  138 lbs on 5/21/19  139 lbs on 4/4/19    Weight Change: Wt continues to remain stable.     RD able to briefly visit pt during Infusion appointment. Utilized [employer] via Ipad: Oliverio [id number]. Pt states is feeling good, mentions appetite has been good. Denies any changes to intake, reports continues to consume lots of fruits, veggies, chicken soups, juices, and shakes. Confirms is still drinking her protein shake. Denies concerns for RD.     Plan/Recommend:  • Pt reports doing well in regards to nutrition with weight maintained for at least 6 months.   • Encouraged pt to continue eating well and drinking protein shake    RD to sign off at this time and remain available PRN.   Please contact [phone]     Limbic encephalitis and temporal seizures

## 2020-08-21 ENCOUNTER — TELEPHONE (OUTPATIENT)
Dept: ONCOLOGY | Facility: MEDICAL CENTER | Age: 65
End: 2020-08-21

## 2020-08-21 RX ORDER — 0.9 % SODIUM CHLORIDE 0.9 %
3 VIAL (ML) INJECTION PRN
Status: CANCELLED | OUTPATIENT
Start: 2020-08-21

## 2020-08-21 RX ORDER — ONDANSETRON 8 MG/1
8 TABLET, ORALLY DISINTEGRATING ORAL PRN
Status: CANCELLED | OUTPATIENT
Start: 2020-08-22

## 2020-08-21 RX ORDER — 0.9 % SODIUM CHLORIDE 0.9 %
10 VIAL (ML) INJECTION PRN
Status: CANCELLED | OUTPATIENT
Start: 2020-08-22

## 2020-08-21 RX ORDER — 0.9 % SODIUM CHLORIDE 0.9 %
10 VIAL (ML) INJECTION PRN
Status: CANCELLED | OUTPATIENT
Start: 2020-08-21

## 2020-08-21 RX ORDER — PROCHLORPERAZINE MALEATE 10 MG
10 TABLET ORAL EVERY 6 HOURS PRN
Status: CANCELLED | OUTPATIENT
Start: 2020-08-29

## 2020-08-21 RX ORDER — ONDANSETRON 2 MG/ML
4 INJECTION INTRAMUSCULAR; INTRAVENOUS PRN
Status: CANCELLED | OUTPATIENT
Start: 2020-08-29

## 2020-08-21 RX ORDER — HEPARIN SODIUM (PORCINE) LOCK FLUSH IV SOLN 100 UNIT/ML 100 UNIT/ML
500 SOLUTION INTRAVENOUS PRN
Status: CANCELLED | OUTPATIENT
Start: 2020-08-21

## 2020-08-21 RX ORDER — PROCHLORPERAZINE MALEATE 10 MG
10 TABLET ORAL EVERY 6 HOURS PRN
Status: CANCELLED | OUTPATIENT
Start: 2020-08-22

## 2020-08-21 RX ORDER — SODIUM CHLORIDE 9 MG/ML
INJECTION, SOLUTION INTRAVENOUS CONTINUOUS
Status: CANCELLED | OUTPATIENT
Start: 2020-08-22

## 2020-08-21 RX ORDER — 0.9 % SODIUM CHLORIDE 0.9 %
VIAL (ML) INJECTION PRN
Status: CANCELLED | OUTPATIENT
Start: 2020-08-22

## 2020-08-21 RX ORDER — ONDANSETRON 8 MG/1
8 TABLET, ORALLY DISINTEGRATING ORAL ONCE
Status: CANCELLED | OUTPATIENT
Start: 2020-08-22

## 2020-08-21 RX ORDER — 0.9 % SODIUM CHLORIDE 0.9 %
3 VIAL (ML) INJECTION PRN
Status: CANCELLED | OUTPATIENT
Start: 2020-08-22

## 2020-08-21 RX ORDER — HEPARIN SODIUM (PORCINE) LOCK FLUSH IV SOLN 100 UNIT/ML 100 UNIT/ML
500 SOLUTION INTRAVENOUS PRN
Status: CANCELLED | OUTPATIENT
Start: 2020-08-22

## 2020-08-21 RX ORDER — ONDANSETRON 8 MG/1
8 TABLET, ORALLY DISINTEGRATING ORAL ONCE
Status: CANCELLED | OUTPATIENT
Start: 2020-08-29

## 2020-08-21 RX ORDER — 0.9 % SODIUM CHLORIDE 0.9 %
VIAL (ML) INJECTION PRN
Status: CANCELLED | OUTPATIENT
Start: 2020-08-21

## 2020-08-21 RX ORDER — ONDANSETRON 2 MG/ML
4 INJECTION INTRAMUSCULAR; INTRAVENOUS PRN
Status: CANCELLED | OUTPATIENT
Start: 2020-08-22

## 2020-08-21 RX ORDER — SODIUM CHLORIDE 9 MG/ML
INJECTION, SOLUTION INTRAVENOUS CONTINUOUS
Status: CANCELLED | OUTPATIENT
Start: 2020-08-29

## 2020-08-21 RX ORDER — ONDANSETRON 8 MG/1
8 TABLET, ORALLY DISINTEGRATING ORAL PRN
Status: CANCELLED | OUTPATIENT
Start: 2020-08-29

## 2020-08-21 RX ORDER — 0.9 % SODIUM CHLORIDE 0.9 %
10 VIAL (ML) INJECTION PRN
Status: CANCELLED | OUTPATIENT
Start: 2020-08-29

## 2020-08-21 RX ORDER — 0.9 % SODIUM CHLORIDE 0.9 %
VIAL (ML) INJECTION PRN
Status: CANCELLED | OUTPATIENT
Start: 2020-08-29

## 2020-08-21 RX ORDER — HEPARIN SODIUM (PORCINE) LOCK FLUSH IV SOLN 100 UNIT/ML 100 UNIT/ML
500 SOLUTION INTRAVENOUS PRN
Status: CANCELLED | OUTPATIENT
Start: 2020-08-29

## 2020-08-21 RX ORDER — 0.9 % SODIUM CHLORIDE 0.9 %
3 VIAL (ML) INJECTION PRN
Status: CANCELLED | OUTPATIENT
Start: 2020-08-29

## 2020-08-22 ENCOUNTER — OUTPATIENT INFUSION SERVICES (OUTPATIENT)
Dept: ONCOLOGY | Facility: MEDICAL CENTER | Age: 65
End: 2020-08-22
Attending: INTERNAL MEDICINE
Payer: MEDICAID

## 2020-08-22 VITALS
SYSTOLIC BLOOD PRESSURE: 142 MMHG | HEART RATE: 72 BPM | RESPIRATION RATE: 18 BRPM | HEIGHT: 58 IN | WEIGHT: 132.94 LBS | TEMPERATURE: 97.3 F | OXYGEN SATURATION: 96 % | BODY MASS INDEX: 27.91 KG/M2 | DIASTOLIC BLOOD PRESSURE: 64 MMHG

## 2020-08-22 DIAGNOSIS — C50.811 MALIGNANT NEOPLASM OF OVERLAPPING SITES OF RIGHT BREAST IN FEMALE, ESTROGEN RECEPTOR POSITIVE (HCC): ICD-10-CM

## 2020-08-22 DIAGNOSIS — Z17.0 MALIGNANT NEOPLASM OF OVERLAPPING SITES OF RIGHT BREAST IN FEMALE, ESTROGEN RECEPTOR POSITIVE (HCC): ICD-10-CM

## 2020-08-22 LAB
ALBUMIN SERPL BCP-MCNC: 3.9 G/DL (ref 3.2–4.9)
ALBUMIN/GLOB SERPL: 1.4 G/DL
ALP SERPL-CCNC: 66 U/L (ref 30–99)
ALT SERPL-CCNC: 19 U/L (ref 2–50)
ANION GAP SERPL CALC-SCNC: 11 MMOL/L (ref 7–16)
AST SERPL-CCNC: 21 U/L (ref 12–45)
BASOPHILS # BLD AUTO: 0.6 % (ref 0–1.8)
BASOPHILS # BLD: 0.03 K/UL (ref 0–0.12)
BILIRUB SERPL-MCNC: 0.2 MG/DL (ref 0.1–1.5)
BUN SERPL-MCNC: 17 MG/DL (ref 8–22)
CALCIUM SERPL-MCNC: 9.4 MG/DL (ref 8.5–10.5)
CHLORIDE SERPL-SCNC: 104 MMOL/L (ref 96–112)
CO2 SERPL-SCNC: 25 MMOL/L (ref 20–33)
CREAT SERPL-MCNC: 0.6 MG/DL (ref 0.5–1.4)
EOSINOPHIL # BLD AUTO: 0 K/UL (ref 0–0.51)
EOSINOPHIL NFR BLD: 0 % (ref 0–6.9)
ERYTHROCYTE [DISTWIDTH] IN BLOOD BY AUTOMATED COUNT: 56.8 FL (ref 35.9–50)
GLOBULIN SER CALC-MCNC: 2.8 G/DL (ref 1.9–3.5)
GLUCOSE SERPL-MCNC: 95 MG/DL (ref 65–99)
HCT VFR BLD AUTO: 39.9 % (ref 37–47)
HGB BLD-MCNC: 12.5 G/DL (ref 12–16)
IMM GRANULOCYTES # BLD AUTO: 0.03 K/UL (ref 0–0.11)
IMM GRANULOCYTES NFR BLD AUTO: 0.6 % (ref 0–0.9)
LYMPHOCYTES # BLD AUTO: 1.44 K/UL (ref 1–4.8)
LYMPHOCYTES NFR BLD: 26.9 % (ref 22–41)
MAGNESIUM SERPL-MCNC: 1.8 MG/DL (ref 1.5–2.5)
MCH RBC QN AUTO: 27.9 PG (ref 27–33)
MCHC RBC AUTO-ENTMCNC: 31.3 G/DL (ref 33.6–35)
MCV RBC AUTO: 89.1 FL (ref 81.4–97.8)
MONOCYTES # BLD AUTO: 0.46 K/UL (ref 0–0.85)
MONOCYTES NFR BLD AUTO: 8.6 % (ref 0–13.4)
NEUTROPHILS # BLD AUTO: 3.39 K/UL (ref 2–7.15)
NEUTROPHILS NFR BLD: 63.3 % (ref 44–72)
NRBC # BLD AUTO: 0 K/UL
NRBC BLD-RTO: 0 /100 WBC
PLATELET # BLD AUTO: 274 K/UL (ref 164–446)
PMV BLD AUTO: 10.2 FL (ref 9–12.9)
POTASSIUM SERPL-SCNC: 4.6 MMOL/L (ref 3.6–5.5)
PROT SERPL-MCNC: 6.7 G/DL (ref 6–8.2)
RBC # BLD AUTO: 4.48 M/UL (ref 4.2–5.4)
SODIUM SERPL-SCNC: 140 MMOL/L (ref 135–145)
WBC # BLD AUTO: 5.4 K/UL (ref 4.8–10.8)

## 2020-08-22 PROCEDURE — 96409 CHEMO IV PUSH SNGL DRUG: CPT

## 2020-08-22 PROCEDURE — 85025 COMPLETE CBC W/AUTO DIFF WBC: CPT

## 2020-08-22 PROCEDURE — 83735 ASSAY OF MAGNESIUM: CPT

## 2020-08-22 PROCEDURE — 700111 HCHG RX REV CODE 636 W/ 250 OVERRIDE (IP): Performed by: INTERNAL MEDICINE

## 2020-08-22 PROCEDURE — 80053 COMPREHEN METABOLIC PANEL: CPT

## 2020-08-22 RX ORDER — ONDANSETRON 8 MG/1
8 TABLET, ORALLY DISINTEGRATING ORAL ONCE
Status: COMPLETED | OUTPATIENT
Start: 2020-08-22 | End: 2020-08-22

## 2020-08-22 RX ADMIN — ERIBULIN MESYLATE 2 MG: 0.5 INJECTION INTRAVENOUS at 15:00

## 2020-08-22 RX ADMIN — ONDANSETRON 8 MG: 8 TABLET, ORALLY DISINTEGRATING ORAL at 14:50

## 2020-08-22 ASSESSMENT — FIBROSIS 4 INDEX: FIB4 SCORE: 1.27

## 2020-08-22 NOTE — PROGRESS NOTES
Chemotherapy Verification - PRIMARY RN      Height = 147cm  Weight = 60.3kg  BSA = 1.57m2      Medication: Eribulin  Dose: 1.4mg/m2  Calculated Dose: 2.198mg                             (In mg/m2, AUC, mg/kg)         I confirm this process was performed independently with the BSA and all final chemotherapy dosing calculations congruent.  Any discrepancies of 10% or greater have been addressed with the chemotherapy pharmacist. The resolution of the discrepancy has been documented in the EPIC progress notes.

## 2020-08-22 NOTE — PROGRESS NOTES
"Pharmacy Chemotherapy Verification Note:    Patient Name: Christine Malhotra      Dx: Breast CA, stage IV (ER/[place] +, HER2 -)      Protocol: eribulin       *Dosing Reference*  Eribulin ([place]) 1.4 mg/m² IV on Days 1 and 8              21-day cycle until disease progression or unacceptable toxicity  NCCN Guidelines for Breast Cancer. V.1.2019.  Artem WADE, et al. Lancet. 2011;377(4051):290-11.    Allergies:  Patient has no known allergies.     /64   Pulse 72   Temp 36.3 °C (97.3 °F) (Temporal)   Resp 18   Ht 1.47 m (4' 9.87\")   Wt 60.3 kg (132 lb 15 oz)   SpO2 96%   BMI 27.91 kg/m²  Body surface area is 1.57 meters squared.    Labs 8/22/20  ANC~ 3390 Plt = 274k   Hgb = 12.5     SCr = 0.6 mg/dL CrCl ~ 78.2 mL/min (minimum SCr of 0.7)   LFT's = WNLs  TBili = 0.2   K = 4.6  Mg = 1.8    EKG 8/4/20 QTc =   444     Drug Order   (Drug name, dose, route, IV Fluid & volume, frequency, number of doses) Cycle 7 Day 1     Previous treatment: C6D8 on 8/4/20     Medication = Eribulin ([place])  Base Dose = 1.4 mg/m²  Calc Dose: Base Dose x 1.57 m² = 2.198 mg  Final Dose = 2 mg  Route = IVP  Fluid & Volume = 4 mL in syringe  Conc = 0.5 mg/mL  Admin Duration = Over 2-5 minutes          <10% difference, okay to treat with final written dose     By my signature below, I confirm this process was performed independently with the BSA and all final chemotherapy dosing calculations congruent. I have reviewed the above chemotherapy order and that my calculation of the final dose and BSA (when applicable) corroborate those calculations of the  pharmacist.     Pepito Merritt, PharmD      "

## 2020-08-22 NOTE — PROGRESS NOTES
Pt arrived ambulatory to [facility] for Day 1/ Cycle 7 of Eribulin. Pt denied fever, signs or symptoms of infection or acute illness today. POC discussed with  Vivian #[id number], and pt verbalized understanding.     PIV established and labs drawn at this time; pt tolerated well. Labs reviewed. Premedication and Eribulin administered per MAR and provider orders; pt tolerated well. No signs or symptoms of reaction or complications noted. PIV flushed and removed; sterile gauze and paper tape applied to site.     Follow-up care discussed and future appointment printed for pt; pt stated she wanted to go back to Tuesday appointments for the next cycle and schedulers scheduled pt per request. Pt discharged home to self care in no apparent distress at this time.

## 2020-08-22 NOTE — PROGRESS NOTES
"Pharmacy Chemotherapy Verification  Dx: Stage IV Breast  Cancer (ER/ND+, HER2 neg)    Cycle 7 Day 1  Previous treatment: 8/4/20     Protocol: Halaven    Eribulin ([place]) 1.4 mg/m2 IV on Days 1 and 8   21-day cycle until disease progression or unacceptable toxicity  *Dosing Reference*  NCCN Guidelines for Breast Cancer. V.1.2019.  Artem WADE, et al. Lancet. 2011;377(2585):046-44.          Allergies:Patient has no known allergies.  /64   Pulse 72   Temp 36.3 °C (97.3 °F) (Temporal)   Resp 18   Ht 1.47 m (4' 9.87\")   Wt 60.3 kg (132 lb 15 oz)   SpO2 96%   BMI 27.91 kg/m²  Body surface area is 1.57 meters squared.     Labs: 8/22/20  Meets treatment parameters     EKG 8/4/20  QTc = 444 msec    Erubilin ([place]) 1.4 mg/m² x 1.57 m² = 2.198 mg   <10% difference, okay to treat with final dose =  2 mg IV        "

## 2020-08-22 NOTE — PROGRESS NOTES
Chemotherapy Verification - SECONDARY RN       Height = 147 cm  Weight = 60.3 kg  BSA = 1.57 m2       Medication: Halaven  Dose: 1.4 mg/m2  Calculated Dose: 2.198 mg                             (In mg/m2, AUC, mg/kg)         I confirm that this process was performed independently.

## 2020-08-24 ENCOUNTER — HOSPITAL ENCOUNTER (OUTPATIENT)
Dept: RADIOLOGY | Facility: MEDICAL CENTER | Age: 65
End: 2020-08-24
Attending: INTERNAL MEDICINE
Payer: MEDICAID

## 2020-08-24 DIAGNOSIS — C50.611 MALIGNANT NEOPLASM OF AXILLARY TAIL OF RIGHT FEMALE BREAST, UNSPECIFIED ESTROGEN RECEPTOR STATUS (HCC): ICD-10-CM

## 2020-08-24 PROCEDURE — 700117 HCHG RX CONTRAST REV CODE 255: Performed by: INTERNAL MEDICINE

## 2020-08-24 PROCEDURE — 71260 CT THORAX DX C+: CPT

## 2020-08-24 RX ADMIN — IOHEXOL 50 ML: 240 INJECTION, SOLUTION INTRATHECAL; INTRAVASCULAR; INTRAVENOUS; ORAL at 15:56

## 2020-08-24 RX ADMIN — IOHEXOL 100 ML: 350 INJECTION, SOLUTION INTRAVENOUS at 15:57

## 2020-08-27 RX ORDER — ONDANSETRON 8 MG/1
8 TABLET, ORALLY DISINTEGRATING ORAL ONCE
Status: CANCELLED | OUTPATIENT
Start: 2020-08-29

## 2020-08-27 RX ORDER — HEPARIN SODIUM (PORCINE) LOCK FLUSH IV SOLN 100 UNIT/ML 100 UNIT/ML
500 SOLUTION INTRAVENOUS PRN
Status: CANCELLED | OUTPATIENT
Start: 2020-09-05

## 2020-08-27 RX ORDER — SODIUM CHLORIDE 9 MG/ML
INJECTION, SOLUTION INTRAVENOUS CONTINUOUS
Status: CANCELLED | OUTPATIENT
Start: 2020-09-12

## 2020-08-27 RX ORDER — HEPARIN SODIUM (PORCINE) LOCK FLUSH IV SOLN 100 UNIT/ML 100 UNIT/ML
500 SOLUTION INTRAVENOUS PRN
Status: CANCELLED | OUTPATIENT
Start: 2020-08-28

## 2020-08-27 RX ORDER — 0.9 % SODIUM CHLORIDE 0.9 %
3 VIAL (ML) INJECTION PRN
Status: CANCELLED | OUTPATIENT
Start: 2020-08-28

## 2020-08-27 RX ORDER — 0.9 % SODIUM CHLORIDE 0.9 %
VIAL (ML) INJECTION PRN
Status: CANCELLED | OUTPATIENT
Start: 2020-09-05

## 2020-08-27 RX ORDER — PROCHLORPERAZINE MALEATE 10 MG
10 TABLET ORAL EVERY 6 HOURS PRN
Status: CANCELLED | OUTPATIENT
Start: 2020-09-12

## 2020-08-27 RX ORDER — HEPARIN SODIUM (PORCINE) LOCK FLUSH IV SOLN 100 UNIT/ML 100 UNIT/ML
500 SOLUTION INTRAVENOUS PRN
Status: CANCELLED | OUTPATIENT
Start: 2020-09-12

## 2020-08-27 RX ORDER — 0.9 % SODIUM CHLORIDE 0.9 %
10 VIAL (ML) INJECTION PRN
Status: CANCELLED | OUTPATIENT
Start: 2020-09-12

## 2020-08-27 RX ORDER — ONDANSETRON 8 MG/1
8 TABLET, ORALLY DISINTEGRATING ORAL PRN
Status: CANCELLED | OUTPATIENT
Start: 2020-09-12

## 2020-08-27 RX ORDER — ONDANSETRON 8 MG/1
8 TABLET, ORALLY DISINTEGRATING ORAL PRN
Status: CANCELLED | OUTPATIENT
Start: 2020-09-05

## 2020-08-27 RX ORDER — 0.9 % SODIUM CHLORIDE 0.9 %
3 VIAL (ML) INJECTION PRN
Status: CANCELLED | OUTPATIENT
Start: 2020-08-29

## 2020-08-27 RX ORDER — 0.9 % SODIUM CHLORIDE 0.9 %
10 VIAL (ML) INJECTION PRN
Status: CANCELLED | OUTPATIENT
Start: 2020-08-29

## 2020-08-27 RX ORDER — PROCHLORPERAZINE MALEATE 10 MG
10 TABLET ORAL EVERY 6 HOURS PRN
Status: CANCELLED | OUTPATIENT
Start: 2020-08-29

## 2020-08-27 RX ORDER — 0.9 % SODIUM CHLORIDE 0.9 %
10 VIAL (ML) INJECTION PRN
Status: CANCELLED | OUTPATIENT
Start: 2020-09-05

## 2020-08-27 RX ORDER — PROCHLORPERAZINE MALEATE 10 MG
10 TABLET ORAL EVERY 6 HOURS PRN
Status: CANCELLED | OUTPATIENT
Start: 2020-09-05

## 2020-08-27 RX ORDER — ONDANSETRON 2 MG/ML
4 INJECTION INTRAMUSCULAR; INTRAVENOUS PRN
Status: CANCELLED | OUTPATIENT
Start: 2020-09-05

## 2020-08-27 RX ORDER — SODIUM CHLORIDE 9 MG/ML
INJECTION, SOLUTION INTRAVENOUS CONTINUOUS
Status: CANCELLED | OUTPATIENT
Start: 2020-08-29

## 2020-08-27 RX ORDER — SODIUM CHLORIDE 9 MG/ML
INJECTION, SOLUTION INTRAVENOUS CONTINUOUS
Status: CANCELLED | OUTPATIENT
Start: 2020-09-05

## 2020-08-27 RX ORDER — ONDANSETRON 2 MG/ML
4 INJECTION INTRAMUSCULAR; INTRAVENOUS PRN
Status: CANCELLED | OUTPATIENT
Start: 2020-08-29

## 2020-08-27 RX ORDER — ONDANSETRON 2 MG/ML
4 INJECTION INTRAMUSCULAR; INTRAVENOUS PRN
Status: CANCELLED | OUTPATIENT
Start: 2020-09-12

## 2020-08-27 RX ORDER — ONDANSETRON 8 MG/1
8 TABLET, ORALLY DISINTEGRATING ORAL PRN
Status: CANCELLED | OUTPATIENT
Start: 2020-08-29

## 2020-08-27 RX ORDER — 0.9 % SODIUM CHLORIDE 0.9 %
3 VIAL (ML) INJECTION PRN
Status: CANCELLED | OUTPATIENT
Start: 2020-09-12

## 2020-08-27 RX ORDER — 0.9 % SODIUM CHLORIDE 0.9 %
VIAL (ML) INJECTION PRN
Status: CANCELLED | OUTPATIENT
Start: 2020-09-12

## 2020-08-27 RX ORDER — 0.9 % SODIUM CHLORIDE 0.9 %
VIAL (ML) INJECTION PRN
Status: CANCELLED | OUTPATIENT
Start: 2020-08-29

## 2020-08-27 RX ORDER — 0.9 % SODIUM CHLORIDE 0.9 %
3 VIAL (ML) INJECTION PRN
Status: CANCELLED | OUTPATIENT
Start: 2020-09-05

## 2020-08-27 RX ORDER — 0.9 % SODIUM CHLORIDE 0.9 %
10 VIAL (ML) INJECTION PRN
Status: CANCELLED | OUTPATIENT
Start: 2020-08-28

## 2020-08-27 RX ORDER — ONDANSETRON 8 MG/1
8 TABLET, ORALLY DISINTEGRATING ORAL ONCE
Status: CANCELLED | OUTPATIENT
Start: 2020-09-12

## 2020-08-27 RX ORDER — 0.9 % SODIUM CHLORIDE 0.9 %
VIAL (ML) INJECTION PRN
Status: CANCELLED | OUTPATIENT
Start: 2020-08-28

## 2020-08-27 RX ORDER — ONDANSETRON 8 MG/1
8 TABLET, ORALLY DISINTEGRATING ORAL ONCE
Status: CANCELLED | OUTPATIENT
Start: 2020-09-05

## 2020-08-27 RX ORDER — HEPARIN SODIUM (PORCINE) LOCK FLUSH IV SOLN 100 UNIT/ML 100 UNIT/ML
500 SOLUTION INTRAVENOUS PRN
Status: CANCELLED | OUTPATIENT
Start: 2020-08-29

## 2020-08-29 ENCOUNTER — OUTPATIENT INFUSION SERVICES (OUTPATIENT)
Dept: ONCOLOGY | Facility: MEDICAL CENTER | Age: 65
End: 2020-08-29
Attending: INTERNAL MEDICINE
Payer: MEDICAID

## 2020-08-29 VITALS
BODY MASS INDEX: 27.91 KG/M2 | WEIGHT: 132.94 LBS | OXYGEN SATURATION: 92 % | SYSTOLIC BLOOD PRESSURE: 146 MMHG | HEIGHT: 58 IN | TEMPERATURE: 97.8 F | HEART RATE: 85 BPM | DIASTOLIC BLOOD PRESSURE: 72 MMHG | RESPIRATION RATE: 18 BRPM

## 2020-08-29 DIAGNOSIS — Z17.0 MALIGNANT NEOPLASM OF OVERLAPPING SITES OF RIGHT BREAST IN FEMALE, ESTROGEN RECEPTOR POSITIVE (HCC): ICD-10-CM

## 2020-08-29 DIAGNOSIS — C50.811 MALIGNANT NEOPLASM OF OVERLAPPING SITES OF RIGHT BREAST IN FEMALE, ESTROGEN RECEPTOR POSITIVE (HCC): ICD-10-CM

## 2020-08-29 LAB
ALBUMIN SERPL BCP-MCNC: 4.1 G/DL (ref 3.2–4.9)
ALBUMIN/GLOB SERPL: 1.4 G/DL
ALP SERPL-CCNC: 65 U/L (ref 30–99)
ALT SERPL-CCNC: 20 U/L (ref 2–50)
ANION GAP SERPL CALC-SCNC: 14 MMOL/L (ref 7–16)
AST SERPL-CCNC: 25 U/L (ref 12–45)
BASOPHILS # BLD AUTO: 1.1 % (ref 0–1.8)
BASOPHILS # BLD: 0.05 K/UL (ref 0–0.12)
BILIRUB SERPL-MCNC: 0.3 MG/DL (ref 0.1–1.5)
BUN SERPL-MCNC: 17 MG/DL (ref 8–22)
CALCIUM SERPL-MCNC: 10 MG/DL (ref 8.5–10.5)
CHLORIDE SERPL-SCNC: 101 MMOL/L (ref 96–112)
CO2 SERPL-SCNC: 24 MMOL/L (ref 20–33)
CREAT SERPL-MCNC: 0.51 MG/DL (ref 0.5–1.4)
EOSINOPHIL # BLD AUTO: 0.01 K/UL (ref 0–0.51)
EOSINOPHIL NFR BLD: 0.2 % (ref 0–6.9)
ERYTHROCYTE [DISTWIDTH] IN BLOOD BY AUTOMATED COUNT: 54.1 FL (ref 35.9–50)
GLOBULIN SER CALC-MCNC: 2.9 G/DL (ref 1.9–3.5)
GLUCOSE SERPL-MCNC: 97 MG/DL (ref 65–99)
HCT VFR BLD AUTO: 39.5 % (ref 37–47)
HGB BLD-MCNC: 12.6 G/DL (ref 12–16)
IMM GRANULOCYTES # BLD AUTO: 0.06 K/UL (ref 0–0.11)
IMM GRANULOCYTES NFR BLD AUTO: 1.3 % (ref 0–0.9)
LYMPHOCYTES # BLD AUTO: 1.45 K/UL (ref 1–4.8)
LYMPHOCYTES NFR BLD: 31.7 % (ref 22–41)
MCH RBC QN AUTO: 28 PG (ref 27–33)
MCHC RBC AUTO-ENTMCNC: 31.9 G/DL (ref 33.6–35)
MCV RBC AUTO: 87.8 FL (ref 81.4–97.8)
MONOCYTES # BLD AUTO: 0.23 K/UL (ref 0–0.85)
MONOCYTES NFR BLD AUTO: 5 % (ref 0–13.4)
NEUTROPHILS # BLD AUTO: 2.78 K/UL (ref 2–7.15)
NEUTROPHILS NFR BLD: 60.7 % (ref 44–72)
NRBC # BLD AUTO: 0 K/UL
NRBC BLD-RTO: 0 /100 WBC
PLATELET # BLD AUTO: 253 K/UL (ref 164–446)
PMV BLD AUTO: 9.9 FL (ref 9–12.9)
POTASSIUM SERPL-SCNC: 4 MMOL/L (ref 3.6–5.5)
PROT SERPL-MCNC: 7 G/DL (ref 6–8.2)
RBC # BLD AUTO: 4.5 M/UL (ref 4.2–5.4)
SODIUM SERPL-SCNC: 139 MMOL/L (ref 135–145)
WBC # BLD AUTO: 4.6 K/UL (ref 4.8–10.8)

## 2020-08-29 PROCEDURE — 96413 CHEMO IV INFUSION 1 HR: CPT

## 2020-08-29 PROCEDURE — 700111 HCHG RX REV CODE 636 W/ 250 OVERRIDE (IP): Performed by: INTERNAL MEDICINE

## 2020-08-29 PROCEDURE — 85025 COMPLETE CBC W/AUTO DIFF WBC: CPT

## 2020-08-29 PROCEDURE — 700105 HCHG RX REV CODE 258: Performed by: INTERNAL MEDICINE

## 2020-08-29 PROCEDURE — 80053 COMPREHEN METABOLIC PANEL: CPT

## 2020-08-29 RX ORDER — ONDANSETRON 8 MG/1
8 TABLET, ORALLY DISINTEGRATING ORAL ONCE
Status: COMPLETED | OUTPATIENT
Start: 2020-08-29 | End: 2020-08-29

## 2020-08-29 RX ADMIN — GEMCITABINE HYDROCHLORIDE 1570 MG: 200 INJECTION, POWDER, LYOPHILIZED, FOR SOLUTION INTRAVENOUS at 16:05

## 2020-08-29 RX ADMIN — ONDANSETRON 8 MG: 8 TABLET, ORALLY DISINTEGRATING ORAL at 15:28

## 2020-08-29 ASSESSMENT — FIBROSIS 4 INDEX: FIB4 SCORE: 1.11

## 2020-08-29 NOTE — PROGRESS NOTES
"Pharmacy Chemotherapy Calculation:    Dx: breast cancer         Protocol: Gemzar     *Dosing Reference*  Gemcitabine 800-1200 mg/m² IV over 30 min on Days 1, 8, and 15  28-day cycle until disease progression or unacceptable toxicity  NCCN Guidelines for Breast Cancer V.1.2019  Shaggy GARCIA, Oncology ([place]). 2001;15(2_suppl_3):11-4    Allergies:  Patient has no known allergies.     /72   Pulse 85   Temp 36.6 °C (97.8 °F) (Temporal)   Resp 18   Ht 1.47 m (4' 9.87\")   Wt 60.3 kg (132 lb 15 oz)   SpO2 92%   BMI 27.91 kg/m²  Body surface area is 1.57 meters squared.    All labs (8/29/20) within treatment plan parameters.       Drug Order   (Drug name, dose, route, IV Fluid & volume, frequency, number of doses) Cycle 1, Day 1      Previous treatment: Halaven x 12 cycles (3/2020-8/2020)   Medication = Gemcitabine (Gemzar)  Base Dose = 1000 mg/m²  Calc Dose: Base Dose x 1.57 m² = 1570 mg  Final Dose = 1570 mg  Route = IV  Fluid & Volume =  mL  Admin Duration = Over 30 min          <10% difference, okay to treat with final dose     By my signature below, I confirm this process was performed independently with the BSA and all final chemotherapy dosing calculations congruent. I have reviewed the above chemotherapy order and that my calculation of the final dose and BSA (when applicable) corroborate those calculations of the  pharmacist. Discrepancies of 10% or greater in the written dose have been addressed and documented within the [facility] Progress notes.      Blanca Yap, PharmD  "

## 2020-08-29 NOTE — PROGRESS NOTES
Chemotherapy Verification - PRIMARY RN  C1 D1      Height = 1.47 m  Weight = 60.3 kg  BSA = 1.57 m2       Medication: gemcitibine  Dose: 1000 mg/m2  Calculated Dose: 1570 mg                             (In mg/m2, AUC, mg/kg)     I confirm this process was performed independently with the BSA and all final chemotherapy dosing calculations congruent.  Any discrepancies of 10% or greater have been addressed with the chemotherapy pharmacist. The resolution of the discrepancy has been documented in the EPIC progress notes.

## 2020-08-29 NOTE — PROGRESS NOTES
Name:  Russell   ID Number:  [id number]    Content Discussed:  POC, medication allergies, home medications, pain and fall scales, distress screening, head-to-toe assessment, pt questions answered, IV placement preference, pt needs.

## 2020-08-29 NOTE — PROGRESS NOTES
Pt ambulatory to [facility] for C1 D1 of Gemzar for breast cancer.  With use of , pt w/ no s/s of infection, pt has no complaints at this time.  Pt left hand swollen and bruised, pt reports her garbage can lid fell on her hand.  Pt is able to move fingers freely although w/ mild pain.  Pt reports she has been soaking it in warm water w/ espom salts.  Pt educated to use ice 20 minutes at a time w/ at least 20 minutes between to help reduce swelling, pt states her understanding.  Gemzar drug sheet discussed w/ use of , all questions asked and answered.  pIV placed in pt's LAC, brisk blood return noted, labs drawn per orders, flushed per protocol.  Pt lab results w/n parameters for tx today.  Pt given PO Zofran as pre-med.  Gemzar infused over 30 minutes w/ no adverse reactions.  PIV w/ brisk blood return post-infusion, flushed and removed, gauze and coban dressing applied.  Pt left on foot in care of self in NAD.  Pt given printout of future appts.

## 2020-08-29 NOTE — PROGRESS NOTES
"Pharmacy Chemotherapy Calculations    Dx: Breast CA  Cycle: 1, Day 1   Previous treatment = Halaven C7 = 8/22/20     Regimen and Dosing Reference  Gemzar  Gemcitabine 800-1200 mg/m2 IV days 1, 8, 15  28 day cycle until DP or UT  NCCN guidelines for breast cancer V.1.2019  Shaggy GARCIA, et al. Oncology ([place]) 2001 Feb;15(2 Suppl 3):11-4.     /72   Pulse 85   Temp 36.6 °C (97.8 °F) (Temporal)   Resp 18   Ht 1.47 m (4' 9.87\")   Wt 60.3 kg (132 lb 15 oz)   SpO2 92%   BMI 27.91 kg/m²  Body surface area is 1.57 meters squared.     Labs from 8/29/20 reviewed- results within treatment parameters       Gemcitabine 1000 mg/m2 x 1.57 m2 = 1570 mg   <10% difference, OK to treat with final dose = 1570 mg IV    Caleb Padilla, PharmD    "

## 2020-08-29 NOTE — PROGRESS NOTES
Chemotherapy Verification - SECONDARY RN       Height = 147 cm  Weight = 60.3 kg  BSA = 1.57 m2       Medication: Gemzar  Dose: 1,000 mg/m2  Calculated Dose: 1,570 mg                             (In mg/m2, AUC, mg/kg)       I confirm that this process was performed independently.

## 2020-09-05 ENCOUNTER — APPOINTMENT (OUTPATIENT)
Dept: ONCOLOGY | Facility: MEDICAL CENTER | Age: 65
End: 2020-09-05
Attending: INTERNAL MEDICINE
Payer: MEDICAID

## 2020-09-07 NOTE — PROGRESS NOTES
"Pharmacy Chemotherapy Calculations    Dx: Breast CA  Cycle: 1, Day 8  Previous treatment = 8/28/20   s/p Halaven x 7 through 8/22/20     Regimen and Dosing Reference  Gemzar  Gemcitabine 800-1200 mg/m2 IV days 1, 8, 15  28 day cycle until DP or UT  NCCN guidelines for breast cancer V.1.2019  Shaggy GARCIA, et al. Oncology ([place]) 2001 Feb;15(2 Suppl 3):11-4.     /65   Pulse 85   Temp 36.7 °C (98 °F) (Temporal)   Resp 18   Ht 1.48 m (4' 10.27\")   Wt 60 kg (132 lb 4.4 oz)   SpO2 96%   BMI 27.39 kg/m²  Body surface area is 1.57 meters squared.     Labs from 9/8/20 reviewed- results within treatment parameters       Gemcitabine 1000 mg/m2 x 1.57 m2 = 1570 mg   <10% difference, OK to treat with final dose = 1570 mg IV    Caleb Padilla, PharmD    "

## 2020-09-08 ENCOUNTER — OUTPATIENT INFUSION SERVICES (OUTPATIENT)
Dept: ONCOLOGY | Facility: MEDICAL CENTER | Age: 65
End: 2020-09-08
Attending: INTERNAL MEDICINE
Payer: MEDICAID

## 2020-09-08 VITALS
WEIGHT: 132.28 LBS | OXYGEN SATURATION: 96 % | DIASTOLIC BLOOD PRESSURE: 65 MMHG | TEMPERATURE: 98 F | HEIGHT: 58 IN | RESPIRATION RATE: 18 BRPM | BODY MASS INDEX: 27.77 KG/M2 | HEART RATE: 85 BPM | SYSTOLIC BLOOD PRESSURE: 144 MMHG

## 2020-09-08 DIAGNOSIS — C50.811 MALIGNANT NEOPLASM OF OVERLAPPING SITES OF RIGHT BREAST IN FEMALE, ESTROGEN RECEPTOR POSITIVE (HCC): ICD-10-CM

## 2020-09-08 DIAGNOSIS — Z17.0 MALIGNANT NEOPLASM OF OVERLAPPING SITES OF RIGHT BREAST IN FEMALE, ESTROGEN RECEPTOR POSITIVE (HCC): ICD-10-CM

## 2020-09-08 LAB
BASOPHILS # BLD AUTO: 0.5 % (ref 0–1.8)
BASOPHILS # BLD: 0.02 K/UL (ref 0–0.12)
EOSINOPHIL # BLD AUTO: 0.04 K/UL (ref 0–0.51)
EOSINOPHIL NFR BLD: 0.9 % (ref 0–6.9)
ERYTHROCYTE [DISTWIDTH] IN BLOOD BY AUTOMATED COUNT: 56.3 FL (ref 35.9–50)
HCT VFR BLD AUTO: 38.3 % (ref 37–47)
HGB BLD-MCNC: 12.3 G/DL (ref 12–16)
IMM GRANULOCYTES # BLD AUTO: 0.1 K/UL (ref 0–0.11)
IMM GRANULOCYTES NFR BLD AUTO: 2.3 % (ref 0–0.9)
LYMPHOCYTES # BLD AUTO: 1.44 K/UL (ref 1–4.8)
LYMPHOCYTES NFR BLD: 32.8 % (ref 22–41)
MCH RBC QN AUTO: 28.5 PG (ref 27–33)
MCHC RBC AUTO-ENTMCNC: 32.1 G/DL (ref 33.6–35)
MCV RBC AUTO: 88.9 FL (ref 81.4–97.8)
MONOCYTES # BLD AUTO: 0.59 K/UL (ref 0–0.85)
MONOCYTES NFR BLD AUTO: 13.4 % (ref 0–13.4)
NEUTROPHILS # BLD AUTO: 2.2 K/UL (ref 2–7.15)
NEUTROPHILS NFR BLD: 50.1 % (ref 44–72)
NRBC # BLD AUTO: 0 K/UL
NRBC BLD-RTO: 0 /100 WBC
PLATELET # BLD AUTO: 187 K/UL (ref 164–446)
PMV BLD AUTO: 9.6 FL (ref 9–12.9)
RBC # BLD AUTO: 4.31 M/UL (ref 4.2–5.4)
WBC # BLD AUTO: 4.4 K/UL (ref 4.8–10.8)

## 2020-09-08 PROCEDURE — 700105 HCHG RX REV CODE 258: Performed by: INTERNAL MEDICINE

## 2020-09-08 PROCEDURE — 96413 CHEMO IV INFUSION 1 HR: CPT

## 2020-09-08 PROCEDURE — 85025 COMPLETE CBC W/AUTO DIFF WBC: CPT

## 2020-09-08 PROCEDURE — 700111 HCHG RX REV CODE 636 W/ 250 OVERRIDE (IP): Performed by: INTERNAL MEDICINE

## 2020-09-08 RX ORDER — ONDANSETRON 8 MG/1
8 TABLET, ORALLY DISINTEGRATING ORAL ONCE
Status: COMPLETED | OUTPATIENT
Start: 2020-09-08 | End: 2020-09-08

## 2020-09-08 RX ADMIN — ONDANSETRON 8 MG: 8 TABLET, ORALLY DISINTEGRATING ORAL at 15:19

## 2020-09-08 RX ADMIN — GEMCITABINE 1570 MG: 200 INJECTION, POWDER, LYOPHILIZED, FOR SOLUTION INTRAVENOUS at 15:47

## 2020-09-08 ASSESSMENT — FIBROSIS 4 INDEX: FIB4 SCORE: 1.39

## 2020-09-08 NOTE — PROGRESS NOTES
Chemotherapy Verification - PRIMARY RN      Height = 148cm  Weight = 60kh  BSA = 1.57m2       Medication: gemcitabine  Dose: 1000mg/m2  Calculated Dose: 1570mg                             (In mg/m2, AUC, mg/kg)         I confirm this process was performed independently with the BSA and all final chemotherapy dosing calculations congruent.  Any discrepancies of 10% or greater have been addressed with the chemotherapy pharmacist. The resolution of the discrepancy has been documented in the EPIC progress notes.

## 2020-09-08 NOTE — PROGRESS NOTES
"Pharmacy Chemotherapy Calculation:    Dx: breast cancer         Protocol: Gemzar     *Dosing Reference*  Gemcitabine 800-1200 mg/m² IV over 30 min on Days 1, 8, and 15  28-day cycle until disease progression or unacceptable toxicity  NCCN Guidelines for Breast Cancer V.1.2019  Shaggy GARCIA, Oncology ([place]). 2001;15(2_suppl_3):11-4    Allergies:  Patient has no known allergies.     /65   Pulse 85   Temp 36.7 °C (98 °F) (Temporal)   Resp 18   Ht 1.48 m (4' 10.27\")   Wt 60 kg (132 lb 4.4 oz)   SpO2 96%   BMI 27.39 kg/m²  Body surface area is 1.57 meters squared.    Labs 9/8/20:  ANC~ 2200 Plt = 187k   Hgb = 12.3        Drug Order   (Drug name, dose, route, IV Fluid & volume, frequency, number of doses) Cycle 1 Day 8   Previous treatment: C1D1 on 8/29/20   Medication = Gemcitabine (Gemzar)  Base Dose = 1000 mg/m²  Calc Dose: Base Dose x 1.57 m² = 1570 mg  Final Dose = 1570 mg  Route = IV  Fluid & Volume =  mL  Admin Duration = Over 30 min          <10% difference, okay to treat with final dose     By my signature below, I confirm this process was performed independently with the BSA and all final chemotherapy dosing calculations congruent. I have reviewed the above chemotherapy order and that my calculation of the final dose and BSA (when applicable) corroborate those calculations of the  pharmacist. Discrepancies of 10% or greater in the written dose have been addressed and documented within the [facility] Progress notes.      Matt Humphries, PharmD  "

## 2020-09-08 NOTE — PROGRESS NOTES
Chemotherapy Verification - SECONDARY RN       Height = 148 cm  Weight = 60 kg  BSA = 1.57 m2       Medication: Gemzar  Dose: 1,000 mg/m2  Calculated Dose: 1,570 mg                             (In mg/m2, AUC, mg/kg)       I confirm that this process was performed independently.

## 2020-09-09 NOTE — PROGRESS NOTES
Christine received gemcitabine today over 30min without issue. Premedicated with PO zofran. Patient states she is worried about transportation to and from appointment in the future, an order for a nurse navigator was placed. Blood return noted in L hand PIV before and after infusion. Patient discharges stable and ambulatory.

## 2020-09-10 ENCOUNTER — PATIENT OUTREACH (OUTPATIENT)
Dept: OTHER | Facility: MEDICAL CENTER | Age: 65
End: 2020-09-10

## 2020-09-10 NOTE — PROGRESS NOTES
On September 10, 2020, Oncology Social Worker Lisa Brown and Oncology Nurse Navigator Jackelyn Mason contacted pt. via telephone.  BRADLY Brown served as  for pt. and ANMOL Mason.  BRADLY Brown scheduled meeting with pt. for Tuesday September 15th, 2020 while [facility]'s [facility].  Pt. inquired as to why  was meeting with her.  BRADLY Brown explained to pt. a  and nurse navigator were meeting with her to provide resources she may need.  Pt. shared her daughter drives pt. to her appointments but there are instances where she is called in for work.  BRADLY Brown informed pt. about MTM for transportation.  Pt. asked for information brochure for Tuesday when they meet in person.

## 2020-09-15 ENCOUNTER — PATIENT OUTREACH (OUTPATIENT)
Dept: OTHER | Facility: MEDICAL CENTER | Age: 65
End: 2020-09-15

## 2020-09-15 ENCOUNTER — OUTPATIENT INFUSION SERVICES (OUTPATIENT)
Dept: ONCOLOGY | Facility: MEDICAL CENTER | Age: 65
End: 2020-09-15
Attending: INTERNAL MEDICINE
Payer: MEDICAID

## 2020-09-15 VITALS
DIASTOLIC BLOOD PRESSURE: 60 MMHG | SYSTOLIC BLOOD PRESSURE: 142 MMHG | RESPIRATION RATE: 18 BRPM | OXYGEN SATURATION: 95 % | TEMPERATURE: 97.9 F | WEIGHT: 130.95 LBS | HEIGHT: 57 IN | BODY MASS INDEX: 28.25 KG/M2 | HEART RATE: 68 BPM

## 2020-09-15 DIAGNOSIS — Z17.0 MALIGNANT NEOPLASM OF OVERLAPPING SITES OF RIGHT BREAST IN FEMALE, ESTROGEN RECEPTOR POSITIVE (HCC): ICD-10-CM

## 2020-09-15 DIAGNOSIS — C50.811 MALIGNANT NEOPLASM OF OVERLAPPING SITES OF RIGHT BREAST IN FEMALE, ESTROGEN RECEPTOR POSITIVE (HCC): ICD-10-CM

## 2020-09-15 LAB
BASOPHILS # BLD AUTO: 0.5 % (ref 0–1.8)
BASOPHILS # BLD: 0.02 K/UL (ref 0–0.12)
EOSINOPHIL # BLD AUTO: 0 K/UL (ref 0–0.51)
EOSINOPHIL NFR BLD: 0 % (ref 0–6.9)
ERYTHROCYTE [DISTWIDTH] IN BLOOD BY AUTOMATED COUNT: 54.9 FL (ref 35.9–50)
HCT VFR BLD AUTO: 38.6 % (ref 37–47)
HGB BLD-MCNC: 12.6 G/DL (ref 12–16)
IMM GRANULOCYTES # BLD AUTO: 0.04 K/UL (ref 0–0.11)
IMM GRANULOCYTES NFR BLD AUTO: 1.1 % (ref 0–0.9)
LYMPHOCYTES # BLD AUTO: 1.15 K/UL (ref 1–4.8)
LYMPHOCYTES NFR BLD: 30.8 % (ref 22–41)
MCH RBC QN AUTO: 28.9 PG (ref 27–33)
MCHC RBC AUTO-ENTMCNC: 32.6 G/DL (ref 33.6–35)
MCV RBC AUTO: 88.5 FL (ref 81.4–97.8)
MONOCYTES # BLD AUTO: 0.43 K/UL (ref 0–0.85)
MONOCYTES NFR BLD AUTO: 11.5 % (ref 0–13.4)
NEUTROPHILS # BLD AUTO: 2.09 K/UL (ref 2–7.15)
NEUTROPHILS NFR BLD: 56.1 % (ref 44–72)
NRBC # BLD AUTO: 0 K/UL
NRBC BLD-RTO: 0 /100 WBC
PLATELET # BLD AUTO: 294 K/UL (ref 164–446)
PMV BLD AUTO: 8.6 FL (ref 9–12.9)
RBC # BLD AUTO: 4.36 M/UL (ref 4.2–5.4)
WBC # BLD AUTO: 3.7 K/UL (ref 4.8–10.8)

## 2020-09-15 PROCEDURE — 85025 COMPLETE CBC W/AUTO DIFF WBC: CPT

## 2020-09-15 PROCEDURE — 700111 HCHG RX REV CODE 636 W/ 250 OVERRIDE (IP): Performed by: INTERNAL MEDICINE

## 2020-09-15 PROCEDURE — 96413 CHEMO IV INFUSION 1 HR: CPT

## 2020-09-15 PROCEDURE — 700105 HCHG RX REV CODE 258: Performed by: INTERNAL MEDICINE

## 2020-09-15 RX ORDER — ONDANSETRON 8 MG/1
8 TABLET, ORALLY DISINTEGRATING ORAL ONCE
Status: COMPLETED | OUTPATIENT
Start: 2020-09-15 | End: 2020-09-15

## 2020-09-15 RX ADMIN — ONDANSETRON 8 MG: 8 TABLET, ORALLY DISINTEGRATING ORAL at 15:46

## 2020-09-15 RX ADMIN — GEMCITABINE HYDROCHLORIDE 1550 MG: 1 INJECTION, POWDER, LYOPHILIZED, FOR SOLUTION INTRAVENOUS at 16:06

## 2020-09-15 ASSESSMENT — FIBROSIS 4 INDEX: FIB4 SCORE: 1.88

## 2020-09-15 NOTE — PROGRESS NOTES
On September 15, 2020, Oncology Social Worker Lisa Brown attempted telephone contact with pt. to change date of appointment.  OSW Kevin left voicemail message for pt. in Swedish requesting for pt. to call back to let OSW Kevin know if Friday at 2 pm would work for telephone appointment.

## 2020-09-15 NOTE — PROGRESS NOTES
Patient presents for day 151 cycle 2 Gemzar. Reviewed plan of care with  (see flow sheet). Patient verbalizes understanding. PIV established, blood drawn as ordered. Results within parameters to proceed with treatment. Gemzar infused with no new patient complaints, line flushed clear. PIV removed, tip intact, compression dressing to site. Patient scheduled for her next appointment and released in no acute distress.

## 2020-09-15 NOTE — PROGRESS NOTES
"Pharmacy Chemotherapy Calculation:    Dx: breast cancer         Protocol: Gemzar     *Dosing Reference*  Gemcitabine 800-1200 mg/m² IV over 30 min on Days 1, 8, and 15  28-day cycle until disease progression or unacceptable toxicity  NCCN Guidelines for Breast Cancer V.1.2019  Shaggy GARCIA, Oncology ([place]). 2001;15(2_suppl_3):11-4    Allergies:  Patient has no known allergies.     /60   Pulse 68   Temp 36.6 °C (97.9 °F) (Temporal)   Resp 18   Ht 1.45 m (4' 9.09\")   Wt 59.4 kg (130 lb 15.3 oz)   SpO2 95%   BMI 28.25 kg/m²  Body surface area is 1.55 meters squared.    Labs 9/15/20:  ANC~ 2090 Plt = 294k   Hgb = 12.6        Drug Order   (Drug name, dose, route, IV Fluid & volume, frequency, number of doses) Cycle 1 Day 15  Previous treatment: C1D8 on 9/8/20   Medication = Gemcitabine (Gemzar)  Base Dose = 1000 mg/m²  Calc Dose: Base Dose x 1.55 m² = 1550 mg  Final Dose = 1550 mg  Route = IV  Fluid & Volume =  mL  Admin Duration = Over 30 minutes          <10% difference, okay to treat with final dose     By my signature below, I confirm this process was performed independently with the BSA and all final chemotherapy dosing calculations congruent. I have reviewed the above chemotherapy order and that my calculation of the final dose and BSA (when applicable) corroborate those calculations of the  pharmacist. Discrepancies of 10% or greater in the written dose have been addressed and documented within the [facility] Progress notes.      Matt Humphries, PharmD  "

## 2020-09-15 NOTE — PROGRESS NOTES
Chemotherapy Verification - SECONDARY RN       Height = 146 cm  Weight = 59.4 kg  BSA = 1.55 m2       Medication: Gemzar   Dose: 1000 mg/m2  Calculated Dose: 1550 mg                             (In mg/m2, AUC, mg/kg)     I confirm that this process was performed independently.

## 2020-09-15 NOTE — PROGRESS NOTES
"Pharmacy Chemotherapy Calculations    Dx: Breast CA  Cycle: 1, Day 15  Previous treatment = 9/8/20   s/p Halaven x 7 through 8/22/20     Regimen and Dosing Reference  Gemzar  Gemcitabine 800-1200 mg/m2 IV days 1, 8, 15  28 day cycle until DP or UT  NCCN guidelines for breast cancer V.1.2019  Shaggy GARCIA, et al. Oncology ([place]) 2001 Feb;15(2 Suppl 3):11-4.     /60   Pulse 68   Temp 36.6 °C (97.9 °F) (Temporal)   Resp 18   Ht 1.45 m (4' 9.09\")   Wt 59.4 kg (130 lb 15.3 oz)   SpO2 95%   BMI 28.25 kg/m²  Body surface area is 1.55 meters squared.     All lab results 9/15/20 within treatment parameters.       Gemcitabine 1000 mg/m2 x 1.55m2 = 1550mg   <10% difference, OK to treat with final dose = 1570 mg IV    Bhavana Urbina, PharmD    "

## 2020-09-18 ENCOUNTER — PATIENT OUTREACH (OUTPATIENT)
Dept: OTHER | Facility: MEDICAL CENTER | Age: 65
End: 2020-09-18

## 2020-09-18 NOTE — PROGRESS NOTES
"On September 18, 2020, Oncology Social Worker Lisa Brown and Oncology Nurse Navigator Jackelyn aMson contacted pt. via telephone.  BRADLY Brown served as  between pt. and ANMOL Mason.  BRADLY Brown apologized for having to reschedule appointment.  Pt. shared her daughter informed her OSEVAN Brown had left voicemail message with the new date.  Pt. shared she was diagnosed with breast cancer in 2014, had surgery in 2015 and then moved to [place] from [place], [place] in 2019 after her  passed away.  Pt. shared her  had diabetes and \"other illnesses.\"  Pt. lives in [place] with her daughter, Reta Tamez, Reta's  and Reta's 6 year old granddaughter.  Pt. shared her only concern to be transportation.  Pt. shared her daughter works and cannot always bring her to appointments.  BRADLY Brown informed pt. about [facility] transportation and informed pt. she would mail the information to pt.  Pt. shared emotionally she is doing okay.      Pt. denies any other barriers to care at this time.      BRADLY Brown will mail pt. [facility], [facility] and business cards for ANMOL Mason and herself should pt. have any questions in the future.      "

## 2020-09-28 RX ORDER — 0.9 % SODIUM CHLORIDE 0.9 %
VIAL (ML) INJECTION PRN
Status: CANCELLED | OUTPATIENT
Start: 2020-09-28

## 2020-09-28 RX ORDER — 0.9 % SODIUM CHLORIDE 0.9 %
3 VIAL (ML) INJECTION PRN
Status: CANCELLED | OUTPATIENT
Start: 2020-10-06

## 2020-09-28 RX ORDER — ONDANSETRON 8 MG/1
8 TABLET, ORALLY DISINTEGRATING ORAL PRN
Status: CANCELLED | OUTPATIENT
Start: 2020-09-28

## 2020-09-28 RX ORDER — SODIUM CHLORIDE 9 MG/ML
INJECTION, SOLUTION INTRAVENOUS CONTINUOUS
Status: CANCELLED | OUTPATIENT
Start: 2020-10-06

## 2020-09-28 RX ORDER — ONDANSETRON 8 MG/1
8 TABLET, ORALLY DISINTEGRATING ORAL PRN
Status: CANCELLED | OUTPATIENT
Start: 2020-10-06

## 2020-09-28 RX ORDER — 0.9 % SODIUM CHLORIDE 0.9 %
10 VIAL (ML) INJECTION PRN
Status: CANCELLED | OUTPATIENT
Start: 2020-09-28

## 2020-09-28 RX ORDER — 0.9 % SODIUM CHLORIDE 0.9 %
VIAL (ML) INJECTION PRN
Status: CANCELLED | OUTPATIENT
Start: 2020-10-13

## 2020-09-28 RX ORDER — 0.9 % SODIUM CHLORIDE 0.9 %
3 VIAL (ML) INJECTION PRN
Status: CANCELLED | OUTPATIENT
Start: 2020-09-28

## 2020-09-28 RX ORDER — 0.9 % SODIUM CHLORIDE 0.9 %
10 VIAL (ML) INJECTION PRN
Status: CANCELLED | OUTPATIENT
Start: 2020-10-06

## 2020-09-28 RX ORDER — HEPARIN SODIUM (PORCINE) LOCK FLUSH IV SOLN 100 UNIT/ML 100 UNIT/ML
500 SOLUTION INTRAVENOUS PRN
Status: CANCELLED | OUTPATIENT
Start: 2020-10-13

## 2020-09-28 RX ORDER — PROCHLORPERAZINE MALEATE 10 MG
10 TABLET ORAL EVERY 6 HOURS PRN
Status: CANCELLED | OUTPATIENT
Start: 2020-09-28

## 2020-09-28 RX ORDER — 0.9 % SODIUM CHLORIDE 0.9 %
VIAL (ML) INJECTION PRN
Status: CANCELLED | OUTPATIENT
Start: 2020-10-06

## 2020-09-28 RX ORDER — ONDANSETRON 2 MG/ML
4 INJECTION INTRAMUSCULAR; INTRAVENOUS PRN
Status: CANCELLED | OUTPATIENT
Start: 2020-10-13

## 2020-09-28 RX ORDER — PROCHLORPERAZINE MALEATE 10 MG
10 TABLET ORAL EVERY 6 HOURS PRN
Status: CANCELLED | OUTPATIENT
Start: 2020-10-13

## 2020-09-28 RX ORDER — 0.9 % SODIUM CHLORIDE 0.9 %
10 VIAL (ML) INJECTION PRN
Status: CANCELLED | OUTPATIENT
Start: 2020-10-13

## 2020-09-28 RX ORDER — PROCHLORPERAZINE MALEATE 10 MG
10 TABLET ORAL EVERY 6 HOURS PRN
Status: CANCELLED | OUTPATIENT
Start: 2020-10-06

## 2020-09-28 RX ORDER — HEPARIN SODIUM (PORCINE) LOCK FLUSH IV SOLN 100 UNIT/ML 100 UNIT/ML
500 SOLUTION INTRAVENOUS PRN
Status: CANCELLED | OUTPATIENT
Start: 2020-09-28

## 2020-09-28 RX ORDER — 0.9 % SODIUM CHLORIDE 0.9 %
3 VIAL (ML) INJECTION PRN
Status: CANCELLED | OUTPATIENT
Start: 2020-10-13

## 2020-09-28 RX ORDER — ONDANSETRON 2 MG/ML
4 INJECTION INTRAMUSCULAR; INTRAVENOUS PRN
Status: CANCELLED | OUTPATIENT
Start: 2020-10-06

## 2020-09-28 RX ORDER — ONDANSETRON 8 MG/1
8 TABLET, ORALLY DISINTEGRATING ORAL ONCE
Status: CANCELLED | OUTPATIENT
Start: 2020-09-28

## 2020-09-28 RX ORDER — ONDANSETRON 8 MG/1
8 TABLET, ORALLY DISINTEGRATING ORAL ONCE
Status: CANCELLED | OUTPATIENT
Start: 2020-10-13

## 2020-09-28 RX ORDER — HEPARIN SODIUM (PORCINE) LOCK FLUSH IV SOLN 100 UNIT/ML 100 UNIT/ML
500 SOLUTION INTRAVENOUS PRN
Status: CANCELLED | OUTPATIENT
Start: 2020-10-06

## 2020-09-28 RX ORDER — SODIUM CHLORIDE 9 MG/ML
INJECTION, SOLUTION INTRAVENOUS CONTINUOUS
Status: CANCELLED | OUTPATIENT
Start: 2020-10-13

## 2020-09-28 RX ORDER — ONDANSETRON 8 MG/1
8 TABLET, ORALLY DISINTEGRATING ORAL ONCE
Status: CANCELLED | OUTPATIENT
Start: 2020-10-06

## 2020-09-28 RX ORDER — ONDANSETRON 2 MG/ML
4 INJECTION INTRAMUSCULAR; INTRAVENOUS PRN
Status: CANCELLED | OUTPATIENT
Start: 2020-09-28

## 2020-09-28 RX ORDER — ONDANSETRON 8 MG/1
8 TABLET, ORALLY DISINTEGRATING ORAL PRN
Status: CANCELLED | OUTPATIENT
Start: 2020-10-13

## 2020-09-28 RX ORDER — SODIUM CHLORIDE 9 MG/ML
INJECTION, SOLUTION INTRAVENOUS CONTINUOUS
Status: CANCELLED | OUTPATIENT
Start: 2020-09-28

## 2020-09-29 ENCOUNTER — OUTPATIENT INFUSION SERVICES (OUTPATIENT)
Dept: ONCOLOGY | Facility: MEDICAL CENTER | Age: 65
End: 2020-09-29
Attending: INTERNAL MEDICINE
Payer: MEDICAID

## 2020-09-29 VITALS
WEIGHT: 130.07 LBS | TEMPERATURE: 98 F | BODY MASS INDEX: 27.3 KG/M2 | DIASTOLIC BLOOD PRESSURE: 55 MMHG | HEIGHT: 58 IN | RESPIRATION RATE: 18 BRPM | OXYGEN SATURATION: 96 % | SYSTOLIC BLOOD PRESSURE: 121 MMHG | HEART RATE: 75 BPM

## 2020-09-29 DIAGNOSIS — C50.811 MALIGNANT NEOPLASM OF OVERLAPPING SITES OF RIGHT BREAST IN FEMALE, ESTROGEN RECEPTOR POSITIVE (HCC): ICD-10-CM

## 2020-09-29 DIAGNOSIS — Z17.0 MALIGNANT NEOPLASM OF OVERLAPPING SITES OF RIGHT BREAST IN FEMALE, ESTROGEN RECEPTOR POSITIVE (HCC): ICD-10-CM

## 2020-09-29 LAB
ALBUMIN SERPL BCP-MCNC: 4 G/DL (ref 3.2–4.9)
ALBUMIN/GLOB SERPL: 1.5 G/DL
ALP SERPL-CCNC: 69 U/L (ref 30–99)
ALT SERPL-CCNC: 18 U/L (ref 2–50)
ANION GAP SERPL CALC-SCNC: 13 MMOL/L (ref 7–16)
ANISOCYTOSIS BLD QL SMEAR: ABNORMAL
APPEARANCE UR: CLEAR
AST SERPL-CCNC: 16 U/L (ref 12–45)
BASOPHILS # BLD AUTO: 0.4 % (ref 0–1.8)
BASOPHILS # BLD: 0.03 K/UL (ref 0–0.12)
BILIRUB SERPL-MCNC: 0.2 MG/DL (ref 0.1–1.5)
BILIRUB UR QL STRIP.AUTO: NEGATIVE
BUN SERPL-MCNC: 20 MG/DL (ref 8–22)
CALCIUM SERPL-MCNC: 9.4 MG/DL (ref 8.5–10.5)
CHLORIDE SERPL-SCNC: 103 MMOL/L (ref 96–112)
CO2 SERPL-SCNC: 26 MMOL/L (ref 20–33)
COLOR UR: YELLOW
COMMENT 1642: NORMAL
CREAT SERPL-MCNC: 0.59 MG/DL (ref 0.5–1.4)
EOSINOPHIL # BLD AUTO: 0.19 K/UL (ref 0–0.51)
EOSINOPHIL NFR BLD: 2.8 % (ref 0–6.9)
ERYTHROCYTE [DISTWIDTH] IN BLOOD BY AUTOMATED COUNT: 65.6 FL (ref 35.9–50)
GLOBULIN SER CALC-MCNC: 2.7 G/DL (ref 1.9–3.5)
GLUCOSE SERPL-MCNC: 110 MG/DL (ref 65–99)
GLUCOSE UR STRIP.AUTO-MCNC: NEGATIVE MG/DL
HCT VFR BLD AUTO: 38.7 % (ref 37–47)
HGB BLD-MCNC: 12.3 G/DL (ref 12–16)
IMM GRANULOCYTES # BLD AUTO: 0.02 K/UL (ref 0–0.11)
IMM GRANULOCYTES NFR BLD AUTO: 0.3 % (ref 0–0.9)
KETONES UR STRIP.AUTO-MCNC: NEGATIVE MG/DL
LEUKOCYTE ESTERASE UR QL STRIP.AUTO: NEGATIVE
LYMPHOCYTES # BLD AUTO: 1.22 K/UL (ref 1–4.8)
LYMPHOCYTES NFR BLD: 17.9 % (ref 22–41)
MCH RBC QN AUTO: 29.4 PG (ref 27–33)
MCHC RBC AUTO-ENTMCNC: 31.8 G/DL (ref 33.6–35)
MCV RBC AUTO: 92.4 FL (ref 81.4–97.8)
MICRO URNS: NORMAL
MICROCYTES BLD QL SMEAR: ABNORMAL
MONOCYTES # BLD AUTO: 0.82 K/UL (ref 0–0.85)
MONOCYTES NFR BLD AUTO: 12.1 % (ref 0–13.4)
MORPHOLOGY BLD-IMP: NORMAL
NEUTROPHILS # BLD AUTO: 4.52 K/UL (ref 2–7.15)
NEUTROPHILS NFR BLD: 66.5 % (ref 44–72)
NITRITE UR QL STRIP.AUTO: NEGATIVE
NRBC # BLD AUTO: 0 K/UL
NRBC BLD-RTO: 0 /100 WBC
OVALOCYTES BLD QL SMEAR: NORMAL
PH UR STRIP.AUTO: 8 [PH] (ref 5–8)
PLATELET # BLD AUTO: 404 K/UL (ref 164–446)
PLATELET BLD QL SMEAR: NORMAL
PMV BLD AUTO: 9.6 FL (ref 9–12.9)
POIKILOCYTOSIS BLD QL SMEAR: NORMAL
POLYCHROMASIA BLD QL SMEAR: NORMAL
POTASSIUM SERPL-SCNC: 4.7 MMOL/L (ref 3.6–5.5)
PROT SERPL-MCNC: 6.7 G/DL (ref 6–8.2)
PROT UR QL STRIP: NEGATIVE MG/DL
RBC # BLD AUTO: 4.19 M/UL (ref 4.2–5.4)
RBC BLD AUTO: PRESENT
RBC UR QL AUTO: NEGATIVE
SODIUM SERPL-SCNC: 142 MMOL/L (ref 135–145)
SP GR UR STRIP.AUTO: 1.02
UROBILINOGEN UR STRIP.AUTO-MCNC: 0.2 MG/DL
WBC # BLD AUTO: 6.8 K/UL (ref 4.8–10.8)

## 2020-09-29 PROCEDURE — 96413 CHEMO IV INFUSION 1 HR: CPT

## 2020-09-29 PROCEDURE — 700111 HCHG RX REV CODE 636 W/ 250 OVERRIDE (IP): Performed by: INTERNAL MEDICINE

## 2020-09-29 PROCEDURE — 80053 COMPREHEN METABOLIC PANEL: CPT

## 2020-09-29 PROCEDURE — 85025 COMPLETE CBC W/AUTO DIFF WBC: CPT

## 2020-09-29 PROCEDURE — 81003 URINALYSIS AUTO W/O SCOPE: CPT

## 2020-09-29 PROCEDURE — 700105 HCHG RX REV CODE 258: Performed by: INTERNAL MEDICINE

## 2020-09-29 PROCEDURE — 87086 URINE CULTURE/COLONY COUNT: CPT

## 2020-09-29 RX ORDER — PROCHLORPERAZINE MALEATE 10 MG
TABLET ORAL
COMMUNITY
Start: 2020-09-09

## 2020-09-29 RX ORDER — ERIBULIN MESYLATE 0.5 MG/ML
INJECTION INTRAVENOUS
COMMUNITY
Start: 2020-08-22 | End: 2020-11-09

## 2020-09-29 RX ORDER — GEMCITABINE HYDROCHLORIDE 1 G/1
INJECTION, POWDER, LYOPHILIZED, FOR SOLUTION INTRAVENOUS
COMMUNITY
Start: 2020-08-29 | End: 2020-11-09

## 2020-09-29 RX ORDER — ONDANSETRON 8 MG/1
8 TABLET, ORALLY DISINTEGRATING ORAL ONCE
Status: COMPLETED | OUTPATIENT
Start: 2020-09-29 | End: 2020-09-29

## 2020-09-29 RX ADMIN — GEMCITABINE HYDROCHLORIDE 1550 MG: 200 INJECTION, POWDER, LYOPHILIZED, FOR SOLUTION INTRAVENOUS at 16:25

## 2020-09-29 RX ADMIN — ONDANSETRON 8 MG: 8 TABLET, ORALLY DISINTEGRATING ORAL at 16:06

## 2020-09-29 ASSESSMENT — FIBROSIS 4 INDEX: FIB4 SCORE: 1.197893559374887337

## 2020-09-29 NOTE — PROGRESS NOTES
Chemotherapy Verification - PRIMARY RN      Height = 147cm  Weight = 59kg  BSA = 1.55m2       Medication: Gemzar  Dose: 1000mg/m2  Calculated Dose: 1550mg                              (In mg/m2, AUC, mg/kg)       I confirm this process was performed independently with the BSA and all final chemotherapy dosing calculations congruent.  Any discrepancies of 10% or greater have been addressed with the chemotherapy pharmacist. The resolution of the discrepancy has been documented in the EPIC progress notes.

## 2020-09-29 NOTE — PROGRESS NOTES
Pt arrived ambulatory to [facility] for D1 C2 Gemzar. POC discussed with  Gifty #[id number], and pt verbalized understanding. Pt states via  she has burning with urination over the last few days; pt denies fever or flank pain. Dr. Jarvis notified and order obtained for urinalysis and urine culture.     PIV established and labs drawn at this time; pt tolerated well. Urine collected by pt and set to lab for processing. Labs reviewed. Premedication and Gemcitabine administered per MAR and provider orders; pt tolerated well. No signs or symptoms of reaction or complications noted. PIV flushed and removed; sterile gauze and paper tape applied to site.     Follow-up care discussed and future appointment printed for pt. Pt verbalizes understanding to contact provider if she get's a fever and/or continues to have burning with urination, and/or has any other concerns.    Pt expresses concern regarding transportation and finances, and would like to speak to her nurse navigator. RN attempted to contact BRITTANY Israel, and Jackelyn Mason RN nurse navigator, and messages left for both to contact patient. Per Lisa's previous notes, pt was mailed information regarding transportation but patient reports confusion and would like to speak with her again. Pt given Lisa's contact information at this time and informed messages left for both, and pt verbalized understanding. Pt discharged home ambulatory to self care in no apparent distress at this time.

## 2020-09-29 NOTE — PROGRESS NOTES
"Pharmacy Chemotherapy Calculation:    Dx: breast cancer         Protocol: Gemzar     *Dosing Reference*  Gemcitabine 800-1200 mg/m² IV over 30 min on Days 1, 8, and 15  28-day cycle until disease progression or unacceptable toxicity  NCCN Guidelines for Breast Cancer V.1.2019  Shaggy GARCIA, Oncology ([place]). 2001;15(2_suppl_3):11-4    Allergies:  Patient has no known allergies.     /55   Pulse 75   Temp 36.7 °C (98 °F) (Temporal)   Resp 18   Ht 1.47 m (4' 9.87\")   Wt 59 kg (130 lb 1.1 oz)   SpO2 96%   BMI 27.30 kg/m²  Body surface area is 1.55 meters squared.     Labs from 9/29/20 reviewed- results within treatment parameters       Drug Order   (Drug name, dose, route, IV Fluid & volume, frequency, number of doses) Cycle 2 Day 1  Previous treatment: C1D15 on 9/15/20   Medication = Gemcitabine (Gemzar)  Base Dose = 1000 mg/m²  Calc Dose: Base Dose x 1.55 m² = 1550 mg  Final Dose = 1550 mg  Route = IV  Fluid & Volume =  mL  Admin Duration = Over 30 minutes          <10% difference, okay to treat with final dose     By my signature below, I confirm this process was performed independently with the BSA and all final chemotherapy dosing calculations congruent. I have reviewed the above chemotherapy order and that my calculation of the final dose and BSA (when applicable) corroborate those calculations of the  pharmacist. Discrepancies of 10% or greater in the written dose have been addressed and documented within the [facility] Progress notes.      Caleb Padilla, PharmD  "

## 2020-09-29 NOTE — PROGRESS NOTES
"Pharmacy Chemotherapy Calculations    Dx: Breast CA  Cycle: 2, Day 1  Previous treatment = 9/15/20   s/p Halaven x 7 through 8/22/20     Regimen and Dosing Reference  Gemzar  Gemcitabine 800-1200 mg/m2 IV days 1, 8, 15  28 day cycle until DP or UT  NCCN guidelines for breast cancer V.1.2019  Shaggy GARCIA, et al. Oncology ([place]) 2001 Feb;15(2 Suppl 3):11-4.     /55   Pulse 75   Temp 36.7 °C (98 °F) (Temporal)   Resp 18   Ht 1.47 m (4' 9.87\")   Wt 59 kg (130 lb 1.1 oz)   SpO2 96%   BMI 27.30 kg/m²  Body surface area is 1.55 meters squared.     All lab results 9/29/20 within treatment parameters.       Gemcitabine 1000 mg/m2 x 1.55m2 = 1550mg   <10% difference, OK to treat with final dose = 1550mg IV    Bhavana Urbina, PharmD    "

## 2020-09-29 NOTE — PROGRESS NOTES
Chemotherapy Verification - SECONDARY RN       Height = 147 cm  Weight = 59 kg  BSA = 1.55 m2       Medication: Gemzar  Dose: 1000 mg/m2  Calculated Dose: 1550 mg                             (In mg/m2, AUC, mg/kg)     I confirm that this process was performed independently.

## 2020-09-30 ENCOUNTER — PATIENT OUTREACH (OUTPATIENT)
Dept: OTHER | Facility: MEDICAL CENTER | Age: 65
End: 2020-09-30

## 2020-09-30 NOTE — PROGRESS NOTES
On September 30, 2020, Oncology Social Worker Lisa Brown contacted pt. after receiving message from [facility]. BRADLY Brown spoke to pt. in Nepali throughout conversation.  Pt. shared she had not received the application yet from BRADLY Brown.  Pt. stated they did receive a call about their mail and would keep BRADLY Brown updated as to whether she gets the envelope sent by BRADLY Brown. BRADLY Brown verified address with pt.  BRADLY Brown informed pt. if she does not receive it by the end of the week she will have application and MTM information at her appointment with [facility] on October 6th.  Pt. thanked BRADLY Brown for her call and concern.

## 2020-10-01 LAB
BACTERIA UR CULT: NORMAL
SIGNIFICANT IND 70042: NORMAL
SITE SITE: NORMAL
SOURCE SOURCE: NORMAL

## 2020-10-06 ENCOUNTER — OUTPATIENT INFUSION SERVICES (OUTPATIENT)
Dept: ONCOLOGY | Facility: MEDICAL CENTER | Age: 65
End: 2020-10-06
Attending: INTERNAL MEDICINE
Payer: MEDICAID

## 2020-10-06 VITALS
OXYGEN SATURATION: 98 % | TEMPERATURE: 98 F | WEIGHT: 130.07 LBS | RESPIRATION RATE: 18 BRPM | HEIGHT: 58 IN | HEART RATE: 70 BPM | SYSTOLIC BLOOD PRESSURE: 129 MMHG | BODY MASS INDEX: 27.3 KG/M2 | DIASTOLIC BLOOD PRESSURE: 61 MMHG

## 2020-10-06 DIAGNOSIS — Z17.0 MALIGNANT NEOPLASM OF OVERLAPPING SITES OF RIGHT BREAST IN FEMALE, ESTROGEN RECEPTOR POSITIVE (HCC): ICD-10-CM

## 2020-10-06 DIAGNOSIS — C50.811 MALIGNANT NEOPLASM OF OVERLAPPING SITES OF RIGHT BREAST IN FEMALE, ESTROGEN RECEPTOR POSITIVE (HCC): ICD-10-CM

## 2020-10-06 LAB
BASOPHILS # BLD AUTO: 0.9 % (ref 0–1.8)
BASOPHILS # BLD: 0.03 K/UL (ref 0–0.12)
EOSINOPHIL # BLD AUTO: 0.02 K/UL (ref 0–0.51)
EOSINOPHIL NFR BLD: 0.6 % (ref 0–6.9)
ERYTHROCYTE [DISTWIDTH] IN BLOOD BY AUTOMATED COUNT: 62.2 FL (ref 35.9–50)
HCT VFR BLD AUTO: 38.9 % (ref 37–47)
HGB BLD-MCNC: 12.7 G/DL (ref 12–16)
IMM GRANULOCYTES # BLD AUTO: 0.07 K/UL (ref 0–0.11)
IMM GRANULOCYTES NFR BLD AUTO: 2.1 % (ref 0–0.9)
LYMPHOCYTES # BLD AUTO: 1.22 K/UL (ref 1–4.8)
LYMPHOCYTES NFR BLD: 36 % (ref 22–41)
MCH RBC QN AUTO: 29.9 PG (ref 27–33)
MCHC RBC AUTO-ENTMCNC: 32.6 G/DL (ref 33.6–35)
MCV RBC AUTO: 91.5 FL (ref 81.4–97.8)
MONOCYTES # BLD AUTO: 0.43 K/UL (ref 0–0.85)
MONOCYTES NFR BLD AUTO: 12.7 % (ref 0–13.4)
NEUTROPHILS # BLD AUTO: 1.62 K/UL (ref 2–7.15)
NEUTROPHILS NFR BLD: 47.7 % (ref 44–72)
NRBC # BLD AUTO: 0 K/UL
NRBC BLD-RTO: 0 /100 WBC
PLATELET # BLD AUTO: 356 K/UL (ref 164–446)
PMV BLD AUTO: 9.5 FL (ref 9–12.9)
RBC # BLD AUTO: 4.25 M/UL (ref 4.2–5.4)
WBC # BLD AUTO: 3.4 K/UL (ref 4.8–10.8)

## 2020-10-06 PROCEDURE — 700105 HCHG RX REV CODE 258: Performed by: INTERNAL MEDICINE

## 2020-10-06 PROCEDURE — 85025 COMPLETE CBC W/AUTO DIFF WBC: CPT

## 2020-10-06 PROCEDURE — 700111 HCHG RX REV CODE 636 W/ 250 OVERRIDE (IP): Performed by: INTERNAL MEDICINE

## 2020-10-06 PROCEDURE — 96413 CHEMO IV INFUSION 1 HR: CPT

## 2020-10-06 RX ORDER — ONDANSETRON 8 MG/1
8 TABLET, ORALLY DISINTEGRATING ORAL ONCE
Status: COMPLETED | OUTPATIENT
Start: 2020-10-06 | End: 2020-10-06

## 2020-10-06 RX ADMIN — GEMCITABINE HYDROCHLORIDE 1550 MG: 200 INJECTION, POWDER, LYOPHILIZED, FOR SOLUTION INTRAVENOUS at 15:53

## 2020-10-06 RX ADMIN — ONDANSETRON 8 MG: 8 TABLET, ORALLY DISINTEGRATING ORAL at 15:15

## 2020-10-06 ASSESSMENT — FIBROSIS 4 INDEX: FIB4 SCORE: 0.59

## 2020-10-06 NOTE — PROGRESS NOTES
"Pharmacy Chemotherapy Calculations    Dx: Breast CA  Cycle: 2, Day 8  Previous treatment = 9/29/20   s/p Halaven x 7 through 8/22/20     Regimen and Dosing Reference  Gemzar  Gemcitabine 800-1200 mg/m2 IV days 1, 8, 15  28 day cycle until DP or UT  NCCN guidelines for breast cancer V.1.2019  Shaggy GARCIA, et al. Oncology ([place]) 2001 Feb;15(2 Suppl 3):11-4.     /61   Pulse 70   Temp 36.7 °C (98 °F) (Temporal)   Resp 18   Ht 1.47 m (4' 9.87\")   Wt 59 kg (130 lb 1.1 oz)   SpO2 98%   BMI 27.30 kg/m²  Body surface area is 1.55 meters squared.     Labs: 10/6/20  Meets treatment parameters    Gemcitabine 1000 mg/m2 x 1.55 m2 = 1550 mg   <10% difference, OK to treat with final dose = 1550 mg IV        "

## 2020-10-06 NOTE — PROGRESS NOTES
Chemotherapy Verification - PRIMARY RN      Height = 147cm  Weight = 59kg  BSA = 1.55m2       Medication: Gemcitabine  Dose: 1000mg/m2  Calculated Dose: 1550mg                             (In mg/m2, AUC, mg/kg)       I confirm this process was performed independently with the BSA and all final chemotherapy dosing calculations congruent.  Any discrepancies of 10% or greater have been addressed with the chemotherapy pharmacist. The resolution of the discrepancy has been documented in the EPIC progress notes.

## 2020-10-06 NOTE — PROGRESS NOTES
"Pharmacy Chemotherapy Calculation:    Dx: breast cancer         Protocol: Gemzar     *Dosing Reference*  Gemcitabine 800-1200 mg/m² IV over 30 min on Days 1, 8, and 15   28-day cycle until disease progression or unacceptable toxicity  NCCN Guidelines for Breast Cancer V.1.2019  Shaggy GARCIA, Oncology ([place]). 2001;15(2_suppl_3):11-4    Allergies:  Patient has no known allergies.     /61   Pulse 70   Temp 36.7 °C (98 °F) (Temporal)   Resp 18   Ht 1.47 m (4' 9.87\")   Wt 59 kg (130 lb 1.1 oz)   SpO2 98%   BMI 27.30 kg/m²  Body surface area is 1.55 meters squared.     Labs 10/6/20:  ANC~ 1620 Plt = 356k   Hgb = 127        Drug Order   (Drug name, dose, route, IV Fluid & volume, frequency, number of doses) Cycle 2 Day 8  Previous treatment: C2D1 on 9/29/20   Medication = Gemcitabine (Gemzar)  Base Dose = 1000 mg/m²  Calc Dose: Base Dose x 1.55 m² = 1550 mg  Final Dose = 1550 mg  Route = IV  Fluid & Volume =  mL  Admin Duration = Over 30 minutes          <10% difference, okay to treat with final dose     By my signature below, I confirm this process was performed independently with the BSA and all final chemotherapy dosing calculations congruent. I have reviewed the above chemotherapy order and that my calculation of the final dose and BSA (when applicable) corroborate those calculations of the  pharmacist. Discrepancies of 10% or greater in the written dose have been addressed and documented within the [facility] Progress notes.      Matt Humphries, PharmD  "

## 2020-10-07 NOTE — PROGRESS NOTES
Christine came to [facility] for her cycle 14 gemcitabine. PIV placed in L AC, labs drawn, resulted within treatment parameters. Premedicated with PO zofran. Gemcitabine infused over 30min without issue. Christine discharged stable, ambulatory, and aware of upcoming appointments.

## 2020-10-13 ENCOUNTER — OUTPATIENT INFUSION SERVICES (OUTPATIENT)
Dept: ONCOLOGY | Facility: MEDICAL CENTER | Age: 65
End: 2020-10-13
Attending: INTERNAL MEDICINE
Payer: MEDICAID

## 2020-10-13 VITALS
HEART RATE: 65 BPM | TEMPERATURE: 97.8 F | DIASTOLIC BLOOD PRESSURE: 70 MMHG | WEIGHT: 130.07 LBS | BODY MASS INDEX: 27.3 KG/M2 | SYSTOLIC BLOOD PRESSURE: 133 MMHG | HEIGHT: 58 IN | OXYGEN SATURATION: 97 % | RESPIRATION RATE: 18 BRPM

## 2020-10-13 DIAGNOSIS — Z17.0 MALIGNANT NEOPLASM OF OVERLAPPING SITES OF RIGHT BREAST IN FEMALE, ESTROGEN RECEPTOR POSITIVE (HCC): ICD-10-CM

## 2020-10-13 DIAGNOSIS — C50.811 MALIGNANT NEOPLASM OF OVERLAPPING SITES OF RIGHT BREAST IN FEMALE, ESTROGEN RECEPTOR POSITIVE (HCC): ICD-10-CM

## 2020-10-13 LAB
BASOPHILS # BLD AUTO: 0.5 % (ref 0–1.8)
BASOPHILS # BLD: 0.02 K/UL (ref 0–0.12)
EOSINOPHIL # BLD AUTO: 0.01 K/UL (ref 0–0.51)
EOSINOPHIL NFR BLD: 0.3 % (ref 0–6.9)
ERYTHROCYTE [DISTWIDTH] IN BLOOD BY AUTOMATED COUNT: 63.1 FL (ref 35.9–50)
HCT VFR BLD AUTO: 37.9 % (ref 37–47)
HGB BLD-MCNC: 12.2 G/DL (ref 12–16)
IMM GRANULOCYTES # BLD AUTO: 0.05 K/UL (ref 0–0.11)
IMM GRANULOCYTES NFR BLD AUTO: 1.3 % (ref 0–0.9)
LYMPHOCYTES # BLD AUTO: 1.33 K/UL (ref 1–4.8)
LYMPHOCYTES NFR BLD: 33.9 % (ref 22–41)
MCH RBC QN AUTO: 29.8 PG (ref 27–33)
MCHC RBC AUTO-ENTMCNC: 32.2 G/DL (ref 33.6–35)
MCV RBC AUTO: 92.7 FL (ref 81.4–97.8)
MONOCYTES # BLD AUTO: 0.44 K/UL (ref 0–0.85)
MONOCYTES NFR BLD AUTO: 11.2 % (ref 0–13.4)
NEUTROPHILS # BLD AUTO: 2.07 K/UL (ref 2–7.15)
NEUTROPHILS NFR BLD: 52.8 % (ref 44–72)
NRBC # BLD AUTO: 0 K/UL
NRBC BLD-RTO: 0 /100 WBC
PLATELET # BLD AUTO: 141 K/UL (ref 164–446)
PMV BLD AUTO: 9.8 FL (ref 9–12.9)
RBC # BLD AUTO: 4.09 M/UL (ref 4.2–5.4)
WBC # BLD AUTO: 3.9 K/UL (ref 4.8–10.8)

## 2020-10-13 PROCEDURE — 700105 HCHG RX REV CODE 258: Performed by: INTERNAL MEDICINE

## 2020-10-13 PROCEDURE — 700111 HCHG RX REV CODE 636 W/ 250 OVERRIDE (IP): Performed by: INTERNAL MEDICINE

## 2020-10-13 PROCEDURE — 85025 COMPLETE CBC W/AUTO DIFF WBC: CPT

## 2020-10-13 PROCEDURE — 96413 CHEMO IV INFUSION 1 HR: CPT

## 2020-10-13 RX ORDER — SODIUM CHLORIDE 9 MG/ML
INJECTION, SOLUTION INTRAVENOUS CONTINUOUS
Status: DISCONTINUED | OUTPATIENT
Start: 2020-10-13 | End: 2020-10-13 | Stop reason: HOSPADM

## 2020-10-13 RX ORDER — ONDANSETRON 8 MG/1
8 TABLET, ORALLY DISINTEGRATING ORAL ONCE
Status: COMPLETED | OUTPATIENT
Start: 2020-10-13 | End: 2020-10-13

## 2020-10-13 RX ADMIN — GEMCITABINE 1550 MG: 2 INJECTION, POWDER, LYOPHILIZED, FOR SOLUTION INTRAVENOUS at 16:36

## 2020-10-13 RX ADMIN — ONDANSETRON 8 MG: 8 TABLET, ORALLY DISINTEGRATING ORAL at 15:48

## 2020-10-13 RX ADMIN — SODIUM CHLORIDE: 9 INJECTION, SOLUTION INTRAVENOUS at 15:48

## 2020-10-13 ASSESSMENT — FIBROSIS 4 INDEX: FIB4 SCORE: 0.67

## 2020-10-13 NOTE — PROGRESS NOTES
"Pharmacy Chemotherapy Calculation:    Dx: breast cancer         Protocol: Gemzar     *Dosing Reference*  Gemcitabine 800-1200 mg/m² IV over 30 min on Days 1, 8, and 15   28-day cycle until disease progression or unacceptable toxicity  NCCN Guidelines for Breast Cancer V.1.2019  Shaggy GARCIA, Oncology ([place]). 2001;15(2_suppl_3):11-4    Allergies:  Patient has no known allergies.     /70   Pulse 65   Temp 36.6 °C (97.8 °F) (Temporal)   Resp 18   Ht 1.47 m (4' 9.87\")   Wt 59 kg (130 lb 1.1 oz)   SpO2 97%   BMI 27.30 kg/m²  Body surface area is 1.55 meters squared.     All lab results 10/13/20 within treatment parameters.           Drug Order   (Drug name, dose, route, IV Fluid & volume, frequency, number of doses) Cycle 2 Day 15  Previous treatment: C2D8 on 10/6/20   Medication = Gemcitabine (Gemzar)  Base Dose = 1000 mg/m²  Calc Dose: Base Dose x 1.55 m² = 1550mg  Final Dose = 1550mg  Route = IV  Fluid & Volume =  mL  Admin Duration = Over 30 minutes          <10% difference, okay to treat with final dose     By my signature below, I confirm this process was performed independently with the BSA and all final chemotherapy dosing calculations congruent. I have reviewed the above chemotherapy order and that my calculation of the final dose and BSA (when applicable) corroborate those calculations of the  pharmacist. Discrepancies of 10% or greater in the written dose have been addressed and documented within the [facility] Progress notes.      Bhavana Urbina, PharmD  "

## 2020-10-13 NOTE — PROGRESS NOTES
Chemotherapy Verification - SECONDARY RN   C2 D15      Height = 1.47 m  Weight = 59 kg  BSA = 1.55 m2       Medication: gemcitibine  Dose: 1000 mg/m2  Calculated Dose: 1550 mg                             (In mg/m2, AUC, mg/kg)       I confirm that this process was performed independently.

## 2020-10-13 NOTE — PROGRESS NOTES
Pt arrived ambulatory to \[facility]\"" for Gemzar infusion. POC discussed with pt via the interpretor line,  Aysha #[id number]. Pt agrees with plan. Pt's questions answered. Pt medicated per MAR. Pt tolerated treatment without s/s adverse reaction. Nurse Navigator Marilynn Barrios contacted r/t pt's need for bra and prosthetic breasts. Marilynn saw pt at bedside and assisted pt with needs. PIV dc'd catheter tip intact. Pt discharged to self care, NAD. Pt aware of next appt 10/27/2020.

## 2020-10-13 NOTE — PROGRESS NOTES
"Pharmacy Chemotherapy Calculations    Dx: Breast CA    Cycle: 2, Day 15  Previous treatment = 10/6/20    Regimen and Dosing Reference  Gemzar  Gemcitabine 800-1200 mg/m2 IV days 1, 8, 15  28 day cycle until DP or UT  NCCN guidelines for breast cancer V.1.2019  Shaggy GARCIA, et al. Oncology ([place]) 2001 Feb;15(2 Suppl 3):11-4.     /70   Pulse 65   Temp 36.6 °C (97.8 °F) (Temporal)   Resp 18   Ht 1.47 m (4' 9.87\")   Wt 59 kg (130 lb 1.1 oz)   SpO2 97%   BMI 27.30 kg/m²  Body surface area is 1.55 meters squared.     Labs 10/13/20:  ANC~ 2070 Plt = 141k   Hgb = 12.2       Gemcitabine (Gemzar) 1000 mg/m2 x 1.55 m2 = 1550 mg   <10% difference, okay to treat with final dose = 1550 mg IV      Matt Humphries, PharmD    "

## 2020-10-13 NOTE — PROGRESS NOTES
Chemotherapy Verification - PRIMARY RN      Height = 147cm  Weight = 59kg  BSA = 1.55m2      Medication: Gemzar  Dose: 1000mg/m2  Calculated Dose: 1550mg                                  I confirm this process was performed independently with the BSA and all final chemotherapy dosing calculations congruent.  Any discrepancies of 10% or greater have been addressed with the chemotherapy pharmacist. The resolution of the discrepancy has been documented in the EPIC progress notes.

## 2020-10-15 ENCOUNTER — PATIENT OUTREACH (OUTPATIENT)
Dept: OTHER | Facility: MEDICAL CENTER | Age: 65
End: 2020-10-15

## 2020-10-15 NOTE — PROGRESS NOTES
On October 15, 2020, Oncology Social Worker Lisa Brown and Oncology Nurse Navigator Jackelyn Mason attempted telephone contact with pt.  OSW Kevin was unable to reach pt. and unable to leave voicemail message.

## 2020-10-22 ENCOUNTER — HOSPITAL ENCOUNTER (OUTPATIENT)
Dept: RADIOLOGY | Facility: MEDICAL CENTER | Age: 65
End: 2020-10-22
Attending: INTERNAL MEDICINE
Payer: MEDICAID

## 2020-10-22 DIAGNOSIS — T81.718S IATROGENIC PULMONARY EMBOLISM AND INFARCTION, SEQUELA (HCC): ICD-10-CM

## 2020-10-22 DIAGNOSIS — I26.99 IATROGENIC PULMONARY EMBOLISM AND INFARCTION, SEQUELA (HCC): ICD-10-CM

## 2020-10-22 DIAGNOSIS — R97.8 ABNORMAL TUMOR MARKERS: ICD-10-CM

## 2020-10-22 DIAGNOSIS — C50.611 MALIGNANT NEOPLASM OF AXILLARY TAIL OF RIGHT FEMALE BREAST, UNSPECIFIED ESTROGEN RECEPTOR STATUS (HCC): ICD-10-CM

## 2020-10-22 PROCEDURE — 700117 HCHG RX CONTRAST REV CODE 255: Performed by: INTERNAL MEDICINE

## 2020-10-22 PROCEDURE — 71260 CT THORAX DX C+: CPT

## 2020-10-22 RX ADMIN — IOHEXOL 100 ML: 350 INJECTION, SOLUTION INTRAVENOUS at 16:15

## 2020-10-27 ENCOUNTER — APPOINTMENT (OUTPATIENT)
Dept: ONCOLOGY | Facility: MEDICAL CENTER | Age: 65
End: 2020-10-27
Attending: INTERNAL MEDICINE
Payer: MEDICAID

## 2020-10-27 ENCOUNTER — HOSPITAL ENCOUNTER (OUTPATIENT)
Dept: LAB | Facility: MEDICAL CENTER | Age: 65
End: 2020-10-27
Attending: INTERNAL MEDICINE
Payer: MEDICAID

## 2020-10-27 LAB
ALBUMIN SERPL BCP-MCNC: 4.1 G/DL (ref 3.2–4.9)
ALBUMIN/GLOB SERPL: 1.4 G/DL
ALP SERPL-CCNC: 66 U/L (ref 30–99)
ALT SERPL-CCNC: 15 U/L (ref 2–50)
ANION GAP SERPL CALC-SCNC: 11 MMOL/L (ref 7–16)
AST SERPL-CCNC: 17 U/L (ref 12–45)
BILIRUB SERPL-MCNC: 0.3 MG/DL (ref 0.1–1.5)
BUN SERPL-MCNC: 18 MG/DL (ref 8–22)
CALCIUM SERPL-MCNC: 9.6 MG/DL (ref 8.5–10.5)
CHLORIDE SERPL-SCNC: 103 MMOL/L (ref 96–112)
CO2 SERPL-SCNC: 27 MMOL/L (ref 20–33)
CREAT SERPL-MCNC: 0.66 MG/DL (ref 0.5–1.4)
GLOBULIN SER CALC-MCNC: 3 G/DL (ref 1.9–3.5)
GLUCOSE SERPL-MCNC: 79 MG/DL (ref 65–99)
POTASSIUM SERPL-SCNC: 4.5 MMOL/L (ref 3.6–5.5)
PROT SERPL-MCNC: 7.1 G/DL (ref 6–8.2)
SODIUM SERPL-SCNC: 141 MMOL/L (ref 135–145)

## 2020-10-27 PROCEDURE — 86300 IMMUNOASSAY TUMOR CA 15-3: CPT

## 2020-10-27 PROCEDURE — 80053 COMPREHEN METABOLIC PANEL: CPT

## 2020-10-27 RX ORDER — ONDANSETRON 8 MG/1
8 TABLET, ORALLY DISINTEGRATING ORAL PRN
Status: CANCELLED | OUTPATIENT
Start: 2020-11-30

## 2020-10-27 RX ORDER — 0.9 % SODIUM CHLORIDE 0.9 %
10 VIAL (ML) INJECTION PRN
Status: CANCELLED | OUTPATIENT
Start: 2020-11-08

## 2020-10-27 RX ORDER — 0.9 % SODIUM CHLORIDE 0.9 %
3 VIAL (ML) INJECTION PRN
Status: CANCELLED | OUTPATIENT
Start: 2020-11-09

## 2020-10-27 RX ORDER — PROCHLORPERAZINE MALEATE 10 MG
10 TABLET ORAL EVERY 6 HOURS PRN
Status: CANCELLED | OUTPATIENT
Start: 2020-11-09

## 2020-10-27 RX ORDER — ONDANSETRON 2 MG/ML
4 INJECTION INTRAMUSCULAR; INTRAVENOUS PRN
Status: CANCELLED | OUTPATIENT
Start: 2020-11-09

## 2020-10-27 RX ORDER — METHYLPREDNISOLONE SODIUM SUCCINATE 125 MG/2ML
125 INJECTION, POWDER, LYOPHILIZED, FOR SOLUTION INTRAMUSCULAR; INTRAVENOUS PRN
Status: CANCELLED | OUTPATIENT
Start: 2020-11-30

## 2020-10-27 RX ORDER — 0.9 % SODIUM CHLORIDE 0.9 %
VIAL (ML) INJECTION PRN
Status: CANCELLED | OUTPATIENT
Start: 2020-11-30

## 2020-10-27 RX ORDER — 0.9 % SODIUM CHLORIDE 0.9 %
VIAL (ML) INJECTION PRN
Status: CANCELLED | OUTPATIENT
Start: 2020-11-23

## 2020-10-27 RX ORDER — METHYLPREDNISOLONE SODIUM SUCCINATE 125 MG/2ML
125 INJECTION, POWDER, LYOPHILIZED, FOR SOLUTION INTRAMUSCULAR; INTRAVENOUS PRN
Status: CANCELLED | OUTPATIENT
Start: 2020-11-16

## 2020-10-27 RX ORDER — SODIUM CHLORIDE 9 MG/ML
INJECTION, SOLUTION INTRAVENOUS CONTINUOUS
Status: CANCELLED | OUTPATIENT
Start: 2020-11-16

## 2020-10-27 RX ORDER — HEPARIN SODIUM (PORCINE) LOCK FLUSH IV SOLN 100 UNIT/ML 100 UNIT/ML
500 SOLUTION INTRAVENOUS PRN
Status: CANCELLED | OUTPATIENT
Start: 2020-11-30

## 2020-10-27 RX ORDER — 0.9 % SODIUM CHLORIDE 0.9 %
3 VIAL (ML) INJECTION PRN
Status: CANCELLED | OUTPATIENT
Start: 2020-11-08

## 2020-10-27 RX ORDER — 0.9 % SODIUM CHLORIDE 0.9 %
10 VIAL (ML) INJECTION PRN
Status: CANCELLED | OUTPATIENT
Start: 2020-11-30

## 2020-10-27 RX ORDER — HEPARIN SODIUM (PORCINE) LOCK FLUSH IV SOLN 100 UNIT/ML 100 UNIT/ML
500 SOLUTION INTRAVENOUS PRN
Status: CANCELLED | OUTPATIENT
Start: 2020-11-16

## 2020-10-27 RX ORDER — SODIUM CHLORIDE 9 MG/ML
INJECTION, SOLUTION INTRAVENOUS CONTINUOUS
Status: CANCELLED | OUTPATIENT
Start: 2020-11-30

## 2020-10-27 RX ORDER — 0.9 % SODIUM CHLORIDE 0.9 %
10 VIAL (ML) INJECTION PRN
Status: CANCELLED | OUTPATIENT
Start: 2020-11-09

## 2020-10-27 RX ORDER — EPINEPHRINE 1 MG/ML(1)
0.5 AMPUL (ML) INJECTION PRN
Status: CANCELLED | OUTPATIENT
Start: 2020-11-16

## 2020-10-27 RX ORDER — METHYLPREDNISOLONE SODIUM SUCCINATE 125 MG/2ML
125 INJECTION, POWDER, LYOPHILIZED, FOR SOLUTION INTRAMUSCULAR; INTRAVENOUS PRN
Status: CANCELLED | OUTPATIENT
Start: 2020-11-23

## 2020-10-27 RX ORDER — ONDANSETRON 8 MG/1
8 TABLET, ORALLY DISINTEGRATING ORAL PRN
Status: CANCELLED | OUTPATIENT
Start: 2020-11-09

## 2020-10-27 RX ORDER — ONDANSETRON 2 MG/ML
4 INJECTION INTRAMUSCULAR; INTRAVENOUS PRN
Status: CANCELLED | OUTPATIENT
Start: 2020-11-23

## 2020-10-27 RX ORDER — 0.9 % SODIUM CHLORIDE 0.9 %
10 VIAL (ML) INJECTION PRN
Status: CANCELLED | OUTPATIENT
Start: 2020-11-23

## 2020-10-27 RX ORDER — 0.9 % SODIUM CHLORIDE 0.9 %
VIAL (ML) INJECTION PRN
Status: CANCELLED | OUTPATIENT
Start: 2020-11-08

## 2020-10-27 RX ORDER — 0.9 % SODIUM CHLORIDE 0.9 %
VIAL (ML) INJECTION PRN
Status: CANCELLED | OUTPATIENT
Start: 2020-11-09

## 2020-10-27 RX ORDER — EPINEPHRINE 1 MG/ML(1)
0.5 AMPUL (ML) INJECTION PRN
Status: CANCELLED | OUTPATIENT
Start: 2020-11-09

## 2020-10-27 RX ORDER — HEPARIN SODIUM (PORCINE) LOCK FLUSH IV SOLN 100 UNIT/ML 100 UNIT/ML
500 SOLUTION INTRAVENOUS PRN
Status: CANCELLED | OUTPATIENT
Start: 2020-11-08

## 2020-10-27 RX ORDER — 0.9 % SODIUM CHLORIDE 0.9 %
3 VIAL (ML) INJECTION PRN
Status: CANCELLED | OUTPATIENT
Start: 2020-11-30

## 2020-10-27 RX ORDER — DIPHENHYDRAMINE HYDROCHLORIDE 50 MG/ML
50 INJECTION INTRAMUSCULAR; INTRAVENOUS PRN
Status: CANCELLED | OUTPATIENT
Start: 2020-11-09

## 2020-10-27 RX ORDER — ONDANSETRON 8 MG/1
8 TABLET, ORALLY DISINTEGRATING ORAL PRN
Status: CANCELLED | OUTPATIENT
Start: 2020-11-23

## 2020-10-27 RX ORDER — 0.9 % SODIUM CHLORIDE 0.9 %
10 VIAL (ML) INJECTION PRN
Status: CANCELLED | OUTPATIENT
Start: 2020-11-16

## 2020-10-27 RX ORDER — EPINEPHRINE 1 MG/ML(1)
0.5 AMPUL (ML) INJECTION PRN
Status: CANCELLED | OUTPATIENT
Start: 2020-11-30

## 2020-10-27 RX ORDER — METHYLPREDNISOLONE SODIUM SUCCINATE 125 MG/2ML
125 INJECTION, POWDER, LYOPHILIZED, FOR SOLUTION INTRAMUSCULAR; INTRAVENOUS PRN
Status: CANCELLED | OUTPATIENT
Start: 2020-11-09

## 2020-10-27 RX ORDER — DIPHENHYDRAMINE HYDROCHLORIDE 50 MG/ML
50 INJECTION INTRAMUSCULAR; INTRAVENOUS PRN
Status: CANCELLED | OUTPATIENT
Start: 2020-11-30

## 2020-10-27 RX ORDER — DIPHENHYDRAMINE HYDROCHLORIDE 50 MG/ML
50 INJECTION INTRAMUSCULAR; INTRAVENOUS PRN
Status: CANCELLED | OUTPATIENT
Start: 2020-11-16

## 2020-10-27 RX ORDER — HEPARIN SODIUM (PORCINE) LOCK FLUSH IV SOLN 100 UNIT/ML 100 UNIT/ML
500 SOLUTION INTRAVENOUS PRN
Status: CANCELLED | OUTPATIENT
Start: 2020-11-23

## 2020-10-27 RX ORDER — EPINEPHRINE 1 MG/ML(1)
0.5 AMPUL (ML) INJECTION PRN
Status: CANCELLED | OUTPATIENT
Start: 2020-11-23

## 2020-10-27 RX ORDER — 0.9 % SODIUM CHLORIDE 0.9 %
3 VIAL (ML) INJECTION PRN
Status: CANCELLED | OUTPATIENT
Start: 2020-11-23

## 2020-10-27 RX ORDER — ONDANSETRON 8 MG/1
8 TABLET, ORALLY DISINTEGRATING ORAL PRN
Status: CANCELLED | OUTPATIENT
Start: 2020-11-16

## 2020-10-27 RX ORDER — ONDANSETRON 2 MG/ML
4 INJECTION INTRAMUSCULAR; INTRAVENOUS PRN
Status: CANCELLED | OUTPATIENT
Start: 2020-11-30

## 2020-10-27 RX ORDER — SODIUM CHLORIDE 9 MG/ML
INJECTION, SOLUTION INTRAVENOUS CONTINUOUS
Status: CANCELLED | OUTPATIENT
Start: 2020-11-23

## 2020-10-27 RX ORDER — DIPHENHYDRAMINE HYDROCHLORIDE 50 MG/ML
50 INJECTION INTRAMUSCULAR; INTRAVENOUS PRN
Status: CANCELLED | OUTPATIENT
Start: 2020-11-23

## 2020-10-27 RX ORDER — ONDANSETRON 2 MG/ML
4 INJECTION INTRAMUSCULAR; INTRAVENOUS PRN
Status: CANCELLED | OUTPATIENT
Start: 2020-11-16

## 2020-10-27 RX ORDER — SODIUM CHLORIDE 9 MG/ML
INJECTION, SOLUTION INTRAVENOUS CONTINUOUS
Status: CANCELLED | OUTPATIENT
Start: 2020-11-09

## 2020-10-27 RX ORDER — PROCHLORPERAZINE MALEATE 10 MG
10 TABLET ORAL EVERY 6 HOURS PRN
Status: CANCELLED | OUTPATIENT
Start: 2020-11-16

## 2020-10-27 RX ORDER — PROCHLORPERAZINE MALEATE 10 MG
10 TABLET ORAL EVERY 6 HOURS PRN
Status: CANCELLED | OUTPATIENT
Start: 2020-11-23

## 2020-10-27 RX ORDER — 0.9 % SODIUM CHLORIDE 0.9 %
VIAL (ML) INJECTION PRN
Status: CANCELLED | OUTPATIENT
Start: 2020-11-16

## 2020-10-27 RX ORDER — 0.9 % SODIUM CHLORIDE 0.9 %
3 VIAL (ML) INJECTION PRN
Status: CANCELLED | OUTPATIENT
Start: 2020-11-16

## 2020-10-27 RX ORDER — PROCHLORPERAZINE MALEATE 10 MG
10 TABLET ORAL EVERY 6 HOURS PRN
Status: CANCELLED | OUTPATIENT
Start: 2020-11-30

## 2020-10-27 RX ORDER — HEPARIN SODIUM (PORCINE) LOCK FLUSH IV SOLN 100 UNIT/ML 100 UNIT/ML
500 SOLUTION INTRAVENOUS PRN
Status: CANCELLED | OUTPATIENT
Start: 2020-11-09

## 2020-10-29 LAB — CANCER AG15-3 SERPL-ACNC: 18 U/ML (ref 0–31)

## 2020-10-30 LAB — CANCER AG27-29 SERPL-ACNC: 25.4 U/ML (ref 0–40)

## 2020-11-08 NOTE — PROGRESS NOTES
"Pharmacy Chemotherapy Calculation:    Dx: breast cancer         Protocol: paclitaxel (taxol)  *Dosing Reference*  Paclitaxel 80mg/m2 IV over 60 min on day 1  Weekly until DP/UT  NCCN Guidelines for Breast Cancer V.1.2019  Bay COPPOLA, et al - J Clin Oncol 2001 Nov 15;19(22):9504-23.     Allergies:  Patient has no known allergies.     /47   Pulse 70   Temp 36.1 °C (97 °F) (Temporal)   Resp 18   Ht 1.47 m (4' 9.87\")   Wt 59.8 kg (131 lb 13.4 oz)   SpO2 98%   BMI 27.67 kg/m²  Body surface area is 1.56 meters squared.     All lab results 11/9/20 within treatment parameters.          Drug Order   (Drug name, dose, route, IV Fluid & volume, frequency, number of doses) Cycle 1 Day 1  Previous treatment: Gemcitabine 10/13/20   Medication = paclitaxel  Base Dose = 80mg/m²  Calc Dose: Base Dose x 1.56m² = 124.8mg  Final Dose = 124.8mg  Route = IV  Fluid & Volume =  mL  Admin Duration = Over 60minutes          <10% difference, okay to treat with final dose     By my signature below, I confirm this process was performed independently with the BSA and all final chemotherapy dosing calculations congruent. I have reviewed the above chemotherapy order and that my calculation of the final dose and BSA (when applicable) corroborate those calculations of the  pharmacist. Discrepancies of 10% or greater in the written dose have been addressed and documented within the [facility] Progress notes.      Bhavana Urbina, PharmD  "

## 2020-11-09 ENCOUNTER — OUTPATIENT INFUSION SERVICES (OUTPATIENT)
Dept: ONCOLOGY | Facility: MEDICAL CENTER | Age: 65
End: 2020-11-09
Attending: INTERNAL MEDICINE
Payer: MEDICAID

## 2020-11-09 VITALS
RESPIRATION RATE: 18 BRPM | WEIGHT: 131.84 LBS | HEART RATE: 70 BPM | HEIGHT: 58 IN | DIASTOLIC BLOOD PRESSURE: 47 MMHG | SYSTOLIC BLOOD PRESSURE: 133 MMHG | TEMPERATURE: 97 F | OXYGEN SATURATION: 98 % | BODY MASS INDEX: 27.67 KG/M2

## 2020-11-09 DIAGNOSIS — C50.811 MALIGNANT NEOPLASM OF OVERLAPPING SITES OF RIGHT BREAST IN FEMALE, ESTROGEN RECEPTOR POSITIVE (HCC): ICD-10-CM

## 2020-11-09 DIAGNOSIS — Z17.0 MALIGNANT NEOPLASM OF OVERLAPPING SITES OF RIGHT BREAST IN FEMALE, ESTROGEN RECEPTOR POSITIVE (HCC): ICD-10-CM

## 2020-11-09 LAB
ALBUMIN SERPL BCP-MCNC: 3.7 G/DL (ref 3.2–4.9)
ALBUMIN/GLOB SERPL: 1.3 G/DL
ALP SERPL-CCNC: 68 U/L (ref 30–99)
ALT SERPL-CCNC: 14 U/L (ref 2–50)
ANION GAP SERPL CALC-SCNC: 9 MMOL/L (ref 7–16)
AST SERPL-CCNC: 16 U/L (ref 12–45)
BASOPHILS # BLD AUTO: 0.6 % (ref 0–1.8)
BASOPHILS # BLD: 0.03 K/UL (ref 0–0.12)
BILIRUB SERPL-MCNC: 0.2 MG/DL (ref 0.1–1.5)
BUN SERPL-MCNC: 16 MG/DL (ref 8–22)
CALCIUM SERPL-MCNC: 9.3 MG/DL (ref 8.5–10.5)
CHLORIDE SERPL-SCNC: 104 MMOL/L (ref 96–112)
CO2 SERPL-SCNC: 25 MMOL/L (ref 20–33)
CREAT SERPL-MCNC: 0.52 MG/DL (ref 0.5–1.4)
EOSINOPHIL # BLD AUTO: 0.09 K/UL (ref 0–0.51)
EOSINOPHIL NFR BLD: 1.7 % (ref 0–6.9)
ERYTHROCYTE [DISTWIDTH] IN BLOOD BY AUTOMATED COUNT: 59.1 FL (ref 35.9–50)
GLOBULIN SER CALC-MCNC: 2.9 G/DL (ref 1.9–3.5)
GLUCOSE SERPL-MCNC: 101 MG/DL (ref 65–99)
HCT VFR BLD AUTO: 40.7 % (ref 37–47)
HGB BLD-MCNC: 12.7 G/DL (ref 12–16)
IMM GRANULOCYTES # BLD AUTO: 0.04 K/UL (ref 0–0.11)
IMM GRANULOCYTES NFR BLD AUTO: 0.7 % (ref 0–0.9)
LYMPHOCYTES # BLD AUTO: 1.07 K/UL (ref 1–4.8)
LYMPHOCYTES NFR BLD: 20 % (ref 22–41)
MCH RBC QN AUTO: 29.7 PG (ref 27–33)
MCHC RBC AUTO-ENTMCNC: 31.2 G/DL (ref 33.6–35)
MCV RBC AUTO: 95.1 FL (ref 81.4–97.8)
MONOCYTES # BLD AUTO: 0.52 K/UL (ref 0–0.85)
MONOCYTES NFR BLD AUTO: 9.7 % (ref 0–13.4)
NEUTROPHILS # BLD AUTO: 3.61 K/UL (ref 2–7.15)
NEUTROPHILS NFR BLD: 67.3 % (ref 44–72)
NRBC # BLD AUTO: 0 K/UL
NRBC BLD-RTO: 0 /100 WBC
PLATELET # BLD AUTO: 238 K/UL (ref 164–446)
PMV BLD AUTO: 10.4 FL (ref 9–12.9)
POTASSIUM SERPL-SCNC: 4.1 MMOL/L (ref 3.6–5.5)
PROT SERPL-MCNC: 6.6 G/DL (ref 6–8.2)
RBC # BLD AUTO: 4.28 M/UL (ref 4.2–5.4)
SODIUM SERPL-SCNC: 138 MMOL/L (ref 135–145)
WBC # BLD AUTO: 5.4 K/UL (ref 4.8–10.8)

## 2020-11-09 PROCEDURE — 96415 CHEMO IV INFUSION ADDL HR: CPT

## 2020-11-09 PROCEDURE — 700111 HCHG RX REV CODE 636 W/ 250 OVERRIDE (IP): Performed by: INTERNAL MEDICINE

## 2020-11-09 PROCEDURE — 304540 HCHG NITRO SET VENT 2ND TUB

## 2020-11-09 PROCEDURE — 700105 HCHG RX REV CODE 258: Performed by: INTERNAL MEDICINE

## 2020-11-09 PROCEDURE — 85025 COMPLETE CBC W/AUTO DIFF WBC: CPT

## 2020-11-09 PROCEDURE — 96375 TX/PRO/DX INJ NEW DRUG ADDON: CPT

## 2020-11-09 PROCEDURE — 96413 CHEMO IV INFUSION 1 HR: CPT

## 2020-11-09 PROCEDURE — 80053 COMPREHEN METABOLIC PANEL: CPT

## 2020-11-09 RX ORDER — PACLITAXEL 6 MG/ML
INJECTION, SOLUTION INTRAVENOUS
COMMUNITY
End: 2021-10-05

## 2020-11-09 RX ADMIN — DEXAMETHASONE SODIUM PHOSPHATE 12 MG: 4 INJECTION, SOLUTION INTRA-ARTICULAR; INTRALESIONAL; INTRAMUSCULAR; INTRAVENOUS; SOFT TISSUE at 10:40

## 2020-11-09 RX ADMIN — PACLITAXEL 124.8 MG: 300 INJECTION, SOLUTION INTRAVENOUS at 11:02

## 2020-11-09 RX ADMIN — DIPHENHYDRAMINE HYDROCHLORIDE 25 MG: 50 INJECTION, SOLUTION INTRAMUSCULAR; INTRAVENOUS at 10:22

## 2020-11-09 RX ADMIN — FAMOTIDINE 20 MG: 10 INJECTION INTRAVENOUS at 10:22

## 2020-11-09 ASSESSMENT — FIBROSIS 4 INDEX: FIB4 SCORE: 1.96

## 2020-11-09 NOTE — PROGRESS NOTES
Chemotherapy Verification - PRIMARY RN      Height = 147 cm  Weight = 59.8 kg  BSA = 1.56 m2       Medication: Taxol  Dose: 80 mg/m2  Calculated Dose: 124.8 mg                             (In mg/m2, AUC, mg/kg)     I confirm this process was performed independently with the BSA and all final chemotherapy dosing calculations congruent.  Any discrepancies of 10% or greater have been addressed with the chemotherapy pharmacist. The resolution of the discrepancy has been documented in the EPIC progress notes.

## 2020-11-09 NOTE — PROGRESS NOTES
Pt arrived ambulatory to  for Cycle 1 weekly Taxol.  POC discussed via translation line, ID #[id number].  Pt educated on new chemotherapy.  PIV started to LFA, blood return confirmed and labs drawn as ordered.  Results reviewed, pt meets parameters for treatment today.  Premedication given, tolerated well.  Taxol titrated per policy/tolerance.  Infusion completed without adverse reaction.  PIV flushed, removed, and site covered with gauze and coban.  Next appointment confirmed.  Pt discharged from IS in NAD under self care.

## 2020-11-09 NOTE — PROGRESS NOTES
Chemotherapy Verification - SECONDARY RN       Height = 57.87 inches  Weight = 59.8 kg  BSA = 1.56 m2       Medication: Paclitaxel (Taxol)  Dose: 80 mg/m2  Calculated Dose: 124.8 mg                             (In mg/m2, AUC, mg/kg)       I confirm that this process was performed independently.

## 2020-11-09 NOTE — PROGRESS NOTES
"Pharmacy Chemotherapy Calculations    Dx: Breast CA  Cycle: 1, Day 1 (Previous treatment = Gemzar 10/13/20)  Regimen and Dosing Reference  Taxol weekly  Paclitaxel 80 mg/m2 IV Day 1  Weekly cycle until DP or UT  NCCN guidelines for breast cancer V.1.2019  Bay EA, et al. J Clin Oncol 2001;19(22):4216-23    /47   Pulse 70   Temp 36.1 °C (97 °F) (Temporal)   Resp 18   Ht 1.47 m (4' 9.87\")   Wt 59.8 kg (131 lb 13.4 oz)   SpO2 98%   BMI 27.67 kg/m²  Body surface area is 1.56 meters squared.     ANC ~ 3610     Plt = 238 k Hgb = 12.7       Scr =  0.52      Crcl ~78 ml/min   (min Cr 0.7)  LFT WNL    Paclitaxel 80 mg/m2 x 1.56 m2 = 125 mg   <10% difference, OK to treat with final dose = 124.8 mg IV    Caleb Padilla, PharmD    "

## 2020-11-15 NOTE — PROGRESS NOTES
"Pharmacy Chemotherapy Calculation:    Dx: breast cancer         Protocol: paclitaxel (taxol)  *Dosing Reference*  Paclitaxel 80mg/m2 IV over 60 min on day 1  Weekly until DP/UT  NCCN Guidelines for Breast Cancer V.1.2019  Bay COPPOLA, et al - J Clin Oncol 2001 Nov 15;19(22):5967-23.     Allergies:  Patient has no known allergies.     /53   Pulse 62   Temp 36.4 °C (97.5 °F) (Temporal)   Resp 18   Ht 1.48 m (4' 10.27\")   Wt 58 kg (127 lb 13.9 oz)   SpO2 98%   BMI 26.48 kg/m²  Body surface area is 1.54 meters squared.     All lab results 11/16/20 within treatment parameters.          Drug Order   (Drug name, dose, route, IV Fluid & volume, frequency, number of doses) Cycle 1 Day 8  Previous treatment: C1 D1 11/9/20   Medication = paclitaxel  Base Dose = 80mg/m²  Calc Dose: Base Dose x 1.54m² = 123.2mg  Final Dose = 123.2mg  Route = IV  Fluid & Volume =  mL  Admin Duration = Over 60minutes          <10% difference, okay to treat with final dose     By my signature below, I confirm this process was performed independently with the BSA and all final chemotherapy dosing calculations congruent. I have reviewed the above chemotherapy order and that my calculation of the final dose and BSA (when applicable) corroborate those calculations of the  pharmacist. Discrepancies of 10% or greater in the written dose have been addressed and documented within the [facility] Progress notes.      Bhavana Urbina, PharmD  "

## 2020-11-16 ENCOUNTER — OUTPATIENT INFUSION SERVICES (OUTPATIENT)
Dept: ONCOLOGY | Facility: MEDICAL CENTER | Age: 65
End: 2020-11-16
Attending: INTERNAL MEDICINE
Payer: MEDICAID

## 2020-11-16 VITALS
OXYGEN SATURATION: 98 % | BODY MASS INDEX: 26.84 KG/M2 | TEMPERATURE: 97.5 F | HEIGHT: 58 IN | HEART RATE: 62 BPM | DIASTOLIC BLOOD PRESSURE: 53 MMHG | RESPIRATION RATE: 18 BRPM | WEIGHT: 127.87 LBS | SYSTOLIC BLOOD PRESSURE: 128 MMHG

## 2020-11-16 DIAGNOSIS — Z17.0 MALIGNANT NEOPLASM OF OVERLAPPING SITES OF RIGHT BREAST IN FEMALE, ESTROGEN RECEPTOR POSITIVE (HCC): ICD-10-CM

## 2020-11-16 DIAGNOSIS — C50.811 MALIGNANT NEOPLASM OF OVERLAPPING SITES OF RIGHT BREAST IN FEMALE, ESTROGEN RECEPTOR POSITIVE (HCC): ICD-10-CM

## 2020-11-16 LAB
BASOPHILS # BLD AUTO: 0.8 % (ref 0–1.8)
BASOPHILS # BLD: 0.04 K/UL (ref 0–0.12)
EOSINOPHIL # BLD AUTO: 0.15 K/UL (ref 0–0.51)
EOSINOPHIL NFR BLD: 2.9 % (ref 0–6.9)
ERYTHROCYTE [DISTWIDTH] IN BLOOD BY AUTOMATED COUNT: 56.6 FL (ref 35.9–50)
HCT VFR BLD AUTO: 40.3 % (ref 37–47)
HGB BLD-MCNC: 12.7 G/DL (ref 12–16)
IMM GRANULOCYTES # BLD AUTO: 0.09 K/UL (ref 0–0.11)
IMM GRANULOCYTES NFR BLD AUTO: 1.8 % (ref 0–0.9)
LYMPHOCYTES # BLD AUTO: 1.09 K/UL (ref 1–4.8)
LYMPHOCYTES NFR BLD: 21.4 % (ref 22–41)
MCH RBC QN AUTO: 29.8 PG (ref 27–33)
MCHC RBC AUTO-ENTMCNC: 31.5 G/DL (ref 33.6–35)
MCV RBC AUTO: 94.6 FL (ref 81.4–97.8)
MONOCYTES # BLD AUTO: 0.28 K/UL (ref 0–0.85)
MONOCYTES NFR BLD AUTO: 5.5 % (ref 0–13.4)
NEUTROPHILS # BLD AUTO: 3.45 K/UL (ref 2–7.15)
NEUTROPHILS NFR BLD: 67.6 % (ref 44–72)
NRBC # BLD AUTO: 0 K/UL
NRBC BLD-RTO: 0 /100 WBC
PLATELET # BLD AUTO: 262 K/UL (ref 164–446)
PMV BLD AUTO: 10.2 FL (ref 9–12.9)
RBC # BLD AUTO: 4.26 M/UL (ref 4.2–5.4)
WBC # BLD AUTO: 5.1 K/UL (ref 4.8–10.8)

## 2020-11-16 PROCEDURE — 700111 HCHG RX REV CODE 636 W/ 250 OVERRIDE (IP): Performed by: INTERNAL MEDICINE

## 2020-11-16 PROCEDURE — 96415 CHEMO IV INFUSION ADDL HR: CPT

## 2020-11-16 PROCEDURE — 304540 HCHG NITRO SET VENT 2ND TUB

## 2020-11-16 PROCEDURE — 85025 COMPLETE CBC W/AUTO DIFF WBC: CPT

## 2020-11-16 PROCEDURE — 96413 CHEMO IV INFUSION 1 HR: CPT

## 2020-11-16 PROCEDURE — 700105 HCHG RX REV CODE 258: Performed by: INTERNAL MEDICINE

## 2020-11-16 PROCEDURE — 96375 TX/PRO/DX INJ NEW DRUG ADDON: CPT

## 2020-11-16 RX ORDER — LEVOTHYROXINE SODIUM 0.07 MG/1
TABLET ORAL
COMMUNITY
Start: 2020-11-10

## 2020-11-16 RX ADMIN — PACLITAXEL 123.2 MG: 6 INJECTION, SOLUTION INTRAVENOUS at 12:28

## 2020-11-16 RX ADMIN — FAMOTIDINE 20 MG: 10 INJECTION INTRAVENOUS at 11:40

## 2020-11-16 RX ADMIN — DEXAMETHASONE SODIUM PHOSPHATE 12 MG: 4 INJECTION, SOLUTION INTRA-ARTICULAR; INTRALESIONAL; INTRAMUSCULAR; INTRAVENOUS; SOFT TISSUE at 12:05

## 2020-11-16 RX ADMIN — DIPHENHYDRAMINE HYDROCHLORIDE 25 MG: 50 INJECTION, SOLUTION INTRAMUSCULAR; INTRAVENOUS at 11:40

## 2020-11-16 ASSESSMENT — FIBROSIS 4 INDEX: FIB4 SCORE: 1.13192996574673857

## 2020-11-16 NOTE — PROGRESS NOTES
Chemotherapy Verification - SECONDARY RN       Height = 148 cm  Weight = 58 kg  BSA = 1.544 m2       Medication: Taxol  Dose: 80 mg/m2  Calculated Dose: 123.5 mg                             (In mg/m2, AUC, mg/kg)       I confirm that this process was performed independently.

## 2020-11-16 NOTE — PROGRESS NOTES
Patient here for for weekly Taxol. Plan of care discussed with patient via  services. PIV established; labs drawn as ordered. Labs results within parameters to proceed with treatment. Pre-medications given per MAR. Taxol given per MAR; titrated slowly due to 2nd dose of Taxol. PIV flushed with normal saline and removed; gauze/coban applied to site. Next appointment scheduled. Discharged to self care; no apparent distress noted.

## 2020-11-16 NOTE — PROGRESS NOTES
"Pharmacy Chemotherapy Calculations    Dx: Breast CA  Cycle: 2  Previous treatment = 11/9/20  Regimen and Dosing Reference  Taxol weekly  Paclitaxel 80 mg/m2 IV Day 1  Weekly cycle until DP or UT  NCCN guidelines for breast cancer V.1.2019  Bay EA, et al. J Clin Oncol 2001;19(22):4216-23    /53   Pulse 62   Temp 36.4 °C (97.5 °F) (Temporal)   Resp 18   Ht 1.48 m (4' 10.27\")   Wt 58 kg (127 lb 13.9 oz)   SpO2 98%   BMI 26.48 kg/m²  Body surface area is 1.54 meters squared.     Labs from 11/16/20 reviewed - all within treatment parameters.    Paclitaxel 80 mg/m2 x 1.54 m2 = 123.2 mg   <10% difference, OK to treat with final dose = 123.2 mg IV    Anny Camilo, PharmD, BCOP    "

## 2020-11-16 NOTE — PROGRESS NOTES
Chemotherapy Verification - PRIMARY RN      Height = 148 cm  Weight = 58 kg  BSA = 1.54 m^2       Medication: PACLitaxel (TAXOL)  Dose: 80 mg/m^2  Calculated Dose: 123.2 mg                             (In mg/m2, AUC, mg/kg)     I confirm this process was performed independently with the BSA and all final chemotherapy dosing calculations congruent.  Any discrepancies of 10% or greater have been addressed with the chemotherapy pharmacist. The resolution of the discrepancy has been documented in the EPIC progress notes.        Patent

## 2020-11-22 ENCOUNTER — TELEPHONE (OUTPATIENT)
Dept: ONCOLOGY | Facility: MEDICAL CENTER | Age: 65
End: 2020-11-22

## 2020-11-22 NOTE — TELEPHONE ENCOUNTER
Called patient for pre-screening for COVID-19 and education regarding updated [facility] policies. Interpretation services utilized.  Okay to proceed with Monday's treatment.

## 2020-11-23 ENCOUNTER — OUTPATIENT INFUSION SERVICES (OUTPATIENT)
Dept: ONCOLOGY | Facility: MEDICAL CENTER | Age: 65
End: 2020-11-23
Attending: INTERNAL MEDICINE
Payer: MEDICAID

## 2020-11-23 VITALS
DIASTOLIC BLOOD PRESSURE: 64 MMHG | RESPIRATION RATE: 18 BRPM | TEMPERATURE: 97.5 F | OXYGEN SATURATION: 98 % | BODY MASS INDEX: 27.3 KG/M2 | WEIGHT: 130.07 LBS | HEIGHT: 58 IN | SYSTOLIC BLOOD PRESSURE: 136 MMHG | HEART RATE: 68 BPM

## 2020-11-23 DIAGNOSIS — Z17.0 MALIGNANT NEOPLASM OF OVERLAPPING SITES OF RIGHT BREAST IN FEMALE, ESTROGEN RECEPTOR POSITIVE (HCC): ICD-10-CM

## 2020-11-23 DIAGNOSIS — C50.811 MALIGNANT NEOPLASM OF OVERLAPPING SITES OF RIGHT BREAST IN FEMALE, ESTROGEN RECEPTOR POSITIVE (HCC): ICD-10-CM

## 2020-11-23 LAB
BASOPHILS # BLD AUTO: 0.4 % (ref 0–1.8)
BASOPHILS # BLD: 0.02 K/UL (ref 0–0.12)
EOSINOPHIL # BLD AUTO: 0.14 K/UL (ref 0–0.51)
EOSINOPHIL NFR BLD: 3 % (ref 0–6.9)
ERYTHROCYTE [DISTWIDTH] IN BLOOD BY AUTOMATED COUNT: 60 FL (ref 35.9–50)
HCT VFR BLD AUTO: 41.1 % (ref 37–47)
HGB BLD-MCNC: 13.4 G/DL (ref 12–16)
IMM GRANULOCYTES # BLD AUTO: 0.06 K/UL (ref 0–0.11)
IMM GRANULOCYTES NFR BLD AUTO: 1.3 % (ref 0–0.9)
LYMPHOCYTES # BLD AUTO: 1.17 K/UL (ref 1–4.8)
LYMPHOCYTES NFR BLD: 25.3 % (ref 22–41)
MCH RBC QN AUTO: 31.5 PG (ref 27–33)
MCHC RBC AUTO-ENTMCNC: 32.6 G/DL (ref 33.6–35)
MCV RBC AUTO: 96.7 FL (ref 81.4–97.8)
MONOCYTES # BLD AUTO: 0.22 K/UL (ref 0–0.85)
MONOCYTES NFR BLD AUTO: 4.8 % (ref 0–13.4)
NEUTROPHILS # BLD AUTO: 3.02 K/UL (ref 2–7.15)
NEUTROPHILS NFR BLD: 65.2 % (ref 44–72)
NRBC # BLD AUTO: 0 K/UL
NRBC BLD-RTO: 0 /100 WBC
PLATELET # BLD AUTO: 274 K/UL (ref 164–446)
PMV BLD AUTO: 9.4 FL (ref 9–12.9)
RBC # BLD AUTO: 4.25 M/UL (ref 4.2–5.4)
WBC # BLD AUTO: 4.6 K/UL (ref 4.8–10.8)

## 2020-11-23 PROCEDURE — 700105 HCHG RX REV CODE 258: Performed by: INTERNAL MEDICINE

## 2020-11-23 PROCEDURE — 700111 HCHG RX REV CODE 636 W/ 250 OVERRIDE (IP): Performed by: INTERNAL MEDICINE

## 2020-11-23 PROCEDURE — 96415 CHEMO IV INFUSION ADDL HR: CPT

## 2020-11-23 PROCEDURE — 304540 HCHG NITRO SET VENT 2ND TUB

## 2020-11-23 PROCEDURE — 96413 CHEMO IV INFUSION 1 HR: CPT

## 2020-11-23 PROCEDURE — 85025 COMPLETE CBC W/AUTO DIFF WBC: CPT

## 2020-11-23 PROCEDURE — 96375 TX/PRO/DX INJ NEW DRUG ADDON: CPT

## 2020-11-23 RX ADMIN — PACLITAXEL 124.8 MG: 300 INJECTION, SOLUTION INTRAVENOUS at 12:41

## 2020-11-23 RX ADMIN — FAMOTIDINE 20 MG: 10 INJECTION INTRAVENOUS at 12:01

## 2020-11-23 RX ADMIN — DIPHENHYDRAMINE HYDROCHLORIDE 25 MG: 50 INJECTION INTRAMUSCULAR; INTRAVENOUS at 12:13

## 2020-11-23 RX ADMIN — DEXAMETHASONE SODIUM PHOSPHATE 12 MG: 4 INJECTION, SOLUTION INTRA-ARTICULAR; INTRALESIONAL; INTRAMUSCULAR; INTRAVENOUS; SOFT TISSUE at 12:01

## 2020-11-23 ASSESSMENT — FIBROSIS 4 INDEX: FIB4 SCORE: 1.03

## 2020-11-23 NOTE — PROGRESS NOTES
Assumed care of patient. Taxol completed. PIV flushed with normal saline and removed; gauze/coban applied to site. Next appointment scheduled. Discharged to self care; no apparent distress noted.

## 2020-11-23 NOTE — PROGRESS NOTES
"Pharmacy Chemotherapy Calculation:    Dx: adv breast cancer         Protocol: weekly paclitaxel    Paclitaxel 80mg/m2 IV over 60 min on Day 1   Weekly until DP/UT  *Dosing Reference*  NCCN Guidelines for Breast Cancer V.1.2019  Bay EA, et al - J Clin Oncol 2001 Nov 15;19(22):6497-60.     Allergies:  Patient has no known allergies.     /64   Pulse 68   Temp 36.4 °C (97.5 °F) (Temporal)   Resp 18   Ht 1.48 m (4' 10.27\")   Wt 59 kg (130 lb 1.1 oz)   SpO2 98%   BMI 26.94 kg/m²  Body surface area is 1.56 meters squared.     Labs: 11/23/20   Meets treatment parameters.          Drug Order   (Drug name, dose, route, IV Fluid & volume, frequency, number of doses) Cycle 1 Day 15  Previous treatment: 11/16/20   Medication = paclitaxel  Base Dose = 80mg/m²  Calc Dose: Base Dose x 1.56 m² = 124.8mg  Final Dose = 124.8 mg  Final [conc] ~ 0.454 mg/mL  Route = IV  Fluid & Volume =  mL  Admin Duration = Over 60minutes          <5% difference,   okay to treat with final dose             "

## 2020-11-23 NOTE — PROGRESS NOTES
Patient presents for day 15 cycle one Taxol. Fab,  #[id number] used for assessment questions. Patient verbalizes understanding. PIV established, blood drawn as ordered. Lab results within parameters to proceed. Pre-medications given as ordered. Taxol infusing, patient in stable condition. Report to Hayley MONSALVE.

## 2020-11-23 NOTE — PROGRESS NOTES
Chemotherapy Verification - PRIMARY RN      Height = 148 cm  Weight = 59 kg  BSA = 1.56 m2       Medication: Taxol  Dose: 80 mg/m2  Calculated Dose: 124.8 mg                             (In mg/m2, AUC, mg/kg)           I confirm this process was performed independently with the BSA and all final chemotherapy dosing calculations congruent.  Any discrepancies of 10% or greater have been addressed with the chemotherapy pharmacist. The resolution of the discrepancy has been documented in the EPIC progress notes.

## 2020-11-23 NOTE — PROGRESS NOTES
Chemotherapy Verification - SECONDARY RN       Height = 148 cm  Weight = 59 kg  BSA = 1.55 m2       Medication: Taxol  Dose: 80 mg/m2  Calculated Dose: 124.59 mg                             (In mg/m2, AUC, mg/kg)       I confirm that this process was performed independently.

## 2020-11-23 NOTE — PROGRESS NOTES
"Pharmacy Chemotherapy Calculations    Dx: Breast CA    Cycle: 1 day 15    Previous treatment = 11/16/20    Regimen and Dosing Reference  Taxol weekly  Paclitaxel 80 mg/m2 IV Day 1  Weekly cycle until DP or UT  NCCN guidelines for breast cancer V.1.2019  Bay EA, et al. J Clin Oncol 2001;19(22):4216-23    /64   Pulse 68   Temp 36.4 °C (97.5 °F) (Temporal)   Resp 18   Ht 1.48 m (4' 10.27\")   Wt 59 kg (130 lb 1.1 oz)   SpO2 98%   BMI 26.94 kg/m²  Body surface area is 1.56 meters squared.     Labs from 11/23/20 reviewed - all within treatment parameters.    Paclitaxel 80 mg/m2 x 1.56m2 = 124.8 mg   <10% difference, OK to treat with final dose = 124.8mg IV    Bhavana Urbina, PharmD    "

## 2020-11-29 ENCOUNTER — TELEPHONE (OUTPATIENT)
Dept: ONCOLOGY | Facility: MEDICAL CENTER | Age: 65
End: 2020-11-29

## 2020-11-30 ENCOUNTER — OUTPATIENT INFUSION SERVICES (OUTPATIENT)
Dept: ONCOLOGY | Facility: MEDICAL CENTER | Age: 65
End: 2020-11-30
Attending: INTERNAL MEDICINE
Payer: MEDICAID

## 2020-11-30 VITALS
WEIGHT: 132.28 LBS | DIASTOLIC BLOOD PRESSURE: 77 MMHG | SYSTOLIC BLOOD PRESSURE: 162 MMHG | BODY MASS INDEX: 28.54 KG/M2 | OXYGEN SATURATION: 95 % | HEART RATE: 93 BPM | HEIGHT: 57 IN | TEMPERATURE: 97.5 F | RESPIRATION RATE: 18 BRPM

## 2020-11-30 DIAGNOSIS — Z17.0 MALIGNANT NEOPLASM OF OVERLAPPING SITES OF RIGHT BREAST IN FEMALE, ESTROGEN RECEPTOR POSITIVE (HCC): ICD-10-CM

## 2020-11-30 DIAGNOSIS — C50.811 MALIGNANT NEOPLASM OF OVERLAPPING SITES OF RIGHT BREAST IN FEMALE, ESTROGEN RECEPTOR POSITIVE (HCC): ICD-10-CM

## 2020-11-30 LAB
BASOPHILS # BLD AUTO: 0.8 % (ref 0–1.8)
BASOPHILS # BLD: 0.03 K/UL (ref 0–0.12)
EOSINOPHIL # BLD AUTO: 0.07 K/UL (ref 0–0.51)
EOSINOPHIL NFR BLD: 1.8 % (ref 0–6.9)
ERYTHROCYTE [DISTWIDTH] IN BLOOD BY AUTOMATED COUNT: 58.2 FL (ref 35.9–50)
HCT VFR BLD AUTO: 37.7 % (ref 37–47)
HGB BLD-MCNC: 12.3 G/DL (ref 12–16)
IMM GRANULOCYTES # BLD AUTO: 0.15 K/UL (ref 0–0.11)
IMM GRANULOCYTES NFR BLD AUTO: 3.8 % (ref 0–0.9)
LYMPHOCYTES # BLD AUTO: 1 K/UL (ref 1–4.8)
LYMPHOCYTES NFR BLD: 25.6 % (ref 22–41)
MCH RBC QN AUTO: 31.6 PG (ref 27–33)
MCHC RBC AUTO-ENTMCNC: 32.6 G/DL (ref 33.6–35)
MCV RBC AUTO: 96.9 FL (ref 81.4–97.8)
MONOCYTES # BLD AUTO: 0.19 K/UL (ref 0–0.85)
MONOCYTES NFR BLD AUTO: 4.9 % (ref 0–13.4)
NEUTROPHILS # BLD AUTO: 2.47 K/UL (ref 2–7.15)
NEUTROPHILS NFR BLD: 63.1 % (ref 44–72)
NRBC # BLD AUTO: 0 K/UL
NRBC BLD-RTO: 0 /100 WBC
PLATELET # BLD AUTO: 194 K/UL (ref 164–446)
PMV BLD AUTO: 9.5 FL (ref 9–12.9)
RBC # BLD AUTO: 3.89 M/UL (ref 4.2–5.4)
WBC # BLD AUTO: 3.9 K/UL (ref 4.8–10.8)

## 2020-11-30 PROCEDURE — 85025 COMPLETE CBC W/AUTO DIFF WBC: CPT

## 2020-11-30 PROCEDURE — 304540 HCHG NITRO SET VENT 2ND TUB

## 2020-11-30 PROCEDURE — 700105 HCHG RX REV CODE 258: Performed by: INTERNAL MEDICINE

## 2020-11-30 PROCEDURE — 96413 CHEMO IV INFUSION 1 HR: CPT

## 2020-11-30 PROCEDURE — 700111 HCHG RX REV CODE 636 W/ 250 OVERRIDE (IP): Performed by: INTERNAL MEDICINE

## 2020-11-30 PROCEDURE — 96375 TX/PRO/DX INJ NEW DRUG ADDON: CPT

## 2020-11-30 RX ADMIN — DEXAMETHASONE SODIUM PHOSPHATE 12 MG: 4 INJECTION, SOLUTION INTRA-ARTICULAR; INTRALESIONAL; INTRAMUSCULAR; INTRAVENOUS; SOFT TISSUE at 13:16

## 2020-11-30 RX ADMIN — DIPHENHYDRAMINE HYDROCHLORIDE 25 MG: 50 INJECTION, SOLUTION INTRAMUSCULAR; INTRAVENOUS at 13:35

## 2020-11-30 RX ADMIN — PACLITAXEL 124.8 MG: 300 INJECTION, SOLUTION INTRAVENOUS at 14:25

## 2020-11-30 RX ADMIN — FAMOTIDINE 20 MG: 10 INJECTION INTRAVENOUS at 13:18

## 2020-11-30 ASSESSMENT — FIBROSIS 4 INDEX: FIB4 SCORE: 0.98

## 2020-11-30 NOTE — PROGRESS NOTES
Patient here for for weekly Taxol. Plan of care discussed with patient via  services. PIV established; labs drawn as ordered. Labs results within parameters to proceed with treatment. Pre-medications given per MAR. Taxol given per MAR. PIV flushed with normal saline and removed; gauze/coban applied to site. Next appointment scheduled. Discharged to self care; no apparent distress noted.

## 2020-11-30 NOTE — PROGRESS NOTES
Chemotherapy Verification - PRIMARY RN      Height = 145.5 cm  Weight = 60 kg  BSA = 1.56 m^2       Medication: PACLitaxel (TAXOL)  Dose: 80 mg/m^2  Calculated Dose: 124.8 mg                             (In mg/m2, AUC, mg/kg)     I confirm this process was performed independently with the BSA and all final chemotherapy dosing calculations congruent.  Any discrepancies of 10% or greater have been addressed with the chemotherapy pharmacist. The resolution of the discrepancy has been documented in the EPIC progress notes.

## 2020-11-30 NOTE — PROGRESS NOTES
"Pharmacy Chemotherapy Calculations    Dx: Breast CA    Cycle: 1 day 22    Previous treatment = 11/23/20    Regimen and Dosing Reference  Taxol weekly  Paclitaxel 80 mg/m2 IV Day 1  Weekly cycle until DP or UT  NCCN guidelines for breast cancer V.1.2019  Bay EA, et al. J Clin Oncol 2001;19(22):4216-23    BP (!) 162/77   Pulse 93   Temp 36.4 °C (97.5 °F) (Temporal)   Resp 18   Ht 1.455 m (4' 9.28\")   Wt 60 kg (132 lb 4.4 oz)   SpO2 95%   BMI 28.34 kg/m²  Body surface area is 1.56 meters squared.     Labs from 11/30/20 reviewed - all within treatment parameters.     Paclitaxel 80 mg/m2 x 1.56m2 = 124.8mg   <10% difference, OK to treat with final dose = 124.8mg IV    Bhavana Urbina, PharmD    "

## 2020-11-30 NOTE — PROGRESS NOTES
"Pharmacy Chemotherapy Calculation:    Dx: adv breast cancer         Protocol: weekly paclitaxel    Paclitaxel 80mg/m2 IV over 60 min on Day 1   Weekly until DP/UT  *Dosing Reference*  NCCN Guidelines for Breast Cancer V.1.2019  Bay EA, et al - J Clin Oncol 2001 Nov 15;19(22):2392-23.     Allergies:  Patient has no known allergies.     BP (!) 162/77   Pulse 93   Temp 36.4 °C (97.5 °F) (Temporal)   Resp 18   Ht 1.455 m (4' 9.28\")   Wt 60 kg (132 lb 4.4 oz)   SpO2 95%   BMI 28.34 kg/m²  Body surface area is 1.56 meters squared.     Labs 11/30/20:  ANC~ 2470 Plt = 194k   Hgb = 12.3        Drug Order   (Drug name, dose, route, IV Fluid & volume, frequency, number of doses) Cycle 1 Day 22  Previous treatment: 11/23/20   Medication = PACLitaxel (Taxol)   Base Dose = 80 mg/m²  Calc Dose: Base Dose x 1.56 m² = 124.8 mg  Final Dose = 124.8 mg  Route = IV  Fluid & Volume =  mL  Admin Duration = Over 60 minutes          <10% difference, okay to treat with final dose       Matt Humphries, PharmD      "

## 2020-11-30 NOTE — TELEPHONE ENCOUNTER
Called patient for pre-screening for COVID-19 and education regarding updated [facility] policies. Interpretion services used for this call. ( [phone]) Pt stated she has tested positive for COVID in the past.  Her symptoms resolved greater than 30 days ago.   Okay to proceed with  Treatment on 11/30/20.

## 2020-11-30 NOTE — PROGRESS NOTES
Chemotherapy Verification - SECONDARY RN   C1 D22      Height = 1.455 m  Weight = 60 kg  BSA = 1.56 m2       Medication: paclitaxel  Dose: 80 mg/m2  Calculated Dose: 124.8 mg                             (In mg/m2, AUC, mg/kg)     I confirm that this process was performed independently.

## 2020-12-03 RX ORDER — HEPARIN SODIUM (PORCINE) LOCK FLUSH IV SOLN 100 UNIT/ML 100 UNIT/ML
500 SOLUTION INTRAVENOUS PRN
Status: CANCELLED | OUTPATIENT
Start: 2020-12-28

## 2020-12-03 RX ORDER — HEPARIN SODIUM (PORCINE) LOCK FLUSH IV SOLN 100 UNIT/ML 100 UNIT/ML
500 SOLUTION INTRAVENOUS PRN
Status: CANCELLED | OUTPATIENT
Start: 2020-12-14

## 2020-12-03 RX ORDER — METHYLPREDNISOLONE SODIUM SUCCINATE 125 MG/2ML
125 INJECTION, POWDER, LYOPHILIZED, FOR SOLUTION INTRAMUSCULAR; INTRAVENOUS PRN
Status: CANCELLED | OUTPATIENT
Start: 2020-12-14

## 2020-12-03 RX ORDER — 0.9 % SODIUM CHLORIDE 0.9 %
10 VIAL (ML) INJECTION PRN
Status: CANCELLED | OUTPATIENT
Start: 2020-12-21

## 2020-12-03 RX ORDER — 0.9 % SODIUM CHLORIDE 0.9 %
3 VIAL (ML) INJECTION PRN
Status: CANCELLED | OUTPATIENT
Start: 2020-12-14

## 2020-12-03 RX ORDER — 0.9 % SODIUM CHLORIDE 0.9 %
VIAL (ML) INJECTION PRN
Status: CANCELLED | OUTPATIENT
Start: 2020-12-06

## 2020-12-03 RX ORDER — 0.9 % SODIUM CHLORIDE 0.9 %
3 VIAL (ML) INJECTION PRN
Status: CANCELLED | OUTPATIENT
Start: 2020-12-06

## 2020-12-03 RX ORDER — PROCHLORPERAZINE MALEATE 10 MG
10 TABLET ORAL EVERY 6 HOURS PRN
Status: CANCELLED | OUTPATIENT
Start: 2020-12-14

## 2020-12-03 RX ORDER — PROCHLORPERAZINE MALEATE 10 MG
10 TABLET ORAL EVERY 6 HOURS PRN
Status: CANCELLED | OUTPATIENT
Start: 2020-12-07

## 2020-12-03 RX ORDER — 0.9 % SODIUM CHLORIDE 0.9 %
10 VIAL (ML) INJECTION PRN
Status: CANCELLED | OUTPATIENT
Start: 2020-12-07

## 2020-12-03 RX ORDER — ONDANSETRON 2 MG/ML
4 INJECTION INTRAMUSCULAR; INTRAVENOUS PRN
Status: CANCELLED | OUTPATIENT
Start: 2020-12-28

## 2020-12-03 RX ORDER — 0.9 % SODIUM CHLORIDE 0.9 %
3 VIAL (ML) INJECTION PRN
Status: CANCELLED | OUTPATIENT
Start: 2020-12-21

## 2020-12-03 RX ORDER — SODIUM CHLORIDE 9 MG/ML
INJECTION, SOLUTION INTRAVENOUS CONTINUOUS
Status: CANCELLED | OUTPATIENT
Start: 2020-12-14

## 2020-12-03 RX ORDER — 0.9 % SODIUM CHLORIDE 0.9 %
10 VIAL (ML) INJECTION PRN
Status: CANCELLED | OUTPATIENT
Start: 2020-12-06

## 2020-12-03 RX ORDER — HEPARIN SODIUM (PORCINE) LOCK FLUSH IV SOLN 100 UNIT/ML 100 UNIT/ML
500 SOLUTION INTRAVENOUS PRN
Status: CANCELLED | OUTPATIENT
Start: 2020-12-06

## 2020-12-03 RX ORDER — 0.9 % SODIUM CHLORIDE 0.9 %
VIAL (ML) INJECTION PRN
Status: CANCELLED | OUTPATIENT
Start: 2020-12-28

## 2020-12-03 RX ORDER — HEPARIN SODIUM (PORCINE) LOCK FLUSH IV SOLN 100 UNIT/ML 100 UNIT/ML
500 SOLUTION INTRAVENOUS PRN
Status: CANCELLED | OUTPATIENT
Start: 2020-12-07

## 2020-12-03 RX ORDER — 0.9 % SODIUM CHLORIDE 0.9 %
3 VIAL (ML) INJECTION PRN
Status: CANCELLED | OUTPATIENT
Start: 2020-12-07

## 2020-12-03 RX ORDER — DIPHENHYDRAMINE HYDROCHLORIDE 50 MG/ML
50 INJECTION INTRAMUSCULAR; INTRAVENOUS PRN
Status: CANCELLED | OUTPATIENT
Start: 2020-12-21

## 2020-12-03 RX ORDER — PROCHLORPERAZINE MALEATE 10 MG
10 TABLET ORAL EVERY 6 HOURS PRN
Status: CANCELLED | OUTPATIENT
Start: 2020-12-21

## 2020-12-03 RX ORDER — 0.9 % SODIUM CHLORIDE 0.9 %
3 VIAL (ML) INJECTION PRN
Status: CANCELLED | OUTPATIENT
Start: 2020-12-28

## 2020-12-03 RX ORDER — ONDANSETRON 8 MG/1
8 TABLET, ORALLY DISINTEGRATING ORAL PRN
Status: CANCELLED | OUTPATIENT
Start: 2020-12-21

## 2020-12-03 RX ORDER — 0.9 % SODIUM CHLORIDE 0.9 %
VIAL (ML) INJECTION PRN
Status: CANCELLED | OUTPATIENT
Start: 2020-12-21

## 2020-12-03 RX ORDER — 0.9 % SODIUM CHLORIDE 0.9 %
VIAL (ML) INJECTION PRN
Status: CANCELLED | OUTPATIENT
Start: 2020-12-14

## 2020-12-03 RX ORDER — SODIUM CHLORIDE 9 MG/ML
INJECTION, SOLUTION INTRAVENOUS CONTINUOUS
Status: CANCELLED | OUTPATIENT
Start: 2020-12-07

## 2020-12-03 RX ORDER — DIPHENHYDRAMINE HYDROCHLORIDE 50 MG/ML
50 INJECTION INTRAMUSCULAR; INTRAVENOUS PRN
Status: CANCELLED | OUTPATIENT
Start: 2020-12-28

## 2020-12-03 RX ORDER — METHYLPREDNISOLONE SODIUM SUCCINATE 125 MG/2ML
125 INJECTION, POWDER, LYOPHILIZED, FOR SOLUTION INTRAMUSCULAR; INTRAVENOUS PRN
Status: CANCELLED | OUTPATIENT
Start: 2020-12-28

## 2020-12-03 RX ORDER — ONDANSETRON 8 MG/1
8 TABLET, ORALLY DISINTEGRATING ORAL PRN
Status: CANCELLED | OUTPATIENT
Start: 2020-12-07

## 2020-12-03 RX ORDER — DIPHENHYDRAMINE HYDROCHLORIDE 50 MG/ML
50 INJECTION INTRAMUSCULAR; INTRAVENOUS PRN
Status: CANCELLED | OUTPATIENT
Start: 2020-12-14

## 2020-12-03 RX ORDER — 0.9 % SODIUM CHLORIDE 0.9 %
10 VIAL (ML) INJECTION PRN
Status: CANCELLED | OUTPATIENT
Start: 2020-12-14

## 2020-12-03 RX ORDER — 0.9 % SODIUM CHLORIDE 0.9 %
10 VIAL (ML) INJECTION PRN
Status: CANCELLED | OUTPATIENT
Start: 2020-12-28

## 2020-12-03 RX ORDER — 0.9 % SODIUM CHLORIDE 0.9 %
VIAL (ML) INJECTION PRN
Status: CANCELLED | OUTPATIENT
Start: 2020-12-07

## 2020-12-03 RX ORDER — EPINEPHRINE 1 MG/ML(1)
0.5 AMPUL (ML) INJECTION PRN
Status: CANCELLED | OUTPATIENT
Start: 2020-12-28

## 2020-12-03 RX ORDER — EPINEPHRINE 1 MG/ML(1)
0.5 AMPUL (ML) INJECTION PRN
Status: CANCELLED | OUTPATIENT
Start: 2020-12-14

## 2020-12-03 RX ORDER — PROCHLORPERAZINE MALEATE 10 MG
10 TABLET ORAL EVERY 6 HOURS PRN
Status: CANCELLED | OUTPATIENT
Start: 2020-12-28

## 2020-12-03 RX ORDER — METHYLPREDNISOLONE SODIUM SUCCINATE 125 MG/2ML
125 INJECTION, POWDER, LYOPHILIZED, FOR SOLUTION INTRAMUSCULAR; INTRAVENOUS PRN
Status: CANCELLED | OUTPATIENT
Start: 2020-12-21

## 2020-12-03 RX ORDER — ONDANSETRON 8 MG/1
8 TABLET, ORALLY DISINTEGRATING ORAL PRN
Status: CANCELLED | OUTPATIENT
Start: 2020-12-14

## 2020-12-03 RX ORDER — ONDANSETRON 2 MG/ML
4 INJECTION INTRAMUSCULAR; INTRAVENOUS PRN
Status: CANCELLED | OUTPATIENT
Start: 2020-12-07

## 2020-12-03 RX ORDER — METHYLPREDNISOLONE SODIUM SUCCINATE 125 MG/2ML
125 INJECTION, POWDER, LYOPHILIZED, FOR SOLUTION INTRAMUSCULAR; INTRAVENOUS PRN
Status: CANCELLED | OUTPATIENT
Start: 2020-12-07

## 2020-12-03 RX ORDER — SODIUM CHLORIDE 9 MG/ML
INJECTION, SOLUTION INTRAVENOUS CONTINUOUS
Status: CANCELLED | OUTPATIENT
Start: 2020-12-28

## 2020-12-03 RX ORDER — DIPHENHYDRAMINE HYDROCHLORIDE 50 MG/ML
50 INJECTION INTRAMUSCULAR; INTRAVENOUS PRN
Status: CANCELLED | OUTPATIENT
Start: 2020-12-07

## 2020-12-03 RX ORDER — ONDANSETRON 2 MG/ML
4 INJECTION INTRAMUSCULAR; INTRAVENOUS PRN
Status: CANCELLED | OUTPATIENT
Start: 2020-12-14

## 2020-12-03 RX ORDER — ONDANSETRON 2 MG/ML
4 INJECTION INTRAMUSCULAR; INTRAVENOUS PRN
Status: CANCELLED | OUTPATIENT
Start: 2020-12-21

## 2020-12-03 RX ORDER — ONDANSETRON 8 MG/1
8 TABLET, ORALLY DISINTEGRATING ORAL PRN
Status: CANCELLED | OUTPATIENT
Start: 2020-12-28

## 2020-12-03 RX ORDER — EPINEPHRINE 1 MG/ML(1)
0.5 AMPUL (ML) INJECTION PRN
Status: CANCELLED | OUTPATIENT
Start: 2020-12-07

## 2020-12-03 RX ORDER — SODIUM CHLORIDE 9 MG/ML
INJECTION, SOLUTION INTRAVENOUS CONTINUOUS
Status: CANCELLED | OUTPATIENT
Start: 2020-12-21

## 2020-12-03 RX ORDER — EPINEPHRINE 1 MG/ML(1)
0.5 AMPUL (ML) INJECTION PRN
Status: CANCELLED | OUTPATIENT
Start: 2020-12-21

## 2020-12-03 RX ORDER — HEPARIN SODIUM (PORCINE) LOCK FLUSH IV SOLN 100 UNIT/ML 100 UNIT/ML
500 SOLUTION INTRAVENOUS PRN
Status: CANCELLED | OUTPATIENT
Start: 2020-12-21

## 2020-12-07 ENCOUNTER — OUTPATIENT INFUSION SERVICES (OUTPATIENT)
Dept: ONCOLOGY | Facility: MEDICAL CENTER | Age: 65
End: 2020-12-07
Attending: INTERNAL MEDICINE
Payer: MEDICAID

## 2020-12-07 VITALS
HEART RATE: 98 BPM | SYSTOLIC BLOOD PRESSURE: 132 MMHG | OXYGEN SATURATION: 98 % | RESPIRATION RATE: 18 BRPM | WEIGHT: 130.07 LBS | HEIGHT: 57 IN | BODY MASS INDEX: 28.06 KG/M2 | TEMPERATURE: 98 F | DIASTOLIC BLOOD PRESSURE: 75 MMHG

## 2020-12-07 DIAGNOSIS — Z17.0 MALIGNANT NEOPLASM OF OVERLAPPING SITES OF RIGHT BREAST IN FEMALE, ESTROGEN RECEPTOR POSITIVE (HCC): ICD-10-CM

## 2020-12-07 DIAGNOSIS — C50.811 MALIGNANT NEOPLASM OF OVERLAPPING SITES OF RIGHT BREAST IN FEMALE, ESTROGEN RECEPTOR POSITIVE (HCC): ICD-10-CM

## 2020-12-07 LAB
ALBUMIN SERPL BCP-MCNC: 3.6 G/DL (ref 3.2–4.9)
ALBUMIN/GLOB SERPL: 1.2 G/DL
ALP SERPL-CCNC: 71 U/L (ref 30–99)
ALT SERPL-CCNC: 12 U/L (ref 2–50)
ANION GAP SERPL CALC-SCNC: 6 MMOL/L (ref 7–16)
AST SERPL-CCNC: 14 U/L (ref 12–45)
BASOPHILS # BLD AUTO: 0.9 % (ref 0–1.8)
BASOPHILS # BLD: 0.03 K/UL (ref 0–0.12)
BILIRUB SERPL-MCNC: 0.2 MG/DL (ref 0.1–1.5)
BUN SERPL-MCNC: 9 MG/DL (ref 8–22)
CALCIUM SERPL-MCNC: 9.2 MG/DL (ref 8.5–10.5)
CHLORIDE SERPL-SCNC: 105 MMOL/L (ref 96–112)
CO2 SERPL-SCNC: 26 MMOL/L (ref 20–33)
CREAT SERPL-MCNC: 0.58 MG/DL (ref 0.5–1.4)
EOSINOPHIL # BLD AUTO: 0.07 K/UL (ref 0–0.51)
EOSINOPHIL NFR BLD: 2.1 % (ref 0–6.9)
ERYTHROCYTE [DISTWIDTH] IN BLOOD BY AUTOMATED COUNT: 58.4 FL (ref 35.9–50)
GLOBULIN SER CALC-MCNC: 3 G/DL (ref 1.9–3.5)
GLUCOSE SERPL-MCNC: 96 MG/DL (ref 65–99)
HCT VFR BLD AUTO: 39.3 % (ref 37–47)
HGB BLD-MCNC: 12.6 G/DL (ref 12–16)
IMM GRANULOCYTES # BLD AUTO: 0.06 K/UL (ref 0–0.11)
IMM GRANULOCYTES NFR BLD AUTO: 1.8 % (ref 0–0.9)
LYMPHOCYTES # BLD AUTO: 1.04 K/UL (ref 1–4.8)
LYMPHOCYTES NFR BLD: 31 % (ref 22–41)
MCH RBC QN AUTO: 31 PG (ref 27–33)
MCHC RBC AUTO-ENTMCNC: 32.1 G/DL (ref 33.6–35)
MCV RBC AUTO: 96.8 FL (ref 81.4–97.8)
MONOCYTES # BLD AUTO: 0.25 K/UL (ref 0–0.85)
MONOCYTES NFR BLD AUTO: 7.4 % (ref 0–13.4)
NEUTROPHILS # BLD AUTO: 1.91 K/UL (ref 2–7.15)
NEUTROPHILS NFR BLD: 56.8 % (ref 44–72)
NRBC # BLD AUTO: 0 K/UL
NRBC BLD-RTO: 0 /100 WBC
PLATELET # BLD AUTO: 255 K/UL (ref 164–446)
PMV BLD AUTO: 9.3 FL (ref 9–12.9)
POTASSIUM SERPL-SCNC: 4.6 MMOL/L (ref 3.6–5.5)
PROT SERPL-MCNC: 6.6 G/DL (ref 6–8.2)
RBC # BLD AUTO: 4.06 M/UL (ref 4.2–5.4)
SODIUM SERPL-SCNC: 137 MMOL/L (ref 135–145)
WBC # BLD AUTO: 3.4 K/UL (ref 4.8–10.8)

## 2020-12-07 PROCEDURE — 96413 CHEMO IV INFUSION 1 HR: CPT

## 2020-12-07 PROCEDURE — 80053 COMPREHEN METABOLIC PANEL: CPT

## 2020-12-07 PROCEDURE — 304540 HCHG NITRO SET VENT 2ND TUB

## 2020-12-07 PROCEDURE — 96375 TX/PRO/DX INJ NEW DRUG ADDON: CPT

## 2020-12-07 PROCEDURE — 700111 HCHG RX REV CODE 636 W/ 250 OVERRIDE (IP): Performed by: INTERNAL MEDICINE

## 2020-12-07 PROCEDURE — 700105 HCHG RX REV CODE 258: Performed by: INTERNAL MEDICINE

## 2020-12-07 PROCEDURE — 85025 COMPLETE CBC W/AUTO DIFF WBC: CPT

## 2020-12-07 RX ADMIN — PACLITAXEL 123.2 MG: 6 INJECTION, SOLUTION INTRAVENOUS at 11:50

## 2020-12-07 RX ADMIN — FAMOTIDINE 20 MG: 10 INJECTION INTRAVENOUS at 11:08

## 2020-12-07 RX ADMIN — DEXAMETHASONE SODIUM PHOSPHATE 12 MG: 4 INJECTION, SOLUTION INTRA-ARTICULAR; INTRALESIONAL; INTRAMUSCULAR; INTRAVENOUS; SOFT TISSUE at 11:25

## 2020-12-07 RX ADMIN — DIPHENHYDRAMINE HYDROCHLORIDE 25 MG: 50 INJECTION, SOLUTION INTRAMUSCULAR; INTRAVENOUS at 11:10

## 2020-12-07 ASSESSMENT — FIBROSIS 4 INDEX: FIB4 SCORE: 1.39

## 2020-12-07 NOTE — PROGRESS NOTES
"Pharmacy Chemotherapy Verification    Dx: Breast CA  Cycle: 2D1   Previous treatment = 11/30/20    Regimen and Dosing Reference  Weekly PACLitaxel  PACLitaxel 80 mg/m2 IV Day 1  Weekly cycle until DP or UT  NCCN guidelines for breast cancer V.1.2019  Bay EA, et al. J Clin Oncol 2001;19(22):6718-23    Allergies:Patient has no known allergies.  /75   Pulse 98   Temp 36.7 °C (98 °F) (Temporal)   Resp 18   Ht 1.455 m (4' 9.28\")   Wt 59 kg (130 lb 1.1 oz)   SpO2 98%   BMI 27.87 kg/m²  Body surface area is 1.54 meters squared.     Labs 12/7/20  ANC 1910 Hgb 12.6 Plt 255k  SCr 0.58 CrCl 76.2 mL/min  AST/ALT/AP = 14/12/71 Tbili = 0.2    PACLitaxel 80 mg/m2 x 1.54 m2 = 123.2 mg   <10% difference, OK to treat with final dose = 123.2 mg IV    Roseanne Gross, PharmD, BCOP    "

## 2020-12-07 NOTE — PROGRESS NOTES
Chemotherapy Verification - SECONDARY RN       Height = 145.5 cm  Weight = 59 kg  BSA = 1.54 m2       Medication: Taxol  Dose: 80 mg/m2  Calculated Dose: 123.2 mg                             (In mg/m2, AUC, mg/kg)         I confirm that this process was performed independently.

## 2020-12-07 NOTE — PROGRESS NOTES
"Pharmacy Chemotherapy Calculation:    Dx: metastatic breast cancer         Protocol: weekly paclitaxel  Paclitaxel 80mg/m2 IV over 60 min on Day 1   Weekly until disease progression or unacceptable toxicity  NCCN Guidelines for Breast Cancer V.1.2019  Bay COPPOLA, et al - J Clin Oncol 2001 Nov 15;19(22):3901-23.     Allergies:  Patient has no known allergies.     /75   Pulse 98   Temp 36.7 °C (98 °F) (Temporal)   Resp 18   Ht 1.455 m (4' 9.28\")   Wt 59 kg (130 lb 1.1 oz)   SpO2 98%   BMI 27.87 kg/m²  Body surface area is 1.54 meters squared.     Labs from 12/7/20 reviewed - all within treatment parameters.       Drug Order   (Drug name, dose, route, IV Fluid & volume, frequency, number of doses) Cycle 2 Day 1  Previous treatment: C1D22 on 11/30/20   Medication = PACLitaxel (Taxol)   Base Dose = 80 mg/m²  Calc Dose: Base Dose x 1.54 m² = 123.2 mg  Final Dose = 123.2 mg  Route = IV  Fluid & Volume =  mL  Admin Duration = Over 60 minutes          <10% difference, okay to treat with final dose     By my signature below, I confirm this process was performed independently with the BSA and all final chemotherapy dosing calculations congruent. I have reviewed the above chemotherapy order and that my calculation of the final dose and BSA (when applicable) corroborate those calculations of the  pharmacist. Discrepancies of 10% or greater in the written dose have been addressed and documented within the [facility] Progress notes.    Anny Camilo, PharmD, BCOP      "

## 2020-12-07 NOTE — PROGRESS NOTES
Pt arrived to [facility], ambulatory, for C2D1 weekly taxol. Pt voices no complaints. 22g PIV established in the L-AC, positive blood return noted. Labs drawn and reviewed, pt within parameters to treat today. Pre-medications administered with no complications. Taxol infused with no s/sx of adverse reaction. PIV flushed and removed. Pt left IS with no s/sx of distress. Follow up appointment confirmed.

## 2020-12-07 NOTE — PROGRESS NOTES
Chemotherapy Verification - PRIMARY RN      Height = 145.5 cm  Weight = 59 kg  BSA = 1.54 m2       Medication: Taxol  Dose: 80 mg/m2  Calculated Dose: 123.2 mg                             (In mg/m2, AUC, mg/kg)       I confirm this process was performed independently with the BSA and all final chemotherapy dosing calculations congruent.  Any discrepancies of 10% or greater have been addressed with the chemotherapy pharmacist. The resolution of the discrepancy has been documented in the EPIC progress notes.

## 2020-12-13 ENCOUNTER — TELEPHONE (OUTPATIENT)
Dept: ONCOLOGY | Facility: MEDICAL CENTER | Age: 65
End: 2020-12-13

## 2020-12-13 NOTE — TELEPHONE ENCOUNTER
Called Christine with  ([phone]) regarding COVID-19 screening questions and updated check-in process at this time. Christine states understanding of updated process and is aware to call provided number. Patient answered that she was previously positive for COVID-19 bu that it was a long time ago, answered no to remaining screening questions. Christine is ok to proceed with treatment as scheduled for 12/14/20.

## 2020-12-14 ENCOUNTER — OUTPATIENT INFUSION SERVICES (OUTPATIENT)
Dept: ONCOLOGY | Facility: MEDICAL CENTER | Age: 65
End: 2020-12-14
Attending: INTERNAL MEDICINE
Payer: MEDICAID

## 2020-12-14 VITALS
OXYGEN SATURATION: 98 % | RESPIRATION RATE: 18 BRPM | BODY MASS INDEX: 28.06 KG/M2 | HEART RATE: 74 BPM | HEIGHT: 57 IN | WEIGHT: 130.07 LBS | SYSTOLIC BLOOD PRESSURE: 130 MMHG | TEMPERATURE: 97 F | DIASTOLIC BLOOD PRESSURE: 62 MMHG

## 2020-12-14 DIAGNOSIS — C50.811 MALIGNANT NEOPLASM OF OVERLAPPING SITES OF RIGHT BREAST IN FEMALE, ESTROGEN RECEPTOR POSITIVE (HCC): ICD-10-CM

## 2020-12-14 DIAGNOSIS — Z17.0 MALIGNANT NEOPLASM OF OVERLAPPING SITES OF RIGHT BREAST IN FEMALE, ESTROGEN RECEPTOR POSITIVE (HCC): ICD-10-CM

## 2020-12-14 LAB
ANISOCYTOSIS BLD QL SMEAR: ABNORMAL
BASOPHILS # BLD AUTO: 3.4 % (ref 0–1.8)
BASOPHILS # BLD: 0.17 K/UL (ref 0–0.12)
EOSINOPHIL # BLD AUTO: 0.05 K/UL (ref 0–0.51)
EOSINOPHIL NFR BLD: 0.9 % (ref 0–6.9)
ERYTHROCYTE [DISTWIDTH] IN BLOOD BY AUTOMATED COUNT: 59.7 FL (ref 35.9–50)
HCT VFR BLD AUTO: 39.5 % (ref 37–47)
HGB BLD-MCNC: 12.7 G/DL (ref 12–16)
LYMPHOCYTES # BLD AUTO: 1.33 K/UL (ref 1–4.8)
LYMPHOCYTES NFR BLD: 26.5 % (ref 22–41)
MACROCYTES BLD QL SMEAR: ABNORMAL
MANUAL DIFF BLD: NORMAL
MCH RBC QN AUTO: 31.2 PG (ref 27–33)
MCHC RBC AUTO-ENTMCNC: 32.2 G/DL (ref 33.6–35)
MCV RBC AUTO: 97.1 FL (ref 81.4–97.8)
MICROCYTES BLD QL SMEAR: ABNORMAL
MONOCYTES # BLD AUTO: 0.26 K/UL (ref 0–0.85)
MONOCYTES NFR BLD AUTO: 5.1 % (ref 0–13.4)
MORPHOLOGY BLD-IMP: NORMAL
MYELOCYTES NFR BLD MANUAL: 2.6 %
NEUTROPHILS # BLD AUTO: 3.08 K/UL (ref 2–7.15)
NEUTROPHILS NFR BLD: 59.8 % (ref 44–72)
NEUTS BAND NFR BLD MANUAL: 1.7 % (ref 0–10)
NRBC # BLD AUTO: 0 K/UL
NRBC BLD-RTO: 0 /100 WBC
PLATELET # BLD AUTO: 329 K/UL (ref 164–446)
PLATELET BLD QL SMEAR: NORMAL
PMV BLD AUTO: 9.4 FL (ref 9–12.9)
RBC # BLD AUTO: 4.07 M/UL (ref 4.2–5.4)
RBC BLD AUTO: PRESENT
WBC # BLD AUTO: 5 K/UL (ref 4.8–10.8)

## 2020-12-14 PROCEDURE — 85027 COMPLETE CBC AUTOMATED: CPT

## 2020-12-14 PROCEDURE — 85007 BL SMEAR W/DIFF WBC COUNT: CPT

## 2020-12-14 PROCEDURE — 700105 HCHG RX REV CODE 258: Performed by: INTERNAL MEDICINE

## 2020-12-14 PROCEDURE — 96375 TX/PRO/DX INJ NEW DRUG ADDON: CPT

## 2020-12-14 PROCEDURE — 304540 HCHG NITRO SET VENT 2ND TUB

## 2020-12-14 PROCEDURE — 700111 HCHG RX REV CODE 636 W/ 250 OVERRIDE (IP): Performed by: INTERNAL MEDICINE

## 2020-12-14 PROCEDURE — 96413 CHEMO IV INFUSION 1 HR: CPT

## 2020-12-14 PROCEDURE — 96415 CHEMO IV INFUSION ADDL HR: CPT

## 2020-12-14 RX ADMIN — DIPHENHYDRAMINE HYDROCHLORIDE 25 MG: 50 INJECTION INTRAMUSCULAR; INTRAVENOUS at 11:54

## 2020-12-14 RX ADMIN — FAMOTIDINE 20 MG: 10 INJECTION INTRAVENOUS at 12:15

## 2020-12-14 RX ADMIN — PACLITAXEL 123.2 MG: 300 INJECTION, SOLUTION INTRAVENOUS at 12:27

## 2020-12-14 RX ADMIN — DEXAMETHASONE SODIUM PHOSPHATE 12 MG: 4 INJECTION, SOLUTION INTRA-ARTICULAR; INTRALESIONAL; INTRAMUSCULAR; INTRAVENOUS; SOFT TISSUE at 12:05

## 2020-12-14 ASSESSMENT — FIBROSIS 4 INDEX: FIB4 SCORE: 1

## 2020-12-14 NOTE — PROGRESS NOTES
"Pharmacy Chemotherapy Calculation:    Dx: metastatic breast cancer         Protocol: weekly paclitaxel  Paclitaxel 80mg/m2 IV over 60 min on Day 1   Weekly until disease progression or unacceptable toxicity  NCCN Guidelines for Breast Cancer V.1.2019  Bay COPPOLA, et al - J Clin Oncol 2001 Nov 15;19(22):6657-23.     Allergies:  Patient has no known allergies.     /62   Pulse 74   Temp 36.1 °C (97 °F) (Temporal)   Resp 18   Ht 1.455 m (4' 9.28\")   Wt 59 kg (130 lb 1.1 oz)   SpO2 98%   BMI 27.87 kg/m²  Body surface area is 1.54 meters squared.     Labs from 12/14/20 reviewed - all within treatment parameters.       Drug Order   (Drug name, dose, route, IV Fluid & volume, frequency, number of doses) Cycle 2 Day 8  Previous treatment: C2D1 on 12/7/20   Medication = PACLitaxel (Taxol)   Base Dose = 80 mg/m²  Calc Dose: Base Dose x 1.54 m² = 123 mg  Final Dose = 123.2 mg  Route = IV  Fluid & Volume =  mL  Admin Duration = Over 60 minutes          <10% difference, okay to treat with final dose       By my signature below, I confirm this process was performed independently with the BSA and all final chemotherapy dosing calculations congruent. I have reviewed the above chemotherapy order and that my calculation of the final dose and BSA (when applicable) corroborate those calculations of the  pharmacist. Discrepancies of 10% or greater in the written dose have been addressed and documented within the EPIC Progress notes.    Caleb Padilla, PharmD,       "

## 2020-12-14 NOTE — PROGRESS NOTES
Pt arrived ambulatory to [facility] for  D8/ C 2 of Taxol. Pt denied fever, signs or symptoms of infection or acute illness today. POC discussed via , Bakari #[id number], and pt verbalized understanding.     PIV established and labs drawn at this time; pt tolerated well. Labs reviewed. Premedications and Paclitaxel administered per MAR; pt tolerated well. No signs or symptoms of reaction or complications noted. PIV flushed and removed; sterile gauze and paper tape applied to site.     Follow-up care discussed and future appointments scheduled/printed for pt; pt verbalized understanding. Pt discharged home ambulatory to self care in no apparent distress at this time.

## 2020-12-14 NOTE — PROGRESS NOTES
"Pharmacy Chemotherapy Verification    Dx: Breast CA  Cycle: 2 D8   Previous treatment = 12/7/20    Regimen and Dosing Reference  Weekly PACLitaxel  PACLitaxel 80 mg/m2 IV Day 1  Weekly cycle until DP or UT  NCCN guidelines for breast cancer V.1.2019  Bay EA, et al. J Clin Oncol 2001;19(22):4215-23    Allergies:Patient has no known allergies.  /62   Pulse 74   Temp 36.1 °C (97 °F) (Temporal)   Resp 18   Ht 1.455 m (4' 9.28\")   Wt 59 kg (130 lb 1.1 oz)   SpO2 98%   BMI 27.87 kg/m²  Body surface area is 1.54 meters squared.     All lab results 12/14/20 within treatment parameters.       PACLitaxel 80 mg/m2 x 1.54m2 = 123.2 mg   <10% difference, OK to treat with final dose = 123.2mg IV    Bhavana Urbina, PharmD    "

## 2020-12-14 NOTE — PROGRESS NOTES
Chemotherapy Verification - PRIMARY RN      Height = 59kg  Weight = 145.5cm  BSA = 1.54m2       Medication: Paclitaxel  Dose: 80mg/m2  Calculated Dose: 123.2mg                             (In mg/m2, AUC, mg/kg)       I confirm this process was performed independently with the BSA and all final chemotherapy dosing calculations congruent.  Any discrepancies of 10% or greater have been addressed with the chemotherapy pharmacist. The resolution of the discrepancy has been documented in the EPIC progress notes.

## 2020-12-21 ENCOUNTER — OUTPATIENT INFUSION SERVICES (OUTPATIENT)
Dept: ONCOLOGY | Facility: MEDICAL CENTER | Age: 65
End: 2020-12-21
Attending: INTERNAL MEDICINE
Payer: MEDICAID

## 2020-12-21 VITALS
SYSTOLIC BLOOD PRESSURE: 127 MMHG | OXYGEN SATURATION: 97 % | BODY MASS INDEX: 28.35 KG/M2 | DIASTOLIC BLOOD PRESSURE: 60 MMHG | HEART RATE: 76 BPM | HEIGHT: 57 IN | TEMPERATURE: 97.7 F | WEIGHT: 131.39 LBS | RESPIRATION RATE: 18 BRPM

## 2020-12-21 DIAGNOSIS — Z17.0 MALIGNANT NEOPLASM OF OVERLAPPING SITES OF RIGHT BREAST IN FEMALE, ESTROGEN RECEPTOR POSITIVE (HCC): ICD-10-CM

## 2020-12-21 DIAGNOSIS — C50.811 MALIGNANT NEOPLASM OF OVERLAPPING SITES OF RIGHT BREAST IN FEMALE, ESTROGEN RECEPTOR POSITIVE (HCC): ICD-10-CM

## 2020-12-21 LAB
BASOPHILS # BLD AUTO: 1 % (ref 0–1.8)
BASOPHILS # BLD: 0.04 K/UL (ref 0–0.12)
EOSINOPHIL # BLD AUTO: 0.04 K/UL (ref 0–0.51)
EOSINOPHIL NFR BLD: 1 % (ref 0–6.9)
ERYTHROCYTE [DISTWIDTH] IN BLOOD BY AUTOMATED COUNT: 57.6 FL (ref 35.9–50)
HCT VFR BLD AUTO: 38.9 % (ref 37–47)
HGB BLD-MCNC: 12.6 G/DL (ref 12–16)
IMM GRANULOCYTES # BLD AUTO: 0.09 K/UL (ref 0–0.11)
IMM GRANULOCYTES NFR BLD AUTO: 2.3 % (ref 0–0.9)
LYMPHOCYTES # BLD AUTO: 1.06 K/UL (ref 1–4.8)
LYMPHOCYTES NFR BLD: 27.5 % (ref 22–41)
MCH RBC QN AUTO: 31.1 PG (ref 27–33)
MCHC RBC AUTO-ENTMCNC: 32.4 G/DL (ref 33.6–35)
MCV RBC AUTO: 96 FL (ref 81.4–97.8)
MONOCYTES # BLD AUTO: 0.35 K/UL (ref 0–0.85)
MONOCYTES NFR BLD AUTO: 9.1 % (ref 0–13.4)
NEUTROPHILS # BLD AUTO: 2.28 K/UL (ref 2–7.15)
NEUTROPHILS NFR BLD: 59.1 % (ref 44–72)
NRBC # BLD AUTO: 0 K/UL
NRBC BLD-RTO: 0 /100 WBC
PLATELET # BLD AUTO: 242 K/UL (ref 164–446)
PMV BLD AUTO: 9.2 FL (ref 9–12.9)
RBC # BLD AUTO: 4.05 M/UL (ref 4.2–5.4)
WBC # BLD AUTO: 3.9 K/UL (ref 4.8–10.8)

## 2020-12-21 PROCEDURE — 700111 HCHG RX REV CODE 636 W/ 250 OVERRIDE (IP): Performed by: INTERNAL MEDICINE

## 2020-12-21 PROCEDURE — 304540 HCHG NITRO SET VENT 2ND TUB

## 2020-12-21 PROCEDURE — 700105 HCHG RX REV CODE 258: Performed by: INTERNAL MEDICINE

## 2020-12-21 PROCEDURE — 96375 TX/PRO/DX INJ NEW DRUG ADDON: CPT

## 2020-12-21 PROCEDURE — 85025 COMPLETE CBC W/AUTO DIFF WBC: CPT

## 2020-12-21 PROCEDURE — 96413 CHEMO IV INFUSION 1 HR: CPT

## 2020-12-21 RX ORDER — DEXAMETHASONE 4 MG/1
TABLET ORAL
COMMUNITY
Start: 2020-12-13

## 2020-12-21 RX ADMIN — FAMOTIDINE 20 MG: 10 INJECTION INTRAVENOUS at 12:03

## 2020-12-21 RX ADMIN — DIPHENHYDRAMINE HYDROCHLORIDE 25 MG: 50 INJECTION INTRAMUSCULAR; INTRAVENOUS at 12:03

## 2020-12-21 RX ADMIN — DEXAMETHASONE SODIUM PHOSPHATE 12 MG: 4 INJECTION, SOLUTION INTRA-ARTICULAR; INTRALESIONAL; INTRAMUSCULAR; INTRAVENOUS; SOFT TISSUE at 12:20

## 2020-12-21 RX ADMIN — PACLITAXEL 124 MG: 300 INJECTION, SOLUTION INTRAVENOUS at 12:50

## 2020-12-21 ASSESSMENT — FIBROSIS 4 INDEX: FIB4 SCORE: 0.77

## 2020-12-21 NOTE — PROGRESS NOTES
"Pharmacy Chemotherapy Verification    Dx: Breast CA  Cycle: 2 D15   Previous treatment = 12/14/20    Regimen and Dosing Reference  Weekly PACLitaxel  PACLitaxel 80 mg/m2 IV Day 1  Weekly cycle until DP or UT  NCCN guidelines for breast cancer V.1.2019  Bay COPPOLA, et al. J Clin Oncol 2001;19(22):6658-23    Allergies:Patient has no known allergies.  /60   Pulse 76   Temp 36.5 °C (97.7 °F) (Temporal)   Resp 18   Ht 1.46 m (4' 9.48\")   Wt 59.6 kg (131 lb 6.3 oz)   SpO2 97%   BMI 27.96 kg/m²  Body surface area is 1.55 meters squared.     All lab results 12/21/20 within treatment parameters.       PACLitaxel 80 mg/m2 x 1.55m2 = 124mg   <10% difference, OK to treat with final dose = 124mg IV    Bhavana Urbina, PharmD    "

## 2020-12-21 NOTE — PROGRESS NOTES
"Pharmacy Chemotherapy Calculation:    Dx: metastatic breast cancer         Protocol: weekly paclitaxel  Paclitaxel 80mg/m2 IV over 60 min on Day 1   Weekly until disease progression or unacceptable toxicity  NCCN Guidelines for Breast Cancer V.1.2019  Bay COPPOLA, et al - J Clin Oncol 2001 Nov 15;19(22):7823-87.     Allergies:  Patient has no known allergies.     /60   Pulse 76   Temp 36.5 °C (97.7 °F) (Temporal)   Resp 18   Ht 1.46 m (4' 9.48\")   Wt 59.6 kg (131 lb 6.3 oz)   SpO2 97%   BMI 27.96 kg/m²  Body surface area is 1.55 meters squared.     Labs: 12/21/20  Meets treatment parameters  MD: omit renal / hepatic parameters D15     Drug Order   (Drug name, dose, route, IV Fluid & volume, frequency, number of doses) Cycle 2 Day 15  Previous treatment: 12/14/20   Medication = PACLitaxel (Taxol)   Base Dose = 80 mg/m²  Calc Dose: Base Dose x 1.55 m² = 124 mg  Final Dose = 124 mg  Final [conc] ~ 0.46 mg/mL  Route = IV  Fluid & Volume =  mL  Admin Duration = Over 60 minutes                           "

## 2020-12-21 NOTE — PROGRESS NOTES
Chemotherapy Verification - SECONDARY RN       Height = 146 cm  Weight = 59.6 kg  BSA = 1.55 m2       Medication: Taxol  Dose: 80 mg/m2  Calculated Dose: 124 mg                             (In mg/m2, AUC, mg/kg)     I confirm that this process was performed independently.

## 2020-12-21 NOTE — PROGRESS NOTES
Chemotherapy Verification - PRIMARY RN      Height = 1.46 m  Weight = 59.6 kg  BSA = 1.55 m^2       Medication: paclitaxel  Dose: 80 mg/m^2  Calculated Dose: 124 mg                             (In mg/m2, AUC, mg/kg)       I confirm this process was performed independently with the BSA and all final chemotherapy dosing calculations congruent.  Any discrepancies of 10% or greater have been addressed with the chemotherapy pharmacist. The resolution of the discrepancy has been documented in the EPIC progress notes.        HPI Comments: 3 y.o. male with no significant past medical history who presents with chief complaint of laceration. Pt's mother reports pt was running today when he tripped over a garbage bag and cut his left leg on some broken glass that was inside. Pt now reports a laceration to his left lower leg with associated pain. Denies any other injuries. There are no other acute medical concerns at this time. Social hx: IMZ UTD; Lives with parents. PCP: None     Note written by Shay Mcmahon, as dictated by Johnny Thacker MD 4:02 PM      The history is provided by the patient and the mother. Pediatric Social History:         No past medical history on file. No past surgical history on file. No family history on file. Social History     Social History    Marital status: SINGLE     Spouse name: N/A    Number of children: N/A    Years of education: N/A     Occupational History    Not on file. Social History Main Topics    Smoking status: Never Smoker    Smokeless tobacco: Never Used    Alcohol use No    Drug use: No    Sexual activity: Not on file     Other Topics Concern    Not on file     Social History Narrative         ALLERGIES: Review of patient's allergies indicates no known allergies. Review of Systems   Constitutional: Negative. Negative for activity change, appetite change and fever. HENT: Negative. Negative for congestion, ear pain, hearing loss, nosebleeds and sore throat. Respiratory: Negative. Negative for cough. Cardiovascular: Negative. Negative for chest pain and palpitations. Gastrointestinal: Negative. Negative for abdominal pain, blood in stool and vomiting. Endocrine: Negative. Genitourinary: Negative. Negative for dysuria and hematuria. Musculoskeletal: Negative. Negative for back pain, joint swelling and neck pain. Skin: Positive for wound (laceration left lower leg). Negative for color change and rash. Allergic/Immunologic: Negative. Neurological: Negative. Negative for syncope, weakness and headaches. Hematological: Negative. Psychiatric/Behavioral: Negative. All other systems reviewed and are negative. Vitals:    07/25/18 1541   BP: 113/56   Pulse: 104   Resp: 20   Temp: 98.3 °F (36.8 °C)   SpO2: 97%   Weight: 17.8 kg            Physical Exam   Constitutional: He appears well-developed and well-nourished. He is active. Cardiovascular: Regular rhythm. Pulses are palpable. Musculoskeletal: He exhibits signs of injury. Left lower leg: He exhibits laceration. Legs:  2.5cm laceration to lower leg   Neurological: He is alert. Coordination normal.   Nursing note and vitals reviewed. MDM  Number of Diagnoses or Management Options  Leg laceration, left, initial encounter:   Diagnosis management comments: Left leg laceration - wound care and closure in the ED and then d/c home        ED Course       Wound Repair  Date/Time: 7/25/2018 4:45 PM  Performed by: attendingLocation details: left leg  Wound length:2.5 cm or less (2.5cm)  Anesthesia: local infiltration (topical first with LET)    Anesthesia:  Local Anesthetic: LET (lido,epi,tetracaine) and lidocaine 1% without epinephrine  Anesthetic total: 3 mL  Foreign bodies: no foreign bodies  Irrigation solution: tap water  Skin closure: 4-0 nylon  Number of sutures: 4  Technique: simple and interrupted  Approximation: close  Dressing: bandaid. Patient tolerance: Patient tolerated the procedure well with no immediate complications  My total time at bedside, performing this procedure was 1-15 minutes.

## 2020-12-22 NOTE — PROGRESS NOTES
Pt presents to [facility] for paclitaxel.  services in use. Established PIV in LAC; brisk blood return observed and pt tolerated well. Labs drawn and within treatable parameters. Pre meds administered and paclitaxel infused with no s/s of adverse reactions. PIV flushed and brisk blood return observed before removing; gauze/coband dressing applied. Next appointment confirmed and education provided. Pt discharged to self care with all personal belongings and in NAD.

## 2020-12-28 RX ORDER — DIPHENHYDRAMINE HYDROCHLORIDE 50 MG/ML
50 INJECTION INTRAMUSCULAR; INTRAVENOUS PRN
Status: CANCELLED | OUTPATIENT
Start: 2021-01-04

## 2020-12-28 RX ORDER — 0.9 % SODIUM CHLORIDE 0.9 %
10 VIAL (ML) INJECTION PRN
Status: CANCELLED | OUTPATIENT
Start: 2021-01-03

## 2020-12-28 RX ORDER — 0.9 % SODIUM CHLORIDE 0.9 %
VIAL (ML) INJECTION PRN
Status: CANCELLED | OUTPATIENT
Start: 2021-01-11

## 2020-12-28 RX ORDER — SODIUM CHLORIDE 9 MG/ML
INJECTION, SOLUTION INTRAVENOUS CONTINUOUS
Status: CANCELLED | OUTPATIENT
Start: 2021-01-11

## 2020-12-28 RX ORDER — METHYLPREDNISOLONE SODIUM SUCCINATE 125 MG/2ML
125 INJECTION, POWDER, LYOPHILIZED, FOR SOLUTION INTRAMUSCULAR; INTRAVENOUS PRN
Status: CANCELLED | OUTPATIENT
Start: 2021-01-04

## 2020-12-28 RX ORDER — HEPARIN SODIUM (PORCINE) LOCK FLUSH IV SOLN 100 UNIT/ML 100 UNIT/ML
500 SOLUTION INTRAVENOUS PRN
Status: CANCELLED | OUTPATIENT
Start: 2021-01-18

## 2020-12-28 RX ORDER — EPINEPHRINE 1 MG/ML(1)
0.5 AMPUL (ML) INJECTION PRN
Status: CANCELLED | OUTPATIENT
Start: 2021-01-11

## 2020-12-28 RX ORDER — 0.9 % SODIUM CHLORIDE 0.9 %
10 VIAL (ML) INJECTION PRN
Status: CANCELLED | OUTPATIENT
Start: 2021-01-18

## 2020-12-28 RX ORDER — 0.9 % SODIUM CHLORIDE 0.9 %
10 VIAL (ML) INJECTION PRN
Status: CANCELLED | OUTPATIENT
Start: 2021-01-04

## 2020-12-28 RX ORDER — 0.9 % SODIUM CHLORIDE 0.9 %
3 VIAL (ML) INJECTION PRN
Status: CANCELLED | OUTPATIENT
Start: 2021-01-03

## 2020-12-28 RX ORDER — HEPARIN SODIUM (PORCINE) LOCK FLUSH IV SOLN 100 UNIT/ML 100 UNIT/ML
500 SOLUTION INTRAVENOUS PRN
Status: CANCELLED | OUTPATIENT
Start: 2021-01-03

## 2020-12-28 RX ORDER — EPINEPHRINE 1 MG/ML(1)
0.5 AMPUL (ML) INJECTION PRN
Status: CANCELLED | OUTPATIENT
Start: 2021-01-04

## 2020-12-28 RX ORDER — ONDANSETRON 8 MG/1
8 TABLET, ORALLY DISINTEGRATING ORAL PRN
Status: CANCELLED | OUTPATIENT
Start: 2021-01-18

## 2020-12-28 RX ORDER — 0.9 % SODIUM CHLORIDE 0.9 %
VIAL (ML) INJECTION PRN
Status: CANCELLED | OUTPATIENT
Start: 2021-01-03

## 2020-12-28 RX ORDER — METHYLPREDNISOLONE SODIUM SUCCINATE 125 MG/2ML
125 INJECTION, POWDER, LYOPHILIZED, FOR SOLUTION INTRAMUSCULAR; INTRAVENOUS PRN
Status: CANCELLED | OUTPATIENT
Start: 2021-01-18

## 2020-12-28 RX ORDER — ONDANSETRON 8 MG/1
8 TABLET, ORALLY DISINTEGRATING ORAL PRN
Status: CANCELLED | OUTPATIENT
Start: 2021-01-04

## 2020-12-28 RX ORDER — ONDANSETRON 2 MG/ML
4 INJECTION INTRAMUSCULAR; INTRAVENOUS PRN
Status: CANCELLED | OUTPATIENT
Start: 2021-01-11

## 2020-12-28 RX ORDER — ONDANSETRON 8 MG/1
8 TABLET, ORALLY DISINTEGRATING ORAL PRN
Status: CANCELLED | OUTPATIENT
Start: 2021-01-11

## 2020-12-28 RX ORDER — 0.9 % SODIUM CHLORIDE 0.9 %
10 VIAL (ML) INJECTION PRN
Status: CANCELLED | OUTPATIENT
Start: 2021-01-11

## 2020-12-28 RX ORDER — 0.9 % SODIUM CHLORIDE 0.9 %
VIAL (ML) INJECTION PRN
Status: CANCELLED | OUTPATIENT
Start: 2021-01-04

## 2020-12-28 RX ORDER — DIPHENHYDRAMINE HYDROCHLORIDE 50 MG/ML
50 INJECTION INTRAMUSCULAR; INTRAVENOUS PRN
Status: CANCELLED | OUTPATIENT
Start: 2021-01-18

## 2020-12-28 RX ORDER — 0.9 % SODIUM CHLORIDE 0.9 %
3 VIAL (ML) INJECTION PRN
Status: CANCELLED | OUTPATIENT
Start: 2021-01-11

## 2020-12-28 RX ORDER — 0.9 % SODIUM CHLORIDE 0.9 %
3 VIAL (ML) INJECTION PRN
Status: CANCELLED | OUTPATIENT
Start: 2021-01-04

## 2020-12-28 RX ORDER — DIPHENHYDRAMINE HYDROCHLORIDE 50 MG/ML
50 INJECTION INTRAMUSCULAR; INTRAVENOUS PRN
Status: CANCELLED | OUTPATIENT
Start: 2021-01-11

## 2020-12-28 RX ORDER — METHYLPREDNISOLONE SODIUM SUCCINATE 125 MG/2ML
125 INJECTION, POWDER, LYOPHILIZED, FOR SOLUTION INTRAMUSCULAR; INTRAVENOUS PRN
Status: CANCELLED | OUTPATIENT
Start: 2021-01-11

## 2020-12-28 RX ORDER — ONDANSETRON 2 MG/ML
4 INJECTION INTRAMUSCULAR; INTRAVENOUS PRN
Status: CANCELLED | OUTPATIENT
Start: 2021-01-18

## 2020-12-28 RX ORDER — 0.9 % SODIUM CHLORIDE 0.9 %
3 VIAL (ML) INJECTION PRN
Status: CANCELLED | OUTPATIENT
Start: 2021-01-18

## 2020-12-28 RX ORDER — 0.9 % SODIUM CHLORIDE 0.9 %
VIAL (ML) INJECTION PRN
Status: CANCELLED | OUTPATIENT
Start: 2021-01-18

## 2020-12-28 RX ORDER — HEPARIN SODIUM (PORCINE) LOCK FLUSH IV SOLN 100 UNIT/ML 100 UNIT/ML
500 SOLUTION INTRAVENOUS PRN
Status: CANCELLED | OUTPATIENT
Start: 2021-01-11

## 2020-12-28 RX ORDER — SODIUM CHLORIDE 9 MG/ML
INJECTION, SOLUTION INTRAVENOUS CONTINUOUS
Status: CANCELLED | OUTPATIENT
Start: 2021-01-04

## 2020-12-28 RX ORDER — SODIUM CHLORIDE 9 MG/ML
INJECTION, SOLUTION INTRAVENOUS CONTINUOUS
Status: CANCELLED | OUTPATIENT
Start: 2021-01-18

## 2020-12-28 RX ORDER — EPINEPHRINE 1 MG/ML(1)
0.5 AMPUL (ML) INJECTION PRN
Status: CANCELLED | OUTPATIENT
Start: 2021-01-18

## 2020-12-28 RX ORDER — PROCHLORPERAZINE MALEATE 10 MG
10 TABLET ORAL EVERY 6 HOURS PRN
Status: CANCELLED | OUTPATIENT
Start: 2021-01-04

## 2020-12-28 RX ORDER — PROCHLORPERAZINE MALEATE 10 MG
10 TABLET ORAL EVERY 6 HOURS PRN
Status: CANCELLED | OUTPATIENT
Start: 2021-01-11

## 2020-12-28 RX ORDER — ONDANSETRON 2 MG/ML
4 INJECTION INTRAMUSCULAR; INTRAVENOUS PRN
Status: CANCELLED | OUTPATIENT
Start: 2021-01-04

## 2020-12-28 RX ORDER — HEPARIN SODIUM (PORCINE) LOCK FLUSH IV SOLN 100 UNIT/ML 100 UNIT/ML
500 SOLUTION INTRAVENOUS PRN
Status: CANCELLED | OUTPATIENT
Start: 2021-01-04

## 2020-12-28 RX ORDER — PROCHLORPERAZINE MALEATE 10 MG
10 TABLET ORAL EVERY 6 HOURS PRN
Status: CANCELLED | OUTPATIENT
Start: 2021-01-18

## 2021-01-03 NOTE — PROGRESS NOTES
"Pharmacy Chemotherapy Verification    Dx: Breast CA  Cycle: 3 D1, [Day 22 deleted due to neuropathy]  Previous treatment = 12/21/20 = C2D15    Regimen and Dosing Reference  Weekly PACLitaxel  PACLitaxel 80 mg/m2 IV Day 1  Weekly cycle until DP or UT  NCCN guidelines for breast cancer V.1.2019  Bay COPPOLA, et al. J Clin Oncol 2001;19(22):4215-23    Allergies:Patient has no known allergies.  /44   Pulse 76   Temp 36.7 °C (98 °F) (Temporal)   Resp 18   Ht 1.47 m (4' 9.87\")   Wt 60.8 kg (134 lb 0.6 oz)   SpO2 96%   BMI 28.14 kg/m²  Body surface area is 1.58 meters squared.     All lab results 1/4/21within treatment parameters.     PACLitaxel 80 mg/m2 x 1.58 m2 = 126 mg   <10% difference, OK to treat with final dose = 126.4 mg IV    Caleb Padilla, PharmD    "

## 2021-01-04 ENCOUNTER — OUTPATIENT INFUSION SERVICES (OUTPATIENT)
Dept: ONCOLOGY | Facility: MEDICAL CENTER | Age: 66
End: 2021-01-04
Attending: INTERNAL MEDICINE
Payer: MEDICAID

## 2021-01-04 VITALS
HEIGHT: 58 IN | WEIGHT: 134.04 LBS | TEMPERATURE: 98 F | OXYGEN SATURATION: 96 % | SYSTOLIC BLOOD PRESSURE: 114 MMHG | BODY MASS INDEX: 28.14 KG/M2 | RESPIRATION RATE: 18 BRPM | DIASTOLIC BLOOD PRESSURE: 44 MMHG | HEART RATE: 76 BPM

## 2021-01-04 DIAGNOSIS — Z17.0 MALIGNANT NEOPLASM OF OVERLAPPING SITES OF RIGHT BREAST IN FEMALE, ESTROGEN RECEPTOR POSITIVE (HCC): ICD-10-CM

## 2021-01-04 DIAGNOSIS — C50.811 MALIGNANT NEOPLASM OF OVERLAPPING SITES OF RIGHT BREAST IN FEMALE, ESTROGEN RECEPTOR POSITIVE (HCC): ICD-10-CM

## 2021-01-04 LAB
ALBUMIN SERPL BCP-MCNC: 3.7 G/DL (ref 3.2–4.9)
ALBUMIN/GLOB SERPL: 1.4 G/DL
ALP SERPL-CCNC: 66 U/L (ref 30–99)
ALT SERPL-CCNC: 17 U/L (ref 2–50)
ANION GAP SERPL CALC-SCNC: 8 MMOL/L (ref 7–16)
AST SERPL-CCNC: 23 U/L (ref 12–45)
BASOPHILS # BLD AUTO: 0.7 % (ref 0–1.8)
BASOPHILS # BLD: 0.03 K/UL (ref 0–0.12)
BILIRUB SERPL-MCNC: 0.3 MG/DL (ref 0.1–1.5)
BUN SERPL-MCNC: 13 MG/DL (ref 8–22)
CALCIUM SERPL-MCNC: 9 MG/DL (ref 8.5–10.5)
CHLORIDE SERPL-SCNC: 107 MMOL/L (ref 96–112)
CO2 SERPL-SCNC: 23 MMOL/L (ref 20–33)
CREAT SERPL-MCNC: 0.51 MG/DL (ref 0.5–1.4)
EOSINOPHIL # BLD AUTO: 0.09 K/UL (ref 0–0.51)
EOSINOPHIL NFR BLD: 2.2 % (ref 0–6.9)
ERYTHROCYTE [DISTWIDTH] IN BLOOD BY AUTOMATED COUNT: 57.7 FL (ref 35.9–50)
GLOBULIN SER CALC-MCNC: 2.7 G/DL (ref 1.9–3.5)
GLUCOSE SERPL-MCNC: 104 MG/DL (ref 65–99)
HCT VFR BLD AUTO: 39.7 % (ref 37–47)
HGB BLD-MCNC: 12.8 G/DL (ref 12–16)
IMM GRANULOCYTES # BLD AUTO: 0.04 K/UL (ref 0–0.11)
IMM GRANULOCYTES NFR BLD AUTO: 1 % (ref 0–0.9)
LYMPHOCYTES # BLD AUTO: 1.13 K/UL (ref 1–4.8)
LYMPHOCYTES NFR BLD: 27.8 % (ref 22–41)
MCH RBC QN AUTO: 31.1 PG (ref 27–33)
MCHC RBC AUTO-ENTMCNC: 32.2 G/DL (ref 33.6–35)
MCV RBC AUTO: 96.6 FL (ref 81.4–97.8)
MONOCYTES # BLD AUTO: 0.49 K/UL (ref 0–0.85)
MONOCYTES NFR BLD AUTO: 12 % (ref 0–13.4)
NEUTROPHILS # BLD AUTO: 2.29 K/UL (ref 2–7.15)
NEUTROPHILS NFR BLD: 56.3 % (ref 44–72)
NRBC # BLD AUTO: 0 K/UL
NRBC BLD-RTO: 0 /100 WBC
PLATELET # BLD AUTO: 271 K/UL (ref 164–446)
PMV BLD AUTO: 9.4 FL (ref 9–12.9)
POTASSIUM SERPL-SCNC: 4.1 MMOL/L (ref 3.6–5.5)
PROT SERPL-MCNC: 6.4 G/DL (ref 6–8.2)
RBC # BLD AUTO: 4.11 M/UL (ref 4.2–5.4)
SODIUM SERPL-SCNC: 138 MMOL/L (ref 135–145)
WBC # BLD AUTO: 4.1 K/UL (ref 4.8–10.8)

## 2021-01-04 PROCEDURE — 96367 TX/PROPH/DG ADDL SEQ IV INF: CPT

## 2021-01-04 PROCEDURE — 96375 TX/PRO/DX INJ NEW DRUG ADDON: CPT | Performed by: CLINICAL NURSE SPECIALIST

## 2021-01-04 PROCEDURE — 700105 HCHG RX REV CODE 258: Performed by: INTERNAL MEDICINE

## 2021-01-04 PROCEDURE — 304540 HCHG NITRO SET VENT 2ND TUB: Performed by: CLINICAL NURSE SPECIALIST

## 2021-01-04 PROCEDURE — 96413 CHEMO IV INFUSION 1 HR: CPT | Performed by: CLINICAL NURSE SPECIALIST

## 2021-01-04 PROCEDURE — 80053 COMPREHEN METABOLIC PANEL: CPT

## 2021-01-04 PROCEDURE — 85025 COMPLETE CBC W/AUTO DIFF WBC: CPT

## 2021-01-04 PROCEDURE — 700111 HCHG RX REV CODE 636 W/ 250 OVERRIDE (IP): Performed by: INTERNAL MEDICINE

## 2021-01-04 RX ADMIN — FAMOTIDINE 20 MG: 10 INJECTION INTRAVENOUS at 10:51

## 2021-01-04 RX ADMIN — DEXAMETHASONE SODIUM PHOSPHATE 12 MG: 4 INJECTION, SOLUTION INTRA-ARTICULAR; INTRALESIONAL; INTRAMUSCULAR; INTRAVENOUS; SOFT TISSUE at 10:58

## 2021-01-04 RX ADMIN — DIPHENHYDRAMINE HYDROCHLORIDE 25 MG: 50 INJECTION, SOLUTION INTRAMUSCULAR; INTRAVENOUS at 11:09

## 2021-01-04 RX ADMIN — PACLITAXEL 126.4 MG: 300 INJECTION, SOLUTION INTRAVENOUS at 11:41

## 2021-01-04 ASSESSMENT — FIBROSIS 4 INDEX: FIB4 SCORE: 1.05

## 2021-01-04 NOTE — PROGRESS NOTES
Chemotherapy Verification - SECONDARY RN       Height = 147 cm  Weight = 60.8 kg  BSA = 1.58 m2       Medication: Taxol  Dose: 80 mg/m2  Calculated Dose: 126.4 mg                             (In mg/m2, AUC, mg/kg)         I confirm that this process was performed independently.

## 2021-01-04 NOTE — PROGRESS NOTES
Chemotherapy Verification - PRIMARY RN      Height = 1.47m  Weight = 60.8kg  BSA = 1.58m2       Medication: Taxol  Dose: 80mg/m2  Calculated Dose: 126.4mg                             (In mg/m2, AUC, mg/kg)         I confirm this process was performed independently with the BSA and all final chemotherapy dosing calculations congruent.  Any discrepancies of 10% or greater have been addressed with the chemotherapy pharmacist. The resolution of the discrepancy has been documented in the EPIC progress notes.

## 2021-01-04 NOTE — PROGRESS NOTES
Ms. Syeda Malhotra is in infusion for C3D1 Taxol for metastatic breast cancer.   number [id number] used.  Pt has a posterior headache x2 days that is helped with Tylenol.  Resolved currently.  She has had worsening numbness and tingling in her fingers and toes, worse on the left.  She occasionally has difficulty buttoning buttons.  Her left arm has trace edema.  MD aware per pt.      PIV started x2 attempts to left wrist.  Labs drawn and resulted within parameters to treat.   Premedicated as ordered. Taxol completed without issue. PIV flushed and removed. Next appt reviewed and pt discharged ambulatory to home in stable condition.

## 2021-01-04 NOTE — PROGRESS NOTES
"Pharmacy Chemotherapy Calculation:    Dx: metastatic breast cancer         Protocol: weekly paclitaxel  Paclitaxel 80mg/m2 IV over 60 min on Day 1   Weekly until disease progression or unacceptable toxicity  NCCN Guidelines for Breast Cancer V.1.2019  Bay COPPOLA, et al - J Clin Oncol 2001 Nov 15;19(22):0486-23.     Allergies:  Patient has no known allergies.     /44   Pulse 76   Temp 36.7 °C (98 °F) (Temporal)   Resp 18   Ht 1.47 m (4' 9.87\")   Wt 60.8 kg (134 lb 0.6 oz)   SpO2 96%   BMI 28.14 kg/m²  Body surface area is 1.58 meters squared.     Labs 1/4/21:  ANC~ 2290 Plt = 271k   Hgb = 12.8     SCr = 0.51 mg/dL CrCl ~ 108 mL/min   LFT's = WNL TBili = 0.3      Drug Order   (Drug name, dose, route, IV Fluid & volume, frequency, number of doses) Cycle 3 Day 1  Previous treatment: 12/21/20   Medication = PACLitaxel (Taxol)   Base Dose = 80 mg/m²  Calc Dose: Base Dose x 1.58 m² = 126.4 mg  Final Dose = 126.4 mg  Route = IV  Fluid & Volume =  mL  Admin Duration = Over 60 minutes          <10% difference, okay to treat with final dose       By my signature below, I confirm this process was performed independently with the BSA and all final chemotherapy dosing calculations congruent. I have reviewed the above chemotherapy order and that my calculation of the final dose and BSA (when applicable) corroborate those calculations of the  pharmacist. Discrepancies of 10% or greater in the written dose have been addressed and documented within the [facility] Progress notes.    Matt Humphries, PharmD            "

## 2021-01-11 ENCOUNTER — OUTPATIENT INFUSION SERVICES (OUTPATIENT)
Dept: ONCOLOGY | Facility: MEDICAL CENTER | Age: 66
End: 2021-01-11
Attending: INTERNAL MEDICINE
Payer: MEDICAID

## 2021-01-11 VITALS
HEIGHT: 58 IN | HEART RATE: 80 BPM | DIASTOLIC BLOOD PRESSURE: 90 MMHG | SYSTOLIC BLOOD PRESSURE: 112 MMHG | WEIGHT: 134.04 LBS | OXYGEN SATURATION: 94 % | TEMPERATURE: 98 F | RESPIRATION RATE: 18 BRPM | BODY MASS INDEX: 28.14 KG/M2

## 2021-01-11 DIAGNOSIS — C50.811 MALIGNANT NEOPLASM OF OVERLAPPING SITES OF RIGHT BREAST IN FEMALE, ESTROGEN RECEPTOR POSITIVE (HCC): ICD-10-CM

## 2021-01-11 DIAGNOSIS — Z17.0 MALIGNANT NEOPLASM OF OVERLAPPING SITES OF RIGHT BREAST IN FEMALE, ESTROGEN RECEPTOR POSITIVE (HCC): ICD-10-CM

## 2021-01-11 LAB
BASOPHILS # BLD AUTO: 1 % (ref 0–1.8)
BASOPHILS # BLD: 0.05 K/UL (ref 0–0.12)
EOSINOPHIL # BLD AUTO: 0.18 K/UL (ref 0–0.51)
EOSINOPHIL NFR BLD: 3.6 % (ref 0–6.9)
ERYTHROCYTE [DISTWIDTH] IN BLOOD BY AUTOMATED COUNT: 55.7 FL (ref 35.9–50)
HCT VFR BLD AUTO: 41.3 % (ref 37–47)
HGB BLD-MCNC: 13.2 G/DL (ref 12–16)
IMM GRANULOCYTES # BLD AUTO: 0.19 K/UL (ref 0–0.11)
IMM GRANULOCYTES NFR BLD AUTO: 3.8 % (ref 0–0.9)
LYMPHOCYTES # BLD AUTO: 1.23 K/UL (ref 1–4.8)
LYMPHOCYTES NFR BLD: 24.8 % (ref 22–41)
MCH RBC QN AUTO: 30.8 PG (ref 27–33)
MCHC RBC AUTO-ENTMCNC: 32 G/DL (ref 33.6–35)
MCV RBC AUTO: 96.3 FL (ref 81.4–97.8)
MONOCYTES # BLD AUTO: 0.36 K/UL (ref 0–0.85)
MONOCYTES NFR BLD AUTO: 7.3 % (ref 0–13.4)
NEUTROPHILS # BLD AUTO: 2.95 K/UL (ref 2–7.15)
NEUTROPHILS NFR BLD: 59.5 % (ref 44–72)
NRBC # BLD AUTO: 0 K/UL
NRBC BLD-RTO: 0 /100 WBC
PLATELET # BLD AUTO: 266 K/UL (ref 164–446)
PMV BLD AUTO: 9.7 FL (ref 9–12.9)
RBC # BLD AUTO: 4.29 M/UL (ref 4.2–5.4)
WBC # BLD AUTO: 5 K/UL (ref 4.8–10.8)

## 2021-01-11 PROCEDURE — 96413 CHEMO IV INFUSION 1 HR: CPT

## 2021-01-11 PROCEDURE — 85025 COMPLETE CBC W/AUTO DIFF WBC: CPT

## 2021-01-11 PROCEDURE — 96375 TX/PRO/DX INJ NEW DRUG ADDON: CPT

## 2021-01-11 PROCEDURE — 304540 HCHG NITRO SET VENT 2ND TUB

## 2021-01-11 PROCEDURE — 700111 HCHG RX REV CODE 636 W/ 250 OVERRIDE (IP): Performed by: INTERNAL MEDICINE

## 2021-01-11 PROCEDURE — 700105 HCHG RX REV CODE 258: Performed by: INTERNAL MEDICINE

## 2021-01-11 RX ADMIN — DIPHENHYDRAMINE HYDROCHLORIDE 25 MG: 50 INJECTION, SOLUTION INTRAMUSCULAR; INTRAVENOUS at 11:20

## 2021-01-11 RX ADMIN — FAMOTIDINE 20 MG: 10 INJECTION INTRAVENOUS at 11:09

## 2021-01-11 RX ADMIN — PACLITAXEL 126.4 MG: 6 INJECTION, SOLUTION INTRAVENOUS at 11:56

## 2021-01-11 RX ADMIN — DEXAMETHASONE SODIUM PHOSPHATE 12 MG: 4 INJECTION, SOLUTION INTRA-ARTICULAR; INTRALESIONAL; INTRAMUSCULAR; INTRAVENOUS; SOFT TISSUE at 11:09

## 2021-01-11 ASSESSMENT — FIBROSIS 4 INDEX: FIB4 SCORE: 1.3

## 2021-01-11 NOTE — PROGRESS NOTES
Chemotherapy Verification - PRIMARY RN      Height = 147 cm  Weight = 60.8 kg  BSA = 1.58 m^2       Medication: paclitaxel  Dose: 80 mg/m^2  Calculated Dose: 126.4 mg                             (In mg/m2, AUC, mg/kg)         I confirm this process was performed independently with the BSA and all final chemotherapy dosing calculations congruent.  Any discrepancies of 10% or greater have been addressed with the chemotherapy pharmacist. The resolution of the discrepancy has been documented in the EPIC progress notes.

## 2021-01-11 NOTE — PROGRESS NOTES
"Pharmacy Chemotherapy Verification    Dx: Breast CA  Cycle 3, Day 8  Previous treatment = 1/4/21     Regimen: Weekly PACLitaxel  PACLitaxel 80 mg/m2 IV Day 1   Weekly cycle until DP or UT  *Dosing Reference*  NCCN guidelines for breast cancer V.1.2019  Bay EA, et al. J Clin Oncol 2001;19(22):4218-23    Allergies:Patient has no known allergies.  /90   Pulse 80   Temp 36.7 °C (98 °F) (Temporal)   Resp 18   Ht 1.47 m (4' 9.87\")   Wt 60.8 kg (134 lb 0.6 oz)   SpO2 94%   BMI 28.14 kg/m²  Body surface area is 1.58 meters squared.     Labs: 1/11/21   Meets treatment parameters    PACLitaxel 80 mg/m2 x 1.58 m2 = 126 mg   OK to treat with final dose = 126.4 mg IV   Final [conc] ~ 0.47 mg/mL          "

## 2021-01-11 NOTE — PROGRESS NOTES
"Pharmacy Chemotherapy Calculation:    Dx: metastatic breast cancer         Protocol: weekly paclitaxel  Paclitaxel 80mg/m2 IV over 60 min on Day 1   Weekly until disease progression or unacceptable toxicity  NCCN Guidelines for Breast Cancer V.1.2019  [place] COPPOLA, et al - J Clin Oncol 2001 Nov 15;19(22):4218-23.     Allergies:  Patient has no known allergies.     /90   Pulse 80   Temp 36.7 °C (98 °F) (Temporal)   Resp 18   Ht 1.47 m (4' 9.87\")   Wt 60.8 kg (134 lb 0.6 oz)   SpO2 94%   BMI 28.14 kg/m²  Body surface area is 1.58 meters squared.     Labs 1/11/21:  ANC~ 2950 Plt = 266k   Hgb = 13.2          Drug Order   (Drug name, dose, route, IV Fluid & volume, frequency, number of doses) Cycle 3 Day 8  Previous treatment: 1/4/21   Medication = PACLitaxel (Taxol)   Base Dose = 80 mg/m²  Calc Dose: Base Dose x 1.58 m² = 126.4 mg  Final Dose = 126.4 mg  Route = IV  Fluid & Volume =  mL  Admin Duration = Over 60 minutes          <10% difference, okay to treat with final dose       By my signature below, I confirm this process was performed independently with the BSA and all final chemotherapy dosing calculations congruent. I have reviewed the above chemotherapy order and that my calculation of the final dose and BSA (when applicable) corroborate those calculations of the  pharmacist. Discrepancies of 10% or greater in the written dose have been addressed and documented within the [facility] Progress notes.    Matt Humphries, PharmD            "

## 2021-01-11 NOTE — PROGRESS NOTES
Chemotherapy Verification - SECONDARY RN       Height = 147 cm  Weight = 60.8 kg  BSA = 1.58 m2       Medication: Paclitaxel  Dose: 80 mg/m2  Calculated Dose: 126.4 mg                             (In mg/m2, AUC, mg/kg)         I confirm that this process was performed independently.

## 2021-01-12 NOTE — PROGRESS NOTES
[facility]ine into [facility] for Day 8/ Cycle 3 of paclitaxel. Pt denied having any new or acute complaints today, reports tolerating past treatments well. PIV started, had + blood return flushed briskly. Pt given pre-meds and paclitaxel as prescribed, tolerated well, denied having any complaints during or after infusion. PIV discontinued, bleeding controlled with gauze and coban. Next appointment scheduled, Christine discharged home to self care.

## 2021-01-15 RX ORDER — DIPHENHYDRAMINE HYDROCHLORIDE 50 MG/ML
50 INJECTION INTRAMUSCULAR; INTRAVENOUS PRN
Status: CANCELLED | OUTPATIENT
Start: 2021-02-15

## 2021-01-15 RX ORDER — HEPARIN SODIUM (PORCINE) LOCK FLUSH IV SOLN 100 UNIT/ML 100 UNIT/ML
500 SOLUTION INTRAVENOUS PRN
Status: CANCELLED | OUTPATIENT
Start: 2021-02-01

## 2021-01-15 RX ORDER — 0.9 % SODIUM CHLORIDE 0.9 %
3 VIAL (ML) INJECTION PRN
Status: CANCELLED | OUTPATIENT
Start: 2021-02-01

## 2021-01-15 RX ORDER — 0.9 % SODIUM CHLORIDE 0.9 %
3 VIAL (ML) INJECTION PRN
Status: CANCELLED | OUTPATIENT
Start: 2021-01-31

## 2021-01-15 RX ORDER — 0.9 % SODIUM CHLORIDE 0.9 %
10 VIAL (ML) INJECTION PRN
Status: CANCELLED | OUTPATIENT
Start: 2021-02-01

## 2021-01-15 RX ORDER — SODIUM CHLORIDE 9 MG/ML
INJECTION, SOLUTION INTRAVENOUS CONTINUOUS
Status: CANCELLED | OUTPATIENT
Start: 2021-02-08

## 2021-01-15 RX ORDER — SODIUM CHLORIDE 9 MG/ML
INJECTION, SOLUTION INTRAVENOUS CONTINUOUS
Status: CANCELLED | OUTPATIENT
Start: 2021-02-15

## 2021-01-15 RX ORDER — 0.9 % SODIUM CHLORIDE 0.9 %
VIAL (ML) INJECTION PRN
Status: CANCELLED | OUTPATIENT
Start: 2021-02-01

## 2021-01-15 RX ORDER — 0.9 % SODIUM CHLORIDE 0.9 %
10 VIAL (ML) INJECTION PRN
Status: CANCELLED | OUTPATIENT
Start: 2021-01-31

## 2021-01-15 RX ORDER — DIPHENHYDRAMINE HYDROCHLORIDE 50 MG/ML
50 INJECTION INTRAMUSCULAR; INTRAVENOUS PRN
Status: CANCELLED | OUTPATIENT
Start: 2021-02-01

## 2021-01-15 RX ORDER — HEPARIN SODIUM (PORCINE) LOCK FLUSH IV SOLN 100 UNIT/ML 100 UNIT/ML
500 SOLUTION INTRAVENOUS PRN
Status: CANCELLED | OUTPATIENT
Start: 2021-01-31

## 2021-01-15 RX ORDER — EPINEPHRINE 1 MG/ML(1)
0.5 AMPUL (ML) INJECTION PRN
Status: CANCELLED | OUTPATIENT
Start: 2021-02-08

## 2021-01-15 RX ORDER — 0.9 % SODIUM CHLORIDE 0.9 %
10 VIAL (ML) INJECTION PRN
Status: CANCELLED | OUTPATIENT
Start: 2021-02-08

## 2021-01-15 RX ORDER — EPINEPHRINE 1 MG/ML(1)
0.5 AMPUL (ML) INJECTION PRN
Status: CANCELLED | OUTPATIENT
Start: 2021-02-01

## 2021-01-15 RX ORDER — PROCHLORPERAZINE MALEATE 10 MG
10 TABLET ORAL EVERY 6 HOURS PRN
Status: CANCELLED | OUTPATIENT
Start: 2021-02-01

## 2021-01-15 RX ORDER — ONDANSETRON 8 MG/1
8 TABLET, ORALLY DISINTEGRATING ORAL PRN
Status: CANCELLED | OUTPATIENT
Start: 2021-02-01

## 2021-01-15 RX ORDER — 0.9 % SODIUM CHLORIDE 0.9 %
10 VIAL (ML) INJECTION PRN
Status: CANCELLED | OUTPATIENT
Start: 2021-02-15

## 2021-01-15 RX ORDER — HEPARIN SODIUM (PORCINE) LOCK FLUSH IV SOLN 100 UNIT/ML 100 UNIT/ML
500 SOLUTION INTRAVENOUS PRN
Status: CANCELLED | OUTPATIENT
Start: 2021-02-08

## 2021-01-15 RX ORDER — ONDANSETRON 8 MG/1
8 TABLET, ORALLY DISINTEGRATING ORAL PRN
Status: CANCELLED | OUTPATIENT
Start: 2021-02-08

## 2021-01-15 RX ORDER — ONDANSETRON 2 MG/ML
4 INJECTION INTRAMUSCULAR; INTRAVENOUS PRN
Status: CANCELLED | OUTPATIENT
Start: 2021-02-01

## 2021-01-15 RX ORDER — PROCHLORPERAZINE MALEATE 10 MG
10 TABLET ORAL EVERY 6 HOURS PRN
Status: CANCELLED | OUTPATIENT
Start: 2021-02-08

## 2021-01-15 RX ORDER — ONDANSETRON 2 MG/ML
4 INJECTION INTRAMUSCULAR; INTRAVENOUS PRN
Status: CANCELLED | OUTPATIENT
Start: 2021-02-15

## 2021-01-15 RX ORDER — METHYLPREDNISOLONE SODIUM SUCCINATE 125 MG/2ML
125 INJECTION, POWDER, LYOPHILIZED, FOR SOLUTION INTRAMUSCULAR; INTRAVENOUS PRN
Status: CANCELLED | OUTPATIENT
Start: 2021-02-08

## 2021-01-15 RX ORDER — 0.9 % SODIUM CHLORIDE 0.9 %
VIAL (ML) INJECTION PRN
Status: CANCELLED | OUTPATIENT
Start: 2021-02-08

## 2021-01-15 RX ORDER — SODIUM CHLORIDE 9 MG/ML
INJECTION, SOLUTION INTRAVENOUS CONTINUOUS
Status: CANCELLED | OUTPATIENT
Start: 2021-02-01

## 2021-01-15 RX ORDER — 0.9 % SODIUM CHLORIDE 0.9 %
3 VIAL (ML) INJECTION PRN
Status: CANCELLED | OUTPATIENT
Start: 2021-02-08

## 2021-01-15 RX ORDER — 0.9 % SODIUM CHLORIDE 0.9 %
VIAL (ML) INJECTION PRN
Status: CANCELLED | OUTPATIENT
Start: 2021-01-31

## 2021-01-15 RX ORDER — METHYLPREDNISOLONE SODIUM SUCCINATE 125 MG/2ML
125 INJECTION, POWDER, LYOPHILIZED, FOR SOLUTION INTRAMUSCULAR; INTRAVENOUS PRN
Status: CANCELLED | OUTPATIENT
Start: 2021-02-15

## 2021-01-15 RX ORDER — 0.9 % SODIUM CHLORIDE 0.9 %
VIAL (ML) INJECTION PRN
Status: CANCELLED | OUTPATIENT
Start: 2021-02-15

## 2021-01-15 RX ORDER — EPINEPHRINE 1 MG/ML(1)
0.5 AMPUL (ML) INJECTION PRN
Status: CANCELLED | OUTPATIENT
Start: 2021-02-15

## 2021-01-15 RX ORDER — PROCHLORPERAZINE MALEATE 10 MG
10 TABLET ORAL EVERY 6 HOURS PRN
Status: CANCELLED | OUTPATIENT
Start: 2021-02-15

## 2021-01-15 RX ORDER — DIPHENHYDRAMINE HYDROCHLORIDE 50 MG/ML
50 INJECTION INTRAMUSCULAR; INTRAVENOUS PRN
Status: CANCELLED | OUTPATIENT
Start: 2021-02-08

## 2021-01-15 RX ORDER — METHYLPREDNISOLONE SODIUM SUCCINATE 125 MG/2ML
125 INJECTION, POWDER, LYOPHILIZED, FOR SOLUTION INTRAMUSCULAR; INTRAVENOUS PRN
Status: CANCELLED | OUTPATIENT
Start: 2021-02-01

## 2021-01-15 RX ORDER — ONDANSETRON 8 MG/1
8 TABLET, ORALLY DISINTEGRATING ORAL PRN
Status: CANCELLED | OUTPATIENT
Start: 2021-02-15

## 2021-01-15 RX ORDER — 0.9 % SODIUM CHLORIDE 0.9 %
3 VIAL (ML) INJECTION PRN
Status: CANCELLED | OUTPATIENT
Start: 2021-02-15

## 2021-01-15 RX ORDER — ONDANSETRON 2 MG/ML
4 INJECTION INTRAMUSCULAR; INTRAVENOUS PRN
Status: CANCELLED | OUTPATIENT
Start: 2021-02-08

## 2021-01-15 RX ORDER — HEPARIN SODIUM (PORCINE) LOCK FLUSH IV SOLN 100 UNIT/ML 100 UNIT/ML
500 SOLUTION INTRAVENOUS PRN
Status: CANCELLED | OUTPATIENT
Start: 2021-02-15

## 2021-01-18 ENCOUNTER — OUTPATIENT INFUSION SERVICES (OUTPATIENT)
Dept: ONCOLOGY | Facility: MEDICAL CENTER | Age: 66
End: 2021-01-18
Attending: INTERNAL MEDICINE
Payer: MEDICAID

## 2021-01-18 VITALS
TEMPERATURE: 98 F | OXYGEN SATURATION: 98 % | HEIGHT: 58 IN | HEART RATE: 83 BPM | WEIGHT: 134.48 LBS | BODY MASS INDEX: 28.23 KG/M2 | SYSTOLIC BLOOD PRESSURE: 129 MMHG | DIASTOLIC BLOOD PRESSURE: 65 MMHG | RESPIRATION RATE: 18 BRPM

## 2021-01-18 DIAGNOSIS — Z17.0 MALIGNANT NEOPLASM OF OVERLAPPING SITES OF RIGHT BREAST IN FEMALE, ESTROGEN RECEPTOR POSITIVE (HCC): ICD-10-CM

## 2021-01-18 DIAGNOSIS — C50.811 MALIGNANT NEOPLASM OF OVERLAPPING SITES OF RIGHT BREAST IN FEMALE, ESTROGEN RECEPTOR POSITIVE (HCC): ICD-10-CM

## 2021-01-18 LAB
BASOPHILS # BLD AUTO: 0.8 % (ref 0–1.8)
BASOPHILS # BLD: 0.03 K/UL (ref 0–0.12)
EOSINOPHIL # BLD AUTO: 0.12 K/UL (ref 0–0.51)
EOSINOPHIL NFR BLD: 3.2 % (ref 0–6.9)
ERYTHROCYTE [DISTWIDTH] IN BLOOD BY AUTOMATED COUNT: 57.4 FL (ref 35.9–50)
HCT VFR BLD AUTO: 40.3 % (ref 37–47)
HGB BLD-MCNC: 13 G/DL (ref 12–16)
IMM GRANULOCYTES # BLD AUTO: 0.05 K/UL (ref 0–0.11)
IMM GRANULOCYTES NFR BLD AUTO: 1.3 % (ref 0–0.9)
LYMPHOCYTES # BLD AUTO: 1.1 K/UL (ref 1–4.8)
LYMPHOCYTES NFR BLD: 28.9 % (ref 22–41)
MCH RBC QN AUTO: 31.3 PG (ref 27–33)
MCHC RBC AUTO-ENTMCNC: 32.3 G/DL (ref 33.6–35)
MCV RBC AUTO: 97.1 FL (ref 81.4–97.8)
MONOCYTES # BLD AUTO: 0.22 K/UL (ref 0–0.85)
MONOCYTES NFR BLD AUTO: 5.8 % (ref 0–13.4)
NEUTROPHILS # BLD AUTO: 2.28 K/UL (ref 2–7.15)
NEUTROPHILS NFR BLD: 60 % (ref 44–72)
NRBC # BLD AUTO: 0 K/UL
NRBC BLD-RTO: 0 /100 WBC
PLATELET # BLD AUTO: 297 K/UL (ref 164–446)
PMV BLD AUTO: 9.5 FL (ref 9–12.9)
RBC # BLD AUTO: 4.15 M/UL (ref 4.2–5.4)
WBC # BLD AUTO: 3.8 K/UL (ref 4.8–10.8)

## 2021-01-18 PROCEDURE — 304540 HCHG NITRO SET VENT 2ND TUB

## 2021-01-18 PROCEDURE — 96413 CHEMO IV INFUSION 1 HR: CPT

## 2021-01-18 PROCEDURE — 85025 COMPLETE CBC W/AUTO DIFF WBC: CPT

## 2021-01-18 PROCEDURE — 96375 TX/PRO/DX INJ NEW DRUG ADDON: CPT

## 2021-01-18 PROCEDURE — 700111 HCHG RX REV CODE 636 W/ 250 OVERRIDE (IP): Performed by: INTERNAL MEDICINE

## 2021-01-18 PROCEDURE — 700105 HCHG RX REV CODE 258: Performed by: INTERNAL MEDICINE

## 2021-01-18 RX ADMIN — FAMOTIDINE 20 MG: 10 INJECTION INTRAVENOUS at 11:13

## 2021-01-18 RX ADMIN — DIPHENHYDRAMINE HYDROCHLORIDE 25 MG: 50 INJECTION INTRAMUSCULAR; INTRAVENOUS at 11:13

## 2021-01-18 RX ADMIN — PACLITAXEL 126.4 MG: 6 INJECTION, SOLUTION INTRAVENOUS at 12:17

## 2021-01-18 RX ADMIN — DEXAMETHASONE SODIUM PHOSPHATE 12 MG: 4 INJECTION, SOLUTION INTRA-ARTICULAR; INTRALESIONAL; INTRAMUSCULAR; INTRAVENOUS; SOFT TISSUE at 11:25

## 2021-01-18 ASSESSMENT — FIBROSIS 4 INDEX: FIB4 SCORE: 1.32

## 2021-01-18 NOTE — PROGRESS NOTES
Pt arrived ambulatory to IS for D15/ C 3 of Paclitaxel. Pt denied fever, signs or symptoms of infection or acute illness today. POC discussed via  #[id number], Mayra and pt verbalized understanding.     PIV established and labs drawn at this time; pt tolerated well. Labs reviewed. Premedications and Paclitaxel administered per MAR; pt tolerated well. No signs or symptoms of reaction or complications noted. PIV flushed and removed; sterile gauze and paper tape applied to site.     Follow-up care discussed and future appointments scheduled/onfirmed/printed for pt; pt verbalized understanding. Pt discharged home ambulatory to self care in no apparent distress at this time.

## 2021-01-18 NOTE — PROGRESS NOTES
"Pharmacy Chemotherapy Calculation:    Dx: Metastatic breast cancer         Protocol: Weekly Paclitaxel  Paclitaxel 80 mg/m2 IV over 60 min on Day 1   Weekly until disease progression or unacceptable toxicity  NCCN Guidelines for Breast Cancer V.1.2019  Bay EA, et al - J Clin Oncol 2001 Nov 15;19(22):4219-23.     Allergies:  Patient has no known allergies.     /65   Pulse 83   Temp 36.7 °C (98 °F) (Temporal)   Resp 18   Ht 1.47 m (4' 9.87\")   Wt 61 kg (134 lb 7.7 oz)   SpO2 98%   BMI 28.23 kg/m²  Body surface area is 1.58 meters squared.     Labs 1/18/21  ANC~ 2280 Plt = 297k   Hgb = 13        Drug Order   (Drug name, dose, route, IV Fluid & volume, frequency, number of doses) Cycle 3 Day 15  Previous treatment: 1/11/21   Medication = PACLitaxel (Taxol)   Base Dose = 80 mg/m²  Calc Dose: Base Dose x 1.58 m² = 126.4 mg  Final Dose = 126.4 mg  Route = IV  Fluid & Volume =  mL  Admin Duration = Over 60 minutes          <10% difference, okay to treat with final dose   By my signature below, I confirm this process was performed independently with the BSA and all final chemotherapy dosing calculations congruent. I have reviewed the above chemotherapy order and that my calculation of the final dose and BSA (when applicable) corroborate those calculations of the  pharmacist. Discrepancies of 10% or greater in the written dose have been addressed and documented within the EPIC Progress notes.    Pepito Merritt, PharmD            "

## 2021-01-18 NOTE — PROGRESS NOTES
Chemotherapy Verification - SECONDARY RN       Height = 57.87in  Weight = 61kg  BSA = 1.57m2       Medication: Taxol  Dose: 80mg/m2  Calculated Dose: 125.6mg                             (In mg/m2, AUC, mg/kg)         I confirm that this process was performed independently.

## 2021-01-18 NOTE — PROGRESS NOTES
Chemotherapy Verification - PRIMARY RN      Height = 147cm  Weight = 61kg  BSA = 1.58m2       Medication: Paclitaxel  Dose: 80mg/m2  Calculated Dose: 126.4mg                            (In mg/m2, AUC, mg/kg)       I confirm this process was performed independently with the BSA and all final chemotherapy dosing calculations congruent.  Any discrepancies of 10% or greater have been addressed with the chemotherapy pharmacist. The resolution of the discrepancy has been documented in the EPIC progress notes.

## 2021-01-18 NOTE — PROGRESS NOTES
"Pharmacy Chemotherapy Verification    Dx: Breast CA  Cycle 3, Day 15  Previous treatment = 1/11/21 = C3D8    Regimen: Weekly PACLitaxel  PACLitaxel 80 mg/m2 IV Day 1   Weekly cycle until DP or UT  *Dosing Reference*  NCCN guidelines for breast cancer V.1.2019  [place] EA, et al. J Clin Oncol 2001;19(22):4216-23    Allergies:Patient has no known allergies.  /65   Pulse 83   Temp 36.7 °C (98 °F) (Temporal)   Resp 18   Ht 1.47 m (4' 9.87\")   Wt 61 kg (134 lb 7.7 oz)   SpO2 98%   BMI 28.23 kg/m²  Body surface area is 1.58 meters squared.     Labs: 1/18/21   Meets treatment parameters    PACLitaxel 80 mg/m2 x 1.58 m2 = 126 mg   OK to treat with final dose = 126.4 mg IV    Caleb Padilla, PharmD    "

## 2021-01-30 NOTE — PROGRESS NOTES
"Pharmacy Chemotherapy Verification    Dx: Metastatic breast cancer         Protocol: Weekly PACLItaxel  PACLItaxel 80 mg/m2 IV over 60 min on Day 1  Weekly until disease progression or unacceptable toxicity  NCCN Guidelines for Breast Cancer V.1.2019  [place] EA, et al - J Clin Oncol 2001 Nov 15;19(22):4217-23.     Allergies:  Patient has no known allergies.     /61   Pulse (!) 47   Temp 36.1 °C (97 °F) (Temporal)   Resp 18   Ht 1.47 m (4' 9.87\")   Wt 62 kg (136 lb 11 oz)   SpO2 98%   BMI 28.69 kg/m²  Body surface area is 1.59 meters squared.     Labs 2/1/21  ANC~ 2870 Hgb 13.3 Plt 248k  SCr 0.54 CrCl 80 mL/min   AST/ALT/AP = 28/14/71 Tbili = 0.2     Drug Order   (Drug name, dose, route, IV Fluid & volume, frequency, number of doses) Cycle 4 Day 1 - delayed  Previous treatment: 1/18/21   Medication = PACLitaxel (Taxol)   Base Dose = 80 mg/m²  Calc Dose: Base Dose x 1.59 m² = 127.2 mg  Final Dose = 127.2 mg  Route = IV  Fluid & Volume =  mL  Admin Duration = Over 60 minutes          <10% difference, okay to treat with final dose   By my signature below, I confirm this process was performed independently with the BSA and all final chemotherapy dosing calculations congruent. I have reviewed the above chemotherapy order and that my calculation of the final dose and BSA (when applicable) corroborate those calculations of the  pharmacist. Discrepancies of 10% or greater in the written dose have been addressed and documented within the [facility] Progress notes.    Roseanne Gross, PharmD, BCOP            "

## 2021-02-01 ENCOUNTER — OUTPATIENT INFUSION SERVICES (OUTPATIENT)
Dept: ONCOLOGY | Facility: MEDICAL CENTER | Age: 66
End: 2021-02-01
Attending: INTERNAL MEDICINE
Payer: MEDICAID

## 2021-02-01 VITALS
SYSTOLIC BLOOD PRESSURE: 135 MMHG | OXYGEN SATURATION: 98 % | RESPIRATION RATE: 18 BRPM | HEART RATE: 47 BPM | WEIGHT: 136.69 LBS | HEIGHT: 58 IN | BODY MASS INDEX: 28.69 KG/M2 | TEMPERATURE: 97 F | DIASTOLIC BLOOD PRESSURE: 61 MMHG

## 2021-02-01 DIAGNOSIS — Z17.0 MALIGNANT NEOPLASM OF OVERLAPPING SITES OF RIGHT BREAST IN FEMALE, ESTROGEN RECEPTOR POSITIVE (HCC): ICD-10-CM

## 2021-02-01 DIAGNOSIS — C50.811 MALIGNANT NEOPLASM OF OVERLAPPING SITES OF RIGHT BREAST IN FEMALE, ESTROGEN RECEPTOR POSITIVE (HCC): ICD-10-CM

## 2021-02-01 LAB
ALBUMIN SERPL BCP-MCNC: 3.7 G/DL (ref 3.2–4.9)
ALBUMIN/GLOB SERPL: 1.4 G/DL
ALP SERPL-CCNC: 71 U/L (ref 30–99)
ALT SERPL-CCNC: 14 U/L (ref 2–50)
ANION GAP SERPL CALC-SCNC: 10 MMOL/L (ref 7–16)
AST SERPL-CCNC: 28 U/L (ref 12–45)
BASOPHILS # BLD AUTO: 1.3 % (ref 0–1.8)
BASOPHILS # BLD: 0.06 K/UL (ref 0–0.12)
BILIRUB SERPL-MCNC: 0.2 MG/DL (ref 0.1–1.5)
BUN SERPL-MCNC: 12 MG/DL (ref 8–22)
CALCIUM SERPL-MCNC: 8.7 MG/DL (ref 8.5–10.5)
CHLORIDE SERPL-SCNC: 106 MMOL/L (ref 96–112)
CO2 SERPL-SCNC: 23 MMOL/L (ref 20–33)
CREAT SERPL-MCNC: 0.54 MG/DL (ref 0.5–1.4)
EOSINOPHIL # BLD AUTO: 0.09 K/UL (ref 0–0.51)
EOSINOPHIL NFR BLD: 1.9 % (ref 0–6.9)
ERYTHROCYTE [DISTWIDTH] IN BLOOD BY AUTOMATED COUNT: 58.2 FL (ref 35.9–50)
GLOBULIN SER CALC-MCNC: 2.7 G/DL (ref 1.9–3.5)
GLUCOSE SERPL-MCNC: 118 MG/DL (ref 65–99)
HCT VFR BLD AUTO: 42.1 % (ref 37–47)
HGB BLD-MCNC: 13.3 G/DL (ref 12–16)
IMM GRANULOCYTES # BLD AUTO: 0.06 K/UL (ref 0–0.11)
IMM GRANULOCYTES NFR BLD AUTO: 1.3 % (ref 0–0.9)
LYMPHOCYTES # BLD AUTO: 1.22 K/UL (ref 1–4.8)
LYMPHOCYTES NFR BLD: 25.8 % (ref 22–41)
MCH RBC QN AUTO: 31.7 PG (ref 27–33)
MCHC RBC AUTO-ENTMCNC: 31.6 G/DL (ref 33.6–35)
MCV RBC AUTO: 100.2 FL (ref 81.4–97.8)
MONOCYTES # BLD AUTO: 0.43 K/UL (ref 0–0.85)
MONOCYTES NFR BLD AUTO: 9.1 % (ref 0–13.4)
NEUTROPHILS # BLD AUTO: 2.87 K/UL (ref 2–7.15)
NEUTROPHILS NFR BLD: 60.6 % (ref 44–72)
NRBC # BLD AUTO: 0 K/UL
NRBC BLD-RTO: 0 /100 WBC
PLATELET # BLD AUTO: 248 K/UL (ref 164–446)
PMV BLD AUTO: 9.5 FL (ref 9–12.9)
POTASSIUM SERPL-SCNC: 4.4 MMOL/L (ref 3.6–5.5)
PROT SERPL-MCNC: 6.4 G/DL (ref 6–8.2)
RBC # BLD AUTO: 4.2 M/UL (ref 4.2–5.4)
SODIUM SERPL-SCNC: 139 MMOL/L (ref 135–145)
WBC # BLD AUTO: 4.7 K/UL (ref 4.8–10.8)

## 2021-02-01 PROCEDURE — 304540 HCHG NITRO SET VENT 2ND TUB

## 2021-02-01 PROCEDURE — 96413 CHEMO IV INFUSION 1 HR: CPT

## 2021-02-01 PROCEDURE — 85025 COMPLETE CBC W/AUTO DIFF WBC: CPT

## 2021-02-01 PROCEDURE — 700111 HCHG RX REV CODE 636 W/ 250 OVERRIDE (IP): Performed by: INTERNAL MEDICINE

## 2021-02-01 PROCEDURE — 96375 TX/PRO/DX INJ NEW DRUG ADDON: CPT

## 2021-02-01 PROCEDURE — 700105 HCHG RX REV CODE 258: Performed by: INTERNAL MEDICINE

## 2021-02-01 PROCEDURE — 80053 COMPREHEN METABOLIC PANEL: CPT

## 2021-02-01 RX ADMIN — PACLITAXEL 127.2 MG: 6 INJECTION, SOLUTION INTRAVENOUS at 14:21

## 2021-02-01 RX ADMIN — FAMOTIDINE 20 MG: 10 INJECTION INTRAVENOUS at 13:32

## 2021-02-01 RX ADMIN — DIPHENHYDRAMINE HYDROCHLORIDE 25 MG: 50 INJECTION INTRAMUSCULAR; INTRAVENOUS at 13:40

## 2021-02-01 RX ADMIN — DEXAMETHASONE SODIUM PHOSPHATE 12 MG: 4 INJECTION, SOLUTION INTRA-ARTICULAR; INTRALESIONAL; INTRAMUSCULAR; INTRAVENOUS; SOFT TISSUE at 13:52

## 2021-02-01 ASSESSMENT — FIBROSIS 4 INDEX: FIB4 SCORE: 1.18

## 2021-02-01 NOTE — PROGRESS NOTES
"Pharmacy Chemotherapy Verification    Dx: Breast CA  Cycle 4, Day 1  Previous treatment = 1/18/21     Regimen: Weekly PACLitaxel  PACLitaxel 80 mg/m2 IV over 60 minutes Day 1,8,15   qWeekly cycles x6 cycles (currently entered)  *Dosing Reference*  NCCN guidelines for breast cancer V.1.2019  [place] COPPOLA, et al. J Clin Oncol 2001;19(22):4213-23    Allergies:Patient has no known allergies.  /61   Pulse (!) 47   Temp 36.1 °C (97 °F) (Temporal)   Resp 18   Ht 1.47 m (4' 9.87\")   Wt 62 kg (136 lb 11 oz)   SpO2 98%   BMI 28.69 kg/m²  Body surface area is 1.59 meters squared.     Labs: 2/1/21   Meets treatment parameters    PACLitaxel 80 mg/m2 x 1.59 m2 = 127.2 mg   OK to treat with final dose = 127.2 mg IV   Final [conc] ~ 0.47 mg/mL          "

## 2021-02-01 NOTE — PROGRESS NOTES
Chemotherapy Verification - SECONDARY RN       Height = 147 cm  Weight = 62 kg  BSA = 1.59 m2       Medication: Taxol  Dose: 80 mg/m2  Calculated Dose: 127.2 mg                             (In mg/m2, AUC, mg/kg)         I confirm that this process was performed independently.

## 2021-02-01 NOTE — PROGRESS NOTES
Chemotherapy Verification - PRIMARY RN      Height = 147 cm  Weight = 62 kg  BSA = 1.59 m^2       Medication: PACLitaxel (TAXOL)  Dose: 80 mg/m^2  Calculated Dose: 127.2 mg                             (In mg/m2, AUC, mg/kg)     I confirm this process was performed independently with the BSA and all final chemotherapy dosing calculations congruent.  Any discrepancies of 10% or greater have been addressed with the chemotherapy pharmacist. The resolution of the discrepancy has been documented in the EPIC progress notes.

## 2021-02-01 NOTE — PROGRESS NOTES
Patient here for Day 1, Cycle 4 Taxol. Plan of care discussed with patient via  services. PIV established; labs drawn as ordered. Labs results within parameters to proceed with treatment. Reports that her numbness/tingling has worsen. Radha LEACH notified. Prescription sent for gabapentin to [facility] in [place]. Updated patient via  services about new prescription. Patient verbalized understanding.  Pre-medications given per MAR. Taxol given per MAR. PIV flushed with normal saline and removed; gauze/coban applied to site. Next appointment scheduled. Discharged to self care; no apparent distress noted.

## 2021-02-08 ENCOUNTER — OUTPATIENT INFUSION SERVICES (OUTPATIENT)
Dept: ONCOLOGY | Facility: MEDICAL CENTER | Age: 66
End: 2021-02-08
Attending: INTERNAL MEDICINE
Payer: MEDICAID

## 2021-02-08 VITALS
SYSTOLIC BLOOD PRESSURE: 135 MMHG | HEIGHT: 58 IN | DIASTOLIC BLOOD PRESSURE: 67 MMHG | OXYGEN SATURATION: 96 % | HEART RATE: 70 BPM | RESPIRATION RATE: 18 BRPM | TEMPERATURE: 98 F | BODY MASS INDEX: 28.65 KG/M2 | WEIGHT: 136.47 LBS

## 2021-02-08 DIAGNOSIS — Z17.0 MALIGNANT NEOPLASM OF OVERLAPPING SITES OF RIGHT BREAST IN FEMALE, ESTROGEN RECEPTOR POSITIVE (HCC): ICD-10-CM

## 2021-02-08 DIAGNOSIS — C50.811 MALIGNANT NEOPLASM OF OVERLAPPING SITES OF RIGHT BREAST IN FEMALE, ESTROGEN RECEPTOR POSITIVE (HCC): ICD-10-CM

## 2021-02-08 LAB
BASOPHILS # BLD AUTO: 1.2 % (ref 0–1.8)
BASOPHILS # BLD: 0.06 K/UL (ref 0–0.12)
EOSINOPHIL # BLD AUTO: 0.17 K/UL (ref 0–0.51)
EOSINOPHIL NFR BLD: 3.4 % (ref 0–6.9)
ERYTHROCYTE [DISTWIDTH] IN BLOOD BY AUTOMATED COUNT: 54.4 FL (ref 35.9–50)
HCT VFR BLD AUTO: 39.2 % (ref 37–47)
HGB BLD-MCNC: 12.7 G/DL (ref 12–16)
IMM GRANULOCYTES # BLD AUTO: 0.18 K/UL (ref 0–0.11)
IMM GRANULOCYTES NFR BLD AUTO: 3.6 % (ref 0–0.9)
LYMPHOCYTES # BLD AUTO: 1.12 K/UL (ref 1–4.8)
LYMPHOCYTES NFR BLD: 22.2 % (ref 22–41)
MCH RBC QN AUTO: 31.1 PG (ref 27–33)
MCHC RBC AUTO-ENTMCNC: 32.4 G/DL (ref 33.6–35)
MCV RBC AUTO: 96.1 FL (ref 81.4–97.8)
MONOCYTES # BLD AUTO: 0.27 K/UL (ref 0–0.85)
MONOCYTES NFR BLD AUTO: 5.3 % (ref 0–13.4)
NEUTROPHILS # BLD AUTO: 3.25 K/UL (ref 2–7.15)
NEUTROPHILS NFR BLD: 64.3 % (ref 44–72)
NRBC # BLD AUTO: 0 K/UL
NRBC BLD-RTO: 0 /100 WBC
PLATELET # BLD AUTO: 340 K/UL (ref 164–446)
PMV BLD AUTO: 10.2 FL (ref 9–12.9)
RBC # BLD AUTO: 4.08 M/UL (ref 4.2–5.4)
WBC # BLD AUTO: 5.1 K/UL (ref 4.8–10.8)

## 2021-02-08 PROCEDURE — 700111 HCHG RX REV CODE 636 W/ 250 OVERRIDE (IP): Performed by: INTERNAL MEDICINE

## 2021-02-08 PROCEDURE — 304540 HCHG NITRO SET VENT 2ND TUB

## 2021-02-08 PROCEDURE — 96413 CHEMO IV INFUSION 1 HR: CPT

## 2021-02-08 PROCEDURE — 96375 TX/PRO/DX INJ NEW DRUG ADDON: CPT

## 2021-02-08 PROCEDURE — 85025 COMPLETE CBC W/AUTO DIFF WBC: CPT

## 2021-02-08 PROCEDURE — 700105 HCHG RX REV CODE 258: Performed by: INTERNAL MEDICINE

## 2021-02-08 RX ORDER — GABAPENTIN 100 MG/1
100 CAPSULE ORAL 3 TIMES DAILY
COMMUNITY

## 2021-02-08 RX ADMIN — DEXAMETHASONE SODIUM PHOSPHATE 12 MG: 4 INJECTION, SOLUTION INTRA-ARTICULAR; INTRALESIONAL; INTRAMUSCULAR; INTRAVENOUS; SOFT TISSUE at 10:46

## 2021-02-08 RX ADMIN — FAMOTIDINE 20 MG: 10 INJECTION INTRAVENOUS at 10:46

## 2021-02-08 RX ADMIN — PACLITAXEL 127.2 MG: 6 INJECTION, SOLUTION INTRAVENOUS at 11:23

## 2021-02-08 RX ADMIN — DIPHENHYDRAMINE HYDROCHLORIDE 25 MG: 50 INJECTION INTRAMUSCULAR; INTRAVENOUS at 10:58

## 2021-02-08 ASSESSMENT — FIBROSIS 4 INDEX: FIB4 SCORE: 1.9

## 2021-02-08 NOTE — PROGRESS NOTES
"Pharmacy Chemotherapy Verification    Dx: Breast CA  Cycle 4, Day 8  Previous treatment = 2/1/21    Regimen: Weekly PACLitaxel  PACLitaxel 80 mg/m2 IV over 60 minutes Day 1,8,15  qWeekly cycles x6 cycles (currently entered)  *Dosing Reference*  NCCN guidelines for breast cancer V.1.2019  [place] COPPOLA, et al. J Clin Oncol 2001;19(22):4216-23    Allergies:Patient has no known allergies.  /67   Pulse 70   Temp 36.7 °C (98 °F) (Temporal)   Resp 18   Ht 1.47 m (4' 9.87\")   Wt 61.9 kg (136 lb 7.4 oz)   SpO2 96%   BMI 28.65 kg/m²  Body surface area is 1.59 meters squared.     All lab results 2/8/21 within treatment parameters.       PACLitaxel 80 mg/m2 x 1.59m2 = 127.2mg   OK to treat with final dose = 127.2mg IV     Jameel VázquezD.            "

## 2021-02-08 NOTE — PROGRESS NOTES
Patient presents for day eight cycle four weekly Taxol. Patient assessment performed with language line phones, questions answered as needed. PIV established, flushes well with blood drawn as ordered. Medications administered as ordered, line flushed clear. PIV removed, tip intact, compression dressing to site. Patient scheduled for her next appointment and released in no acute distress

## 2021-02-08 NOTE — PROGRESS NOTES
"Pharmacy Chemotherapy Verification    Dx: Breast CA  Cycle 4, Day 8  Previous treatment = 2/1/21     Regimen: Weekly PACLitaxel  PACLitaxel 80 mg/m2 IV over 60 minutes Day 1,8,15   qWeekly cycles x6 cycles (currently entered)  *Dosing Reference*  NCCN guidelines for breast cancer V.1.2019  [place] COPPOLA, et al. J Clin Oncol 2001;19(22):9470-23    Allergies:Patient has no known allergies.  /67   Pulse 70   Temp 36.7 °C (98 °F) (Temporal)   Resp 18   Ht 1.47 m (4' 9.87\")   Wt 61.9 kg (136 lb 7.4 oz)   SpO2 96%   BMI 28.65 kg/m²  Body surface area is 1.59 meters squared.     Labs: 2/8/21   Meets treatment parameters      Drug Order   (Drug name, dose, route, IV Fluid & volume, frequency, number of doses) Cycle 4 Day 8  Previous treatment: 2/1/21   Medication = PACLitaxel (Taxol)   Base Dose = 80 mg/m²  Calc Dose: Base Dose x 1.59 m² = 127.2 mg  Final Dose = 127.2 mg  Final [conc] ~ 0.47 mg/mL  Route = IV  Fluid & Volume =  mL  Admin Duration = Over 60 minutes            okay to treat with final dose         "

## 2021-02-08 NOTE — PROGRESS NOTES
Chemotherapy Verification - PRIMARY RN      Height = 147 cm  Weight = 61.9 kg  BSA = 1.59 m2       Medication: Taxol  Dose: 80 mg/m2  Calculated Dose: 127.2 mg                             (In mg/m2, AUC, mg/kg)           I confirm this process was performed independently with the BSA and all final chemotherapy dosing calculations congruent.  Any discrepancies of 10% or greater have been addressed with the chemotherapy pharmacist. The resolution of the discrepancy has been documented in the EPIC progress notes.

## 2021-02-08 NOTE — PROGRESS NOTES
Chemotherapy Verification - SECONDARY RN       Height = 147 cm  Weight = 61.9 kg  BSA = 1.59 m2       Medication: Taxol  Dose: 80 mg/m2  Calculated Dose: 127.2 mg                             (In mg/m2, AUC, mg/kg)     I confirm that this process was performed independently.

## 2021-02-15 ENCOUNTER — OUTPATIENT INFUSION SERVICES (OUTPATIENT)
Dept: ONCOLOGY | Facility: MEDICAL CENTER | Age: 66
End: 2021-02-15
Attending: INTERNAL MEDICINE
Payer: MEDICAID

## 2021-02-15 ENCOUNTER — HOSPITAL ENCOUNTER (OUTPATIENT)
Dept: RADIOLOGY | Facility: MEDICAL CENTER | Age: 66
End: 2021-02-15
Attending: INTERNAL MEDICINE
Payer: MEDICAID

## 2021-02-15 VITALS
TEMPERATURE: 98 F | SYSTOLIC BLOOD PRESSURE: 98 MMHG | HEART RATE: 80 BPM | WEIGHT: 134.92 LBS | RESPIRATION RATE: 18 BRPM | BODY MASS INDEX: 28.32 KG/M2 | OXYGEN SATURATION: 98 % | DIASTOLIC BLOOD PRESSURE: 63 MMHG | HEIGHT: 58 IN

## 2021-02-15 DIAGNOSIS — C50.811 MALIGNANT NEOPLASM OF OVERLAPPING SITES OF RIGHT BREAST IN FEMALE, ESTROGEN RECEPTOR POSITIVE (HCC): ICD-10-CM

## 2021-02-15 DIAGNOSIS — Z17.0 MALIGNANT NEOPLASM OF OVERLAPPING SITES OF RIGHT BREAST IN FEMALE, ESTROGEN RECEPTOR POSITIVE (HCC): ICD-10-CM

## 2021-02-15 LAB
BASOPHILS # BLD AUTO: 1.2 % (ref 0–1.8)
BASOPHILS # BLD: 0.05 K/UL (ref 0–0.12)
EOSINOPHIL # BLD AUTO: 0.14 K/UL (ref 0–0.51)
EOSINOPHIL NFR BLD: 3.3 % (ref 0–6.9)
ERYTHROCYTE [DISTWIDTH] IN BLOOD BY AUTOMATED COUNT: 58.4 FL (ref 35.9–50)
HCT VFR BLD AUTO: 44.2 % (ref 37–47)
HGB BLD-MCNC: 14.1 G/DL (ref 12–16)
IMM GRANULOCYTES # BLD AUTO: 0.08 K/UL (ref 0–0.11)
IMM GRANULOCYTES NFR BLD AUTO: 1.9 % (ref 0–0.9)
LYMPHOCYTES # BLD AUTO: 1.21 K/UL (ref 1–4.8)
LYMPHOCYTES NFR BLD: 28.3 % (ref 22–41)
MCH RBC QN AUTO: 31.4 PG (ref 27–33)
MCHC RBC AUTO-ENTMCNC: 31.9 G/DL (ref 33.6–35)
MCV RBC AUTO: 98.4 FL (ref 81.4–97.8)
MONOCYTES # BLD AUTO: 0.25 K/UL (ref 0–0.85)
MONOCYTES NFR BLD AUTO: 5.9 % (ref 0–13.4)
NEUTROPHILS # BLD AUTO: 2.54 K/UL (ref 2–7.15)
NEUTROPHILS NFR BLD: 59.4 % (ref 44–72)
NRBC # BLD AUTO: 0 K/UL
NRBC BLD-RTO: 0 /100 WBC
PLATELET # BLD AUTO: 314 K/UL (ref 164–446)
PMV BLD AUTO: 9.8 FL (ref 9–12.9)
RBC # BLD AUTO: 4.49 M/UL (ref 4.2–5.4)
WBC # BLD AUTO: 4.3 K/UL (ref 4.8–10.8)

## 2021-02-15 PROCEDURE — 96413 CHEMO IV INFUSION 1 HR: CPT

## 2021-02-15 PROCEDURE — 96375 TX/PRO/DX INJ NEW DRUG ADDON: CPT

## 2021-02-15 PROCEDURE — 700105 HCHG RX REV CODE 258: Performed by: INTERNAL MEDICINE

## 2021-02-15 PROCEDURE — 700111 HCHG RX REV CODE 636 W/ 250 OVERRIDE (IP): Performed by: INTERNAL MEDICINE

## 2021-02-15 PROCEDURE — 85025 COMPLETE CBC W/AUTO DIFF WBC: CPT

## 2021-02-15 PROCEDURE — 304540 HCHG NITRO SET VENT 2ND TUB

## 2021-02-15 RX ADMIN — DEXAMETHASONE SODIUM PHOSPHATE 12 MG: 4 INJECTION, SOLUTION INTRAMUSCULAR; INTRAVENOUS at 10:56

## 2021-02-15 RX ADMIN — FAMOTIDINE 20 MG: 10 INJECTION INTRAVENOUS at 10:54

## 2021-02-15 RX ADMIN — PACLITAXEL 126.4 MG: 6 INJECTION, SOLUTION INTRAVENOUS at 11:34

## 2021-02-15 RX ADMIN — DIPHENHYDRAMINE HYDROCHLORIDE 25 MG: 50 INJECTION INTRAMUSCULAR; INTRAVENOUS at 11:10

## 2021-02-15 ASSESSMENT — FIBROSIS 4 INDEX: FIB4 SCORE: 1.39

## 2021-02-15 NOTE — PROGRESS NOTES
"Pharmacy Chemotherapy Verification    Dx: Breast CA    Cycle 4, Day 15  Previous treatment = 2/8/21    Regimen: Weekly PACLitaxel  PACLitaxel 80 mg/m2 IV over 60 minutes Day 1,8,15  qWeekly cycles x6 cycles (currently entered)  *Dosing Reference*  NCCN guidelines for breast cancer V.1.2019  [place] COPPOLA, et al. J Clin Oncol 2001;19(22):4216-23    Allergies:Patient has no known allergies.  BP (!) 98/63   Pulse 80   Temp 36.7 °C (98 °F) (Temporal)   Resp 18   Ht 1.47 m (4' 9.87\")   Wt 61.2 kg (134 lb 14.7 oz)   SpO2 98%   BMI 28.32 kg/m²  Body surface area is 1.58 meters squared.     All lab results  2/15/21 within treatment parameters.       PACLitaxel 80 mg/m2 x 1.58m2 = 126.4mg   OK to treat with final dose = 126.4mg IV     ALBERT Urbina PharmJunaidD.            "

## 2021-02-15 NOTE — PROGRESS NOTES
Christine medicated per MAR. Pt tolerated treatment without s/s adverse reaction. VAT team left forearm PIV with brisk blood return, flushed with NS. PIV dc'd catheter tip intact, gauze and coban dressing applied. Pt discharged to self care, NAD. Pt aware of next appt 3/1/2021.

## 2021-02-15 NOTE — PROGRESS NOTES
Chemotherapy Verification - SECONDARY RN       Height = 147 cm  Weight = 61.2 kg  BSA = 1.58 m2       Medication: Taxol  Dose: 80 mg/m2  Calculated Dose: 126.4 mg                             (In mg/m2, AUC, mg/kg)         I confirm that this process was performed independently.

## 2021-02-15 NOTE — PROGRESS NOTES
"Pharmacy Chemotherapy Verification    Dx: Breast CA    Cycle 4, Day 8  Previous treatment = 2/1/21     Regimen: Weekly PACLitaxel  *Dosing Reference*  PACLitaxel 80 mg/m2 IV over 60 minutes Day 1,8,15  Weekly cycles x6 cycles (currently entered)  NCCN guidelines for breast cancer V.1.2019  [place] COPPOLA, et al. J Clin Oncol 2001;19(22):4216-23    Allergies:Patient has no known allergies.  BP (!) 98/63   Pulse 80   Temp 36.7 °C (98 °F) (Temporal)   Resp 18   Ht 1.47 m (4' 9.87\")   Wt 61.2 kg (134 lb 14.7 oz)   SpO2 98%   BMI 28.32 kg/m²  Body surface area is 1.58 meters squared.     All labs (2/15/21) within treatment plan parameters.      Drug Order   (Drug name, dose, route, IV Fluid & volume, frequency, number of doses) Cycle 4, Day 15  Previous treatment: 2/8/21   Medication = PACLitaxel (Taxol)   Base Dose = 80 mg/m²  Calc Dose: Base Dose x 1.58 m² =126.4 mg  Final Dose = 126.4 mg  Final [conc] ~ 0.5 mg/mL  Route = IV  Fluid & Volume =  mL  Admin Duration = Over 60 minutes            <10% difference, okay to treat with final dose     By my signature below, I confirm this process was performed independently with the BSA and all final chemotherapy dosing calculations congruent. I have reviewed the above chemotherapy order and that my calculation of the final dose and BSA (when applicable) corroborate those calculations of the  pharmacist. Discrepancies of 10% or greater in the written dose have been addressed and documented within the [facility] Progress notes.      Blanca Yap, PharmD    "

## 2021-02-15 NOTE — PROGRESS NOTES
Christine arrived ambulatory to [facility] for chemo treatment today.  services used for intake,  [place] #[id number]. POC discussed with pt and she agrees with plan. PIV access attempted x 2 by this RN, blood specimen sent to lab. VAT team contacted for IV access. Pt aware of plan.

## 2021-02-15 NOTE — PROGRESS NOTES
Chemotherapy Verification - PRIMARY RN    D15C4    Height = 147cm  Weight = 61.2kg  BSA = 1.58m2       Medication: Paclitaxel  Dose: 80mg/m2  Calculated Dose: 126.4mg                               I confirm this process was performed independently with the BSA and all final chemotherapy dosing calculations congruent.  Any discrepancies of 10% or greater have been addressed with the chemotherapy pharmacist. The resolution of the discrepancy has been documented in the EPIC progress notes.

## 2021-02-22 RX ORDER — PROCHLORPERAZINE MALEATE 10 MG
10 TABLET ORAL EVERY 6 HOURS PRN
Status: CANCELLED | OUTPATIENT
Start: 2021-03-01

## 2021-02-22 RX ORDER — 0.9 % SODIUM CHLORIDE 0.9 %
3 VIAL (ML) INJECTION PRN
Status: CANCELLED | OUTPATIENT
Start: 2021-03-15

## 2021-02-22 RX ORDER — EPINEPHRINE 1 MG/ML(1)
0.5 AMPUL (ML) INJECTION PRN
Status: CANCELLED | OUTPATIENT
Start: 2021-03-01

## 2021-02-22 RX ORDER — 0.9 % SODIUM CHLORIDE 0.9 %
3 VIAL (ML) INJECTION PRN
Status: CANCELLED | OUTPATIENT
Start: 2021-02-28

## 2021-02-22 RX ORDER — 0.9 % SODIUM CHLORIDE 0.9 %
10 VIAL (ML) INJECTION PRN
Status: CANCELLED | OUTPATIENT
Start: 2021-03-01

## 2021-02-22 RX ORDER — 0.9 % SODIUM CHLORIDE 0.9 %
VIAL (ML) INJECTION PRN
Status: CANCELLED | OUTPATIENT
Start: 2021-03-15

## 2021-02-22 RX ORDER — ONDANSETRON 2 MG/ML
4 INJECTION INTRAMUSCULAR; INTRAVENOUS PRN
Status: CANCELLED | OUTPATIENT
Start: 2021-03-01

## 2021-02-22 RX ORDER — EPINEPHRINE 1 MG/ML(1)
0.5 AMPUL (ML) INJECTION PRN
Status: CANCELLED | OUTPATIENT
Start: 2021-03-08

## 2021-02-22 RX ORDER — 0.9 % SODIUM CHLORIDE 0.9 %
10 VIAL (ML) INJECTION PRN
Status: CANCELLED | OUTPATIENT
Start: 2021-02-28

## 2021-02-22 RX ORDER — 0.9 % SODIUM CHLORIDE 0.9 %
3 VIAL (ML) INJECTION PRN
Status: CANCELLED | OUTPATIENT
Start: 2021-03-01

## 2021-02-22 RX ORDER — METHYLPREDNISOLONE SODIUM SUCCINATE 125 MG/2ML
125 INJECTION, POWDER, LYOPHILIZED, FOR SOLUTION INTRAMUSCULAR; INTRAVENOUS PRN
Status: CANCELLED | OUTPATIENT
Start: 2021-03-15

## 2021-02-22 RX ORDER — SODIUM CHLORIDE 9 MG/ML
INJECTION, SOLUTION INTRAVENOUS CONTINUOUS
Status: CANCELLED | OUTPATIENT
Start: 2021-03-15

## 2021-02-22 RX ORDER — PROCHLORPERAZINE MALEATE 10 MG
10 TABLET ORAL EVERY 6 HOURS PRN
Status: CANCELLED | OUTPATIENT
Start: 2021-03-15

## 2021-02-22 RX ORDER — EPINEPHRINE 1 MG/ML(1)
0.5 AMPUL (ML) INJECTION PRN
Status: CANCELLED | OUTPATIENT
Start: 2021-03-15

## 2021-02-22 RX ORDER — ONDANSETRON 8 MG/1
8 TABLET, ORALLY DISINTEGRATING ORAL PRN
Status: CANCELLED | OUTPATIENT
Start: 2021-03-08

## 2021-02-22 RX ORDER — 0.9 % SODIUM CHLORIDE 0.9 %
VIAL (ML) INJECTION PRN
Status: CANCELLED | OUTPATIENT
Start: 2021-03-08

## 2021-02-22 RX ORDER — ONDANSETRON 2 MG/ML
4 INJECTION INTRAMUSCULAR; INTRAVENOUS PRN
Status: CANCELLED | OUTPATIENT
Start: 2021-03-08

## 2021-02-22 RX ORDER — SODIUM CHLORIDE 9 MG/ML
INJECTION, SOLUTION INTRAVENOUS CONTINUOUS
Status: CANCELLED | OUTPATIENT
Start: 2021-03-08

## 2021-02-22 RX ORDER — 0.9 % SODIUM CHLORIDE 0.9 %
10 VIAL (ML) INJECTION PRN
Status: CANCELLED | OUTPATIENT
Start: 2021-03-08

## 2021-02-22 RX ORDER — ONDANSETRON 8 MG/1
8 TABLET, ORALLY DISINTEGRATING ORAL PRN
Status: CANCELLED | OUTPATIENT
Start: 2021-03-01

## 2021-02-22 RX ORDER — HEPARIN SODIUM (PORCINE) LOCK FLUSH IV SOLN 100 UNIT/ML 100 UNIT/ML
500 SOLUTION INTRAVENOUS PRN
Status: CANCELLED | OUTPATIENT
Start: 2021-03-15

## 2021-02-22 RX ORDER — HEPARIN SODIUM (PORCINE) LOCK FLUSH IV SOLN 100 UNIT/ML 100 UNIT/ML
500 SOLUTION INTRAVENOUS PRN
Status: CANCELLED | OUTPATIENT
Start: 2021-03-01

## 2021-02-22 RX ORDER — SODIUM CHLORIDE 9 MG/ML
INJECTION, SOLUTION INTRAVENOUS CONTINUOUS
Status: CANCELLED | OUTPATIENT
Start: 2021-03-01

## 2021-02-22 RX ORDER — METHYLPREDNISOLONE SODIUM SUCCINATE 125 MG/2ML
125 INJECTION, POWDER, LYOPHILIZED, FOR SOLUTION INTRAMUSCULAR; INTRAVENOUS PRN
Status: CANCELLED | OUTPATIENT
Start: 2021-03-08

## 2021-02-22 RX ORDER — 0.9 % SODIUM CHLORIDE 0.9 %
VIAL (ML) INJECTION PRN
Status: CANCELLED | OUTPATIENT
Start: 2021-03-01

## 2021-02-22 RX ORDER — HEPARIN SODIUM (PORCINE) LOCK FLUSH IV SOLN 100 UNIT/ML 100 UNIT/ML
500 SOLUTION INTRAVENOUS PRN
Status: CANCELLED | OUTPATIENT
Start: 2021-02-28

## 2021-02-22 RX ORDER — 0.9 % SODIUM CHLORIDE 0.9 %
3 VIAL (ML) INJECTION PRN
Status: CANCELLED | OUTPATIENT
Start: 2021-03-08

## 2021-02-22 RX ORDER — ONDANSETRON 8 MG/1
8 TABLET, ORALLY DISINTEGRATING ORAL PRN
Status: CANCELLED | OUTPATIENT
Start: 2021-03-15

## 2021-02-22 RX ORDER — 0.9 % SODIUM CHLORIDE 0.9 %
VIAL (ML) INJECTION PRN
Status: CANCELLED | OUTPATIENT
Start: 2021-02-28

## 2021-02-22 RX ORDER — DIPHENHYDRAMINE HYDROCHLORIDE 50 MG/ML
50 INJECTION INTRAMUSCULAR; INTRAVENOUS PRN
Status: CANCELLED | OUTPATIENT
Start: 2021-03-08

## 2021-02-22 RX ORDER — PROCHLORPERAZINE MALEATE 10 MG
10 TABLET ORAL EVERY 6 HOURS PRN
Status: CANCELLED | OUTPATIENT
Start: 2021-03-08

## 2021-02-22 RX ORDER — METHYLPREDNISOLONE SODIUM SUCCINATE 125 MG/2ML
125 INJECTION, POWDER, LYOPHILIZED, FOR SOLUTION INTRAMUSCULAR; INTRAVENOUS PRN
Status: CANCELLED | OUTPATIENT
Start: 2021-03-01

## 2021-02-22 RX ORDER — 0.9 % SODIUM CHLORIDE 0.9 %
10 VIAL (ML) INJECTION PRN
Status: CANCELLED | OUTPATIENT
Start: 2021-03-15

## 2021-02-22 RX ORDER — ONDANSETRON 2 MG/ML
4 INJECTION INTRAMUSCULAR; INTRAVENOUS PRN
Status: CANCELLED | OUTPATIENT
Start: 2021-03-15

## 2021-02-22 RX ORDER — DIPHENHYDRAMINE HYDROCHLORIDE 50 MG/ML
50 INJECTION INTRAMUSCULAR; INTRAVENOUS PRN
Status: CANCELLED | OUTPATIENT
Start: 2021-03-01

## 2021-02-22 RX ORDER — HEPARIN SODIUM (PORCINE) LOCK FLUSH IV SOLN 100 UNIT/ML 100 UNIT/ML
500 SOLUTION INTRAVENOUS PRN
Status: CANCELLED | OUTPATIENT
Start: 2021-03-08

## 2021-02-22 RX ORDER — DIPHENHYDRAMINE HYDROCHLORIDE 50 MG/ML
50 INJECTION INTRAMUSCULAR; INTRAVENOUS PRN
Status: CANCELLED | OUTPATIENT
Start: 2021-03-15

## 2021-03-01 ENCOUNTER — OUTPATIENT INFUSION SERVICES (OUTPATIENT)
Dept: ONCOLOGY | Facility: MEDICAL CENTER | Age: 66
End: 2021-03-01
Attending: INTERNAL MEDICINE
Payer: MEDICAID

## 2021-03-01 ENCOUNTER — HOSPITAL ENCOUNTER (OUTPATIENT)
Dept: RADIOLOGY | Facility: MEDICAL CENTER | Age: 66
End: 2021-03-01
Attending: INTERNAL MEDICINE
Payer: MEDICAID

## 2021-03-01 VITALS
TEMPERATURE: 96.8 F | OXYGEN SATURATION: 98 % | BODY MASS INDEX: 29.15 KG/M2 | SYSTOLIC BLOOD PRESSURE: 140 MMHG | DIASTOLIC BLOOD PRESSURE: 57 MMHG | WEIGHT: 138.89 LBS | RESPIRATION RATE: 18 BRPM | HEIGHT: 58 IN | HEART RATE: 77 BPM

## 2021-03-01 DIAGNOSIS — C50.811 MALIGNANT NEOPLASM OF OVERLAPPING SITES OF RIGHT BREAST IN FEMALE, ESTROGEN RECEPTOR POSITIVE (HCC): ICD-10-CM

## 2021-03-01 DIAGNOSIS — Z17.0 MALIGNANT NEOPLASM OF OVERLAPPING SITES OF RIGHT BREAST IN FEMALE, ESTROGEN RECEPTOR POSITIVE (HCC): ICD-10-CM

## 2021-03-01 LAB
ALBUMIN SERPL BCP-MCNC: 3.7 G/DL (ref 3.2–4.9)
ALBUMIN/GLOB SERPL: 1.2 G/DL
ALP SERPL-CCNC: 68 U/L (ref 30–99)
ALT SERPL-CCNC: 13 U/L (ref 2–50)
ANION GAP SERPL CALC-SCNC: 11 MMOL/L (ref 7–16)
AST SERPL-CCNC: 17 U/L (ref 12–45)
BASOPHILS # BLD AUTO: 0.6 % (ref 0–1.8)
BASOPHILS # BLD: 0.03 K/UL (ref 0–0.12)
BILIRUB SERPL-MCNC: 0.2 MG/DL (ref 0.1–1.5)
BUN SERPL-MCNC: 12 MG/DL (ref 8–22)
CALCIUM SERPL-MCNC: 8.9 MG/DL (ref 8.5–10.5)
CHLORIDE SERPL-SCNC: 103 MMOL/L (ref 96–112)
CO2 SERPL-SCNC: 24 MMOL/L (ref 20–33)
CREAT SERPL-MCNC: 0.47 MG/DL (ref 0.5–1.4)
EOSINOPHIL # BLD AUTO: 0.13 K/UL (ref 0–0.51)
EOSINOPHIL NFR BLD: 2.7 % (ref 0–6.9)
ERYTHROCYTE [DISTWIDTH] IN BLOOD BY AUTOMATED COUNT: 57 FL (ref 35.9–50)
GLOBULIN SER CALC-MCNC: 3 G/DL (ref 1.9–3.5)
GLUCOSE SERPL-MCNC: 98 MG/DL (ref 65–99)
HCT VFR BLD AUTO: 40.5 % (ref 37–47)
HGB BLD-MCNC: 12.9 G/DL (ref 12–16)
IMM GRANULOCYTES # BLD AUTO: 0.05 K/UL (ref 0–0.11)
IMM GRANULOCYTES NFR BLD AUTO: 1.1 % (ref 0–0.9)
LYMPHOCYTES # BLD AUTO: 1.09 K/UL (ref 1–4.8)
LYMPHOCYTES NFR BLD: 23 % (ref 22–41)
MCH RBC QN AUTO: 31.2 PG (ref 27–33)
MCHC RBC AUTO-ENTMCNC: 31.9 G/DL (ref 33.6–35)
MCV RBC AUTO: 98.1 FL (ref 81.4–97.8)
MONOCYTES # BLD AUTO: 0.53 K/UL (ref 0–0.85)
MONOCYTES NFR BLD AUTO: 11.2 % (ref 0–13.4)
NEUTROPHILS # BLD AUTO: 2.9 K/UL (ref 2–7.15)
NEUTROPHILS NFR BLD: 61.4 % (ref 44–72)
NRBC # BLD AUTO: 0 K/UL
NRBC BLD-RTO: 0 /100 WBC
PLATELET # BLD AUTO: 289 K/UL (ref 164–446)
PMV BLD AUTO: 9.7 FL (ref 9–12.9)
POTASSIUM SERPL-SCNC: 3.9 MMOL/L (ref 3.6–5.5)
PROT SERPL-MCNC: 6.7 G/DL (ref 6–8.2)
RBC # BLD AUTO: 4.13 M/UL (ref 4.2–5.4)
SODIUM SERPL-SCNC: 138 MMOL/L (ref 135–145)
WBC # BLD AUTO: 4.7 K/UL (ref 4.8–10.8)

## 2021-03-01 PROCEDURE — 700111 HCHG RX REV CODE 636 W/ 250 OVERRIDE (IP): Performed by: INTERNAL MEDICINE

## 2021-03-01 PROCEDURE — 96413 CHEMO IV INFUSION 1 HR: CPT

## 2021-03-01 PROCEDURE — 85025 COMPLETE CBC W/AUTO DIFF WBC: CPT

## 2021-03-01 PROCEDURE — 96367 TX/PROPH/DG ADDL SEQ IV INF: CPT

## 2021-03-01 PROCEDURE — 80053 COMPREHEN METABOLIC PANEL: CPT

## 2021-03-01 PROCEDURE — 96375 TX/PRO/DX INJ NEW DRUG ADDON: CPT

## 2021-03-01 PROCEDURE — 304540 HCHG NITRO SET VENT 2ND TUB

## 2021-03-01 PROCEDURE — 700105 HCHG RX REV CODE 258: Performed by: INTERNAL MEDICINE

## 2021-03-01 PROCEDURE — 96415 CHEMO IV INFUSION ADDL HR: CPT

## 2021-03-01 RX ADMIN — DEXAMETHASONE SODIUM PHOSPHATE 12 MG: 4 INJECTION, SOLUTION INTRA-ARTICULAR; INTRALESIONAL; INTRAMUSCULAR; INTRAVENOUS; SOFT TISSUE at 10:55

## 2021-03-01 RX ADMIN — DIPHENHYDRAMINE HYDROCHLORIDE 25 MG: 50 INJECTION INTRAMUSCULAR; INTRAVENOUS at 11:10

## 2021-03-01 RX ADMIN — PACLITAXEL 96 MG: 6 INJECTION, SOLUTION INTRAVENOUS at 11:50

## 2021-03-01 RX ADMIN — FAMOTIDINE 20 MG: 10 INJECTION INTRAVENOUS at 10:51

## 2021-03-01 ASSESSMENT — FIBROSIS 4 INDEX: FIB4 SCORE: 1.5

## 2021-03-01 NOTE — PROGRESS NOTES
Chemotherapy Verification - SECONDARY RN       Height = 147 cm  Weight = 63 kg  BSA = 1.6 m2       Medication: Paclitaxel  Dose: 60 mg/m2    Calculated Dose: 96 mg                             (In mg/m2, AUC, mg/kg)     I confirm that this process was performed independently.

## 2021-03-01 NOTE — PROGRESS NOTES
"Pharmacy Chemotherapy Verification    Dx: Breast CA    Cycle 5 day 1  Previous treatment = 2/15/21    Regimen: Weekly PACLitaxel  PACLitaxel 80 mg/m2 IV over 60 minutes Day 1,8,15  3/1/21 C5 - dose reduced to 60mg/m2 for tolerance/neuropathy  qWeekly cycles x6 cycles (currently entered)  *Dosing Reference*  NCCN guidelines for breast cancer V.1.2019  [place] EA, et al. J Clin Oncol 2001;19(22):4212-23    Allergies:Patient has no known allergies.  /57   Pulse 77   Temp 36 °C (96.8 °F) (Temporal)   Resp 18   Ht 1.47 m (4' 9.87\")   Wt 63 kg (138 lb 14.2 oz)   SpO2 98%   BMI 29.15 kg/m²  Body surface area is 1.6 meters squared.     All lab results  3/1/21 within treatment parameters.       PACLitaxel 60mg/m2 x 1.6m2 = 96mg   OK to treat with final dose = 96mg IV     ALBERT Urbina Pharm.D.            "

## 2021-03-01 NOTE — PROGRESS NOTES
"Pharmacy Chemotherapy Verification    Dx: Breast CA    Cycle 5, Day 1  Previous treatment = 2/15/21     Regimen: Weekly PACLitaxel    PACLitaxel 80 mg/m2 IV over 60 minutes Day 1,8,15   C5: dose dec to 60 mg/m2 for tolerance / neuropathy   Weekly cycles x6 cycles (currently entered)  *Dosing Reference*  NCCN guidelines for breast cancer V.1.2019  [place] COPPOLA, et al. J Clin Oncol 2001;19(22):9910-23    Allergies:Patient has no known allergies.  /57   Pulse 77   Temp 36 °C (96.8 °F) (Temporal)   Resp 18   Ht 1.47 m (4' 9.87\")   Wt 63 kg (138 lb 14.2 oz)   SpO2 98%   BMI 29.15 kg/m²  Body surface area is 1.6 meters squared.     Labs: 3/1/21  Meet treatment parameters      Drug Order   (Drug name, dose, route, IV Fluid & volume, frequency, number of doses) Cycle 5, Day 1  Previous treatment = 2/15/21    Medication = PACLitaxel (Taxol)   Base Dose = 60 mg/m²  Calc Dose: Base Dose x 1.6 m² = 96 mg  Final Dose = 96 mg  Final [conc] ~ 0.36 mg/mL  Route = IV  Fluid & Volume =  mL  Admin Duration = Over 60 minutes            Okay to treat with final dose               "

## 2021-03-01 NOTE — PROGRESS NOTES
Chemotherapy Verification - PRIMARY RN      Height = 147cm  Weight = 63kg  BSA = 1.6m2       Medication: Paclitaxel  Dose: 60mg/m2  Calculated Dose: 96mg                                   I confirm this process was performed independently with the BSA and all final chemotherapy dosing calculations congruent.  Any discrepancies of 10% or greater have been addressed with the chemotherapy pharmacist. The resolution of the discrepancy has been documented in the EPIC progress notes.

## 2021-03-02 NOTE — PROGRESS NOTES
Christine arrived ambulatory to IS for Taxol infusion.  services used, [place] #[id number], for pt intake. Pt's questions answered. Pt aware of VAT team to start PIV. Lab results reviewed, ok to proceed with treatment. Pt tolerated infusion without s/s adverse reaction. PIV dc'd catheter tip intact, gauze and coban dressing applied. Pt discharged to self care, NAD. Pt aware of next appt 3/8/2021.

## 2021-03-08 ENCOUNTER — OUTPATIENT INFUSION SERVICES (OUTPATIENT)
Dept: ONCOLOGY | Facility: MEDICAL CENTER | Age: 66
End: 2021-03-08
Attending: INTERNAL MEDICINE
Payer: MEDICAID

## 2021-03-08 ENCOUNTER — HOSPITAL ENCOUNTER (OUTPATIENT)
Dept: RADIOLOGY | Facility: MEDICAL CENTER | Age: 66
End: 2021-03-08
Attending: INTERNAL MEDICINE
Payer: MEDICAID

## 2021-03-08 VITALS
WEIGHT: 138.67 LBS | RESPIRATION RATE: 18 BRPM | HEART RATE: 83 BPM | HEIGHT: 57 IN | OXYGEN SATURATION: 96 % | SYSTOLIC BLOOD PRESSURE: 129 MMHG | TEMPERATURE: 97.1 F | BODY MASS INDEX: 29.92 KG/M2 | DIASTOLIC BLOOD PRESSURE: 54 MMHG

## 2021-03-08 DIAGNOSIS — Z17.0 MALIGNANT NEOPLASM OF OVERLAPPING SITES OF RIGHT BREAST IN FEMALE, ESTROGEN RECEPTOR POSITIVE (HCC): ICD-10-CM

## 2021-03-08 DIAGNOSIS — C50.811 MALIGNANT NEOPLASM OF OVERLAPPING SITES OF RIGHT BREAST IN FEMALE, ESTROGEN RECEPTOR POSITIVE (HCC): ICD-10-CM

## 2021-03-08 LAB
BASOPHILS # BLD AUTO: 0.7 % (ref 0–1.8)
BASOPHILS # BLD: 0.04 K/UL (ref 0–0.12)
EOSINOPHIL # BLD AUTO: 0.21 K/UL (ref 0–0.51)
EOSINOPHIL NFR BLD: 3.5 % (ref 0–6.9)
ERYTHROCYTE [DISTWIDTH] IN BLOOD BY AUTOMATED COUNT: 56.3 FL (ref 35.9–50)
HCT VFR BLD AUTO: 40.7 % (ref 37–47)
HGB BLD-MCNC: 13.2 G/DL (ref 12–16)
IMM GRANULOCYTES # BLD AUTO: 0.25 K/UL (ref 0–0.11)
IMM GRANULOCYTES NFR BLD AUTO: 4.2 % (ref 0–0.9)
LYMPHOCYTES # BLD AUTO: 1.3 K/UL (ref 1–4.8)
LYMPHOCYTES NFR BLD: 21.6 % (ref 22–41)
MCH RBC QN AUTO: 32 PG (ref 27–33)
MCHC RBC AUTO-ENTMCNC: 32.4 G/DL (ref 33.6–35)
MCV RBC AUTO: 98.5 FL (ref 81.4–97.8)
MONOCYTES # BLD AUTO: 0.45 K/UL (ref 0–0.85)
MONOCYTES NFR BLD AUTO: 7.5 % (ref 0–13.4)
NEUTROPHILS # BLD AUTO: 3.76 K/UL (ref 2–7.15)
NEUTROPHILS NFR BLD: 62.5 % (ref 44–72)
NRBC # BLD AUTO: 0 K/UL
NRBC BLD-RTO: 0 /100 WBC
PLATELET # BLD AUTO: 294 K/UL (ref 164–446)
PMV BLD AUTO: 9.5 FL (ref 9–12.9)
RBC # BLD AUTO: 4.13 M/UL (ref 4.2–5.4)
WBC # BLD AUTO: 6 K/UL (ref 4.8–10.8)

## 2021-03-08 PROCEDURE — 85025 COMPLETE CBC W/AUTO DIFF WBC: CPT

## 2021-03-08 PROCEDURE — 304540 HCHG NITRO SET VENT 2ND TUB

## 2021-03-08 PROCEDURE — 700111 HCHG RX REV CODE 636 W/ 250 OVERRIDE (IP): Performed by: INTERNAL MEDICINE

## 2021-03-08 PROCEDURE — 96375 TX/PRO/DX INJ NEW DRUG ADDON: CPT

## 2021-03-08 PROCEDURE — 700105 HCHG RX REV CODE 258: Performed by: INTERNAL MEDICINE

## 2021-03-08 PROCEDURE — 96413 CHEMO IV INFUSION 1 HR: CPT

## 2021-03-08 RX ADMIN — DEXAMETHASONE SODIUM PHOSPHATE 12 MG: 4 INJECTION, SOLUTION INTRA-ARTICULAR; INTRALESIONAL; INTRAMUSCULAR; INTRAVENOUS; SOFT TISSUE at 10:35

## 2021-03-08 RX ADMIN — PACLITAXEL 96 MG: 300 INJECTION, SOLUTION INTRAVENOUS at 11:50

## 2021-03-08 RX ADMIN — DIPHENHYDRAMINE HYDROCHLORIDE 25 MG: 50 INJECTION INTRAMUSCULAR; INTRAVENOUS at 10:50

## 2021-03-08 RX ADMIN — FAMOTIDINE 20 MG: 10 INJECTION INTRAVENOUS at 11:10

## 2021-03-08 ASSESSMENT — FIBROSIS 4 INDEX: FIB4 SCORE: 1.027826834182042196

## 2021-03-08 NOTE — PROGRESS NOTES
"Pharmacy Chemotherapy Verification    Dx: Breast CA    Cycle 5 day 8  Previous treatment = 3/1/21    Regimen: Weekly PACLitaxel  PACLitaxel 80 mg/m2 IV over 60 minutes Day 1,8,15  3/1/21 C5 - dose reduced to 60mg/m2 for tolerance/neuropathy  qWeekly cycles x6 cycles (currently entered)  *Dosing Reference*  NCCN guidelines for breast cancer V.1.2019  Bay EA, et al. J Clin Oncol 2001;19(22):4216-23    Allergies:Patient has no known allergies.  /54   Pulse 83   Temp 36.2 °C (97.1 °F) (Temporal)   Resp 18   Ht 1.46 m (4' 9.48\")   Wt 62.9 kg (138 lb 10.7 oz)   SpO2 96%   BMI 29.51 kg/m²  Body surface area is 1.6 meters squared.     All lab results  3/8/21 within treatment parameters.       PACLitaxel 60mg/m2 x 1.6m2 = 96mg   OK to treat with final dose = 96mg IV     ALBERT Urbina PharmJunaidD.            "

## 2021-03-08 NOTE — PROGRESS NOTES
Chemotherapy Verification - SECONDARY RN       Height = 146 cm  Weight = 62.9 kg  BSA = 1.6 m2       Medication: Taxol  Dose: 60 mg/m2  Calculated Dose: 96 mg                             (In mg/m2, AUC, mg/kg)         I confirm that this process was performed independently.

## 2021-03-08 NOTE — PROGRESS NOTES
Patient seen today for chemotherapy. This nurse reviewed plan of care in [place] with patient. Understands PIV will be established by [facility]. Order for u/s guided PIV placed. Patient comfortable.

## 2021-03-08 NOTE — PROGRESS NOTES
U/S guided PIV established with positive blood return. Sample obtained for CBC and sent to lab. Call light in reach and patient educated on use.

## 2021-03-08 NOTE — PROGRESS NOTES
Chemotherapy Verification - PRIMARY RN      Height = 57.48in  Weight = 62.9kg  BSA = 1.59       Medication: Taxol  Dose: 60mg/m2  Calculated Dose: 95.4mg                               (In mg/m2, AUC, mg/kg)           I confirm this process was performed independently with the BSA and all final chemotherapy dosing calculations congruent.  Any discrepancies of 10% or greater have been addressed with the chemotherapy pharmacist. The resolution of the discrepancy has been documented in the EPIC progress notes.

## 2021-03-08 NOTE — PROGRESS NOTES
"Pharmacy Chemotherapy Verification    Dx: Breast CA    Regimen: Weekly PACLitaxel    PACLitaxel 80 mg/m2 IV over 60 minutes Day 1,8,15   C5: dose dec to 60 mg/m2 for tolerance / neuropathy   Weekly cycles x6 cycles (currently entered)  *Dosing Reference*  NCCN guidelines for breast cancer V.1.2019  [place] COPPOLA, et al. J Clin Oncol 2001;19(22):3311-23    Allergies:Patient has no known allergies.  /54   Pulse 83   Temp 36.2 °C (97.1 °F) (Temporal)   Resp 18   Ht 1.46 m (4' 9.48\")   Wt 62.9 kg (138 lb 10.7 oz)   SpO2 96%   BMI 29.51 kg/m²  Body surface area is 1.6 meters squared.     Labs: 3/8/21  Meet treatment parameters      Drug Order   (Drug name, dose, route, IV Fluid & volume, frequency, number of doses) Cycle 5, Day 8  Previous treatment = 3/1/21    Medication = PACLitaxel (Taxol)   Base Dose = 60 mg/m²  Calc Dose: Base Dose x 1.6 m² = 96 mg  Final Dose = 96 mg  Final [conc] ~ 0.36 mg/mL  Route = IV  Fluid & Volume =  mL  Admin Duration = Over 60 minutes            Okay to treat with final dose               "

## 2021-03-08 NOTE — PROGRESS NOTES
Premeds followed by Taxol infused as ordered. Tolerated well. No s/s of adverse reactions observed or expressed. PIV removed. Gauze and coban dressing applied to skin, site wnl. Patient discharged in good condition, ambulatory. Return appointment confirmed for 09:15 on March 15th.

## 2021-03-25 RX ORDER — PROCHLORPERAZINE MALEATE 10 MG
10 TABLET ORAL EVERY 6 HOURS PRN
Status: CANCELLED | OUTPATIENT
Start: 2021-03-29

## 2021-03-25 RX ORDER — 0.9 % SODIUM CHLORIDE 0.9 %
3 VIAL (ML) INJECTION PRN
Status: CANCELLED | OUTPATIENT
Start: 2021-04-05

## 2021-03-25 RX ORDER — 0.9 % SODIUM CHLORIDE 0.9 %
VIAL (ML) INJECTION PRN
Status: CANCELLED | OUTPATIENT
Start: 2021-04-12

## 2021-03-25 RX ORDER — 0.9 % SODIUM CHLORIDE 0.9 %
VIAL (ML) INJECTION PRN
Status: CANCELLED | OUTPATIENT
Start: 2021-03-29

## 2021-03-25 RX ORDER — DIPHENHYDRAMINE HYDROCHLORIDE 50 MG/ML
50 INJECTION INTRAMUSCULAR; INTRAVENOUS PRN
Status: CANCELLED | OUTPATIENT
Start: 2021-04-12

## 2021-03-25 RX ORDER — 0.9 % SODIUM CHLORIDE 0.9 %
10 VIAL (ML) INJECTION PRN
Status: CANCELLED | OUTPATIENT
Start: 2021-03-29

## 2021-03-25 RX ORDER — DIPHENHYDRAMINE HYDROCHLORIDE 50 MG/ML
50 INJECTION INTRAMUSCULAR; INTRAVENOUS PRN
Status: CANCELLED | OUTPATIENT
Start: 2021-03-29

## 2021-03-25 RX ORDER — DIPHENHYDRAMINE HYDROCHLORIDE 50 MG/ML
50 INJECTION INTRAMUSCULAR; INTRAVENOUS PRN
Status: CANCELLED | OUTPATIENT
Start: 2021-04-05

## 2021-03-25 RX ORDER — METHYLPREDNISOLONE SODIUM SUCCINATE 125 MG/2ML
125 INJECTION, POWDER, LYOPHILIZED, FOR SOLUTION INTRAMUSCULAR; INTRAVENOUS PRN
Status: CANCELLED | OUTPATIENT
Start: 2021-04-05

## 2021-03-25 RX ORDER — EPINEPHRINE 1 MG/ML(1)
0.5 AMPUL (ML) INJECTION PRN
Status: CANCELLED | OUTPATIENT
Start: 2021-03-29

## 2021-03-25 RX ORDER — 0.9 % SODIUM CHLORIDE 0.9 %
3 VIAL (ML) INJECTION PRN
Status: CANCELLED | OUTPATIENT
Start: 2021-04-12

## 2021-03-25 RX ORDER — SODIUM CHLORIDE 9 MG/ML
INJECTION, SOLUTION INTRAVENOUS CONTINUOUS
Status: CANCELLED | OUTPATIENT
Start: 2021-04-12

## 2021-03-25 RX ORDER — HEPARIN SODIUM (PORCINE) LOCK FLUSH IV SOLN 100 UNIT/ML 100 UNIT/ML
500 SOLUTION INTRAVENOUS PRN
Status: CANCELLED | OUTPATIENT
Start: 2021-03-28

## 2021-03-25 RX ORDER — EPINEPHRINE 1 MG/ML(1)
0.5 AMPUL (ML) INJECTION PRN
Status: CANCELLED | OUTPATIENT
Start: 2021-04-12

## 2021-03-25 RX ORDER — 0.9 % SODIUM CHLORIDE 0.9 %
3 VIAL (ML) INJECTION PRN
Status: CANCELLED | OUTPATIENT
Start: 2021-03-29

## 2021-03-25 RX ORDER — ONDANSETRON 8 MG/1
8 TABLET, ORALLY DISINTEGRATING ORAL PRN
Status: CANCELLED | OUTPATIENT
Start: 2021-04-12

## 2021-03-25 RX ORDER — 0.9 % SODIUM CHLORIDE 0.9 %
3 VIAL (ML) INJECTION PRN
Status: CANCELLED | OUTPATIENT
Start: 2021-03-28

## 2021-03-25 RX ORDER — HEPARIN SODIUM (PORCINE) LOCK FLUSH IV SOLN 100 UNIT/ML 100 UNIT/ML
500 SOLUTION INTRAVENOUS PRN
Status: CANCELLED | OUTPATIENT
Start: 2021-03-29

## 2021-03-25 RX ORDER — ONDANSETRON 2 MG/ML
4 INJECTION INTRAMUSCULAR; INTRAVENOUS PRN
Status: CANCELLED | OUTPATIENT
Start: 2021-03-29

## 2021-03-25 RX ORDER — PROCHLORPERAZINE MALEATE 10 MG
10 TABLET ORAL EVERY 6 HOURS PRN
Status: CANCELLED | OUTPATIENT
Start: 2021-04-05

## 2021-03-25 RX ORDER — 0.9 % SODIUM CHLORIDE 0.9 %
VIAL (ML) INJECTION PRN
Status: CANCELLED | OUTPATIENT
Start: 2021-03-28

## 2021-03-25 RX ORDER — 0.9 % SODIUM CHLORIDE 0.9 %
10 VIAL (ML) INJECTION PRN
Status: CANCELLED | OUTPATIENT
Start: 2021-04-05

## 2021-03-25 RX ORDER — ONDANSETRON 2 MG/ML
4 INJECTION INTRAMUSCULAR; INTRAVENOUS PRN
Status: CANCELLED | OUTPATIENT
Start: 2021-04-05

## 2021-03-25 RX ORDER — HEPARIN SODIUM (PORCINE) LOCK FLUSH IV SOLN 100 UNIT/ML 100 UNIT/ML
500 SOLUTION INTRAVENOUS PRN
Status: CANCELLED | OUTPATIENT
Start: 2021-04-12

## 2021-03-25 RX ORDER — ONDANSETRON 8 MG/1
8 TABLET, ORALLY DISINTEGRATING ORAL PRN
Status: CANCELLED | OUTPATIENT
Start: 2021-03-29

## 2021-03-25 RX ORDER — PROCHLORPERAZINE MALEATE 10 MG
10 TABLET ORAL EVERY 6 HOURS PRN
Status: CANCELLED | OUTPATIENT
Start: 2021-04-12

## 2021-03-25 RX ORDER — EPINEPHRINE 1 MG/ML(1)
0.5 AMPUL (ML) INJECTION PRN
Status: CANCELLED | OUTPATIENT
Start: 2021-04-05

## 2021-03-25 RX ORDER — ONDANSETRON 2 MG/ML
4 INJECTION INTRAMUSCULAR; INTRAVENOUS PRN
Status: CANCELLED | OUTPATIENT
Start: 2021-04-12

## 2021-03-25 RX ORDER — 0.9 % SODIUM CHLORIDE 0.9 %
VIAL (ML) INJECTION PRN
Status: CANCELLED | OUTPATIENT
Start: 2021-04-05

## 2021-03-25 RX ORDER — 0.9 % SODIUM CHLORIDE 0.9 %
10 VIAL (ML) INJECTION PRN
Status: CANCELLED | OUTPATIENT
Start: 2021-03-28

## 2021-03-25 RX ORDER — SODIUM CHLORIDE 9 MG/ML
INJECTION, SOLUTION INTRAVENOUS CONTINUOUS
Status: CANCELLED | OUTPATIENT
Start: 2021-03-29

## 2021-03-25 RX ORDER — ONDANSETRON 8 MG/1
8 TABLET, ORALLY DISINTEGRATING ORAL PRN
Status: CANCELLED | OUTPATIENT
Start: 2021-04-05

## 2021-03-25 RX ORDER — METHYLPREDNISOLONE SODIUM SUCCINATE 125 MG/2ML
125 INJECTION, POWDER, LYOPHILIZED, FOR SOLUTION INTRAMUSCULAR; INTRAVENOUS PRN
Status: CANCELLED | OUTPATIENT
Start: 2021-03-29

## 2021-03-25 RX ORDER — SODIUM CHLORIDE 9 MG/ML
INJECTION, SOLUTION INTRAVENOUS CONTINUOUS
Status: CANCELLED | OUTPATIENT
Start: 2021-04-05

## 2021-03-25 RX ORDER — 0.9 % SODIUM CHLORIDE 0.9 %
10 VIAL (ML) INJECTION PRN
Status: CANCELLED | OUTPATIENT
Start: 2021-04-12

## 2021-03-25 RX ORDER — HEPARIN SODIUM (PORCINE) LOCK FLUSH IV SOLN 100 UNIT/ML 100 UNIT/ML
500 SOLUTION INTRAVENOUS PRN
Status: CANCELLED | OUTPATIENT
Start: 2021-04-05

## 2021-03-25 RX ORDER — METHYLPREDNISOLONE SODIUM SUCCINATE 125 MG/2ML
125 INJECTION, POWDER, LYOPHILIZED, FOR SOLUTION INTRAMUSCULAR; INTRAVENOUS PRN
Status: CANCELLED | OUTPATIENT
Start: 2021-04-12

## 2021-03-28 NOTE — PROGRESS NOTES
"Pharmacy Chemotherapy Verification    Dx: Breast CA    Cycle 6 day 1- delayed from 3/15/21  Previous treatment = 3/8/21    Regimen: Weekly PACLitaxel  PACLitaxel 80 mg/m2 IV over 60 minutes Day 1,8,15  3/1/21 C5 - dose reduced to 60mg/m2 for tolerance/neuropathy  3/29/21 C6 - dose reduced to 50mg/m2 for tolerance/neuropathy  qWeekly cycles x6 cycles (currently entered)  *Dosing Reference*  NCCN guidelines for breast cancer V.1.2019  Bay COPPOLA, et al. J Clin Oncol 2001;19(22):4212-23    Allergies:Patient has no known allergies.  /65   Pulse 61   Temp 36.1 °C (97 °F) (Temporal)   Resp 18   Ht 1.455 m (4' 9.28\")   Wt 64.3 kg (141 lb 12.1 oz)   SpO2 92%   BMI 30.37 kg/m²  Body surface area is 1.61 meters squared.     All lab results  3/29/21 within treatment parameters.       PACLitaxel 50mg/m2 x 1.61m2 = 80.5mg   OK to treat with final dose = 80.5mg IV     ALBERT Urbina Pharm.D.            "

## 2021-03-29 ENCOUNTER — HOSPITAL ENCOUNTER (OUTPATIENT)
Dept: RADIOLOGY | Facility: MEDICAL CENTER | Age: 66
End: 2021-03-29
Attending: INTERNAL MEDICINE
Payer: MEDICAID

## 2021-03-29 ENCOUNTER — OUTPATIENT INFUSION SERVICES (OUTPATIENT)
Dept: ONCOLOGY | Facility: MEDICAL CENTER | Age: 66
End: 2021-03-29
Attending: INTERNAL MEDICINE
Payer: MEDICAID

## 2021-03-29 VITALS
OXYGEN SATURATION: 92 % | SYSTOLIC BLOOD PRESSURE: 147 MMHG | RESPIRATION RATE: 18 BRPM | HEART RATE: 61 BPM | BODY MASS INDEX: 30.58 KG/M2 | TEMPERATURE: 97 F | HEIGHT: 57 IN | WEIGHT: 141.76 LBS | DIASTOLIC BLOOD PRESSURE: 65 MMHG

## 2021-03-29 DIAGNOSIS — C50.811 MALIGNANT NEOPLASM OF OVERLAPPING SITES OF RIGHT BREAST IN FEMALE, ESTROGEN RECEPTOR POSITIVE (HCC): ICD-10-CM

## 2021-03-29 DIAGNOSIS — Z17.0 MALIGNANT NEOPLASM OF OVERLAPPING SITES OF RIGHT BREAST IN FEMALE, ESTROGEN RECEPTOR POSITIVE (HCC): ICD-10-CM

## 2021-03-29 LAB
ALBUMIN SERPL BCP-MCNC: 3.7 G/DL (ref 3.2–4.9)
ALBUMIN/GLOB SERPL: 1.5 G/DL
ALP SERPL-CCNC: 60 U/L (ref 30–99)
ALT SERPL-CCNC: 10 U/L (ref 2–50)
ANION GAP SERPL CALC-SCNC: 7 MMOL/L (ref 7–16)
AST SERPL-CCNC: 17 U/L (ref 12–45)
BASOPHILS # BLD AUTO: 0.5 % (ref 0–1.8)
BASOPHILS # BLD: 0.03 K/UL (ref 0–0.12)
BILIRUB SERPL-MCNC: 0.2 MG/DL (ref 0.1–1.5)
BUN SERPL-MCNC: 13 MG/DL (ref 8–22)
CALCIUM SERPL-MCNC: 9 MG/DL (ref 8.5–10.5)
CHLORIDE SERPL-SCNC: 104 MMOL/L (ref 96–112)
CO2 SERPL-SCNC: 25 MMOL/L (ref 20–33)
CREAT SERPL-MCNC: 0.44 MG/DL (ref 0.5–1.4)
EOSINOPHIL # BLD AUTO: 0.1 K/UL (ref 0–0.51)
EOSINOPHIL NFR BLD: 1.7 % (ref 0–6.9)
ERYTHROCYTE [DISTWIDTH] IN BLOOD BY AUTOMATED COUNT: 52.6 FL (ref 35.9–50)
GLOBULIN SER CALC-MCNC: 2.5 G/DL (ref 1.9–3.5)
GLUCOSE SERPL-MCNC: 99 MG/DL (ref 65–99)
HCT VFR BLD AUTO: 36.1 % (ref 37–47)
HGB BLD-MCNC: 11.7 G/DL (ref 12–16)
IMM GRANULOCYTES # BLD AUTO: 0.05 K/UL (ref 0–0.11)
IMM GRANULOCYTES NFR BLD AUTO: 0.8 % (ref 0–0.9)
LYMPHOCYTES # BLD AUTO: 1.46 K/UL (ref 1–4.8)
LYMPHOCYTES NFR BLD: 24.2 % (ref 22–41)
MCH RBC QN AUTO: 31.4 PG (ref 27–33)
MCHC RBC AUTO-ENTMCNC: 32.4 G/DL (ref 33.6–35)
MCV RBC AUTO: 96.8 FL (ref 81.4–97.8)
MONOCYTES # BLD AUTO: 0.55 K/UL (ref 0–0.85)
MONOCYTES NFR BLD AUTO: 9.1 % (ref 0–13.4)
NEUTROPHILS # BLD AUTO: 3.85 K/UL (ref 2–7.15)
NEUTROPHILS NFR BLD: 63.7 % (ref 44–72)
NRBC # BLD AUTO: 0 K/UL
NRBC BLD-RTO: 0 /100 WBC
PLATELET # BLD AUTO: 264 K/UL (ref 164–446)
PMV BLD AUTO: 10 FL (ref 9–12.9)
POTASSIUM SERPL-SCNC: 3.6 MMOL/L (ref 3.6–5.5)
PROT SERPL-MCNC: 6.2 G/DL (ref 6–8.2)
RBC # BLD AUTO: 3.73 M/UL (ref 4.2–5.4)
SODIUM SERPL-SCNC: 136 MMOL/L (ref 135–145)
WBC # BLD AUTO: 6 K/UL (ref 4.8–10.8)

## 2021-03-29 PROCEDURE — 96413 CHEMO IV INFUSION 1 HR: CPT | Performed by: CLINICAL NURSE SPECIALIST

## 2021-03-29 PROCEDURE — 96375 TX/PRO/DX INJ NEW DRUG ADDON: CPT | Performed by: CLINICAL NURSE SPECIALIST

## 2021-03-29 PROCEDURE — 85025 COMPLETE CBC W/AUTO DIFF WBC: CPT

## 2021-03-29 PROCEDURE — 304540 HCHG NITRO SET VENT 2ND TUB

## 2021-03-29 PROCEDURE — 700111 HCHG RX REV CODE 636 W/ 250 OVERRIDE (IP): Performed by: INTERNAL MEDICINE

## 2021-03-29 PROCEDURE — 700105 HCHG RX REV CODE 258: Performed by: INTERNAL MEDICINE

## 2021-03-29 PROCEDURE — 80053 COMPREHEN METABOLIC PANEL: CPT

## 2021-03-29 RX ADMIN — DEXAMETHASONE SODIUM PHOSPHATE 12 MG: 4 INJECTION, SOLUTION INTRA-ARTICULAR; INTRALESIONAL; INTRAMUSCULAR; INTRAVENOUS; SOFT TISSUE at 17:15

## 2021-03-29 RX ADMIN — DIPHENHYDRAMINE HYDROCHLORIDE 25 MG: 50 INJECTION, SOLUTION INTRAMUSCULAR; INTRAVENOUS at 17:30

## 2021-03-29 RX ADMIN — FAMOTIDINE 20 MG: 10 INJECTION INTRAVENOUS at 17:12

## 2021-03-29 RX ADMIN — PACLITAXEL 80.5 MG: 6 INJECTION, SOLUTION INTRAVENOUS at 17:42

## 2021-03-29 ASSESSMENT — FIBROSIS 4 INDEX: FIB4 SCORE: 1.01

## 2021-03-29 NOTE — PROGRESS NOTES
Chemotherapy Verification - PRIMARY RN      Height = 145.5 cm  Weight = 64.3 kg  BSA = 1.61 m2       Medication: Paclitaxel  Dose: 50 mg/m2  Calculated Dose: 80.5 mg                             (In mg/m2, AUC, mg/kg)         I confirm this process was performed independently with the BSA and all final chemotherapy dosing calculations congruent.  Any discrepancies of 10% or greater have been addressed with the chemotherapy pharmacist. The resolution of the discrepancy has been documented in the EPIC progress notes.

## 2021-03-29 NOTE — PROGRESS NOTES
"Pharmacy Chemotherapy Verification    Dx: Breast CA    Regimen: Weekly PACLitaxel    PACLitaxel 80 mg/m2 IV over 60 minutes Day 1,8,15   C5: dose dec to 60 mg/m2 for tolerance / neuropathy   C6 dose decreased to 50 mg/m2 for increased neuropathy per Dr. Jarvis   Weekly cycles x6 cycles (currently entered)  *Dosing Reference*  NCCN guidelines for breast cancer V.1.2019  Bay EA, et al. J Clin Oncol 2001;19(22):4216-23    Allergies:Patient has no known allergies.  /65   Pulse 61   Temp 36.1 °C (97 °F) (Temporal)   Resp 18   Ht 1.455 m (4' 9.28\")   Wt 64.3 kg (141 lb 12.1 oz)   SpO2 92%   BMI 30.37 kg/m²  Body surface area is 1.61 meters squared.     Labs 3/29/21:  ANC~ 3850 Plt = 264k   Hgb = 11.7     SCr = 0.44 mg/dL CrCl >125 mL/min   LFT's = WNL TBili = 0.2       Drug Order   (Drug name, dose, route, IV Fluid & volume, frequency, number of doses) Cycle 6 Day 1  Previous treatment = C5D8 on 3/8/21   Medication = PACLitaxel (Taxol)   Base Dose = 50 mg/m²  Calc Dose: Base Dose x 1.61 m² = 80.5 mg  Final Dose = 80.5 mg  Route = IV  Fluid & Volume =  mL  Admin Duration = Over 60 minutes            <10% difference, okay to treat with final dose     By my signature below, I confirm this process was performed independently with the BSA and all final chemotherapy dosing calculations congruent. I have reviewed the above chemotherapy order and that my calculation of the final dose and BSA (when applicable) corroborate those calculations of the  pharmacist. Discrepancies of 10% or greater in the written dose have been addressed and documented within the [facility] Progress notes.    Matt Humphries, PharmD      "

## 2021-03-29 NOTE — PROGRESS NOTES
Chemotherapy Verification - SECONDARY RN   C6 D1      Height = 1.455 m  Weight = 64.3 kg  BSA = 1.61 m2       Medication: paclitaxel  Dose: 50 mg/m2  Calculated Dose: 80.5 mg                             (In mg/m2, AUC, mg/kg)     I confirm that this process was performed independently.

## 2021-03-30 NOTE — PROGRESS NOTES
Christine arrived ambulatory to IS for Taxol infusion.   Ipad used for translation.  Assessment complete, POC discussed with patient and pt verbalized understanding.  PIV placed via ultrasound, labs collected and reviewed.     Pt within parameters to receive treatment today.  Pre-medications administered per MAR.  Taxol administered per MAR, infusing currently, pt tolerating well.      Report given to Hilton MONSALVE, Hilton assumed care.

## 2021-03-30 NOTE — PROGRESS NOTES
Report received from Shruthi MONSALVE.  Christine completed her Taxol without issue. PIV flushed and removed. Next appointment reviewed. Christine was discharged ambulatory to home in stable condition.

## 2021-04-03 NOTE — PROGRESS NOTES
"Pharmacy Chemotherapy Verification    Dx: Breast CA    Regimen: Weekly PACLitaxel    PACLitaxel 80 mg/m2 IV over 60 minutes Day 1,8,15   C5: dose dec to 60 mg/m2 for tolerance / neuropathy   C6 dose decreased to 50 mg/m2 for increased neuropathy per Dr. Jarvis   Weekly cycles x6 cycles (currently entered)  *Dosing Reference*  NCCN guidelines for breast cancer V.1.2019  Bay EA, et al. J Clin Oncol 2001;19(22):4216-23    Allergies:Patient has no known allergies.  /72   Pulse 78   Temp 36.7 °C (98 °F) (Temporal)   Resp 18   Ht 1.455 m (4' 9.28\")   Wt 64.2 kg (141 lb 8.6 oz)   SpO2 99%   BMI 30.33 kg/m²  Body surface area is 1.61 meters squared.     Labs from 4/5/21 reviewed- results within treatment parameters     Drug Order   (Drug name, dose, route, IV Fluid & volume, frequency, number of doses) Cycle 6 Day 8  Previous treatment = C6D1 on 3/29/21   Medication = PACLitaxel (Taxol)   Base Dose = 50 mg/m²  Calc Dose: Base Dose x 1.61 m² = 80.5 mg  Final Dose = 80.5 mg  Route = IV  Fluid & Volume =  mL  Admin Duration = Over 60 minutes            <10% difference, okay to treat with final dose     By my signature below, I confirm this process was performed independently with the BSA and all final chemotherapy dosing calculations congruent. I have reviewed the above chemotherapy order and that my calculation of the final dose and BSA (when applicable) corroborate those calculations of the  pharmacist. Discrepancies of 10% or greater in the written dose have been addressed and documented within the [facility] Progress notes.    Caleb Padilla, PharmD      "

## 2021-04-05 ENCOUNTER — HOSPITAL ENCOUNTER (OUTPATIENT)
Dept: RADIOLOGY | Facility: MEDICAL CENTER | Age: 66
End: 2021-04-05
Attending: INTERNAL MEDICINE
Payer: MEDICAID

## 2021-04-05 ENCOUNTER — OUTPATIENT INFUSION SERVICES (OUTPATIENT)
Dept: ONCOLOGY | Facility: MEDICAL CENTER | Age: 66
End: 2021-04-05
Attending: INTERNAL MEDICINE
Payer: MEDICAID

## 2021-04-05 VITALS
TEMPERATURE: 98 F | WEIGHT: 141.54 LBS | HEART RATE: 78 BPM | HEIGHT: 57 IN | BODY MASS INDEX: 30.54 KG/M2 | DIASTOLIC BLOOD PRESSURE: 72 MMHG | RESPIRATION RATE: 18 BRPM | SYSTOLIC BLOOD PRESSURE: 131 MMHG | OXYGEN SATURATION: 99 %

## 2021-04-05 DIAGNOSIS — Z17.0 MALIGNANT NEOPLASM OF OVERLAPPING SITES OF RIGHT BREAST IN FEMALE, ESTROGEN RECEPTOR POSITIVE (HCC): ICD-10-CM

## 2021-04-05 DIAGNOSIS — R97.8 ABNORMAL TUMOR MARKERS: ICD-10-CM

## 2021-04-05 DIAGNOSIS — C50.611 MALIGNANT NEOPLASM OF AXILLARY TAIL OF RIGHT FEMALE BREAST, UNSPECIFIED ESTROGEN RECEPTOR STATUS (HCC): ICD-10-CM

## 2021-04-05 DIAGNOSIS — C50.811 MALIGNANT NEOPLASM OF OVERLAPPING SITES OF RIGHT BREAST IN FEMALE, ESTROGEN RECEPTOR POSITIVE (HCC): ICD-10-CM

## 2021-04-05 LAB
BASOPHILS # BLD AUTO: 0.6 % (ref 0–1.8)
BASOPHILS # BLD: 0.03 K/UL (ref 0–0.12)
EOSINOPHIL # BLD AUTO: 0.16 K/UL (ref 0–0.51)
EOSINOPHIL NFR BLD: 3.3 % (ref 0–6.9)
ERYTHROCYTE [DISTWIDTH] IN BLOOD BY AUTOMATED COUNT: 52 FL (ref 35.9–50)
HCT VFR BLD AUTO: 39.8 % (ref 37–47)
HGB BLD-MCNC: 12.8 G/DL (ref 12–16)
IMM GRANULOCYTES # BLD AUTO: 0.06 K/UL (ref 0–0.11)
IMM GRANULOCYTES NFR BLD AUTO: 1.2 % (ref 0–0.9)
LYMPHOCYTES # BLD AUTO: 1.28 K/UL (ref 1–4.8)
LYMPHOCYTES NFR BLD: 26 % (ref 22–41)
MCH RBC QN AUTO: 31.3 PG (ref 27–33)
MCHC RBC AUTO-ENTMCNC: 32.2 G/DL (ref 33.6–35)
MCV RBC AUTO: 97.3 FL (ref 81.4–97.8)
MONOCYTES # BLD AUTO: 0.3 K/UL (ref 0–0.85)
MONOCYTES NFR BLD AUTO: 6.1 % (ref 0–13.4)
NEUTROPHILS # BLD AUTO: 3.09 K/UL (ref 2–7.15)
NEUTROPHILS NFR BLD: 62.8 % (ref 44–72)
NRBC # BLD AUTO: 0 K/UL
NRBC BLD-RTO: 0 /100 WBC
PLATELET # BLD AUTO: 272 K/UL (ref 164–446)
PMV BLD AUTO: 9.8 FL (ref 9–12.9)
RBC # BLD AUTO: 4.09 M/UL (ref 4.2–5.4)
WBC # BLD AUTO: 4.9 K/UL (ref 4.8–10.8)

## 2021-04-05 PROCEDURE — 85025 COMPLETE CBC W/AUTO DIFF WBC: CPT

## 2021-04-05 PROCEDURE — 96413 CHEMO IV INFUSION 1 HR: CPT

## 2021-04-05 PROCEDURE — 96367 TX/PROPH/DG ADDL SEQ IV INF: CPT

## 2021-04-05 PROCEDURE — 700105 HCHG RX REV CODE 258: Performed by: INTERNAL MEDICINE

## 2021-04-05 PROCEDURE — 96375 TX/PRO/DX INJ NEW DRUG ADDON: CPT

## 2021-04-05 PROCEDURE — 700111 HCHG RX REV CODE 636 W/ 250 OVERRIDE (IP): Performed by: INTERNAL MEDICINE

## 2021-04-05 PROCEDURE — 304540 HCHG NITRO SET VENT 2ND TUB

## 2021-04-05 RX ADMIN — DIPHENHYDRAMINE HYDROCHLORIDE 25 MG: 50 INJECTION INTRAMUSCULAR; INTRAVENOUS at 12:18

## 2021-04-05 RX ADMIN — PACLITAXEL 80.5 MG: 6 INJECTION, SOLUTION INTRAVENOUS at 13:03

## 2021-04-05 RX ADMIN — FAMOTIDINE 20 MG: 10 INJECTION INTRAVENOUS at 12:15

## 2021-04-05 RX ADMIN — DEXAMETHASONE SODIUM PHOSPHATE 12 MG: 4 INJECTION, SOLUTION INTRA-ARTICULAR; INTRALESIONAL; INTRAMUSCULAR; INTRAVENOUS; SOFT TISSUE at 12:30

## 2021-04-05 ASSESSMENT — FIBROSIS 4 INDEX: FIB4 SCORE: 1.28

## 2021-04-05 NOTE — PROGRESS NOTES
"Pharmacy Chemotherapy Calculations Note:    Patient Name: Christine Malhotra        Dx: Breast CA, Stage IV (ER/[place] +, HER2-)  Cycle:  6, Day 8 Previous treatment: C6D1 = 3/29/21     Protocol:  Weekly PACLitaxel  PACLitaxel 80 mg/m² IV over 60 minutes Day 1, 8, 15  3/1/21 C5 - dose reduced to 60 mg/m² for tolerance/neuropathy  3/29/21 C6 - dose reduced to 50 mg/m² for tolerance/neuropathy  Weekly cycles x6 cycles     NCCN guidelines for breast cancer V.1.2019.  Bay COPPOLA, et al. J Clin Oncol 2001;19(22):4216-23.        /72   Pulse 78   Temp 36.7 °C (98 °F) (Temporal)   Resp 18   Ht 1.455 m (4' 9.28\")   Wt 64.2 kg (141 lb 8.6 oz)   SpO2 99%   BMI 30.33 kg/m²  Body surface area is 1.61 meters squared.  ANC~ 3090  Plt = 272 k   Labs from 3/29/21:   SCr = 0.44 mg/dL CrCl = 83 mL/min (using min SCr 0.7 mg/dL and current weight)  LFT's = WNL  TBili = 0.2     PACLitaxel (Taxol) 50 mg/m² x 1.61 m² = 81 mg   <10% difference, ok to treat with final written dose = 80.5 mg    Bubba Blake, PharmD, PharmD, BCPS             "

## 2021-04-05 NOTE — PROGRESS NOTES
Christine arrived ambulatory to IS for Taxol infusion.  services uses for pt intake,  #[id number], Maribel. POC discussed with pt and she agrees with plan. VAT team at chairside to obtain PIV access. Results reviewed, ok to proceed with treatment. Pt medicated per MAR. Pt tolerated treatment without s/s adverse reaction. PIV dc'd catheter tip intact, gauze and coban dressing applied. Pt discharged to self care, NAD. Pt's next appt  4/12/2021.

## 2021-04-05 NOTE — PROGRESS NOTES
Chemotherapy Verification - SECONDARY RN       Height = 145.56 cm  Weight = 64.2 kg  BSA = 1.61 m2       Medication: Taxol  Dose: 50 mg/m2  Calculated Dose: 80.5 mg                             (In mg/m2, AUC, mg/kg)         I confirm that this process was performed independently.

## 2021-04-05 NOTE — PROGRESS NOTES
Chemotherapy Verification - PRIMARY RN    D8C6    Height = 145cm  Weight = 64.2kg  BSA = 1.61m2       Medication: Paclitaxel  Dose: 50mg/m2  Calculated Dose: 80.5mg                                 I confirm this process was performed independently with the BSA and all final chemotherapy dosing calculations congruent.  Any discrepancies of 10% or greater have been addressed with the chemotherapy pharmacist. The resolution of the discrepancy has been documented in the EPIC progress notes.

## 2021-04-11 NOTE — PROGRESS NOTES
"Pharmacy Chemotherapy Calculations Note:    Patient Name: Christine Malhotra      Dx: Breast CA, Stage IV (ER/[place] +, HER2-)    Cycle:  6, Day 15   Previous treatment: C6D1 = 3/29/21     Protocol:  Weekly PACLitaxel  PACLitaxel 80 mg/m² IV over 60 minutes Day 1, 8, 15  3/1/21 C5 - dose reduced to 60 mg/m² for tolerance/neuropathy  3/29/21 C6 - dose reduced to 50 mg/m² for tolerance/neuropathy  Weekly cycles x6 cycles   NCCN guidelines for breast cancer V.1.2019.  Bay COPPOLA, et al. J Clin Oncol 2001;19(22):4216-23.        /77   Pulse 88   Temp 36.7 °C (98 °F) (Temporal)   Resp 18   Ht 1.455 m (4' 9.28\")   Wt 64.4 kg (141 lb 15.6 oz)   SpO2 98%   BMI 30.42 kg/m²  Body surface area is 1.61 meters squared.    All lab results 4/12/21 within treatment parameters.        PACLitaxel (Taxol) 50 mg/m² x 1.61m² = 80.5mg   <10% difference, ok to treat with final written dose = 80.5mg    ALBERT Urbina PharmJunaidD.               "

## 2021-04-11 NOTE — PROGRESS NOTES
"Pharmacy Chemotherapy Verification    Dx: Breast CA    Regimen: Weekly PACLitaxel  PACLitaxel 80 mg/m2 IV over 60 minutes Day 1,8,15   -C5: dose dec to 60 mg/m2 for tolerance / neuropathy   -C6 dose decreased to 50 mg/m2 for increased neuropathy per Dr. Jarvis  Weekly cycles x6 cycles (currently entered)  *Dosing Reference*  NCCN guidelines for breast cancer V.1.2019  Bay EA, et al. J Clin Oncol 2001;19(22):4216-23    Allergies:Patient has no known allergies.  /77   Pulse 88   Temp 36.7 °C (98 °F) (Temporal)   Resp 18   Ht 1.455 m (4' 9.28\")   Wt 64.4 kg (141 lb 15.6 oz)   SpO2 98%   BMI 30.42 kg/m²  Body surface area is 1.61 meters squared.     Labs 4/12/21  ANC 3300 Hgb 13 Plt 299k     Labs 3/29/21  SCr 0.44 CrCl 82.7 mL/min   AST/ALT/AP = 17/10/60 Tbili = 0.2    Drug Order   (Drug name, dose, route, IV Fluid & volume, frequency, number of doses) Cycle 6 Day 15  Previous treatment = C6D8 on 4/5/21   Medication = PACLitaxel (Taxol)   Base Dose = 50 mg/m²  Calc Dose: Base Dose x 1.61 m² = 80.5 mg  Final Dose = 80.5 mg  Route = IV  Fluid & Volume =  mL  Admin Duration = Over 60 minutes            <10% difference, okay to treat with final dose     By my signature below, I confirm this process was performed independently with the BSA and all final chemotherapy dosing calculations congruent. I have reviewed the above chemotherapy order and that my calculation of the final dose and BSA (when applicable) corroborate those calculations of the  pharmacist. Discrepancies of 10% or greater in the written dose have been addressed and documented within the [facility] Progress notes.    Roseanne Gross, PharmD, BCOP      "

## 2021-04-12 ENCOUNTER — OUTPATIENT INFUSION SERVICES (OUTPATIENT)
Dept: ONCOLOGY | Facility: MEDICAL CENTER | Age: 66
End: 2021-04-12
Attending: INTERNAL MEDICINE
Payer: MEDICAID

## 2021-04-12 ENCOUNTER — HOSPITAL ENCOUNTER (OUTPATIENT)
Dept: RADIOLOGY | Facility: MEDICAL CENTER | Age: 66
End: 2021-04-12
Attending: INTERNAL MEDICINE
Payer: MEDICAID

## 2021-04-12 VITALS
SYSTOLIC BLOOD PRESSURE: 134 MMHG | WEIGHT: 141.98 LBS | DIASTOLIC BLOOD PRESSURE: 77 MMHG | RESPIRATION RATE: 18 BRPM | TEMPERATURE: 98 F | HEART RATE: 88 BPM | BODY MASS INDEX: 30.63 KG/M2 | OXYGEN SATURATION: 98 % | HEIGHT: 57 IN

## 2021-04-12 DIAGNOSIS — C50.811 MALIGNANT NEOPLASM OF OVERLAPPING SITES OF RIGHT BREAST IN FEMALE, ESTROGEN RECEPTOR POSITIVE (HCC): ICD-10-CM

## 2021-04-12 DIAGNOSIS — Z17.0 MALIGNANT NEOPLASM OF OVERLAPPING SITES OF RIGHT BREAST IN FEMALE, ESTROGEN RECEPTOR POSITIVE (HCC): ICD-10-CM

## 2021-04-12 LAB
BASOPHILS # BLD AUTO: 0.4 % (ref 0–1.8)
BASOPHILS # BLD: 0.02 K/UL (ref 0–0.12)
EOSINOPHIL # BLD AUTO: 0.14 K/UL (ref 0–0.51)
EOSINOPHIL NFR BLD: 2.8 % (ref 0–6.9)
ERYTHROCYTE [DISTWIDTH] IN BLOOD BY AUTOMATED COUNT: 51.6 FL (ref 35.9–50)
HCT VFR BLD AUTO: 40.2 % (ref 37–47)
HGB BLD-MCNC: 13 G/DL (ref 12–16)
IMM GRANULOCYTES # BLD AUTO: 0.04 K/UL (ref 0–0.11)
IMM GRANULOCYTES NFR BLD AUTO: 0.8 % (ref 0–0.9)
LYMPHOCYTES # BLD AUTO: 1.15 K/UL (ref 1–4.8)
LYMPHOCYTES NFR BLD: 23.3 % (ref 22–41)
MCH RBC QN AUTO: 31.2 PG (ref 27–33)
MCHC RBC AUTO-ENTMCNC: 32.3 G/DL (ref 33.6–35)
MCV RBC AUTO: 96.4 FL (ref 81.4–97.8)
MONOCYTES # BLD AUTO: 0.29 K/UL (ref 0–0.85)
MONOCYTES NFR BLD AUTO: 5.9 % (ref 0–13.4)
NEUTROPHILS # BLD AUTO: 3.3 K/UL (ref 2–7.15)
NEUTROPHILS NFR BLD: 66.8 % (ref 44–72)
NRBC # BLD AUTO: 0 K/UL
NRBC BLD-RTO: 0 /100 WBC
PLATELET # BLD AUTO: 299 K/UL (ref 164–446)
PMV BLD AUTO: 9.8 FL (ref 9–12.9)
RBC # BLD AUTO: 4.17 M/UL (ref 4.2–5.4)
WBC # BLD AUTO: 4.9 K/UL (ref 4.8–10.8)

## 2021-04-12 PROCEDURE — 96413 CHEMO IV INFUSION 1 HR: CPT

## 2021-04-12 PROCEDURE — 85025 COMPLETE CBC W/AUTO DIFF WBC: CPT

## 2021-04-12 PROCEDURE — 700111 HCHG RX REV CODE 636 W/ 250 OVERRIDE (IP): Performed by: INTERNAL MEDICINE

## 2021-04-12 PROCEDURE — 96375 TX/PRO/DX INJ NEW DRUG ADDON: CPT

## 2021-04-12 PROCEDURE — 304540 HCHG NITRO SET VENT 2ND TUB

## 2021-04-12 PROCEDURE — 700105 HCHG RX REV CODE 258: Performed by: INTERNAL MEDICINE

## 2021-04-12 RX ADMIN — FAMOTIDINE 20 MG: 10 INJECTION INTRAVENOUS at 13:26

## 2021-04-12 RX ADMIN — DEXAMETHASONE SODIUM PHOSPHATE 12 MG: 4 INJECTION, SOLUTION INTRA-ARTICULAR; INTRALESIONAL; INTRAMUSCULAR; INTRAVENOUS; SOFT TISSUE at 13:30

## 2021-04-12 RX ADMIN — PACLITAXEL 80.5 MG: 6 INJECTION, SOLUTION INTRAVENOUS at 14:12

## 2021-04-12 RX ADMIN — DIPHENHYDRAMINE HYDROCHLORIDE 25 MG: 50 INJECTION INTRAMUSCULAR; INTRAVENOUS at 13:45

## 2021-04-12 ASSESSMENT — FIBROSIS 4 INDEX: FIB4 SCORE: 1.25

## 2021-04-12 NOTE — PROGRESS NOTES
Patient here for Day 15, Cycle 6 Taxol. Plan of care discussed with patient via  services. PIV established by Vascular Access Team; labs drawn as ordered. Labs results within parameters to proceed with treatment. Pre-medications given per MAR. Taxol given per MAR. PIV flushed with normal saline and removed; gauze/coban applied to site. Next appointment scheduled. Discharged to self care; no apparent distress noted.

## 2021-04-12 NOTE — PROGRESS NOTES
Chemotherapy Verification - PRIMARY RN      Height = 145.5 cm  Weight = 64.4 kg  BSA = 1.61 m^2       Medication: PACLitaxel (TAXOL)  Dose: 50 mg/m^2  Calculated Dose: 80.5 mg                             (In mg/m2, AUC, mg/kg)     I confirm this process was performed independently with the BSA and all final chemotherapy dosing calculations congruent.  Any discrepancies of 10% or greater have been addressed with the chemotherapy pharmacist. The resolution of the discrepancy has been documented in the EPIC progress notes.

## 2021-04-12 NOTE — PROGRESS NOTES
Chemotherapy Verification - SECONDARY RN       Height = 57.28in  Weight = 64.4kg  BSA = 1.61m2       Medication: Taxol  Dose: 50mg/m2  Calculated Dose: 80.5mg                             (In mg/m2, AUC, mg/kg)       I confirm that this process was performed independently.

## 2021-04-19 RX ORDER — 0.9 % SODIUM CHLORIDE 0.9 %
3 VIAL (ML) INJECTION PRN
Status: CANCELLED | OUTPATIENT
Start: 2021-04-25

## 2021-04-19 RX ORDER — 0.9 % SODIUM CHLORIDE 0.9 %
10 VIAL (ML) INJECTION PRN
Status: CANCELLED | OUTPATIENT
Start: 2021-05-03

## 2021-04-19 RX ORDER — HEPARIN SODIUM (PORCINE) LOCK FLUSH IV SOLN 100 UNIT/ML 100 UNIT/ML
500 SOLUTION INTRAVENOUS PRN
Status: CANCELLED | OUTPATIENT
Start: 2021-05-10

## 2021-04-19 RX ORDER — SODIUM CHLORIDE 9 MG/ML
INJECTION, SOLUTION INTRAVENOUS CONTINUOUS
Status: CANCELLED | OUTPATIENT
Start: 2021-05-03

## 2021-04-19 RX ORDER — HEPARIN SODIUM (PORCINE) LOCK FLUSH IV SOLN 100 UNIT/ML 100 UNIT/ML
500 SOLUTION INTRAVENOUS PRN
Status: CANCELLED | OUTPATIENT
Start: 2021-04-25

## 2021-04-19 RX ORDER — 0.9 % SODIUM CHLORIDE 0.9 %
3 VIAL (ML) INJECTION PRN
Status: CANCELLED | OUTPATIENT
Start: 2021-05-03

## 2021-04-19 RX ORDER — ONDANSETRON 2 MG/ML
4 INJECTION INTRAMUSCULAR; INTRAVENOUS PRN
Status: CANCELLED | OUTPATIENT
Start: 2021-04-26

## 2021-04-19 RX ORDER — SODIUM CHLORIDE 9 MG/ML
INJECTION, SOLUTION INTRAVENOUS CONTINUOUS
Status: CANCELLED | OUTPATIENT
Start: 2021-05-10

## 2021-04-19 RX ORDER — PROCHLORPERAZINE MALEATE 10 MG
10 TABLET ORAL EVERY 6 HOURS PRN
Status: CANCELLED | OUTPATIENT
Start: 2021-05-10

## 2021-04-19 RX ORDER — ONDANSETRON 8 MG/1
8 TABLET, ORALLY DISINTEGRATING ORAL PRN
Status: CANCELLED | OUTPATIENT
Start: 2021-04-26

## 2021-04-19 RX ORDER — 0.9 % SODIUM CHLORIDE 0.9 %
10 VIAL (ML) INJECTION PRN
Status: CANCELLED | OUTPATIENT
Start: 2021-04-26

## 2021-04-19 RX ORDER — ONDANSETRON 2 MG/ML
4 INJECTION INTRAMUSCULAR; INTRAVENOUS PRN
Status: CANCELLED | OUTPATIENT
Start: 2021-05-10

## 2021-04-19 RX ORDER — METHYLPREDNISOLONE SODIUM SUCCINATE 125 MG/2ML
125 INJECTION, POWDER, LYOPHILIZED, FOR SOLUTION INTRAMUSCULAR; INTRAVENOUS PRN
Status: CANCELLED | OUTPATIENT
Start: 2021-04-26

## 2021-04-19 RX ORDER — HEPARIN SODIUM (PORCINE) LOCK FLUSH IV SOLN 100 UNIT/ML 100 UNIT/ML
500 SOLUTION INTRAVENOUS PRN
Status: CANCELLED | OUTPATIENT
Start: 2021-04-26

## 2021-04-19 RX ORDER — METHYLPREDNISOLONE SODIUM SUCCINATE 125 MG/2ML
125 INJECTION, POWDER, LYOPHILIZED, FOR SOLUTION INTRAMUSCULAR; INTRAVENOUS PRN
Status: CANCELLED | OUTPATIENT
Start: 2021-05-03

## 2021-04-19 RX ORDER — DIPHENHYDRAMINE HYDROCHLORIDE 50 MG/ML
50 INJECTION INTRAMUSCULAR; INTRAVENOUS PRN
Status: CANCELLED | OUTPATIENT
Start: 2021-05-10

## 2021-04-19 RX ORDER — METHYLPREDNISOLONE SODIUM SUCCINATE 125 MG/2ML
125 INJECTION, POWDER, LYOPHILIZED, FOR SOLUTION INTRAMUSCULAR; INTRAVENOUS PRN
Status: CANCELLED | OUTPATIENT
Start: 2021-05-10

## 2021-04-19 RX ORDER — ONDANSETRON 2 MG/ML
4 INJECTION INTRAMUSCULAR; INTRAVENOUS PRN
Status: CANCELLED | OUTPATIENT
Start: 2021-05-03

## 2021-04-19 RX ORDER — EPINEPHRINE 1 MG/ML(1)
0.5 AMPUL (ML) INJECTION PRN
Status: CANCELLED | OUTPATIENT
Start: 2021-04-26

## 2021-04-19 RX ORDER — 0.9 % SODIUM CHLORIDE 0.9 %
3 VIAL (ML) INJECTION PRN
Status: CANCELLED | OUTPATIENT
Start: 2021-05-10

## 2021-04-19 RX ORDER — 0.9 % SODIUM CHLORIDE 0.9 %
10 VIAL (ML) INJECTION PRN
Status: CANCELLED | OUTPATIENT
Start: 2021-05-10

## 2021-04-19 RX ORDER — 0.9 % SODIUM CHLORIDE 0.9 %
VIAL (ML) INJECTION PRN
Status: CANCELLED | OUTPATIENT
Start: 2021-04-25

## 2021-04-19 RX ORDER — DIPHENHYDRAMINE HYDROCHLORIDE 50 MG/ML
50 INJECTION INTRAMUSCULAR; INTRAVENOUS PRN
Status: CANCELLED | OUTPATIENT
Start: 2021-05-03

## 2021-04-19 RX ORDER — 0.9 % SODIUM CHLORIDE 0.9 %
VIAL (ML) INJECTION PRN
Status: CANCELLED | OUTPATIENT
Start: 2021-05-10

## 2021-04-19 RX ORDER — 0.9 % SODIUM CHLORIDE 0.9 %
VIAL (ML) INJECTION PRN
Status: CANCELLED | OUTPATIENT
Start: 2021-04-26

## 2021-04-19 RX ORDER — ONDANSETRON 8 MG/1
8 TABLET, ORALLY DISINTEGRATING ORAL PRN
Status: CANCELLED | OUTPATIENT
Start: 2021-05-03

## 2021-04-19 RX ORDER — SODIUM CHLORIDE 9 MG/ML
INJECTION, SOLUTION INTRAVENOUS CONTINUOUS
Status: CANCELLED | OUTPATIENT
Start: 2021-04-26

## 2021-04-19 RX ORDER — HEPARIN SODIUM (PORCINE) LOCK FLUSH IV SOLN 100 UNIT/ML 100 UNIT/ML
500 SOLUTION INTRAVENOUS PRN
Status: CANCELLED | OUTPATIENT
Start: 2021-05-03

## 2021-04-19 RX ORDER — EPINEPHRINE 1 MG/ML(1)
0.5 AMPUL (ML) INJECTION PRN
Status: CANCELLED | OUTPATIENT
Start: 2021-05-10

## 2021-04-19 RX ORDER — PROCHLORPERAZINE MALEATE 10 MG
10 TABLET ORAL EVERY 6 HOURS PRN
Status: CANCELLED | OUTPATIENT
Start: 2021-04-26

## 2021-04-19 RX ORDER — 0.9 % SODIUM CHLORIDE 0.9 %
10 VIAL (ML) INJECTION PRN
Status: CANCELLED | OUTPATIENT
Start: 2021-04-25

## 2021-04-19 RX ORDER — ONDANSETRON 8 MG/1
8 TABLET, ORALLY DISINTEGRATING ORAL PRN
Status: CANCELLED | OUTPATIENT
Start: 2021-05-10

## 2021-04-19 RX ORDER — DIPHENHYDRAMINE HYDROCHLORIDE 50 MG/ML
50 INJECTION INTRAMUSCULAR; INTRAVENOUS PRN
Status: CANCELLED | OUTPATIENT
Start: 2021-04-26

## 2021-04-19 RX ORDER — EPINEPHRINE 1 MG/ML(1)
0.5 AMPUL (ML) INJECTION PRN
Status: CANCELLED | OUTPATIENT
Start: 2021-05-03

## 2021-04-19 RX ORDER — PROCHLORPERAZINE MALEATE 10 MG
10 TABLET ORAL EVERY 6 HOURS PRN
Status: CANCELLED | OUTPATIENT
Start: 2021-05-03

## 2021-04-19 RX ORDER — 0.9 % SODIUM CHLORIDE 0.9 %
3 VIAL (ML) INJECTION PRN
Status: CANCELLED | OUTPATIENT
Start: 2021-04-26

## 2021-04-19 RX ORDER — 0.9 % SODIUM CHLORIDE 0.9 %
VIAL (ML) INJECTION PRN
Status: CANCELLED | OUTPATIENT
Start: 2021-05-03

## 2021-04-26 ENCOUNTER — HOSPITAL ENCOUNTER (OUTPATIENT)
Dept: RADIOLOGY | Facility: MEDICAL CENTER | Age: 66
End: 2021-04-26
Attending: INTERNAL MEDICINE
Payer: MEDICAID

## 2021-04-26 ENCOUNTER — OUTPATIENT INFUSION SERVICES (OUTPATIENT)
Dept: ONCOLOGY | Facility: MEDICAL CENTER | Age: 66
End: 2021-04-26
Attending: INTERNAL MEDICINE
Payer: MEDICAID

## 2021-04-26 VITALS
BODY MASS INDEX: 30.92 KG/M2 | HEIGHT: 57 IN | SYSTOLIC BLOOD PRESSURE: 155 MMHG | WEIGHT: 143.3 LBS | HEART RATE: 84 BPM | RESPIRATION RATE: 18 BRPM | OXYGEN SATURATION: 98 % | TEMPERATURE: 98.7 F | DIASTOLIC BLOOD PRESSURE: 59 MMHG

## 2021-04-26 DIAGNOSIS — Z17.0 MALIGNANT NEOPLASM OF OVERLAPPING SITES OF RIGHT BREAST IN FEMALE, ESTROGEN RECEPTOR POSITIVE (HCC): ICD-10-CM

## 2021-04-26 DIAGNOSIS — C50.811 MALIGNANT NEOPLASM OF OVERLAPPING SITES OF RIGHT BREAST IN FEMALE, ESTROGEN RECEPTOR POSITIVE (HCC): ICD-10-CM

## 2021-04-26 LAB
ALBUMIN SERPL BCP-MCNC: 3.5 G/DL (ref 3.2–4.9)
ALBUMIN/GLOB SERPL: 1.3 G/DL
ALP SERPL-CCNC: 59 U/L (ref 30–99)
ALT SERPL-CCNC: 13 U/L (ref 2–50)
ANION GAP SERPL CALC-SCNC: 8 MMOL/L (ref 7–16)
AST SERPL-CCNC: 19 U/L (ref 12–45)
BASOPHILS # BLD AUTO: 0.6 % (ref 0–1.8)
BASOPHILS # BLD: 0.03 K/UL (ref 0–0.12)
BILIRUB SERPL-MCNC: 0.2 MG/DL (ref 0.1–1.5)
BUN SERPL-MCNC: 8 MG/DL (ref 8–22)
CALCIUM SERPL-MCNC: 8.8 MG/DL (ref 8.5–10.5)
CHLORIDE SERPL-SCNC: 108 MMOL/L (ref 96–112)
CO2 SERPL-SCNC: 26 MMOL/L (ref 20–33)
CREAT SERPL-MCNC: 0.52 MG/DL (ref 0.5–1.4)
EOSINOPHIL # BLD AUTO: 0.09 K/UL (ref 0–0.51)
EOSINOPHIL NFR BLD: 1.8 % (ref 0–6.9)
ERYTHROCYTE [DISTWIDTH] IN BLOOD BY AUTOMATED COUNT: 51.9 FL (ref 35.9–50)
GLOBULIN SER CALC-MCNC: 2.6 G/DL (ref 1.9–3.5)
GLUCOSE SERPL-MCNC: 106 MG/DL (ref 65–99)
HCT VFR BLD AUTO: 37.2 % (ref 37–47)
HGB BLD-MCNC: 12.1 G/DL (ref 12–16)
IMM GRANULOCYTES # BLD AUTO: 0.03 K/UL (ref 0–0.11)
IMM GRANULOCYTES NFR BLD AUTO: 0.6 % (ref 0–0.9)
LYMPHOCYTES # BLD AUTO: 1.3 K/UL (ref 1–4.8)
LYMPHOCYTES NFR BLD: 25.9 % (ref 22–41)
MCH RBC QN AUTO: 31.3 PG (ref 27–33)
MCHC RBC AUTO-ENTMCNC: 32.5 G/DL (ref 33.6–35)
MCV RBC AUTO: 96.4 FL (ref 81.4–97.8)
MONOCYTES # BLD AUTO: 0.44 K/UL (ref 0–0.85)
MONOCYTES NFR BLD AUTO: 8.8 % (ref 0–13.4)
NEUTROPHILS # BLD AUTO: 3.13 K/UL (ref 2–7.15)
NEUTROPHILS NFR BLD: 62.3 % (ref 44–72)
NRBC # BLD AUTO: 0 K/UL
NRBC BLD-RTO: 0 /100 WBC
PLATELET # BLD AUTO: 246 K/UL (ref 164–446)
PMV BLD AUTO: 9.7 FL (ref 9–12.9)
POTASSIUM SERPL-SCNC: 4 MMOL/L (ref 3.6–5.5)
PROT SERPL-MCNC: 6.1 G/DL (ref 6–8.2)
RBC # BLD AUTO: 3.86 M/UL (ref 4.2–5.4)
SODIUM SERPL-SCNC: 142 MMOL/L (ref 135–145)
WBC # BLD AUTO: 5 K/UL (ref 4.8–10.8)

## 2021-04-26 PROCEDURE — 700105 HCHG RX REV CODE 258: Performed by: INTERNAL MEDICINE

## 2021-04-26 PROCEDURE — 700111 HCHG RX REV CODE 636 W/ 250 OVERRIDE (IP): Performed by: INTERNAL MEDICINE

## 2021-04-26 PROCEDURE — 96375 TX/PRO/DX INJ NEW DRUG ADDON: CPT

## 2021-04-26 PROCEDURE — 96413 CHEMO IV INFUSION 1 HR: CPT

## 2021-04-26 PROCEDURE — 304540 HCHG NITRO SET VENT 2ND TUB

## 2021-04-26 PROCEDURE — 80053 COMPREHEN METABOLIC PANEL: CPT

## 2021-04-26 PROCEDURE — 85025 COMPLETE CBC W/AUTO DIFF WBC: CPT

## 2021-04-26 RX ORDER — SODIUM CHLORIDE 9 MG/ML
INJECTION, SOLUTION INTRAVENOUS CONTINUOUS
Status: DISCONTINUED | OUTPATIENT
Start: 2021-04-26 | End: 2021-04-26 | Stop reason: HOSPADM

## 2021-04-26 RX ADMIN — DIPHENHYDRAMINE HYDROCHLORIDE 25 MG: 50 INJECTION, SOLUTION INTRAMUSCULAR; INTRAVENOUS at 13:04

## 2021-04-26 RX ADMIN — DEXAMETHASONE SODIUM PHOSPHATE 12 MG: 4 INJECTION, SOLUTION INTRA-ARTICULAR; INTRALESIONAL; INTRAMUSCULAR; INTRAVENOUS; SOFT TISSUE at 12:47

## 2021-04-26 RX ADMIN — SODIUM CHLORIDE: 9 INJECTION, SOLUTION INTRAVENOUS at 12:46

## 2021-04-26 RX ADMIN — FAMOTIDINE 20 MG: 10 INJECTION INTRAVENOUS at 13:00

## 2021-04-26 RX ADMIN — PACLITAXEL 81 MG: 6 INJECTION, SOLUTION INTRAVENOUS at 13:29

## 2021-04-26 ASSESSMENT — FIBROSIS 4 INDEX: FIB4 SCORE: 1.13

## 2021-04-26 NOTE — PROGRESS NOTES
Chemotherapy Verification - PRIMARY RN      Height = 145.5 cm  Weight = 65 kg  BSA = 1.62 m2       Medication: Paclitaxel  Dose: 50 mg/m2    Calculated Dose: 81 mg                             (In mg/m2, AUC, mg/kg)       I confirm this process was performed independently with the BSA and all final chemotherapy dosing calculations congruent.  Any discrepancies of 10% or greater have been addressed with the chemotherapy pharmacist. The resolution of the discrepancy has been documented in the EPIC progress notes.

## 2021-04-26 NOTE — PROGRESS NOTES
Patient presented to [facility] for C7D1 Paclitaxel.  Denied current illness/infection or change in condition.  PIV placed by VAT team, flushed easily, janie briskly. Pre-treatment labs drawn. Results within treatable parameters. Pt received pre-meds as ordered.  Received Taxol over 1 hour through inline filter, tolerated treatment well.  PIV removed, catheter tip remained intact. Gauze and coban to PIV site. Pt left [facility] in NAD.

## 2021-04-26 NOTE — PROGRESS NOTES
"Pharmacy Chemotherapy Verification    Dx: Breast CA    Regimen: Weekly PACLitaxel  PACLitaxel 80 mg/m2 IV over 60 minutes Day 1,8,15   -C5: dose dec to 60 mg/m2 for tolerance / neuropathy   -C6 dose decreased to 50 mg/m2 for increased neuropathy per Dr. Jarvis  Weekly cycles x6 cycles (currently entered)  *Dosing Reference*  NCCN guidelines for breast cancer V.1.2019  Bay EA, et al. J Clin Oncol 2001;19(22):4216-23    Allergies:Patient has no known allergies.  /59   Pulse 84   Temp 37.1 °C (98.7 °F) (Temporal)   Resp 18   Ht 1.455 m (4' 9.28\")   Wt 65 kg (143 lb 4.8 oz)   SpO2 98%   BMI 30.70 kg/m²  Body surface area is 1.62 meters squared.     Labs 4/26/21  ANC~ 3130 Plt = 246k   Hgb = 12.1     SCr = 0.52 mg/dL CrCl ~ 113 mL/min   LFT's = WNL TBili = 0.2     Drug Order   (Drug name, dose, route, IV Fluid & volume, frequency, number of doses) Cycle 7 Day 1  Previous treatment = C6D15 on 4/12/21   Medication = PACLitaxel (Taxol)   Base Dose = 50 mg/m²  Calc Dose: Base Dose x 1.62 m² = 81 mg  Final Dose = 81 mg  Route = IV  Fluid & Volume =  mL  Admin Duration = Over 60 minutes            <10% difference, okay to treat with final dose     By my signature below, I confirm this process was performed independently with the BSA and all final chemotherapy dosing calculations congruent. I have reviewed the above chemotherapy order and that my calculation of the final dose and BSA (when applicable) corroborate those calculations of the  pharmacist. Discrepancies of 10% or greater in the written dose have been addressed and documented within the [facility] Progress notes.    Matt Humphries, PharmD      "

## 2021-04-26 NOTE — PROGRESS NOTES
"Pharmacy Chemotherapy Calculations Note:    Patient Name: Christine Malhotra      Dx: Breast CA, Stage IV (ER/[place] +, HER2-)    Cycle 7, Day 1  Previous treatment: 4/12/21     Protocol:  Weekly PACLitaxel  PACLitaxel 80 mg/m² IV over 60 minutes Day 1, 8, 15  3/1/21 C5 - dose reduced to 60 mg/m² for tolerance/neuropathy  3/29/21 C6 - dose reduced to 50 mg/m² for tolerance/neuropathy  Weekly cycles x6 cycles   NCCN guidelines for breast cancer V.1.2019.  Bay EA, et al. J Clin Oncol 2001;19(22):4216-23.        /59   Pulse 84   Temp 37.1 °C (98.7 °F) (Temporal)   Resp 18   Ht 1.455 m (4' 9.28\")   Wt 65 kg (143 lb 4.8 oz)   SpO2 98%   BMI 30.70 kg/m²  Body surface area is 1.62 meters squared.    Labs: 4/26/21   Meet treatment parameters     PACLitaxel (Taxol) 50 mg/m² x 1.62 m² = 81 mg   OK to treat with final dose = 81 mg   Final [conc] ~ 0.31 mg/mL         "

## 2021-04-26 NOTE — PROGRESS NOTES
Chemotherapy Verification - SECONDARY RN       Height = 57.28in  Weight = 65kg  BSA = 1.62m2       Medication: Taxol  Dose: 50mg/m2  (62.5% of original dose of 80mg/m2)Calculated Dose: 81mg                             (In mg/m2, AUC, mg/kg)         I confirm that this process was performed independently.

## 2021-04-28 ENCOUNTER — PATIENT OUTREACH (OUTPATIENT)
Dept: OTHER | Facility: MEDICAL CENTER | Age: 66
End: 2021-04-28

## 2021-04-28 NOTE — PROGRESS NOTES
Oncology nurse navigator reached out to patient to follow up on a referral and a distress score.  Oncology nurse navigator was unable to reach patient at home number the voicemail was not set up and the mobile number was no longer in service.

## 2021-04-28 NOTE — PROGRESS NOTES
Oncology nurse navigator reached out to daughter Reta to follow up on the referral for her mom.  Reta asked that I call back later with an .

## 2021-05-03 ENCOUNTER — OUTPATIENT INFUSION SERVICES (OUTPATIENT)
Dept: ONCOLOGY | Facility: MEDICAL CENTER | Age: 66
End: 2021-05-03
Attending: INTERNAL MEDICINE
Payer: MEDICAID

## 2021-05-03 ENCOUNTER — HOSPITAL ENCOUNTER (OUTPATIENT)
Dept: RADIOLOGY | Facility: MEDICAL CENTER | Age: 66
End: 2021-05-03
Attending: INTERNAL MEDICINE
Payer: MEDICAID

## 2021-05-03 VITALS
HEART RATE: 82 BPM | TEMPERATURE: 98 F | RESPIRATION RATE: 18 BRPM | SYSTOLIC BLOOD PRESSURE: 129 MMHG | HEIGHT: 57 IN | OXYGEN SATURATION: 98 % | DIASTOLIC BLOOD PRESSURE: 63 MMHG | WEIGHT: 143.3 LBS | BODY MASS INDEX: 30.92 KG/M2

## 2021-05-03 DIAGNOSIS — Z17.0 MALIGNANT NEOPLASM OF OVERLAPPING SITES OF RIGHT BREAST IN FEMALE, ESTROGEN RECEPTOR POSITIVE (HCC): ICD-10-CM

## 2021-05-03 DIAGNOSIS — C50.811 MALIGNANT NEOPLASM OF OVERLAPPING SITES OF RIGHT BREAST IN FEMALE, ESTROGEN RECEPTOR POSITIVE (HCC): ICD-10-CM

## 2021-05-03 LAB
BASOPHILS # BLD AUTO: 1 % (ref 0–1.8)
BASOPHILS # BLD: 0.06 K/UL (ref 0–0.12)
EOSINOPHIL # BLD AUTO: 0.12 K/UL (ref 0–0.51)
EOSINOPHIL NFR BLD: 2.1 % (ref 0–6.9)
ERYTHROCYTE [DISTWIDTH] IN BLOOD BY AUTOMATED COUNT: 50.9 FL (ref 35.9–50)
HCT VFR BLD AUTO: 40.3 % (ref 37–47)
HGB BLD-MCNC: 12.9 G/DL (ref 12–16)
IMM GRANULOCYTES # BLD AUTO: 0.19 K/UL (ref 0–0.11)
IMM GRANULOCYTES NFR BLD AUTO: 3.2 % (ref 0–0.9)
LYMPHOCYTES # BLD AUTO: 1.43 K/UL (ref 1–4.8)
LYMPHOCYTES NFR BLD: 24.4 % (ref 22–41)
MCH RBC QN AUTO: 30.6 PG (ref 27–33)
MCHC RBC AUTO-ENTMCNC: 32 G/DL (ref 33.6–35)
MCV RBC AUTO: 95.7 FL (ref 81.4–97.8)
MONOCYTES # BLD AUTO: 0.43 K/UL (ref 0–0.85)
MONOCYTES NFR BLD AUTO: 7.4 % (ref 0–13.4)
NEUTROPHILS # BLD AUTO: 3.62 K/UL (ref 2–7.15)
NEUTROPHILS NFR BLD: 61.9 % (ref 44–72)
NRBC # BLD AUTO: 0 K/UL
NRBC BLD-RTO: 0 /100 WBC
PLATELET # BLD AUTO: 299 K/UL (ref 164–446)
PMV BLD AUTO: 10.1 FL (ref 9–12.9)
RBC # BLD AUTO: 4.21 M/UL (ref 4.2–5.4)
WBC # BLD AUTO: 5.9 K/UL (ref 4.8–10.8)

## 2021-05-03 PROCEDURE — 96413 CHEMO IV INFUSION 1 HR: CPT | Performed by: CLINICAL NURSE SPECIALIST

## 2021-05-03 PROCEDURE — 700105 HCHG RX REV CODE 258: Performed by: INTERNAL MEDICINE

## 2021-05-03 PROCEDURE — 304540 HCHG NITRO SET VENT 2ND TUB: Performed by: CLINICAL NURSE SPECIALIST

## 2021-05-03 PROCEDURE — 96375 TX/PRO/DX INJ NEW DRUG ADDON: CPT | Performed by: CLINICAL NURSE SPECIALIST

## 2021-05-03 PROCEDURE — 85025 COMPLETE CBC W/AUTO DIFF WBC: CPT

## 2021-05-03 PROCEDURE — 700111 HCHG RX REV CODE 636 W/ 250 OVERRIDE (IP): Performed by: INTERNAL MEDICINE

## 2021-05-03 RX ADMIN — FAMOTIDINE 20 MG: 10 INJECTION INTRAVENOUS at 12:27

## 2021-05-03 RX ADMIN — DIPHENHYDRAMINE HYDROCHLORIDE 25 MG: 50 INJECTION INTRAMUSCULAR; INTRAVENOUS at 12:21

## 2021-05-03 RX ADMIN — PACLITAXEL 81 MG: 6 INJECTION, SOLUTION INTRAVENOUS at 13:00

## 2021-05-03 RX ADMIN — DEXAMETHASONE SODIUM PHOSPHATE 12 MG: 4 INJECTION, SOLUTION INTRA-ARTICULAR; INTRALESIONAL; INTRAMUSCULAR; INTRAVENOUS; SOFT TISSUE at 12:37

## 2021-05-03 ASSESSMENT — FIBROSIS 4 INDEX: FIB4 SCORE: 1.35

## 2021-05-03 NOTE — PROGRESS NOTES
Chemotherapy Verification - PRIMARY RN      Height = 1.455m  Weight = 65kg  BSA = 1.62m2      Medication: Taxol  Dose: 50mg/m2  Calculated Dose: 81mg                             (In mg/m2, AUC, mg/kg)           I confirm this process was performed independently with the BSA and all final chemotherapy dosing calculations congruent.  Any discrepancies of 10% or greater have been addressed with the chemotherapy pharmacist. The resolution of the discrepancy has been documented in the EPIC progress notes.

## 2021-05-03 NOTE — PROGRESS NOTES
"Pharmacy Chemotherapy Calculations Note:    Patient Name: Christine Malhotra      Dx: Breast CA, Stage IV (ER/[place] +, HER2-)    Cycle 7, Day 8  Previous treatment: 4/26/21     Protocol:  Weekly PACLitaxel  PACLitaxel 80 mg/m² IV over 60 minutes Day 1, 8, 15  3/1/21 C5 - dose reduced to 60 mg/m² for tolerance/neuropathy  3/29/21 C6 - dose reduced to 50 mg/m² for tolerance/neuropathy  Weekly cycles x6 cycles   NCCN guidelines for breast cancer V.1.2019.  Bay EA, et al. J Clin Oncol 2001;19(22):4216-23.        /63   Pulse 82   Temp 36.7 °C (98 °F) (Temporal)   Resp 18   Ht 1.455 m (4' 9.28\")   Wt 65 kg (143 lb 4.8 oz)   SpO2 98%   BMI 30.70 kg/m²  Body surface area is 1.62 meters squared.    Labs: 5/3/21   Meet treatment parameters     PACLitaxel (Taxol) 50 mg/m² x 1.62 m² = 81 mg   OK to treat with final dose = 81 mg   Final [conc] ~ 0.31 mg/mL         "

## 2021-05-03 NOTE — PROGRESS NOTES
"Pharmacy Chemotherapy Verification    Dx: Breast CA    Regimen: Weekly PACLitaxel  PACLitaxel 80 mg/m2 IV over 60 minutes Day 1,8,15   -C5: dose dec to 60 mg/m2 for tolerance / neuropathy   -C6 dose decreased to 50 mg/m2 for increased neuropathy per Dr. Jarvis  Weekly cycles x6 cycles (currently entered)  *Dosing Reference*  NCCN guidelines for breast cancer V.1.2019  Bay EA, et al. J Clin Oncol 2001;19(22):4216-23    Allergies:Patient has no known allergies.  /63   Pulse 82   Temp 36.7 °C (98 °F) (Temporal)   Resp 18   Ht 1.455 m (4' 9.28\")   Wt 65 kg (143 lb 4.8 oz)   SpO2 98%   BMI 30.70 kg/m²  Body surface area is 1.62 meters squared.     Labs 5/3/21:  ANC~ 3620 Plt = 299k   Hgb = 12.9         Drug Order   (Drug name, dose, route, IV Fluid & volume, frequency, number of doses) Cycle 7 Day 8  Previous treatment = C7D1 on 4/26/21   Medication = PACLitaxel (Taxol)   Base Dose = 50 mg/m²  Calc Dose: Base Dose x 1.62 m² = 81 mg  Final Dose = 81 mg  Route = IV  Fluid & Volume =  mL  Admin Duration = Over 60 minutes            <10% difference, okay to treat with final dose     By my signature below, I confirm this process was performed independently with the BSA and all final chemotherapy dosing calculations congruent. I have reviewed the above chemotherapy order and that my calculation of the final dose and BSA (when applicable) corroborate those calculations of the  pharmacist. Discrepancies of 10% or greater in the written dose have been addressed and documented within the [facility] Progress notes.    Matt Humphries, PharmD      "

## 2021-05-03 NOTE — PROGRESS NOTES
Chemotherapy Verification - SECONDARY RN       Height = 145.5 cm  Weight = 65 kg  BSA = 1.62 m2       Medication: Taxol  Dose: 50 mg/m2  Calculated Dose: 81 mg                             (In mg/m2, AUC, mg/kg)     I confirm that this process was performed independently.

## 2021-05-06 ENCOUNTER — HOSPITAL ENCOUNTER (OUTPATIENT)
Dept: RADIOLOGY | Facility: MEDICAL CENTER | Age: 66
End: 2021-05-06
Attending: INTERNAL MEDICINE
Payer: MEDICAID

## 2021-05-06 DIAGNOSIS — C50.611 MALIGNANT NEOPLASM OF AXILLARY TAIL OF RIGHT FEMALE BREAST, UNSPECIFIED ESTROGEN RECEPTOR STATUS (HCC): ICD-10-CM

## 2021-05-06 PROCEDURE — 700117 HCHG RX CONTRAST REV CODE 255: Performed by: INTERNAL MEDICINE

## 2021-05-06 PROCEDURE — 71260 CT THORAX DX C+: CPT

## 2021-05-06 RX ADMIN — IOHEXOL 100 ML: 350 INJECTION, SOLUTION INTRAVENOUS at 12:21

## 2021-05-06 RX ADMIN — IOHEXOL 25 ML: 240 INJECTION, SOLUTION INTRATHECAL; INTRAVASCULAR; INTRAVENOUS; ORAL at 12:22

## 2021-05-10 ENCOUNTER — OUTPATIENT INFUSION SERVICES (OUTPATIENT)
Dept: ONCOLOGY | Facility: MEDICAL CENTER | Age: 66
End: 2021-05-10
Attending: INTERNAL MEDICINE
Payer: MEDICAID

## 2021-05-10 ENCOUNTER — HOSPITAL ENCOUNTER (OUTPATIENT)
Dept: RADIOLOGY | Facility: MEDICAL CENTER | Age: 66
End: 2021-05-10
Attending: INTERNAL MEDICINE
Payer: MEDICAID

## 2021-05-10 VITALS
SYSTOLIC BLOOD PRESSURE: 122 MMHG | DIASTOLIC BLOOD PRESSURE: 74 MMHG | TEMPERATURE: 98 F | HEART RATE: 68 BPM | WEIGHT: 142.42 LBS | RESPIRATION RATE: 18 BRPM | OXYGEN SATURATION: 98 % | BODY MASS INDEX: 30.73 KG/M2 | HEIGHT: 57 IN

## 2021-05-10 DIAGNOSIS — C50.811 MALIGNANT NEOPLASM OF OVERLAPPING SITES OF RIGHT BREAST IN FEMALE, ESTROGEN RECEPTOR POSITIVE (HCC): ICD-10-CM

## 2021-05-10 DIAGNOSIS — Z17.0 MALIGNANT NEOPLASM OF OVERLAPPING SITES OF RIGHT BREAST IN FEMALE, ESTROGEN RECEPTOR POSITIVE (HCC): ICD-10-CM

## 2021-05-10 LAB
BASOPHILS # BLD AUTO: 0.7 % (ref 0–1.8)
BASOPHILS # BLD: 0.05 K/UL (ref 0–0.12)
EOSINOPHIL # BLD AUTO: 0.16 K/UL (ref 0–0.51)
EOSINOPHIL NFR BLD: 2.3 % (ref 0–6.9)
ERYTHROCYTE [DISTWIDTH] IN BLOOD BY AUTOMATED COUNT: 53.2 FL (ref 35.9–50)
HCT VFR BLD AUTO: 42.8 % (ref 37–47)
HGB BLD-MCNC: 13.5 G/DL (ref 12–16)
IMM GRANULOCYTES # BLD AUTO: 0.11 K/UL (ref 0–0.11)
IMM GRANULOCYTES NFR BLD AUTO: 1.6 % (ref 0–0.9)
LYMPHOCYTES # BLD AUTO: 1.51 K/UL (ref 1–4.8)
LYMPHOCYTES NFR BLD: 21.3 % (ref 22–41)
MCH RBC QN AUTO: 30.5 PG (ref 27–33)
MCHC RBC AUTO-ENTMCNC: 31.5 G/DL (ref 33.6–35)
MCV RBC AUTO: 96.8 FL (ref 81.4–97.8)
MONOCYTES # BLD AUTO: 0.34 K/UL (ref 0–0.85)
MONOCYTES NFR BLD AUTO: 4.8 % (ref 0–13.4)
NEUTROPHILS # BLD AUTO: 4.91 K/UL (ref 2–7.15)
NEUTROPHILS NFR BLD: 69.3 % (ref 44–72)
NRBC # BLD AUTO: 0 K/UL
NRBC BLD-RTO: 0 /100 WBC
PLATELET # BLD AUTO: 285 K/UL (ref 164–446)
PMV BLD AUTO: 9.7 FL (ref 9–12.9)
RBC # BLD AUTO: 4.42 M/UL (ref 4.2–5.4)
WBC # BLD AUTO: 7.1 K/UL (ref 4.8–10.8)

## 2021-05-10 PROCEDURE — 96374 THER/PROPH/DIAG INJ IV PUSH: CPT

## 2021-05-10 PROCEDURE — 96375 TX/PRO/DX INJ NEW DRUG ADDON: CPT

## 2021-05-10 PROCEDURE — 304540 HCHG NITRO SET VENT 2ND TUB

## 2021-05-10 PROCEDURE — 85025 COMPLETE CBC W/AUTO DIFF WBC: CPT

## 2021-05-10 PROCEDURE — 96413 CHEMO IV INFUSION 1 HR: CPT

## 2021-05-10 PROCEDURE — 700111 HCHG RX REV CODE 636 W/ 250 OVERRIDE (IP): Performed by: INTERNAL MEDICINE

## 2021-05-10 PROCEDURE — 700105 HCHG RX REV CODE 258: Performed by: INTERNAL MEDICINE

## 2021-05-10 RX ADMIN — PACLITAXEL 81 MG: 6 INJECTION, SOLUTION INTRAVENOUS at 12:45

## 2021-05-10 RX ADMIN — DIPHENHYDRAMINE HYDROCHLORIDE 25 MG: 50 INJECTION INTRAMUSCULAR; INTRAVENOUS at 12:20

## 2021-05-10 RX ADMIN — DEXAMETHASONE SODIUM PHOSPHATE 12 MG: 4 INJECTION, SOLUTION INTRA-ARTICULAR; INTRALESIONAL; INTRAMUSCULAR; INTRAVENOUS; SOFT TISSUE at 12:05

## 2021-05-10 RX ADMIN — FAMOTIDINE 20 MG: 10 INJECTION INTRAVENOUS at 12:15

## 2021-05-10 ASSESSMENT — FIBROSIS 4 INDEX: FIB4 SCORE: 1.11

## 2021-05-10 NOTE — PROGRESS NOTES
"Pharmacy Chemotherapy Calculations Note:    Patient Name: Christine Malhotra      Dx: Breast CA, Stage IV (ER/[place] +, HER2-)    Cycle 7, Day 15  Previous treatment: 5/3/21     Protocol:  Weekly PACLitaxel  PACLitaxel 80 mg/m² IV over 60 minutes Day 1, 8, 15  3/1/21 C5 - dose reduced to 60 mg/m² for tolerance/neuropathy  3/29/21 C6 - dose reduced to 50 mg/m² for tolerance/neuropathy  Weekly cycles x6 cycles   NCCN guidelines for breast cancer V.1.2019.  Bay COPPOLA, et al. J Clin Oncol 2001;19(22):4216-23.        /74   Pulse 68   Temp 36.7 °C (98 °F) (Temporal)   Resp 18   Ht 1.455 m (4' 9.28\")   Wt 64.6 kg (142 lb 6.7 oz)   SpO2 98%   BMI 30.51 kg/m²  Body surface area is 1.62 meters squared.    Labs: 5/10/21   Meet treatment parameters     PACLitaxel (Taxol) 50 mg/m² x 1.62 m² = 81 mg   OK to treat with final dose = 81 mg   Final [conc] ~ 0.31 mg/mL         "

## 2021-05-10 NOTE — PROGRESS NOTES
Chemotherapy Verification - SECONDARY RN       Height = 145.5 cm  Weight = 64.6 kg  BSA = 1..62 m2       Medication: Taxol  Dose: 50 mg/m2  Calculated Dose: 81 mg                             (In mg/m2, AUC, mg/kg)       I confirm that this process was performed independently.

## 2021-05-10 NOTE — PROGRESS NOTES
Pt arrived ambulatory to  for weekly Taxol chemotherapy.  POC discussed via iPad  line.  PIV started to L upper arm by VAT.  Labs drawn as ordered, results reviewed.  Pt meets parameters for treatment today.  Premedications given followed by Taxol.  Treatment completed without adverse reaction.  PIV flushed, removed, and site covered with gauze and coban.  Next appointment confirmed.  Pt discharged from IS in NAD under self care.

## 2021-05-10 NOTE — PROGRESS NOTES
"Pharmacy Chemotherapy Verification    Dx: Breast CA    Regimen: Weekly PACLitaxel  PACLitaxel 80 mg/m2 IV over 60 minutes Day 1,8,15   -C5: dose dec to 60 mg/m2 for tolerance / neuropathy   -C6 dose decreased to 50 mg/m2 for increased neuropathy per Dr. Jarvis  Weekly cycles x6 cycles (currently entered)  *Dosing Reference*  NCCN guidelines for breast cancer V.1.2019  Bay EA, et al. J Clin Oncol 2001;19(22):4216-23    Allergies:Patient has no known allergies.  /74   Pulse 68   Temp 36.7 °C (98 °F) (Temporal)   Resp 18   Ht 1.455 m (4' 9.28\")   Wt 64.6 kg (142 lb 6.7 oz)   SpO2 98%   BMI 30.51 kg/m²  Body surface area is 1.62 meters squared.     Labs 5/10/21:  ANC~ 4910 Plt = 285k   Hgb = 13.5       Drug Order   (Drug name, dose, route, IV Fluid & volume, frequency, number of doses) Cycle 7 Day 15  Previous treatment = C7D8 on 5/3/21   Medication = PACLitaxel (Taxol)   Base Dose = 50 mg/m²  Calc Dose: Base Dose x 1.62 m² = 81 mg  Final Dose = 81 mg  Route = IV  Fluid & Volume =  mL  Admin Duration = Over 60 minutes            <10% difference, okay to treat with final dose     By my signature below, I confirm this process was performed independently with the BSA and all final chemotherapy dosing calculations congruent. I have reviewed the above chemotherapy order and that my calculation of the final dose and BSA (when applicable) corroborate those calculations of the  pharmacist. Discrepancies of 10% or greater in the written dose have been addressed and documented within the [facility] Progress notes.    Matt Humphries, PharmD      "

## 2021-05-10 NOTE — PROGRESS NOTES
Chemotherapy Verification - PRIMARY RN      Height = 145.5 cm  Weight = 64.6 kg  BSA = 1.62 mg       Medication: Taxol  Dose: 50 mg/m2  Calculated Dose: 81 mg                             (In mg/m2, AUC, mg/kg)     I confirm this process was performed independently with the BSA and all final chemotherapy dosing calculations congruent.  Any discrepancies of 10% or greater have been addressed with the chemotherapy pharmacist. The resolution of the discrepancy has been documented in the EPIC progress notes.

## 2021-05-19 RX ORDER — HEPARIN SODIUM (PORCINE) LOCK FLUSH IV SOLN 100 UNIT/ML 100 UNIT/ML
500 SOLUTION INTRAVENOUS PRN
Status: CANCELLED | OUTPATIENT
Start: 2021-05-23

## 2021-05-19 RX ORDER — PROCHLORPERAZINE MALEATE 10 MG
10 TABLET ORAL EVERY 6 HOURS PRN
Status: CANCELLED | OUTPATIENT
Start: 2021-06-07

## 2021-05-19 RX ORDER — 0.9 % SODIUM CHLORIDE 0.9 %
10 VIAL (ML) INJECTION PRN
Status: CANCELLED | OUTPATIENT
Start: 2021-05-31

## 2021-05-19 RX ORDER — 0.9 % SODIUM CHLORIDE 0.9 %
3 VIAL (ML) INJECTION PRN
Status: CANCELLED | OUTPATIENT
Start: 2021-05-23

## 2021-05-19 RX ORDER — METHYLPREDNISOLONE SODIUM SUCCINATE 125 MG/2ML
125 INJECTION, POWDER, LYOPHILIZED, FOR SOLUTION INTRAMUSCULAR; INTRAVENOUS PRN
Status: CANCELLED | OUTPATIENT
Start: 2021-05-31

## 2021-05-19 RX ORDER — 0.9 % SODIUM CHLORIDE 0.9 %
10 VIAL (ML) INJECTION PRN
Status: CANCELLED | OUTPATIENT
Start: 2021-05-24

## 2021-05-19 RX ORDER — HEPARIN SODIUM (PORCINE) LOCK FLUSH IV SOLN 100 UNIT/ML 100 UNIT/ML
500 SOLUTION INTRAVENOUS PRN
Status: CANCELLED | OUTPATIENT
Start: 2021-05-24

## 2021-05-19 RX ORDER — 0.9 % SODIUM CHLORIDE 0.9 %
VIAL (ML) INJECTION PRN
Status: CANCELLED | OUTPATIENT
Start: 2021-05-31

## 2021-05-19 RX ORDER — EPINEPHRINE 1 MG/ML(1)
0.5 AMPUL (ML) INJECTION PRN
Status: CANCELLED | OUTPATIENT
Start: 2021-05-31

## 2021-05-19 RX ORDER — DIPHENHYDRAMINE HYDROCHLORIDE 50 MG/ML
50 INJECTION INTRAMUSCULAR; INTRAVENOUS PRN
Status: CANCELLED | OUTPATIENT
Start: 2021-06-07

## 2021-05-19 RX ORDER — 0.9 % SODIUM CHLORIDE 0.9 %
3 VIAL (ML) INJECTION PRN
Status: CANCELLED | OUTPATIENT
Start: 2021-05-31

## 2021-05-19 RX ORDER — SODIUM CHLORIDE 9 MG/ML
INJECTION, SOLUTION INTRAVENOUS CONTINUOUS
Status: CANCELLED | OUTPATIENT
Start: 2021-05-31

## 2021-05-19 RX ORDER — SODIUM CHLORIDE 9 MG/ML
INJECTION, SOLUTION INTRAVENOUS CONTINUOUS
Status: CANCELLED | OUTPATIENT
Start: 2021-05-24

## 2021-05-19 RX ORDER — METHYLPREDNISOLONE SODIUM SUCCINATE 125 MG/2ML
125 INJECTION, POWDER, LYOPHILIZED, FOR SOLUTION INTRAMUSCULAR; INTRAVENOUS PRN
Status: CANCELLED | OUTPATIENT
Start: 2021-05-24

## 2021-05-19 RX ORDER — 0.9 % SODIUM CHLORIDE 0.9 %
VIAL (ML) INJECTION PRN
Status: CANCELLED | OUTPATIENT
Start: 2021-05-24

## 2021-05-19 RX ORDER — 0.9 % SODIUM CHLORIDE 0.9 %
10 VIAL (ML) INJECTION PRN
Status: CANCELLED | OUTPATIENT
Start: 2021-05-23

## 2021-05-19 RX ORDER — ONDANSETRON 2 MG/ML
4 INJECTION INTRAMUSCULAR; INTRAVENOUS PRN
Status: CANCELLED | OUTPATIENT
Start: 2021-05-31

## 2021-05-19 RX ORDER — DIPHENHYDRAMINE HYDROCHLORIDE 50 MG/ML
50 INJECTION INTRAMUSCULAR; INTRAVENOUS PRN
Status: CANCELLED | OUTPATIENT
Start: 2021-05-31

## 2021-05-19 RX ORDER — EPINEPHRINE 1 MG/ML(1)
0.5 AMPUL (ML) INJECTION PRN
Status: CANCELLED | OUTPATIENT
Start: 2021-05-24

## 2021-05-19 RX ORDER — HEPARIN SODIUM (PORCINE) LOCK FLUSH IV SOLN 100 UNIT/ML 100 UNIT/ML
500 SOLUTION INTRAVENOUS PRN
Status: CANCELLED | OUTPATIENT
Start: 2021-05-31

## 2021-05-19 RX ORDER — 0.9 % SODIUM CHLORIDE 0.9 %
10 VIAL (ML) INJECTION PRN
Status: CANCELLED | OUTPATIENT
Start: 2021-06-07

## 2021-05-19 RX ORDER — ONDANSETRON 8 MG/1
8 TABLET, ORALLY DISINTEGRATING ORAL PRN
Status: CANCELLED | OUTPATIENT
Start: 2021-05-31

## 2021-05-19 RX ORDER — PROCHLORPERAZINE MALEATE 10 MG
10 TABLET ORAL EVERY 6 HOURS PRN
Status: CANCELLED | OUTPATIENT
Start: 2021-05-24

## 2021-05-19 RX ORDER — ONDANSETRON 8 MG/1
8 TABLET, ORALLY DISINTEGRATING ORAL PRN
Status: CANCELLED | OUTPATIENT
Start: 2021-05-24

## 2021-05-19 RX ORDER — 0.9 % SODIUM CHLORIDE 0.9 %
3 VIAL (ML) INJECTION PRN
Status: CANCELLED | OUTPATIENT
Start: 2021-06-07

## 2021-05-19 RX ORDER — 0.9 % SODIUM CHLORIDE 0.9 %
3 VIAL (ML) INJECTION PRN
Status: CANCELLED | OUTPATIENT
Start: 2021-05-24

## 2021-05-19 RX ORDER — PROCHLORPERAZINE MALEATE 10 MG
10 TABLET ORAL EVERY 6 HOURS PRN
Status: CANCELLED | OUTPATIENT
Start: 2021-05-31

## 2021-05-19 RX ORDER — ONDANSETRON 8 MG/1
8 TABLET, ORALLY DISINTEGRATING ORAL PRN
Status: CANCELLED | OUTPATIENT
Start: 2021-06-07

## 2021-05-19 RX ORDER — EPINEPHRINE 1 MG/ML(1)
0.5 AMPUL (ML) INJECTION PRN
Status: CANCELLED | OUTPATIENT
Start: 2021-06-07

## 2021-05-19 RX ORDER — 0.9 % SODIUM CHLORIDE 0.9 %
VIAL (ML) INJECTION PRN
Status: CANCELLED | OUTPATIENT
Start: 2021-05-23

## 2021-05-19 RX ORDER — 0.9 % SODIUM CHLORIDE 0.9 %
VIAL (ML) INJECTION PRN
Status: CANCELLED | OUTPATIENT
Start: 2021-06-07

## 2021-05-19 RX ORDER — ONDANSETRON 2 MG/ML
4 INJECTION INTRAMUSCULAR; INTRAVENOUS PRN
Status: CANCELLED | OUTPATIENT
Start: 2021-05-24

## 2021-05-19 RX ORDER — SODIUM CHLORIDE 9 MG/ML
INJECTION, SOLUTION INTRAVENOUS CONTINUOUS
Status: CANCELLED | OUTPATIENT
Start: 2021-06-07

## 2021-05-19 RX ORDER — DIPHENHYDRAMINE HYDROCHLORIDE 50 MG/ML
50 INJECTION INTRAMUSCULAR; INTRAVENOUS PRN
Status: CANCELLED | OUTPATIENT
Start: 2021-05-24

## 2021-05-19 RX ORDER — HEPARIN SODIUM (PORCINE) LOCK FLUSH IV SOLN 100 UNIT/ML 100 UNIT/ML
500 SOLUTION INTRAVENOUS PRN
Status: CANCELLED | OUTPATIENT
Start: 2021-06-07

## 2021-05-19 RX ORDER — ONDANSETRON 2 MG/ML
4 INJECTION INTRAMUSCULAR; INTRAVENOUS PRN
Status: CANCELLED | OUTPATIENT
Start: 2021-06-07

## 2021-05-19 RX ORDER — METHYLPREDNISOLONE SODIUM SUCCINATE 125 MG/2ML
125 INJECTION, POWDER, LYOPHILIZED, FOR SOLUTION INTRAMUSCULAR; INTRAVENOUS PRN
Status: CANCELLED | OUTPATIENT
Start: 2021-06-07

## 2021-05-24 ENCOUNTER — OUTPATIENT INFUSION SERVICES (OUTPATIENT)
Dept: ONCOLOGY | Facility: MEDICAL CENTER | Age: 66
End: 2021-05-24
Attending: INTERNAL MEDICINE
Payer: MEDICAID

## 2021-05-24 ENCOUNTER — PATIENT OUTREACH (OUTPATIENT)
Dept: OTHER | Facility: MEDICAL CENTER | Age: 66
End: 2021-05-24

## 2021-05-24 ENCOUNTER — HOSPITAL ENCOUNTER (OUTPATIENT)
Dept: RADIOLOGY | Facility: MEDICAL CENTER | Age: 66
End: 2021-05-24
Attending: INTERNAL MEDICINE
Payer: MEDICAID

## 2021-05-24 VITALS
DIASTOLIC BLOOD PRESSURE: 65 MMHG | WEIGHT: 142.2 LBS | TEMPERATURE: 99 F | OXYGEN SATURATION: 95 % | SYSTOLIC BLOOD PRESSURE: 138 MMHG | RESPIRATION RATE: 18 BRPM | HEART RATE: 99 BPM | BODY MASS INDEX: 29.85 KG/M2 | HEIGHT: 58 IN

## 2021-05-24 DIAGNOSIS — C50.811 MALIGNANT NEOPLASM OF OVERLAPPING SITES OF RIGHT BREAST IN FEMALE, ESTROGEN RECEPTOR POSITIVE (HCC): ICD-10-CM

## 2021-05-24 DIAGNOSIS — Z17.0 MALIGNANT NEOPLASM OF OVERLAPPING SITES OF RIGHT BREAST IN FEMALE, ESTROGEN RECEPTOR POSITIVE (HCC): ICD-10-CM

## 2021-05-24 LAB
ALBUMIN SERPL BCP-MCNC: 3.9 G/DL (ref 3.2–4.9)
ALBUMIN/GLOB SERPL: 1.4 G/DL
ALP SERPL-CCNC: 67 U/L (ref 30–99)
ALT SERPL-CCNC: 23 U/L (ref 2–50)
ANION GAP SERPL CALC-SCNC: 8 MMOL/L (ref 7–16)
AST SERPL-CCNC: 20 U/L (ref 12–45)
BASOPHILS # BLD AUTO: 0.5 % (ref 0–1.8)
BASOPHILS # BLD: 0.03 K/UL (ref 0–0.12)
BILIRUB SERPL-MCNC: 0.3 MG/DL (ref 0.1–1.5)
BUN SERPL-MCNC: 13 MG/DL (ref 8–22)
CALCIUM SERPL-MCNC: 8.9 MG/DL (ref 8.5–10.5)
CHLORIDE SERPL-SCNC: 106 MMOL/L (ref 96–112)
CO2 SERPL-SCNC: 25 MMOL/L (ref 20–33)
CREAT SERPL-MCNC: 0.57 MG/DL (ref 0.5–1.4)
EOSINOPHIL # BLD AUTO: 0.11 K/UL (ref 0–0.51)
EOSINOPHIL NFR BLD: 2 % (ref 0–6.9)
ERYTHROCYTE [DISTWIDTH] IN BLOOD BY AUTOMATED COUNT: 51.8 FL (ref 35.9–50)
GLOBULIN SER CALC-MCNC: 2.8 G/DL (ref 1.9–3.5)
GLUCOSE SERPL-MCNC: 95 MG/DL (ref 65–99)
HCT VFR BLD AUTO: 42.6 % (ref 37–47)
HGB BLD-MCNC: 13.5 G/DL (ref 12–16)
IMM GRANULOCYTES # BLD AUTO: 0.07 K/UL (ref 0–0.11)
IMM GRANULOCYTES NFR BLD AUTO: 1.3 % (ref 0–0.9)
LYMPHOCYTES # BLD AUTO: 1.38 K/UL (ref 1–4.8)
LYMPHOCYTES NFR BLD: 25.1 % (ref 22–41)
MCH RBC QN AUTO: 30.2 PG (ref 27–33)
MCHC RBC AUTO-ENTMCNC: 31.7 G/DL (ref 33.6–35)
MCV RBC AUTO: 95.3 FL (ref 81.4–97.8)
MONOCYTES # BLD AUTO: 0.46 K/UL (ref 0–0.85)
MONOCYTES NFR BLD AUTO: 8.4 % (ref 0–13.4)
NEUTROPHILS # BLD AUTO: 3.44 K/UL (ref 2–7.15)
NEUTROPHILS NFR BLD: 62.7 % (ref 44–72)
NRBC # BLD AUTO: 0 K/UL
NRBC BLD-RTO: 0 /100 WBC
PLATELET # BLD AUTO: 284 K/UL (ref 164–446)
PMV BLD AUTO: 9.9 FL (ref 9–12.9)
POTASSIUM SERPL-SCNC: 4.3 MMOL/L (ref 3.6–5.5)
PROT SERPL-MCNC: 6.7 G/DL (ref 6–8.2)
RBC # BLD AUTO: 4.47 M/UL (ref 4.2–5.4)
SODIUM SERPL-SCNC: 139 MMOL/L (ref 135–145)
WBC # BLD AUTO: 5.5 K/UL (ref 4.8–10.8)

## 2021-05-24 PROCEDURE — 85025 COMPLETE CBC W/AUTO DIFF WBC: CPT

## 2021-05-24 PROCEDURE — 304540 HCHG NITRO SET VENT 2ND TUB

## 2021-05-24 PROCEDURE — 80053 COMPREHEN METABOLIC PANEL: CPT

## 2021-05-24 PROCEDURE — 700105 HCHG RX REV CODE 258: Performed by: INTERNAL MEDICINE

## 2021-05-24 PROCEDURE — 96413 CHEMO IV INFUSION 1 HR: CPT

## 2021-05-24 PROCEDURE — 700111 HCHG RX REV CODE 636 W/ 250 OVERRIDE (IP): Performed by: INTERNAL MEDICINE

## 2021-05-24 PROCEDURE — 96375 TX/PRO/DX INJ NEW DRUG ADDON: CPT

## 2021-05-24 RX ORDER — SODIUM CHLORIDE 9 MG/ML
INJECTION, SOLUTION INTRAVENOUS CONTINUOUS
Status: DISCONTINUED | OUTPATIENT
Start: 2021-05-24 | End: 2021-05-24 | Stop reason: HOSPADM

## 2021-05-24 RX ADMIN — FAMOTIDINE 20 MG: 10 INJECTION INTRAVENOUS at 12:12

## 2021-05-24 RX ADMIN — PACLITAXEL 81 MG: 6 INJECTION, SOLUTION INTRAVENOUS at 13:29

## 2021-05-24 RX ADMIN — DEXAMETHASONE SODIUM PHOSPHATE 12 MG: 4 INJECTION, SOLUTION INTRA-ARTICULAR; INTRALESIONAL; INTRAMUSCULAR; INTRAVENOUS; SOFT TISSUE at 12:21

## 2021-05-24 RX ADMIN — SODIUM CHLORIDE: 9 INJECTION, SOLUTION INTRAVENOUS at 12:14

## 2021-05-24 RX ADMIN — DIPHENHYDRAMINE HYDROCHLORIDE 25 MG: 50 INJECTION, SOLUTION INTRAMUSCULAR; INTRAVENOUS at 12:41

## 2021-05-24 ASSESSMENT — FIBROSIS 4 INDEX: FIB4 SCORE: 1.164870412072981156

## 2021-05-24 NOTE — PROGRESS NOTES
Patient presented to [facility] for C8D1 Taxol. Translation services used for this appointment. Patient denied current illness/infection or change in condition.  PIV placed by VAT team, flushed easily, janie briskly. Pre-treatment labs drawn. Results within treatable parameters. Patient received pre-meds as ordered.  Taxol infused over 1 hour through inline filter. Patient tolerated treatment well.  Peripheral IV removed, catheter tip remained intact. Gauze and coban to PIV site. Pt left [facility] in NAD.  Copy of appointment schedule given to patient prior to departure.

## 2021-05-24 NOTE — PROGRESS NOTES
"Pharmacy Chemotherapy Calculations Note:    Patient Name: Christine Malhotra      Dx: Breast CA, Stage IV (ER/NE +, HER2-)    Cycle 8, Day 1  Previous treatment: 5/10/21     Protocol:  Weekly PACLitaxel  PACLitaxel 80 mg/m² IV over 60 minutes Day 1, 8, 15  3/1/21 C5 - dose reduced to 60 mg/m² for tolerance/neuropathy  3/29/21 C6 - dose reduced to 50 mg/m² for tolerance/neuropathy  NCCN guidelines for breast cancer V.1.2019.  [place] COPPOLA, et al. J Clin Oncol 2001;19(22):4216-23.        /65   Pulse 99   Temp 37.2 °C (99 °F) (Temporal)   Resp 18   Ht 1.465 m (4' 9.68\")   Wt 64.5 kg (142 lb 3.2 oz)   SpO2 95%   BMI 30.05 kg/m²  Body surface area is 1.62 meters squared.    All lab results 5/24/21 within treatment parameters.        PACLitaxel (Taxol) 50 mg/m² x 1.62m² = 81mg   <10% difference, ok to treat with final dose = 81mg IV       ALBERT Urbina, Pharm.D.    "

## 2021-05-24 NOTE — PROGRESS NOTES
"Pharmacy Chemotherapy Calculations Note:  Patient Name: Christine Malhotra      Dx: Breast CA, Stage IV (ER/KS +, HER2-)  Protocol:  Weekly PACLitaxel  PACLitaxel 80 mg/m² IV over 60 minutes Day 1, 8, 15  3/1/21 C5 - dose reduced to 60 mg/m² for tolerance/neuropathy  3/29/21 C6 - dose reduced to 50 mg/m² for tolerance/neuropathy  Weekly cycles x6 cycles   NCCN guidelines for breast cancer V.1.2019.  [place] COPPOLA, et al. J Clin Oncol 2001;19(22):4216-23.        /65   Pulse 99   Temp 37.2 °C (99 °F) (Temporal)   Resp 18   Ht 1.465 m (4' 9.68\")   Wt 64.5 kg (142 lb 3.2 oz)   SpO2 95%   BMI 30.05 kg/m²  Body surface area is 1.62 meters squared.    Labs: 5/24/21   Meet treatment parameters    Drug Order   (Drug name, dose, route, IV Fluid & volume, frequency, number of doses) Cycle 8 Day 1  Previous treatment: 5/10/21   Medication = PACLitaxel (Taxol)   Base Dose = 50 mg/m²  Calc Dose: Base Dose x 1.62 m² = 81 mg  Final Dose = 81 mg  Route = IV  Fluid & Volume =  mL  Final [conc] ~ 0.31 mg/mL  Admin Duration = Over 60 minutes            Okay to treat with final dose         "

## 2021-05-24 NOTE — PROGRESS NOTES
Chemotherapy Verification - SECONDARY RN       Height = 57.68in  Weight = 64.5kg  BSA = 1.62m2       Medication: Taxol  Dose: 50mg/m2  (62.5% of original dose of 80mg/m2) Calculated Dose:   81mg                            (In mg/m2, AUC, mg/kg)         I confirm that this process was performed independently.

## 2021-05-24 NOTE — PROGRESS NOTES
Chemotherapy Verification - PRIMARY RN      Height = 146.5 cm  Weight = 64.5 kg  BSA = 1.62 m2     C8D1    Medication: Paclitaxel  Dose: 50 mg/m2    Calculated Dose: 81 mg                             (In mg/m2, AUC, mg/kg)       I confirm this process was performed independently with the BSA and all final chemotherapy dosing calculations congruent.  Any discrepancies of 10% or greater have been addressed with the chemotherapy pharmacist. The resolution of the discrepancy has been documented in the EPIC progress notes.

## 2021-05-24 NOTE — PROGRESS NOTES
Oncology nurse navigator received a call from the Infusion nurse that the patient was asking for transportation assistance.  Oncology nurse navigator went over and used the  IPAD in order to speak to the patient id [id number].  Oncology nurse navigator introduced self and role.  Patient states that she needs help with her transportation because her daughter is working and she works different shifts.  She states that sometimes her daughter is not able to always take and pick her up.  Patient was given a [place] brochure and instructed to call for assistance and she would need to call at least 5 business days before her appointment because the service is for nonemergency transportation only.  Patient verbalized understanding.   Patient has my contact information for any future needs.

## 2021-05-27 ENCOUNTER — PATIENT OUTREACH (OUTPATIENT)
Dept: OTHER | Facility: MEDICAL CENTER | Age: 66
End: 2021-05-27

## 2021-05-27 NOTE — PROGRESS NOTES
"On May 27, 2021, Oncology Social Worker Lisa Brown contacted pt.'s daughter Reta via telephone.  Reta shared her mother was doing well \"God willing.\"  OSW Kevin asked if they had any barriers to care or concerns at this time.  Reta shared transportation to be a challenge and informed OSW Kevin they had been given a number to call.  OSW Kevin explained number they were given was for MTM and explained resource to her.  Reta agreed they would contact them to set up transportation.  OSW Kevin encouraged them to call her if there were any additional barriers to care or concerns.  OSW Kevin provided Reta her direct contact number.    "

## 2021-05-31 ENCOUNTER — OUTPATIENT INFUSION SERVICES (OUTPATIENT)
Dept: ONCOLOGY | Facility: MEDICAL CENTER | Age: 66
End: 2021-05-31
Attending: INTERNAL MEDICINE
Payer: MEDICAID

## 2021-05-31 VITALS
TEMPERATURE: 97.7 F | SYSTOLIC BLOOD PRESSURE: 127 MMHG | HEIGHT: 58 IN | BODY MASS INDEX: 30.68 KG/M2 | WEIGHT: 146.16 LBS | HEART RATE: 85 BPM | OXYGEN SATURATION: 93 % | DIASTOLIC BLOOD PRESSURE: 60 MMHG | RESPIRATION RATE: 18 BRPM

## 2021-05-31 DIAGNOSIS — C50.811 MALIGNANT NEOPLASM OF OVERLAPPING SITES OF RIGHT BREAST IN FEMALE, ESTROGEN RECEPTOR POSITIVE (HCC): ICD-10-CM

## 2021-05-31 DIAGNOSIS — Z17.0 MALIGNANT NEOPLASM OF OVERLAPPING SITES OF RIGHT BREAST IN FEMALE, ESTROGEN RECEPTOR POSITIVE (HCC): ICD-10-CM

## 2021-05-31 LAB
BASOPHILS # BLD AUTO: 0.6 % (ref 0–1.8)
BASOPHILS # BLD: 0.04 K/UL (ref 0–0.12)
EOSINOPHIL # BLD AUTO: 0.17 K/UL (ref 0–0.51)
EOSINOPHIL NFR BLD: 2.6 % (ref 0–6.9)
ERYTHROCYTE [DISTWIDTH] IN BLOOD BY AUTOMATED COUNT: 53.6 FL (ref 35.9–50)
HCT VFR BLD AUTO: 38.6 % (ref 37–47)
HGB BLD-MCNC: 12.7 G/DL (ref 12–16)
IMM GRANULOCYTES # BLD AUTO: 0.18 K/UL (ref 0–0.11)
IMM GRANULOCYTES NFR BLD AUTO: 2.8 % (ref 0–0.9)
LYMPHOCYTES # BLD AUTO: 1.62 K/UL (ref 1–4.8)
LYMPHOCYTES NFR BLD: 24.8 % (ref 22–41)
MCH RBC QN AUTO: 31.4 PG (ref 27–33)
MCHC RBC AUTO-ENTMCNC: 32.9 G/DL (ref 33.6–35)
MCV RBC AUTO: 95.5 FL (ref 81.4–97.8)
MONOCYTES # BLD AUTO: 0.41 K/UL (ref 0–0.85)
MONOCYTES NFR BLD AUTO: 6.3 % (ref 0–13.4)
NEUTROPHILS # BLD AUTO: 4.12 K/UL (ref 2–7.15)
NEUTROPHILS NFR BLD: 62.9 % (ref 44–72)
NRBC # BLD AUTO: 0 K/UL
NRBC BLD-RTO: 0 /100 WBC
PLATELET # BLD AUTO: 279 K/UL (ref 164–446)
PMV BLD AUTO: 9.8 FL (ref 9–12.9)
RBC # BLD AUTO: 4.04 M/UL (ref 4.2–5.4)
WBC # BLD AUTO: 6.5 K/UL (ref 4.8–10.8)

## 2021-05-31 PROCEDURE — 96375 TX/PRO/DX INJ NEW DRUG ADDON: CPT

## 2021-05-31 PROCEDURE — 700105 HCHG RX REV CODE 258: Performed by: INTERNAL MEDICINE

## 2021-05-31 PROCEDURE — 96413 CHEMO IV INFUSION 1 HR: CPT

## 2021-05-31 PROCEDURE — 700111 HCHG RX REV CODE 636 W/ 250 OVERRIDE (IP): Performed by: INTERNAL MEDICINE

## 2021-05-31 PROCEDURE — 85025 COMPLETE CBC W/AUTO DIFF WBC: CPT

## 2021-05-31 PROCEDURE — 304540 HCHG NITRO SET VENT 2ND TUB

## 2021-05-31 RX ADMIN — FAMOTIDINE 20 MG: 10 INJECTION INTRAVENOUS at 12:39

## 2021-05-31 RX ADMIN — DIPHENHYDRAMINE HYDROCHLORIDE 25 MG: 50 INJECTION, SOLUTION INTRAMUSCULAR; INTRAVENOUS at 13:01

## 2021-05-31 RX ADMIN — PACLITAXEL 82.5 MG: 6 INJECTION, SOLUTION INTRAVENOUS at 13:17

## 2021-05-31 RX ADMIN — DEXAMETHASONE SODIUM PHOSPHATE 12 MG: 4 INJECTION, SOLUTION INTRA-ARTICULAR; INTRALESIONAL; INTRAMUSCULAR; INTRAVENOUS; SOFT TISSUE at 12:39

## 2021-05-31 ASSESSMENT — FIBROSIS 4 INDEX: FIB4 SCORE: 0.93

## 2021-05-31 NOTE — PROGRESS NOTES
Patient to  [facility] for Taxol infusion. PIV inserted into left wrist, flushed with + blood return, labs drawn. Patient meets parameters for chemotherapy infusion. Premeds given. Taxol infused with no s/s of infusion reaction. PIV flushed with + blood return and removed. Gauze and Coban applied as a dressing. Patient has future appointment. Patient to home in care of self.

## 2021-05-31 NOTE — PROGRESS NOTES
Name:  Mohinder   ID Number:  [id number]    Content Discussed:  POC, assessment questions, PIV placement, medications.

## 2021-05-31 NOTE — PROGRESS NOTES
"Pharmacy Chemotherapy Calculations Note:  Patient Name: Christine Malhotra      Dx: Breast CA, Stage IV (ER/ME +, HER2-)  Protocol:  Weekly PACLitaxel  Cycle 8 Day 8  Previous treatment: 5/24/21    PACLitaxel 80 mg/m² IV over 60 minutes Day 1, 8, 15  3/1/21 C5 - dose reduced to 60 mg/m² for tolerance/neuropathy  3/29/21 C6 - dose reduced to 50 mg/m² for tolerance/neuropathy  Weekly cycles x6 cycles   NCCN guidelines for breast cancer V.1.2019.  [place] COPPOLA, et al. J Clin Oncol 2001;19(22):4216-23.        /60   Pulse 85   Temp 36.5 °C (97.7 °F) (Temporal)   Resp 18   Ht 1.475 m (4' 10.07\")   Wt 66.3 kg (146 lb 2.6 oz)   SpO2 93%   BMI 30.47 kg/m²  Body surface area is 1.65 meters squared.    Labs: 5/31/21   Meet treatment parameters    PACLitaxel (Taxol) 50 mg/m² x 1.65 m² = 82.5 mg              Okay to treat with final dose = 82.5 mg IV   Final [conc] ~0.31 mg/mL      "

## 2021-05-31 NOTE — PROGRESS NOTES
"Pharmacy Chemotherapy Verification    Dx: Breast CA    Regimen: Weekly PACLitaxel  PACLitaxel 80 mg/m2 IV over 60 minutes Day 1,8,15   -C5: dose dec to 60 mg/m2 for tolerance / neuropathy   -C6 dose decreased to 50 mg/m2 for increased neuropathy per Dr. Jarvis  Weekly cycles x6 cycles (currently entered)  *Dosing Reference*  NCCN guidelines for breast cancer V.1.2019  Bay EA, et al. J Clin Oncol 2001;19(22):4216-23    Allergies:Patient has no known allergies.  /60   Pulse 85   Temp 36.5 °C (97.7 °F) (Temporal)   Resp 18   Ht 1.475 m (4' 10.07\")   Wt 66.3 kg (146 lb 2.6 oz)   SpO2 93%   BMI 30.47 kg/m²  Body surface area is 1.65 meters squared.     Labs 5/31/21:  ANC~ 4120 Plt = 279k   Hgb = 12.7       Drug Order   (Drug name, dose, route, IV Fluid & volume, frequency, number of doses) Cycle 8 Day 8  Previous treatment = C8D1 on 5/24/21   Medication = PACLitaxel (Taxol)   Base Dose = 50 mg/m²  Calc Dose: Base Dose x 1.65 m² = 82.5 mg  Final Dose = 82.5 mg  Route = IV  Fluid & Volume =  mL  Admin Duration = Over 60 minutes            <10% difference, okay to treat with final dose     By my signature below, I confirm this process was performed independently with the BSA and all final chemotherapy dosing calculations congruent. I have reviewed the above chemotherapy order and that my calculation of the final dose and BSA (when applicable) corroborate those calculations of the  pharmacist. Discrepancies of 10% or greater in the written dose have been addressed and documented within the [facility] Progress notes.    Matt Humphries, PharmD      "

## 2021-05-31 NOTE — PROGRESS NOTES
Chemotherapy Verification - SECONDARY RN     D8C8    Height = 147cm  Weight = 66.3kg  BSA = 1.65m2       Medication: Paclitaxel (Taxol)  Dose: 50mg/m2  Calculated Dose: 82.5mg                                 I confirm that this process was performed independently.

## 2021-05-31 NOTE — PROGRESS NOTES
Chemotherapy Verification - PRIMARY RN      Height = 1.0475m  Weight = 66.3kg  BSA = 1.65m2       Medication: paclitaxel   Dose: 50mg/m2  Calculated Dose: 82.5mg                             (In mg/m2, AUC, mg/kg)   I confirm this process was performed independently with the BSA and all final chemotherapy dosing calculations congruent.  Any discrepancies of 10% or greater have been addressed with the chemotherapy pharmacist. The resolution of the discrepancy has been documented in the EPIC progress notes.

## 2021-06-07 ENCOUNTER — OUTPATIENT INFUSION SERVICES (OUTPATIENT)
Dept: ONCOLOGY | Facility: MEDICAL CENTER | Age: 66
End: 2021-06-07
Attending: INTERNAL MEDICINE
Payer: MEDICAID

## 2021-06-07 VITALS
TEMPERATURE: 98 F | WEIGHT: 141.09 LBS | HEIGHT: 58 IN | BODY MASS INDEX: 29.62 KG/M2 | HEART RATE: 86 BPM | SYSTOLIC BLOOD PRESSURE: 131 MMHG | OXYGEN SATURATION: 98 % | RESPIRATION RATE: 18 BRPM | DIASTOLIC BLOOD PRESSURE: 62 MMHG

## 2021-06-07 DIAGNOSIS — C50.811 MALIGNANT NEOPLASM OF OVERLAPPING SITES OF RIGHT BREAST IN FEMALE, ESTROGEN RECEPTOR POSITIVE (HCC): ICD-10-CM

## 2021-06-07 DIAGNOSIS — Z17.0 MALIGNANT NEOPLASM OF OVERLAPPING SITES OF RIGHT BREAST IN FEMALE, ESTROGEN RECEPTOR POSITIVE (HCC): ICD-10-CM

## 2021-06-07 LAB
BASOPHILS # BLD AUTO: 0.6 % (ref 0–1.8)
BASOPHILS # BLD: 0.03 K/UL (ref 0–0.12)
EOSINOPHIL # BLD AUTO: 0.13 K/UL (ref 0–0.51)
EOSINOPHIL NFR BLD: 2.6 % (ref 0–6.9)
ERYTHROCYTE [DISTWIDTH] IN BLOOD BY AUTOMATED COUNT: 52.7 FL (ref 35.9–50)
HCT VFR BLD AUTO: 37.6 % (ref 37–47)
HGB BLD-MCNC: 12.7 G/DL (ref 12–16)
IMM GRANULOCYTES # BLD AUTO: 0.09 K/UL (ref 0–0.11)
IMM GRANULOCYTES NFR BLD AUTO: 1.8 % (ref 0–0.9)
LYMPHOCYTES # BLD AUTO: 1.13 K/UL (ref 1–4.8)
LYMPHOCYTES NFR BLD: 22.7 % (ref 22–41)
MCH RBC QN AUTO: 31.5 PG (ref 27–33)
MCHC RBC AUTO-ENTMCNC: 33.8 G/DL (ref 33.6–35)
MCV RBC AUTO: 93.3 FL (ref 81.4–97.8)
MONOCYTES # BLD AUTO: 0.36 K/UL (ref 0–0.85)
MONOCYTES NFR BLD AUTO: 7.2 % (ref 0–13.4)
NEUTROPHILS # BLD AUTO: 3.23 K/UL (ref 2–7.15)
NEUTROPHILS NFR BLD: 65.1 % (ref 44–72)
NRBC # BLD AUTO: 0 K/UL
NRBC BLD-RTO: 0 /100 WBC
PLATELET # BLD AUTO: 266 K/UL (ref 164–446)
PMV BLD AUTO: 9.6 FL (ref 9–12.9)
RBC # BLD AUTO: 4.03 M/UL (ref 4.2–5.4)
WBC # BLD AUTO: 5 K/UL (ref 4.8–10.8)

## 2021-06-07 PROCEDURE — 700111 HCHG RX REV CODE 636 W/ 250 OVERRIDE (IP): Performed by: INTERNAL MEDICINE

## 2021-06-07 PROCEDURE — 700105 HCHG RX REV CODE 258: Performed by: INTERNAL MEDICINE

## 2021-06-07 PROCEDURE — 96375 TX/PRO/DX INJ NEW DRUG ADDON: CPT

## 2021-06-07 PROCEDURE — 96413 CHEMO IV INFUSION 1 HR: CPT

## 2021-06-07 PROCEDURE — 304540 HCHG NITRO SET VENT 2ND TUB

## 2021-06-07 PROCEDURE — 85025 COMPLETE CBC W/AUTO DIFF WBC: CPT

## 2021-06-07 RX ADMIN — FAMOTIDINE 20 MG: 10 INJECTION INTRAVENOUS at 12:27

## 2021-06-07 RX ADMIN — DEXAMETHASONE SODIUM PHOSPHATE 12 MG: 4 INJECTION, SOLUTION INTRA-ARTICULAR; INTRALESIONAL; INTRAMUSCULAR; INTRAVENOUS; SOFT TISSUE at 12:50

## 2021-06-07 RX ADMIN — DIPHENHYDRAMINE HYDROCHLORIDE 25 MG: 50 INJECTION, SOLUTION INTRAMUSCULAR; INTRAVENOUS at 12:27

## 2021-06-07 RX ADMIN — PACLITAXEL 81 MG: 6 INJECTION, SOLUTION INTRAVENOUS at 13:12

## 2021-06-07 ASSESSMENT — FIBROSIS 4 INDEX: FIB4 SCORE: 0.94

## 2021-06-07 NOTE — PROGRESS NOTES
"Pharmacy Chemotherapy Calculations Note:    Patient Name: Christine Malhotra        Dx: Breast CA, Stage IV (ER/MD +, HER2-)  Cycle:  8, Day 15 Previous treatment: C8D8 = 5/31/21     Protocol:  Weekly PACLitaxel  PACLitaxel 80 mg/m² IV over 60 minutes Day 1, 8, 15  3/1/21 C5 - dose reduced to 60 mg/m² for tolerance/neuropathy  3/29/21 C6 - dose reduced to 50 mg/m² for tolerance/neuropathy  Weekly until disease progression or unacceptable toxicity    NCCN guidelines for breast cancer V.2.2021.  Bay EA, et al. J Clin Oncol 2001;19(22):4216-23.        /62   Pulse 86   Temp 36.7 °C (98 °F) (Temporal)   Resp 18   Ht 1.475 m (4' 10.07\")   Wt 64 kg (141 lb 1.5 oz)   SpO2 98%   BMI 29.42 kg/m²  Body surface area is 1.62 meters squared.  ANC~ 3230  Plt = 266 k   Labs from 5/24/21:  SCr = 0.57 mg/dL CrCl = 82 mL/min (using min SCr 0.7 mg/dL and current weight)  LFT's = WNL  TBili = 0.3     PACLitaxel (Taxol) 50 mg/m² x 1.62 m² = 81 mg   <10% difference, ok to treat with final written dose = 81 mg    Bubba Blake, PharmD, PharmD, BCPS             "

## 2021-06-07 NOTE — PROGRESS NOTES
"Pharmacy Chemotherapy Verification    Dx: Breast CA    Regimen: Weekly PACLitaxel  *Dosing Reference*  PACLitaxel 80 mg/m2 IV over 60 minutes Day 1,8,15   -C5: dose dec to 60 mg/m2 for tolerance / neuropathy   -C6 dose decreased to 50 mg/m2 for increased neuropathy per Dr. Jarvis  Weekly until DP or UT  NCCN guidelines for breast cancer V.1.2019  [place] COPPOLA, et al. J Clin Oncol 2001;19(22):4216-23    Allergies:Patient has no known allergies.  /62   Pulse 86   Temp 36.7 °C (98 °F) (Temporal)   Resp 18   Ht 1.475 m (4' 10.07\")   Wt 64 kg (141 lb 1.5 oz)   SpO2 98%   BMI 29.42 kg/m²  Body surface area is 1.62 meters squared.     Labs 6/7/21:  ANC~ 3230 Plt = 266k   Hgb = 12.7       Drug Order   (Drug name, dose, route, IV Fluid & volume, frequency, number of doses) Cycle 8 Day 15  Previous treatment = C8D8 on 5/31/21   Medication = PACLitaxel (Taxol)   Base Dose = 50 mg/m²  Calc Dose: Base Dose x 1.62 m² = 81 mg  Final Dose = 81 mg  Route = IV  Fluid & Volume =  mL  Admin Duration = Over 60 minutes            <10% difference, okay to treat with final dose     By my signature below, I confirm this process was performed independently with the BSA and all final chemotherapy dosing calculations congruent. I have reviewed the above chemotherapy order and that my calculation of the final dose and BSA (when applicable) corroborate those calculations of the  pharmacist. Discrepancies of 10% or greater in the written dose have been addressed and documented within the [facility] Progress notes.    aMtt Humphries, PharmD      "

## 2021-06-07 NOTE — PROGRESS NOTES
Chemotherapy Verification - SECONDARY RN       Height = 147.5 cm  Weight = 64 kg  BSA = 1.62 m2       Medication: Taxol  Dose: 50 mg/m2  Calculated Dose: 81 mg                             (In mg/m2, AUC, mg/kg)     I confirm that this process was performed independently.

## 2021-06-07 NOTE — PROGRESS NOTES
Chemotherapy Verification - PRIMARY RN      Height = 147.5 cm  Weight = 64 kg  BSA = 1.62 m^2       Medication: paclitaxel  Dose: 50 mg/m^2  Calculated Dose: 81 mg                             (In mg/m2, AUC, mg/kg)       I confirm this process was performed independently with the BSA and all final chemotherapy dosing calculations congruent.  Any discrepancies of 10% or greater have been addressed with the chemotherapy pharmacist. The resolution of the discrepancy has been documented in the EPIC progress notes.

## 2021-06-07 NOTE — PROGRESS NOTES
[facility]ine [facility] for Day 15/ Cycle 8 of paclitaxel. Pt denied having any new or acute complaints today, reports tolerating past treatments well. PIV started, had + blood return flushed briskly.  Pt given pre-medications and paclitaxel as prescribed, tolerated well, denied having any complaints during or after infusion. PIV discontinued, bleeding controlled with gauze and coban. Next appointment scheduled, Christine discharged home to self care.

## 2021-06-09 NOTE — PROGRESS NOTES
kgemotherapy Verification - SECONDARY RN       Height = 147    Weight = 59.6 kg  BSA = 1.56    m2  Medication: halaven  Dose: 1.4 mg/m2  Calculated Dose: 2.184 mg                              I confirm that this process was performed independently.    RN cannot approve Refill Request    RN can NOT refill this medication medication not on med list.     Linn Jensen, [facility] Triage/Med Refill 7/21/2020    Requested Prescriptions   Pending Prescriptions Disp Refills     sotaloL (BETAPACE) 80 MG tablet [Pharmacy Med Name: SOTALOL 80MG TAB] 30 tablet 11     Sig: TAKE 1/2 TABLET (40MG) BY MOUTH TWICE DAILY       Sotalol Refill Protocol Failed - 7/20/2020 12:33 PM        Failed - LFT or AST or ALT on file in last 12 months     Albumin   Date Value Ref Range Status   03/16/2018 3.6 3.5 - 5.0 g/dL Final     Bilirubin, Total   Date Value Ref Range Status   03/16/2018 0.6 0.0 - 1.0 mg/dL Final     Bilirubin, Direct   Date Value Ref Range Status   03/16/2018 0.2 <=0.5 mg/dL Final     Alkaline Phosphatase   Date Value Ref Range Status   03/16/2018 54 45 - 120 U/L Final     AST   Date Value Ref Range Status   03/16/2018 16 0 - 40 U/L Final     ALT   Date Value Ref Range Status   03/16/2018 10 0 - 45 U/L Final     Protein, Total   Date Value Ref Range Status   03/16/2018 6.3 6.0 - 8.0 g/dL Final                Failed - PCP or prescribing provider visit in past 6 months or next 3 months     Last office visit with prescriber/PCP: Visit date not found OR same dept: 10/24/2019 Lela Randolph MD OR same specialty: 10/24/2019 Lela Randolph MD Last physical: Visit date not found Last MTM visit: Visit date not found     Next appt within 3 mo: Visit date not found  Next physical within 3 mo: Visit date not found  Prescriber OR PCP: Lela Randolph MD  Last diagnosis associated with med order: 1. Mild episode of recurrent major depressive disorder (H)  - FLUoxetine (PROZAC) 40 MG capsule [Pharmacy Med Name: FLUOXETINE 40MG CAP]; TAKE 1 CAPSULE BY MOUTH ONCE DAILY  Dispense: 30 capsule; Refill: 11    If protocol passes may refill for 6 months if within 3 months of last provider visit (or a total of 9 months).              Passed - BMP on file in last 12 months      Sodium   Date Value Ref Range Status   06/23/2020 139 136 - 145 mmol/L Final     Potassium   Date Value Ref Range Status   06/23/2020 3.8 3.5 - 5.0 mmol/L Final     Chloride   Date Value Ref Range Status   06/23/2020 105 98 - 107 mmol/L Final     CO2   Date Value Ref Range Status   06/23/2020 26 22 - 31 mmol/L Final     BUN   Date Value Ref Range Status   06/23/2020 24 8 - 28 mg/dL Final     Creatinine   Date Value Ref Range Status   06/23/2020 0.69 0.60 - 1.10 mg/dL Final             Passed - CBC w/plts (hm2) on file in last 12 months     WBC   Date Value Ref Range Status   06/23/2020 8.7 4.0 - 11.0 thou/uL Final     Hemoglobin   Date Value Ref Range Status   06/23/2020 15.4 12.0 - 16.0 g/dL Final     Hematocrit   Date Value Ref Range Status   06/23/2020 48.0 (H) 35.0 - 47.0 % Final     Platelets   Date Value Ref Range Status   06/23/2020 170 140 - 440 thou/uL Final             Passed - ECG in last 12 months     ECG rhythm strip: No results found for this or any previous visit. ECG 12 lead MUSE:   Results for orders placed or performed in visit on 11/22/19   ECG 12 lead with MUSE   Result Value Ref Range    SYSTOLIC BLOOD PRESSURE      DIASTOLIC BLOOD PRESSURE      VENTRICULAR RATE 87 BPM    ATRIAL RATE 70 BPM    P-R INTERVAL      QRS DURATION 84 ms    Q-T INTERVAL 394 ms    QTC CALCULATION (BEZET) 474 ms    P Axis      R AXIS -9 degrees    T AXIS 46 degrees    MUSE DIAGNOSIS       ** Poor data quality, interpretation may be adversely affected  Atrial fibrillation  Nonspecific ST and T wave abnormality , probably digitalis effect  Abnormal ECG  When compared with ECG of 15-OCT-2019 10:52,  Nonspecific T wave abnormality now evident in Inferior leads  Confirmed by YESSENIA MCKEON, KELLEY LOC:SJ ([id number]) on 11/25/2019 2:40:08 PM      ECG 12 lead nursing unit:   Results for orders placed or performed during the hospital encounter of 10/15/19   ECG 12 lead nursing unit performed   Result Value Ref Range    SYSTOLIC BLOOD  PRESSURE 131 mmHg    DIASTOLIC BLOOD PRESSURE 86 mmHg    VENTRICULAR RATE 90 BPM    ATRIAL RATE 100 BPM    P-R INTERVAL      QRS DURATION 82 ms    Q-T INTERVAL 380 ms    QTC CALCULATION (BEZET) 464 ms    P Axis      R AXIS -22 degrees    T AXIS 137 degrees    MUSE DIAGNOSIS       Atrial fibrillation  Left ventricular hypertrophy with repolarization abnormality  Cannot rule out Anterior infarct , age undetermined  Abnormal ECG  When compared with ECG of 14-SEP-2018 10:59,  Atrial fibrillation has replaced Sinus rhythm  Vent. rate has increased BY  35 BPM  Minimal criteria for Anterior infarct are now Present  Confirmed by MASOOD BARTH MD LOC: ([id number]) on 10/18/2019 1:45:56 PM               Passed - Magnesium in last 12 months     Magnesium   Date Value Ref Range Status   10/15/2019 2.0 1.8 - 2.6 mg/dL Final              Passed - Serum creatinine in last 12 months     Creatinine   Date Value Ref Range Status   06/23/2020 0.69 0.60 - 1.10 mg/dL Final                FLUoxetine (PROZAC) 40 MG capsule [Pharmacy Med Name: FLUOXETINE 40MG CAP] 30 capsule 11     Sig: TAKE 1 CAPSULE BY MOUTH ONCE DAILY       SS[facility] Refill Protocol  Passed - 7/20/2020 12:33 PM        Passed - PCP or prescribing provider visit in last year     Last office visit with prescriber/PCP: 10/24/2019 Lela Randolph MD OR same dept: 10/24/2019 Lela Randolph MD OR same specialty: 10/24/2019 Lela Randolph MD  Last physical: Visit date not found Last MTM visit: Visit date not found   Next visit within 3 mo: Visit date not found  Next physical within 3 mo: Visit date not found  Prescriber OR PCP: Lela Randolph MD  Last diagnosis associated with med order: 1. Mild episode of recurrent major depressive disorder (H)  - FLUoxetine (PROZAC) 40 MG capsule [Pharmacy Med Name: FLUOXETINE 40MG CAP]; TAKE 1 CAPSULE BY MOUTH ONCE DAILY  Dispense: 30 capsule; Refill: 11    If protocol passes may refill for 12 months if within 3 months of  last provider visit (or a total of 15 months).                gabapentin (NEURONTIN) 100 MG capsule [Pharmacy Med Name: GABAPENTIN 100MG CAP] 90 capsule 11     Sig: TAKE 1 CAPSULE BY MOUTH THREE TIMES PER DAY       Gabapentin/Levetiracetam/Tiagabine Refill Protocol  Passed - 7/20/2020 12:33 PM        Passed - PCP or prescribing provider visit in past 12 months or next 3 months     Last office visit with prescriber/PCP: 10/24/2019 Lela Randolph MD OR same dept: 10/24/2019 Lela Randolph MD OR same specialty: 10/24/2019 Lela Randolph MD  Last physical: Visit date not found Last MTM visit: Visit date not found   Next visit within 3 mo: Visit date not found  Next physical within 3 mo: Visit date not found  Prescriber OR PCP: Lela Randolph MD  Last diagnosis associated with med order: 1. Mild episode of recurrent major depressive disorder (H)  - FLUoxetine (PROZAC) 40 MG capsule [Pharmacy Med Name: FLUOXETINE 40MG CAP]; TAKE 1 CAPSULE BY MOUTH ONCE DAILY  Dispense: 30 capsule; Refill: 11    If protocol passes may refill for 12 months if within 3 months of last provider visit (or a total of 15 months).                ELIQUIS 2.5 mg Tab tablet [Pharmacy Med Name: ELIQUIS 2.5MG TAB] 60 tablet 11     Sig: TAKE 1 TABLET BY MOUTH TWO TIMES A DAY       Apixaban/Rivaroxaban/Dabigatran Refill Protocol Failed - 7/20/2020 12:33 PM        Failed - Route to appropriate pool/provider     Last Anticoagulation Summary:           Passed - Renal function test in last year     Creatinine   Date Value Ref Range Status   06/23/2020 0.69 0.60 - 1.10 mg/dL Final             Passed - PCP or prescribing provider visit in past 12 months       Last office visit with prescriber/PCP: 10/24/2019 Lela Randolph MD OR same dept: 10/24/2019 Lela Randolph MD OR same specialty: 10/24/2019 Lela Randolph MD  Last physical: Visit date not found Last MTM visit: Visit date not found   Next visit within 3 mo:  Visit date not found  Next physical within 3 mo: Visit date not found  Prescriber OR PCP: Lela Randolph MD  Last diagnosis associated with med order: 1. Mild episode of recurrent major depressive disorder (H)  - FLUoxetine (PROZAC) 40 MG capsule [Pharmacy Med Name: FLUOXETINE 40MG CAP]; TAKE 1 CAPSULE BY MOUTH ONCE DAILY  Dispense: 30 capsule; Refill: 11    If protocol passes may refill for 12 months if within 3 months of last provider visit (or a total of 15 months).

## 2021-06-15 RX ORDER — 0.9 % SODIUM CHLORIDE 0.9 %
VIAL (ML) INJECTION PRN
Status: CANCELLED | OUTPATIENT
Start: 2021-06-21

## 2021-06-15 RX ORDER — SODIUM CHLORIDE 9 MG/ML
INJECTION, SOLUTION INTRAVENOUS CONTINUOUS
Status: CANCELLED | OUTPATIENT
Start: 2021-07-05

## 2021-06-15 RX ORDER — SODIUM CHLORIDE 9 MG/ML
INJECTION, SOLUTION INTRAVENOUS CONTINUOUS
Status: CANCELLED | OUTPATIENT
Start: 2021-06-21

## 2021-06-15 RX ORDER — 0.9 % SODIUM CHLORIDE 0.9 %
10 VIAL (ML) INJECTION PRN
Status: CANCELLED | OUTPATIENT
Start: 2021-06-21

## 2021-06-15 RX ORDER — HEPARIN SODIUM (PORCINE) LOCK FLUSH IV SOLN 100 UNIT/ML 100 UNIT/ML
500 SOLUTION INTRAVENOUS PRN
Status: CANCELLED | OUTPATIENT
Start: 2021-06-28

## 2021-06-15 RX ORDER — ONDANSETRON 8 MG/1
8 TABLET, ORALLY DISINTEGRATING ORAL PRN
Status: CANCELLED | OUTPATIENT
Start: 2021-06-21

## 2021-06-15 RX ORDER — ONDANSETRON 2 MG/ML
4 INJECTION INTRAMUSCULAR; INTRAVENOUS PRN
Status: CANCELLED | OUTPATIENT
Start: 2021-06-28

## 2021-06-15 RX ORDER — SODIUM CHLORIDE 9 MG/ML
INJECTION, SOLUTION INTRAVENOUS CONTINUOUS
Status: CANCELLED | OUTPATIENT
Start: 2021-06-28

## 2021-06-15 RX ORDER — HEPARIN SODIUM (PORCINE) LOCK FLUSH IV SOLN 100 UNIT/ML 100 UNIT/ML
500 SOLUTION INTRAVENOUS PRN
Status: CANCELLED | OUTPATIENT
Start: 2021-06-21

## 2021-06-15 RX ORDER — METHYLPREDNISOLONE SODIUM SUCCINATE 125 MG/2ML
125 INJECTION, POWDER, LYOPHILIZED, FOR SOLUTION INTRAMUSCULAR; INTRAVENOUS PRN
Status: CANCELLED | OUTPATIENT
Start: 2021-06-28

## 2021-06-15 RX ORDER — METHYLPREDNISOLONE SODIUM SUCCINATE 125 MG/2ML
125 INJECTION, POWDER, LYOPHILIZED, FOR SOLUTION INTRAMUSCULAR; INTRAVENOUS PRN
Status: CANCELLED | OUTPATIENT
Start: 2021-07-05

## 2021-06-15 RX ORDER — 0.9 % SODIUM CHLORIDE 0.9 %
10 VIAL (ML) INJECTION PRN
Status: CANCELLED | OUTPATIENT
Start: 2021-07-05

## 2021-06-15 RX ORDER — 0.9 % SODIUM CHLORIDE 0.9 %
VIAL (ML) INJECTION PRN
Status: CANCELLED | OUTPATIENT
Start: 2021-07-05

## 2021-06-15 RX ORDER — 0.9 % SODIUM CHLORIDE 0.9 %
10 VIAL (ML) INJECTION PRN
Status: CANCELLED | OUTPATIENT
Start: 2021-06-20

## 2021-06-15 RX ORDER — PROCHLORPERAZINE MALEATE 10 MG
10 TABLET ORAL EVERY 6 HOURS PRN
Status: CANCELLED | OUTPATIENT
Start: 2021-07-05

## 2021-06-15 RX ORDER — 0.9 % SODIUM CHLORIDE 0.9 %
VIAL (ML) INJECTION PRN
Status: CANCELLED | OUTPATIENT
Start: 2021-06-20

## 2021-06-15 RX ORDER — DIPHENHYDRAMINE HYDROCHLORIDE 50 MG/ML
50 INJECTION INTRAMUSCULAR; INTRAVENOUS PRN
Status: CANCELLED | OUTPATIENT
Start: 2021-07-05

## 2021-06-15 RX ORDER — EPINEPHRINE 1 MG/ML(1)
0.5 AMPUL (ML) INJECTION PRN
Status: CANCELLED | OUTPATIENT
Start: 2021-06-28

## 2021-06-15 RX ORDER — ONDANSETRON 2 MG/ML
4 INJECTION INTRAMUSCULAR; INTRAVENOUS PRN
Status: CANCELLED | OUTPATIENT
Start: 2021-07-05

## 2021-06-15 RX ORDER — HEPARIN SODIUM (PORCINE) LOCK FLUSH IV SOLN 100 UNIT/ML 100 UNIT/ML
500 SOLUTION INTRAVENOUS PRN
Status: CANCELLED | OUTPATIENT
Start: 2021-07-05

## 2021-06-15 RX ORDER — PROCHLORPERAZINE MALEATE 10 MG
10 TABLET ORAL EVERY 6 HOURS PRN
Status: CANCELLED | OUTPATIENT
Start: 2021-06-28

## 2021-06-15 RX ORDER — DIPHENHYDRAMINE HYDROCHLORIDE 50 MG/ML
50 INJECTION INTRAMUSCULAR; INTRAVENOUS PRN
Status: CANCELLED | OUTPATIENT
Start: 2021-06-21

## 2021-06-15 RX ORDER — METHYLPREDNISOLONE SODIUM SUCCINATE 125 MG/2ML
125 INJECTION, POWDER, LYOPHILIZED, FOR SOLUTION INTRAMUSCULAR; INTRAVENOUS PRN
Status: CANCELLED | OUTPATIENT
Start: 2021-06-21

## 2021-06-15 RX ORDER — 0.9 % SODIUM CHLORIDE 0.9 %
10 VIAL (ML) INJECTION PRN
Status: CANCELLED | OUTPATIENT
Start: 2021-06-28

## 2021-06-15 RX ORDER — 0.9 % SODIUM CHLORIDE 0.9 %
VIAL (ML) INJECTION PRN
Status: CANCELLED | OUTPATIENT
Start: 2021-06-28

## 2021-06-15 RX ORDER — EPINEPHRINE 1 MG/ML(1)
0.5 AMPUL (ML) INJECTION PRN
Status: CANCELLED | OUTPATIENT
Start: 2021-07-05

## 2021-06-15 RX ORDER — EPINEPHRINE 1 MG/ML(1)
0.5 AMPUL (ML) INJECTION PRN
Status: CANCELLED | OUTPATIENT
Start: 2021-06-21

## 2021-06-15 RX ORDER — ONDANSETRON 8 MG/1
8 TABLET, ORALLY DISINTEGRATING ORAL PRN
Status: CANCELLED | OUTPATIENT
Start: 2021-07-05

## 2021-06-15 RX ORDER — ONDANSETRON 2 MG/ML
4 INJECTION INTRAMUSCULAR; INTRAVENOUS PRN
Status: CANCELLED | OUTPATIENT
Start: 2021-06-21

## 2021-06-15 RX ORDER — PROCHLORPERAZINE MALEATE 10 MG
10 TABLET ORAL EVERY 6 HOURS PRN
Status: CANCELLED | OUTPATIENT
Start: 2021-06-21

## 2021-06-15 RX ORDER — 0.9 % SODIUM CHLORIDE 0.9 %
3 VIAL (ML) INJECTION PRN
Status: CANCELLED | OUTPATIENT
Start: 2021-06-28

## 2021-06-15 RX ORDER — 0.9 % SODIUM CHLORIDE 0.9 %
3 VIAL (ML) INJECTION PRN
Status: CANCELLED | OUTPATIENT
Start: 2021-06-21

## 2021-06-15 RX ORDER — DIPHENHYDRAMINE HYDROCHLORIDE 50 MG/ML
50 INJECTION INTRAMUSCULAR; INTRAVENOUS PRN
Status: CANCELLED | OUTPATIENT
Start: 2021-06-28

## 2021-06-15 RX ORDER — HEPARIN SODIUM (PORCINE) LOCK FLUSH IV SOLN 100 UNIT/ML 100 UNIT/ML
500 SOLUTION INTRAVENOUS PRN
Status: CANCELLED | OUTPATIENT
Start: 2021-06-20

## 2021-06-15 RX ORDER — 0.9 % SODIUM CHLORIDE 0.9 %
3 VIAL (ML) INJECTION PRN
Status: CANCELLED | OUTPATIENT
Start: 2021-07-05

## 2021-06-15 RX ORDER — ONDANSETRON 8 MG/1
8 TABLET, ORALLY DISINTEGRATING ORAL PRN
Status: CANCELLED | OUTPATIENT
Start: 2021-06-28

## 2021-06-15 RX ORDER — 0.9 % SODIUM CHLORIDE 0.9 %
3 VIAL (ML) INJECTION PRN
Status: CANCELLED | OUTPATIENT
Start: 2021-06-20

## 2021-06-21 ENCOUNTER — OUTPATIENT INFUSION SERVICES (OUTPATIENT)
Dept: ONCOLOGY | Facility: MEDICAL CENTER | Age: 66
End: 2021-06-21
Attending: INTERNAL MEDICINE
Payer: MEDICAID

## 2021-06-21 ENCOUNTER — HOSPITAL ENCOUNTER (OUTPATIENT)
Dept: RADIOLOGY | Facility: MEDICAL CENTER | Age: 66
End: 2021-06-21
Attending: INTERNAL MEDICINE
Payer: MEDICAID

## 2021-06-21 VITALS
OXYGEN SATURATION: 98 % | SYSTOLIC BLOOD PRESSURE: 109 MMHG | TEMPERATURE: 97.6 F | HEART RATE: 76 BPM | DIASTOLIC BLOOD PRESSURE: 68 MMHG | HEIGHT: 58 IN | WEIGHT: 146.61 LBS | BODY MASS INDEX: 30.77 KG/M2 | RESPIRATION RATE: 18 BRPM

## 2021-06-21 DIAGNOSIS — Z17.0 MALIGNANT NEOPLASM OF OVERLAPPING SITES OF RIGHT BREAST IN FEMALE, ESTROGEN RECEPTOR POSITIVE (HCC): ICD-10-CM

## 2021-06-21 DIAGNOSIS — C50.811 MALIGNANT NEOPLASM OF OVERLAPPING SITES OF RIGHT BREAST IN FEMALE, ESTROGEN RECEPTOR POSITIVE (HCC): ICD-10-CM

## 2021-06-21 LAB
ALBUMIN SERPL BCP-MCNC: 3.4 G/DL (ref 3.2–4.9)
ALBUMIN/GLOB SERPL: 1.3 G/DL
ALP SERPL-CCNC: 64 U/L (ref 30–99)
ALT SERPL-CCNC: 14 U/L (ref 2–50)
ANION GAP SERPL CALC-SCNC: 8 MMOL/L (ref 7–16)
AST SERPL-CCNC: 19 U/L (ref 12–45)
BASOPHILS # BLD AUTO: 0.5 % (ref 0–1.8)
BASOPHILS # BLD: 0.03 K/UL (ref 0–0.12)
BILIRUB SERPL-MCNC: 0.2 MG/DL (ref 0.1–1.5)
BUN SERPL-MCNC: 14 MG/DL (ref 8–22)
CALCIUM SERPL-MCNC: 9 MG/DL (ref 8.5–10.5)
CHLORIDE SERPL-SCNC: 105 MMOL/L (ref 96–112)
CO2 SERPL-SCNC: 27 MMOL/L (ref 20–33)
CREAT SERPL-MCNC: 0.53 MG/DL (ref 0.5–1.4)
EOSINOPHIL # BLD AUTO: 0.08 K/UL (ref 0–0.51)
EOSINOPHIL NFR BLD: 1.4 % (ref 0–6.9)
ERYTHROCYTE [DISTWIDTH] IN BLOOD BY AUTOMATED COUNT: 54.3 FL (ref 35.9–50)
GLOBULIN SER CALC-MCNC: 2.6 G/DL (ref 1.9–3.5)
GLUCOSE SERPL-MCNC: 100 MG/DL (ref 65–99)
HCT VFR BLD AUTO: 37.8 % (ref 37–47)
HGB BLD-MCNC: 12.2 G/DL (ref 12–16)
IMM GRANULOCYTES # BLD AUTO: 0.04 K/UL (ref 0–0.11)
IMM GRANULOCYTES NFR BLD AUTO: 0.7 % (ref 0–0.9)
LYMPHOCYTES # BLD AUTO: 1.56 K/UL (ref 1–4.8)
LYMPHOCYTES NFR BLD: 26.7 % (ref 22–41)
MCH RBC QN AUTO: 31 PG (ref 27–33)
MCHC RBC AUTO-ENTMCNC: 32.3 G/DL (ref 33.6–35)
MCV RBC AUTO: 96.2 FL (ref 81.4–97.8)
MONOCYTES # BLD AUTO: 0.71 K/UL (ref 0–0.85)
MONOCYTES NFR BLD AUTO: 12.2 % (ref 0–13.4)
NEUTROPHILS # BLD AUTO: 3.42 K/UL (ref 2–7.15)
NEUTROPHILS NFR BLD: 58.5 % (ref 44–72)
NRBC # BLD AUTO: 0 K/UL
NRBC BLD-RTO: 0 /100 WBC
PLATELET # BLD AUTO: 271 K/UL (ref 164–446)
PMV BLD AUTO: 9.9 FL (ref 9–12.9)
POTASSIUM SERPL-SCNC: 4.2 MMOL/L (ref 3.6–5.5)
PROT SERPL-MCNC: 6 G/DL (ref 6–8.2)
RBC # BLD AUTO: 3.93 M/UL (ref 4.2–5.4)
SODIUM SERPL-SCNC: 140 MMOL/L (ref 135–145)
WBC # BLD AUTO: 5.8 K/UL (ref 4.8–10.8)

## 2021-06-21 PROCEDURE — 80053 COMPREHEN METABOLIC PANEL: CPT

## 2021-06-21 PROCEDURE — 85025 COMPLETE CBC W/AUTO DIFF WBC: CPT

## 2021-06-21 PROCEDURE — 304540 HCHG NITRO SET VENT 2ND TUB

## 2021-06-21 PROCEDURE — 96375 TX/PRO/DX INJ NEW DRUG ADDON: CPT

## 2021-06-21 PROCEDURE — 96409 CHEMO IV PUSH SNGL DRUG: CPT

## 2021-06-21 PROCEDURE — 700105 HCHG RX REV CODE 258: Performed by: INTERNAL MEDICINE

## 2021-06-21 PROCEDURE — 700111 HCHG RX REV CODE 636 W/ 250 OVERRIDE (IP): Performed by: INTERNAL MEDICINE

## 2021-06-21 RX ADMIN — FAMOTIDINE 20 MG: 10 INJECTION INTRAVENOUS at 15:57

## 2021-06-21 RX ADMIN — PACLITAXEL 82.5 MG: 6 INJECTION, SOLUTION INTRAVENOUS at 16:07

## 2021-06-21 RX ADMIN — DIPHENHYDRAMINE HYDROCHLORIDE 25 MG: 50 INJECTION, SOLUTION INTRAMUSCULAR; INTRAVENOUS at 15:43

## 2021-06-21 RX ADMIN — DEXAMETHASONE SODIUM PHOSPHATE 12 MG: 4 INJECTION, SOLUTION INTRA-ARTICULAR; INTRALESIONAL; INTRAMUSCULAR; INTRAVENOUS; SOFT TISSUE at 15:17

## 2021-06-21 ASSESSMENT — FIBROSIS 4 INDEX: FIB4 SCORE: 0.99

## 2021-06-21 NOTE — PROGRESS NOTES
Chemotherapy Verification - SECONDARY RN       Height = 148 cm  Weight = 66.5 kg  BSA = 1.65 m2      Medication: Taxol  Dose: 50 mg/m2  Calculated Dose: 82.5 mg                             (In mg/m2, AUC, mg/kg)     I confirm that this process was performed independently.

## 2021-06-21 NOTE — PROGRESS NOTES
"Pharmacy Chemotherapy Verification    Dx: Breast CA    Regimen: Weekly PACLitaxel  *Dosing Reference*  PACLitaxel 80 mg/m2 IV over 60 minutes Day 1,8,15   -C5: dose dec to 60 mg/m2 for tolerance / neuropathy   -C6 dose decreased to 50 mg/m2 for increased neuropathy per Dr. Jarvis  Weekly until DP or UT  NCCN guidelines for breast cancer V.1.2019  [place] COPPOLA, et al. J Clin Oncol 2001;19(22):4216-23    Allergies:Patient has no known allergies.  /68   Pulse 76   Temp 36.4 °C (97.6 °F) (Temporal)   Resp 18   Ht 1.48 m (4' 10.27\")   Wt 66.5 kg (146 lb 9.7 oz)   SpO2 98%   BMI 30.36 kg/m²  Body surface area is 1.65 meters squared.     All lab results 6/21/21 within treatment parameters.        Drug Order   (Drug name, dose, route, IV Fluid & volume, frequency, number of doses) Cycle 9 Day 1  Previous treatment = C8D15 on 6/7/21   Medication = PACLitaxel (Taxol)   Base Dose = 50 mg/m²  Calc Dose: Base Dose x 1.65m² = 82.5mg  Final Dose = 82.5mg  Route = IV  Fluid & Volume =  mL  Admin Duration = Over 60 minutes            <10% difference, okay to treat with final dose     By my signature below, I confirm this process was performed independently with the BSA and all final chemotherapy dosing calculations congruent. I have reviewed the above chemotherapy order and that my calculation of the final dose and BSA (when applicable) corroborate those calculations of the  pharmacist. Discrepancies of 10% or greater in the written dose have been addressed and documented within the [facility] Progress notes.    Bhavana Urbina, PharmD      "

## 2021-06-21 NOTE — PROGRESS NOTES
Chemotherapy Verification - PRIMARY RN      Height = 148 cm  Weight = 66.5 kg  BSA = 1.65 m^2       Medication: PACLitaxel (TAXOL)  Dose: 50 mg/m^2  Calculated Dose: 82.5 mg                             (In mg/m2, AUC, mg/kg)     I confirm this process was performed independently with the BSA and all final chemotherapy dosing calculations congruent.  Any discrepancies of 10% or greater have been addressed with the chemotherapy pharmacist. The resolution of the discrepancy has been documented in the EPIC progress notes.

## 2021-06-21 NOTE — PROGRESS NOTES
"Pharmacy Chemotherapy Calculations Note:    Patient Name: Christine Malhotra        Dx: Breast CA, Stage IV (ER/MT +, HER2-)  Cycle:  9, Day 1 Previous treatment: C8D15 = 6/7/21     Protocol:  Weekly PACLitaxel  PACLitaxel 80 mg/m² IV over 60 minutes Day 1, 8, 15  3/1/21 C5 - dose reduced to 60 mg/m² for tolerance/neuropathy  3/29/21 C6 - dose reduced to 50 mg/m² for tolerance/neuropathy  Weekly until disease progression or unacceptable toxicity    NCCN guidelines for breast cancer V.2.2021.  Bay EA, et al. J Clin Oncol 2001;19(22):4216-23.        /68   Pulse 76   Temp 36.4 °C (97.6 °F) (Temporal)   Resp 18   Ht 1.48 m (4' 10.27\")   Wt 66.5 kg (146 lb 9.7 oz)   SpO2 98%   BMI 30.36 kg/m²  Body surface area is 1.65 meters squared.     Labs from 6/21/21 reviewed- results within treatment parameters     PACLitaxel (Taxol) 50 mg/m² x 1.65 m² = 82.5 mg   <10% difference, ok to treat with final written dose = 82.5 mg IV    Caleb Padilla, PharmD    "

## 2021-06-22 NOTE — PROGRESS NOTES
Patient here for Day 1, Cycle 9 Taxol. Plan of care discussed with patient via  services. PIV established by Vascular Access Team; labs drawn as ordered. Labs results within parameters to proceed with treatment. Pre-medications given per MAR. Taxol given per MAR. PIV flushed with normal saline and removed; gauze/coban applied to site. Next appointment scheduled. Discharged to self care; no apparent distress noted.

## 2021-06-28 ENCOUNTER — OUTPATIENT INFUSION SERVICES (OUTPATIENT)
Dept: ONCOLOGY | Facility: MEDICAL CENTER | Age: 66
End: 2021-06-28
Attending: INTERNAL MEDICINE
Payer: MEDICAID

## 2021-06-28 ENCOUNTER — HOSPITAL ENCOUNTER (OUTPATIENT)
Dept: RADIOLOGY | Facility: MEDICAL CENTER | Age: 66
End: 2021-06-28
Attending: INTERNAL MEDICINE
Payer: MEDICAID

## 2021-06-28 VITALS
WEIGHT: 145.5 LBS | RESPIRATION RATE: 18 BRPM | HEIGHT: 58 IN | HEART RATE: 85 BPM | OXYGEN SATURATION: 98 % | SYSTOLIC BLOOD PRESSURE: 123 MMHG | DIASTOLIC BLOOD PRESSURE: 56 MMHG | TEMPERATURE: 98 F | BODY MASS INDEX: 30.54 KG/M2

## 2021-06-28 DIAGNOSIS — C50.811 MALIGNANT NEOPLASM OF OVERLAPPING SITES OF RIGHT BREAST IN FEMALE, ESTROGEN RECEPTOR POSITIVE (HCC): ICD-10-CM

## 2021-06-28 DIAGNOSIS — Z17.0 MALIGNANT NEOPLASM OF OVERLAPPING SITES OF RIGHT BREAST IN FEMALE, ESTROGEN RECEPTOR POSITIVE (HCC): ICD-10-CM

## 2021-06-28 LAB
BASOPHILS # BLD AUTO: 0.8 % (ref 0–1.8)
BASOPHILS # BLD: 0.05 K/UL (ref 0–0.12)
EOSINOPHIL # BLD AUTO: 0.19 K/UL (ref 0–0.51)
EOSINOPHIL NFR BLD: 3.1 % (ref 0–6.9)
ERYTHROCYTE [DISTWIDTH] IN BLOOD BY AUTOMATED COUNT: 53.7 FL (ref 35.9–50)
HCT VFR BLD AUTO: 41.9 % (ref 37–47)
HGB BLD-MCNC: 13.4 G/DL (ref 12–16)
IMM GRANULOCYTES # BLD AUTO: 0.21 K/UL (ref 0–0.11)
IMM GRANULOCYTES NFR BLD AUTO: 3.5 % (ref 0–0.9)
LYMPHOCYTES # BLD AUTO: 1.46 K/UL (ref 1–4.8)
LYMPHOCYTES NFR BLD: 24.1 % (ref 22–41)
MCH RBC QN AUTO: 30.6 PG (ref 27–33)
MCHC RBC AUTO-ENTMCNC: 32 G/DL (ref 33.6–35)
MCV RBC AUTO: 95.7 FL (ref 81.4–97.8)
MONOCYTES # BLD AUTO: 0.42 K/UL (ref 0–0.85)
MONOCYTES NFR BLD AUTO: 6.9 % (ref 0–13.4)
NEUTROPHILS # BLD AUTO: 3.74 K/UL (ref 2–7.15)
NEUTROPHILS NFR BLD: 61.6 % (ref 44–72)
NRBC # BLD AUTO: 0 K/UL
NRBC BLD-RTO: 0 /100 WBC
PLATELET # BLD AUTO: 290 K/UL (ref 164–446)
PMV BLD AUTO: 10 FL (ref 9–12.9)
RBC # BLD AUTO: 4.38 M/UL (ref 4.2–5.4)
WBC # BLD AUTO: 6.1 K/UL (ref 4.8–10.8)

## 2021-06-28 PROCEDURE — 304540 HCHG NITRO SET VENT 2ND TUB

## 2021-06-28 PROCEDURE — 700105 HCHG RX REV CODE 258: Performed by: INTERNAL MEDICINE

## 2021-06-28 PROCEDURE — 85025 COMPLETE CBC W/AUTO DIFF WBC: CPT

## 2021-06-28 PROCEDURE — 96413 CHEMO IV INFUSION 1 HR: CPT

## 2021-06-28 PROCEDURE — 96415 CHEMO IV INFUSION ADDL HR: CPT

## 2021-06-28 PROCEDURE — 700111 HCHG RX REV CODE 636 W/ 250 OVERRIDE (IP): Performed by: INTERNAL MEDICINE

## 2021-06-28 PROCEDURE — 96375 TX/PRO/DX INJ NEW DRUG ADDON: CPT

## 2021-06-28 RX ADMIN — FAMOTIDINE 20 MG: 10 INJECTION INTRAVENOUS at 13:46

## 2021-06-28 RX ADMIN — DEXAMETHASONE SODIUM PHOSPHATE 12 MG: 4 INJECTION, SOLUTION INTRA-ARTICULAR; INTRALESIONAL; INTRAMUSCULAR; INTRAVENOUS; SOFT TISSUE at 13:45

## 2021-06-28 RX ADMIN — DIPHENHYDRAMINE HYDROCHLORIDE 25 MG: 50 INJECTION INTRAMUSCULAR; INTRAVENOUS at 14:09

## 2021-06-28 RX ADMIN — PACLITAXEL 82.5 MG: 6 INJECTION, SOLUTION INTRAVENOUS at 14:30

## 2021-06-28 ASSESSMENT — FIBROSIS 4 INDEX: FIB4 SCORE: 1.18

## 2021-06-28 NOTE — PROGRESS NOTES
"Pharmacy Chemotherapy Calculations Note:    Patient Name: Christine Malhotra      Dx: Breast CA, Stage IV (ER/AZ +, HER2-)    Cycle:  9, Day 8   Previous treatment: 6/21/21     Protocol:  Weekly PACLitaxel  PACLitaxel 80 mg/m² IV over 60 minutes Day 1, 8, 15  3/1/21 C5 - dose reduced to 60 mg/m² for tolerance/neuropathy  3/29/21 C6 - dose reduced to 50 mg/m² for tolerance/neuropathy  Weekly until disease progression or unacceptable toxicity    NCCN guidelines for breast cancer V.2.2021.  Bay COPPOLA, et al. J Clin Oncol 2001;19(22):4216-23.        /56   Pulse 85   Temp 36.7 °C (98 °F) (Temporal)   Resp 18   Ht 1.48 m (4' 10.27\")   Wt 66 kg (145 lb 8.1 oz)   SpO2 98%   BMI 30.13 kg/m²  Body surface area is 1.65 meters squared.     Labs from 6/28/21 reviewed- results within treatment parameters     PACLitaxel (Taxol) 50 mg/m² x 1.65m² = 82.5mg   <10% difference, ok to treat with final written dose = 82.5mg IV    Bhavana Urbina, PharmD    "

## 2021-06-28 NOTE — PROGRESS NOTES
"Pharmacy Chemotherapy Verification  Patient name: Christine Malhotra  Dx: Breast CA  Regimen: Weekly PACLitaxel    PACLitaxel 80 mg/m2 IV over 60 minutes Day 1,8,15   -C5: dose dec to 60 mg/m2 for tolerance / neuropathy   -C6 dose decreased to 50 mg/m2 for increased neuropathy per Dr. Jarvis   Weekly until DP or UT  *Dosing Reference*  NCCN guidelines for breast cancer V.1.2019  Bay EA, et al. J Clin Oncol 2001;19(22):4217-23    Allergies:Patient has no known allergies.  /56   Pulse 85   Temp 36.7 °C (98 °F) (Temporal)   Resp 18   Ht 1.48 m (4' 10.27\")   Wt 66 kg (145 lb 8.1 oz)   SpO2 98%   BMI 30.13 kg/m²  Body surface area is 1.65 meters squared.     Labs: 6/28/21   Meet treatment parameters.        Drug Order   (Drug name, dose, route, IV Fluid & volume, frequency, number of doses) Cycle 9 Day 8  Previous treatment = 6/21/21   Medication = PACLitaxel (Taxol)   Base Dose = 50 mg/m²  Calc Dose: Base Dose x 1.65 m² = 82.5 mg  Final Dose = 82.5 mg  Route = IV  Fluid & Volume =  mL  Final [conc] ~ 0.31 mg/mL  Admin Duration = Over 60 minutes            Okay to treat with final dose     "

## 2021-06-28 NOTE — PROGRESS NOTES
Chemotherapy Verification - SECONDARY RN       Height = 148 cm  Weight = 66 kg  BSA = 1.65 m^2       Medication: paclitaxel  Dose: 50 mg/m^2  Calculated Dose: 82.5 mg                             (In mg/m2, AUC, mg/kg)         I confirm that this process was performed independently.

## 2021-06-28 NOTE — PROGRESS NOTES
Chemotherapy Verification - PRIMARY RN      Height = 148 cm  Weight = 66 kg  BSA = 1.65 m^2       Medication: PACLitaxel (TAXOL)  Dose: 50 mg/m^2  Calculated Dose: 82.5 mg                             (In mg/m2, AUC, mg/kg)     I confirm this process was performed independently with the BSA and all final chemotherapy dosing calculations congruent.  Any discrepancies of 10% or greater have been addressed with the chemotherapy pharmacist. The resolution of the discrepancy has been documented in the EPIC progress notes.

## 2021-06-29 NOTE — PROGRESS NOTES
Patient here for Day 8, Cycle 9 Taxol. Plan of care discussed with patient via  services. PIV established by Vascular Access Team; labs drawn as ordered. Labs results within parameters to proceed with treatment. Pre-medications given per MAR. Taxol given per MAR. PIV flushed with normal saline and removed; gauze/coban applied to site. Next appointment scheduled. Discharged to self care; no apparent distress noted.

## 2021-07-05 ENCOUNTER — HOSPITAL ENCOUNTER (OUTPATIENT)
Dept: RADIOLOGY | Facility: MEDICAL CENTER | Age: 66
End: 2021-07-05
Attending: INTERNAL MEDICINE
Payer: MEDICAID

## 2021-07-05 ENCOUNTER — OUTPATIENT INFUSION SERVICES (OUTPATIENT)
Dept: ONCOLOGY | Facility: MEDICAL CENTER | Age: 66
End: 2021-07-05
Attending: INTERNAL MEDICINE
Payer: MEDICAID

## 2021-07-05 VITALS
RESPIRATION RATE: 18 BRPM | HEIGHT: 58 IN | SYSTOLIC BLOOD PRESSURE: 142 MMHG | HEART RATE: 71 BPM | DIASTOLIC BLOOD PRESSURE: 67 MMHG | BODY MASS INDEX: 30.59 KG/M2 | WEIGHT: 145.72 LBS | OXYGEN SATURATION: 96 % | TEMPERATURE: 97.1 F

## 2021-07-05 DIAGNOSIS — Z17.0 MALIGNANT NEOPLASM OF OVERLAPPING SITES OF RIGHT BREAST IN FEMALE, ESTROGEN RECEPTOR POSITIVE (HCC): ICD-10-CM

## 2021-07-05 DIAGNOSIS — C50.811 MALIGNANT NEOPLASM OF OVERLAPPING SITES OF RIGHT BREAST IN FEMALE, ESTROGEN RECEPTOR POSITIVE (HCC): ICD-10-CM

## 2021-07-05 LAB
BASOPHILS # BLD AUTO: 0.6 % (ref 0–1.8)
BASOPHILS # BLD: 0.03 K/UL (ref 0–0.12)
EOSINOPHIL # BLD AUTO: 0.1 K/UL (ref 0–0.51)
EOSINOPHIL NFR BLD: 2.1 % (ref 0–6.9)
ERYTHROCYTE [DISTWIDTH] IN BLOOD BY AUTOMATED COUNT: 52.7 FL (ref 35.9–50)
HCT VFR BLD AUTO: 39 % (ref 37–47)
HGB BLD-MCNC: 12.5 G/DL (ref 12–16)
IMM GRANULOCYTES # BLD AUTO: 0.06 K/UL (ref 0–0.11)
IMM GRANULOCYTES NFR BLD AUTO: 1.3 % (ref 0–0.9)
LYMPHOCYTES # BLD AUTO: 1.29 K/UL (ref 1–4.8)
LYMPHOCYTES NFR BLD: 27.2 % (ref 22–41)
MCH RBC QN AUTO: 30.4 PG (ref 27–33)
MCHC RBC AUTO-ENTMCNC: 32.1 G/DL (ref 33.6–35)
MCV RBC AUTO: 94.9 FL (ref 81.4–97.8)
MONOCYTES # BLD AUTO: 0.27 K/UL (ref 0–0.85)
MONOCYTES NFR BLD AUTO: 5.7 % (ref 0–13.4)
NEUTROPHILS # BLD AUTO: 3 K/UL (ref 2–7.15)
NEUTROPHILS NFR BLD: 63.1 % (ref 44–72)
NRBC # BLD AUTO: 0 K/UL
NRBC BLD-RTO: 0 /100 WBC
PLATELET # BLD AUTO: 274 K/UL (ref 164–446)
PMV BLD AUTO: 9.9 FL (ref 9–12.9)
RBC # BLD AUTO: 4.11 M/UL (ref 4.2–5.4)
WBC # BLD AUTO: 4.8 K/UL (ref 4.8–10.8)

## 2021-07-05 PROCEDURE — 96413 CHEMO IV INFUSION 1 HR: CPT

## 2021-07-05 PROCEDURE — 85025 COMPLETE CBC W/AUTO DIFF WBC: CPT

## 2021-07-05 PROCEDURE — 700111 HCHG RX REV CODE 636 W/ 250 OVERRIDE (IP): Performed by: INTERNAL MEDICINE

## 2021-07-05 PROCEDURE — 96367 TX/PROPH/DG ADDL SEQ IV INF: CPT

## 2021-07-05 PROCEDURE — 304540 HCHG NITRO SET VENT 2ND TUB

## 2021-07-05 PROCEDURE — 96375 TX/PRO/DX INJ NEW DRUG ADDON: CPT

## 2021-07-05 PROCEDURE — 36410 VNPNXR 3YR/> PHY/QHP DX/THER: CPT | Mod: XU

## 2021-07-05 PROCEDURE — 700105 HCHG RX REV CODE 258: Performed by: INTERNAL MEDICINE

## 2021-07-05 RX ADMIN — DEXAMETHASONE SODIUM PHOSPHATE 12 MG: 4 INJECTION, SOLUTION INTRA-ARTICULAR; INTRALESIONAL; INTRAMUSCULAR; INTRAVENOUS; SOFT TISSUE at 12:22

## 2021-07-05 RX ADMIN — DIPHENHYDRAMINE HYDROCHLORIDE 25 MG: 50 INJECTION INTRAMUSCULAR; INTRAVENOUS at 11:57

## 2021-07-05 RX ADMIN — PACLITAXEL 82 MG: 6 INJECTION, SOLUTION INTRAVENOUS at 12:39

## 2021-07-05 RX ADMIN — FAMOTIDINE 20 MG: 10 INJECTION INTRAVENOUS at 11:58

## 2021-07-05 ASSESSMENT — FIBROSIS 4 INDEX: FIB4 SCORE: 1.12

## 2021-07-05 NOTE — PROGRESS NOTES
Chemotherapy Verification - PRIMARY RN      Height = 57.87in  Weight = 66.1kg  BSA = 1.64m2      Medication: Taxol  Dose: 50mg/m2  Calculated Dose: 82mg                             (In mg/m2, AUC, mg/kg)       I confirm this process was performed independently with the BSA and all final chemotherapy dosing calculations congruent.  Any discrepancies of 10% or greater have been addressed with the chemotherapy pharmacist. The resolution of the discrepancy has been documented in the EPIC progress notes.

## 2021-07-05 NOTE — PROGRESS NOTES
Chemotherapy Verification - PRIMARY RN      Height = 1.47m  Weight = 66.1kg  BSA = 1.64m2       Medication: paclitaxel  Dose: 502mg/m2  Calculated Dose: 82mg                             (In mg/m2, AUC, mg/kg)       I confirm this process was performed independently with the BSA and all final chemotherapy dosing calculations congruent.  Any discrepancies of 10% or greater have been addressed with the chemotherapy pharmacist. The resolution of the discrepancy has been documented in the EPIC progress notes.

## 2021-07-05 NOTE — PROGRESS NOTES
"Pharmacy Chemotherapy Calculations Note:    Patient Name: Christine Malhotra      Dx: Breast CA, Stage IV (ER/UT +, HER2-)    Cycle:  9, Day 15   Previous treatment: 6/28/21     Protocol:  Weekly PACLitaxel  PACLitaxel 80 mg/m² IV over 60 minutes Day 1, 8, 15  3/1/21 C5 - dose reduced to 60 mg/m² for tolerance/neuropathy  3/29/21 C6 - dose reduced to 50 mg/m² for tolerance/neuropathy  Weekly until disease progression or unacceptable toxicity    NCCN guidelines for breast cancer V.2.2021.  Bay COPPOLA, et al. J Clin Oncol 2001;19(22):4216-23.        /67   Pulse 71   Temp 36.2 °C (97.1 °F) (Temporal)   Resp 18   Ht 1.47 m (4' 9.87\")   Wt 66.1 kg (145 lb 11.6 oz)   SpO2 96%   BMI 30.59 kg/m²  Body surface area is 1.64 meters squared.     Labs from 7/5/21 reviewed- results within treatment parameters     PACLitaxel (Taxol) 50 mg/m² x 1.64m² = 82mg   <10% difference, ok to treat with final written dose = 82mg IV    Bhavana Urbina, PharmD    "

## 2021-07-05 NOTE — PROGRESS NOTES
"Pharmacy Chemotherapy Verification  Patient name: Christine Malhotra  Dx: Breast CA  Regimen: Weekly PACLitaxel    PACLitaxel 80 mg/m2 IV over 60 minutes Day 1,8,15   C5: dose dec to 60 mg/m2 for tolerance / neuropathy   C6 dose decreased to 50 mg/m2 for increased neuropathy per Dr. Jarvis   Weekly until DP or UT  *Dosing Reference*  NCCN guidelines for breast cancer V.1.2019  Bay EA, et al. J Clin Oncol 2001;19(22):3203-23    Allergies:Patient has no known allergies.  /67   Pulse 71   Temp 36.2 °C (97.1 °F) (Temporal)   Resp 18   Ht 1.47 m (4' 9.87\")   Wt 66.1 kg (145 lb 11.6 oz)   SpO2 96%   BMI 30.59 kg/m²  Body surface area is 1.64 meters squared.     Labs: 7/5/21   Meet treatment parameters    Drug Order   (Drug name, dose, route, IV Fluid & volume, frequency, number of doses) Cycle 9 Day 15  Previous treatment = 6/28/21   Medication = PACLitaxel (Taxol)   Base Dose = 50 mg/m²  Calc Dose: Base Dose x 1.64 m² = 82 mg  Final Dose = 82 mg  Route = IV  Fluid & Volume =  mL  Final [conc] ~ 0.31 mg/mL  Admin Duration = Over 60 minutes            Okay to treat with final dose     "

## 2021-07-05 NOTE — PROGRESS NOTES
Patient to [facility] for chemotherapy infusion. PIV inserted by VAT team with US. PIV inserted into left forearm, flushed with + blood return, labs drawn. Patient meets parameters for infusion. Premeds given. Taxol infused with no s/s of infusion reaction. PIV flushed with + blood return and removed. Gauze and Coban applied as a dressing. Patient has future appointment. Patient to home in care of self.

## 2021-07-06 RX ORDER — 0.9 % SODIUM CHLORIDE 0.9 %
10 VIAL (ML) INJECTION PRN
Status: CANCELLED | OUTPATIENT
Start: 2021-07-19

## 2021-07-06 RX ORDER — PROCHLORPERAZINE MALEATE 10 MG
10 TABLET ORAL EVERY 6 HOURS PRN
Status: CANCELLED | OUTPATIENT
Start: 2021-07-19

## 2021-07-06 RX ORDER — 0.9 % SODIUM CHLORIDE 0.9 %
10 VIAL (ML) INJECTION PRN
Status: CANCELLED | OUTPATIENT
Start: 2021-07-18

## 2021-07-06 RX ORDER — ONDANSETRON 2 MG/ML
4 INJECTION INTRAMUSCULAR; INTRAVENOUS PRN
Status: CANCELLED | OUTPATIENT
Start: 2021-07-26

## 2021-07-06 RX ORDER — 0.9 % SODIUM CHLORIDE 0.9 %
10 VIAL (ML) INJECTION PRN
Status: CANCELLED | OUTPATIENT
Start: 2021-07-26

## 2021-07-06 RX ORDER — EPINEPHRINE 1 MG/ML(1)
0.5 AMPUL (ML) INJECTION PRN
Status: CANCELLED | OUTPATIENT
Start: 2021-07-26

## 2021-07-06 RX ORDER — DIPHENHYDRAMINE HYDROCHLORIDE 50 MG/ML
50 INJECTION INTRAMUSCULAR; INTRAVENOUS PRN
Status: CANCELLED | OUTPATIENT
Start: 2021-07-26

## 2021-07-06 RX ORDER — HEPARIN SODIUM (PORCINE) LOCK FLUSH IV SOLN 100 UNIT/ML 100 UNIT/ML
500 SOLUTION INTRAVENOUS PRN
Status: CANCELLED | OUTPATIENT
Start: 2021-08-02

## 2021-07-06 RX ORDER — 0.9 % SODIUM CHLORIDE 0.9 %
3 VIAL (ML) INJECTION PRN
Status: CANCELLED | OUTPATIENT
Start: 2021-07-26

## 2021-07-06 RX ORDER — 0.9 % SODIUM CHLORIDE 0.9 %
VIAL (ML) INJECTION PRN
Status: CANCELLED | OUTPATIENT
Start: 2021-07-18

## 2021-07-06 RX ORDER — ONDANSETRON 8 MG/1
8 TABLET, ORALLY DISINTEGRATING ORAL PRN
Status: CANCELLED | OUTPATIENT
Start: 2021-07-26

## 2021-07-06 RX ORDER — 0.9 % SODIUM CHLORIDE 0.9 %
3 VIAL (ML) INJECTION PRN
Status: CANCELLED | OUTPATIENT
Start: 2021-07-19

## 2021-07-06 RX ORDER — EPINEPHRINE 1 MG/ML(1)
0.5 AMPUL (ML) INJECTION PRN
Status: CANCELLED | OUTPATIENT
Start: 2021-07-19

## 2021-07-06 RX ORDER — ONDANSETRON 8 MG/1
8 TABLET, ORALLY DISINTEGRATING ORAL PRN
Status: CANCELLED | OUTPATIENT
Start: 2021-08-02

## 2021-07-06 RX ORDER — 0.9 % SODIUM CHLORIDE 0.9 %
3 VIAL (ML) INJECTION PRN
Status: CANCELLED | OUTPATIENT
Start: 2021-08-02

## 2021-07-06 RX ORDER — SODIUM CHLORIDE 9 MG/ML
INJECTION, SOLUTION INTRAVENOUS CONTINUOUS
Status: CANCELLED | OUTPATIENT
Start: 2021-07-26

## 2021-07-06 RX ORDER — 0.9 % SODIUM CHLORIDE 0.9 %
VIAL (ML) INJECTION PRN
Status: CANCELLED | OUTPATIENT
Start: 2021-07-26

## 2021-07-06 RX ORDER — EPINEPHRINE 1 MG/ML(1)
0.5 AMPUL (ML) INJECTION PRN
Status: CANCELLED | OUTPATIENT
Start: 2021-08-02

## 2021-07-06 RX ORDER — HEPARIN SODIUM (PORCINE) LOCK FLUSH IV SOLN 100 UNIT/ML 100 UNIT/ML
500 SOLUTION INTRAVENOUS PRN
Status: CANCELLED | OUTPATIENT
Start: 2021-07-18

## 2021-07-06 RX ORDER — METHYLPREDNISOLONE SODIUM SUCCINATE 125 MG/2ML
125 INJECTION, POWDER, LYOPHILIZED, FOR SOLUTION INTRAMUSCULAR; INTRAVENOUS PRN
Status: CANCELLED | OUTPATIENT
Start: 2021-07-19

## 2021-07-06 RX ORDER — 0.9 % SODIUM CHLORIDE 0.9 %
3 VIAL (ML) INJECTION PRN
Status: CANCELLED | OUTPATIENT
Start: 2021-07-18

## 2021-07-06 RX ORDER — SODIUM CHLORIDE 9 MG/ML
INJECTION, SOLUTION INTRAVENOUS CONTINUOUS
Status: CANCELLED | OUTPATIENT
Start: 2021-07-19

## 2021-07-06 RX ORDER — SODIUM CHLORIDE 9 MG/ML
INJECTION, SOLUTION INTRAVENOUS CONTINUOUS
Status: CANCELLED | OUTPATIENT
Start: 2021-08-02

## 2021-07-06 RX ORDER — 0.9 % SODIUM CHLORIDE 0.9 %
10 VIAL (ML) INJECTION PRN
Status: CANCELLED | OUTPATIENT
Start: 2021-08-02

## 2021-07-06 RX ORDER — ONDANSETRON 8 MG/1
8 TABLET, ORALLY DISINTEGRATING ORAL PRN
Status: CANCELLED | OUTPATIENT
Start: 2021-07-19

## 2021-07-06 RX ORDER — ONDANSETRON 2 MG/ML
4 INJECTION INTRAMUSCULAR; INTRAVENOUS PRN
Status: CANCELLED | OUTPATIENT
Start: 2021-07-19

## 2021-07-06 RX ORDER — DIPHENHYDRAMINE HYDROCHLORIDE 50 MG/ML
50 INJECTION INTRAMUSCULAR; INTRAVENOUS PRN
Status: CANCELLED | OUTPATIENT
Start: 2021-07-19

## 2021-07-06 RX ORDER — METHYLPREDNISOLONE SODIUM SUCCINATE 125 MG/2ML
125 INJECTION, POWDER, LYOPHILIZED, FOR SOLUTION INTRAMUSCULAR; INTRAVENOUS PRN
Status: CANCELLED | OUTPATIENT
Start: 2021-08-02

## 2021-07-06 RX ORDER — METHYLPREDNISOLONE SODIUM SUCCINATE 125 MG/2ML
125 INJECTION, POWDER, LYOPHILIZED, FOR SOLUTION INTRAMUSCULAR; INTRAVENOUS PRN
Status: CANCELLED | OUTPATIENT
Start: 2021-07-26

## 2021-07-06 RX ORDER — PROCHLORPERAZINE MALEATE 10 MG
10 TABLET ORAL EVERY 6 HOURS PRN
Status: CANCELLED | OUTPATIENT
Start: 2021-07-26

## 2021-07-06 RX ORDER — ONDANSETRON 2 MG/ML
4 INJECTION INTRAMUSCULAR; INTRAVENOUS PRN
Status: CANCELLED | OUTPATIENT
Start: 2021-08-02

## 2021-07-06 RX ORDER — 0.9 % SODIUM CHLORIDE 0.9 %
VIAL (ML) INJECTION PRN
Status: CANCELLED | OUTPATIENT
Start: 2021-07-19

## 2021-07-06 RX ORDER — HEPARIN SODIUM (PORCINE) LOCK FLUSH IV SOLN 100 UNIT/ML 100 UNIT/ML
500 SOLUTION INTRAVENOUS PRN
Status: CANCELLED | OUTPATIENT
Start: 2021-07-26

## 2021-07-06 RX ORDER — PROCHLORPERAZINE MALEATE 10 MG
10 TABLET ORAL EVERY 6 HOURS PRN
Status: CANCELLED | OUTPATIENT
Start: 2021-08-02

## 2021-07-06 RX ORDER — 0.9 % SODIUM CHLORIDE 0.9 %
VIAL (ML) INJECTION PRN
Status: CANCELLED | OUTPATIENT
Start: 2021-08-02

## 2021-07-06 RX ORDER — DIPHENHYDRAMINE HYDROCHLORIDE 50 MG/ML
50 INJECTION INTRAMUSCULAR; INTRAVENOUS PRN
Status: CANCELLED | OUTPATIENT
Start: 2021-08-02

## 2021-07-06 RX ORDER — HEPARIN SODIUM (PORCINE) LOCK FLUSH IV SOLN 100 UNIT/ML 100 UNIT/ML
500 SOLUTION INTRAVENOUS PRN
Status: CANCELLED | OUTPATIENT
Start: 2021-07-19

## 2021-07-07 ENCOUNTER — HOSPITAL ENCOUNTER (OUTPATIENT)
Dept: RADIOLOGY | Facility: MEDICAL CENTER | Age: 66
End: 2021-07-07
Attending: INTERNAL MEDICINE
Payer: MEDICAID

## 2021-07-07 DIAGNOSIS — C50.611 MALIGNANT NEOPLASM OF AXILLARY TAIL OF RIGHT FEMALE BREAST, UNSPECIFIED ESTROGEN RECEPTOR STATUS (HCC): ICD-10-CM

## 2021-07-07 PROCEDURE — 70553 MRI BRAIN STEM W/O & W/DYE: CPT

## 2021-07-07 PROCEDURE — 700117 HCHG RX CONTRAST REV CODE 255: Performed by: INTERNAL MEDICINE

## 2021-07-07 PROCEDURE — A9576 INJ PROHANCE MULTIPACK: HCPCS | Performed by: INTERNAL MEDICINE

## 2021-07-07 RX ADMIN — GADOTERIDOL 13 ML: 279.3 INJECTION, SOLUTION INTRAVENOUS at 16:48

## 2021-07-19 ENCOUNTER — OUTPATIENT INFUSION SERVICES (OUTPATIENT)
Dept: ONCOLOGY | Facility: MEDICAL CENTER | Age: 66
End: 2021-07-19
Attending: INTERNAL MEDICINE
Payer: MEDICAID

## 2021-07-19 ENCOUNTER — HOSPITAL ENCOUNTER (OUTPATIENT)
Dept: RADIOLOGY | Facility: MEDICAL CENTER | Age: 66
End: 2021-07-19
Attending: INTERNAL MEDICINE
Payer: MEDICAID

## 2021-07-19 VITALS
BODY MASS INDEX: 31.01 KG/M2 | RESPIRATION RATE: 18 BRPM | OXYGEN SATURATION: 98 % | DIASTOLIC BLOOD PRESSURE: 57 MMHG | SYSTOLIC BLOOD PRESSURE: 118 MMHG | TEMPERATURE: 97.6 F | WEIGHT: 147.71 LBS | HEART RATE: 90 BPM | HEIGHT: 58 IN

## 2021-07-19 DIAGNOSIS — Z17.0 MALIGNANT NEOPLASM OF OVERLAPPING SITES OF RIGHT BREAST IN FEMALE, ESTROGEN RECEPTOR POSITIVE (HCC): ICD-10-CM

## 2021-07-19 DIAGNOSIS — C50.811 MALIGNANT NEOPLASM OF OVERLAPPING SITES OF RIGHT BREAST IN FEMALE, ESTROGEN RECEPTOR POSITIVE (HCC): ICD-10-CM

## 2021-07-19 LAB
ALBUMIN SERPL BCP-MCNC: 3.7 G/DL (ref 3.2–4.9)
ALBUMIN/GLOB SERPL: 1.4 G/DL
ALP SERPL-CCNC: 64 U/L (ref 30–99)
ALT SERPL-CCNC: 12 U/L (ref 2–50)
ANION GAP SERPL CALC-SCNC: 12 MMOL/L (ref 7–16)
AST SERPL-CCNC: 25 U/L (ref 12–45)
BASOPHILS # BLD AUTO: 0.7 % (ref 0–1.8)
BASOPHILS # BLD: 0.04 K/UL (ref 0–0.12)
BILIRUB SERPL-MCNC: 0.3 MG/DL (ref 0.1–1.5)
BUN SERPL-MCNC: 9 MG/DL (ref 8–22)
CALCIUM SERPL-MCNC: 9 MG/DL (ref 8.5–10.5)
CHLORIDE SERPL-SCNC: 106 MMOL/L (ref 96–112)
CO2 SERPL-SCNC: 25 MMOL/L (ref 20–33)
CREAT SERPL-MCNC: 0.6 MG/DL (ref 0.5–1.4)
EOSINOPHIL # BLD AUTO: 0.09 K/UL (ref 0–0.51)
EOSINOPHIL NFR BLD: 1.5 % (ref 0–6.9)
ERYTHROCYTE [DISTWIDTH] IN BLOOD BY AUTOMATED COUNT: 52.7 FL (ref 35.9–50)
GLOBULIN SER CALC-MCNC: 2.6 G/DL (ref 1.9–3.5)
GLUCOSE SERPL-MCNC: 130 MG/DL (ref 65–99)
HCT VFR BLD AUTO: 39.2 % (ref 37–47)
HGB BLD-MCNC: 12.7 G/DL (ref 12–16)
IMM GRANULOCYTES # BLD AUTO: 0.03 K/UL (ref 0–0.11)
IMM GRANULOCYTES NFR BLD AUTO: 0.5 % (ref 0–0.9)
LYMPHOCYTES # BLD AUTO: 1.38 K/UL (ref 1–4.8)
LYMPHOCYTES NFR BLD: 23 % (ref 22–41)
MCH RBC QN AUTO: 31 PG (ref 27–33)
MCHC RBC AUTO-ENTMCNC: 32.4 G/DL (ref 33.6–35)
MCV RBC AUTO: 95.6 FL (ref 81.4–97.8)
MONOCYTES # BLD AUTO: 0.51 K/UL (ref 0–0.85)
MONOCYTES NFR BLD AUTO: 8.5 % (ref 0–13.4)
NEUTROPHILS # BLD AUTO: 3.95 K/UL (ref 2–7.15)
NEUTROPHILS NFR BLD: 65.8 % (ref 44–72)
NRBC # BLD AUTO: 0 K/UL
NRBC BLD-RTO: 0 /100 WBC
PLATELET # BLD AUTO: 294 K/UL (ref 164–446)
PMV BLD AUTO: 9.8 FL (ref 9–12.9)
POTASSIUM SERPL-SCNC: 3.5 MMOL/L (ref 3.6–5.5)
PROT SERPL-MCNC: 6.3 G/DL (ref 6–8.2)
RBC # BLD AUTO: 4.1 M/UL (ref 4.2–5.4)
SODIUM SERPL-SCNC: 143 MMOL/L (ref 135–145)
WBC # BLD AUTO: 6 K/UL (ref 4.8–10.8)

## 2021-07-19 PROCEDURE — 85025 COMPLETE CBC W/AUTO DIFF WBC: CPT

## 2021-07-19 PROCEDURE — 700105 HCHG RX REV CODE 258: Performed by: INTERNAL MEDICINE

## 2021-07-19 PROCEDURE — 80053 COMPREHEN METABOLIC PANEL: CPT

## 2021-07-19 PROCEDURE — 700111 HCHG RX REV CODE 636 W/ 250 OVERRIDE (IP): Performed by: INTERNAL MEDICINE

## 2021-07-19 PROCEDURE — A9270 NON-COVERED ITEM OR SERVICE: HCPCS | Performed by: INTERNAL MEDICINE

## 2021-07-19 PROCEDURE — 304540 HCHG NITRO SET VENT 2ND TUB

## 2021-07-19 PROCEDURE — 96413 CHEMO IV INFUSION 1 HR: CPT

## 2021-07-19 PROCEDURE — 700102 HCHG RX REV CODE 250 W/ 637 OVERRIDE(OP): Performed by: INTERNAL MEDICINE

## 2021-07-19 PROCEDURE — 96375 TX/PRO/DX INJ NEW DRUG ADDON: CPT

## 2021-07-19 RX ORDER — POTASSIUM CHLORIDE 20 MEQ/1
40 TABLET, EXTENDED RELEASE ORAL ONCE
Status: COMPLETED | OUTPATIENT
Start: 2021-07-19 | End: 2021-07-19

## 2021-07-19 RX ORDER — GABAPENTIN 300 MG/1
CAPSULE ORAL
COMMUNITY
Start: 2021-07-06

## 2021-07-19 RX ADMIN — PACLITAXEL 82.5 MG: 6 INJECTION, SOLUTION INTRAVENOUS at 14:44

## 2021-07-19 RX ADMIN — DEXAMETHASONE SODIUM PHOSPHATE 12 MG: 4 INJECTION, SOLUTION INTRA-ARTICULAR; INTRALESIONAL; INTRAMUSCULAR; INTRAVENOUS; SOFT TISSUE at 13:45

## 2021-07-19 RX ADMIN — DIPHENHYDRAMINE HYDROCHLORIDE 25 MG: 50 INJECTION, SOLUTION INTRAMUSCULAR; INTRAVENOUS at 14:00

## 2021-07-19 RX ADMIN — FAMOTIDINE 20 MG: 10 INJECTION INTRAVENOUS at 13:43

## 2021-07-19 RX ADMIN — POTASSIUM CHLORIDE 40 MEQ: 1500 TABLET, EXTENDED RELEASE ORAL at 13:42

## 2021-07-19 ASSESSMENT — FIBROSIS 4 INDEX: FIB4 SCORE: 1.19

## 2021-07-19 NOTE — PROGRESS NOTES
"Pharmacy Chemotherapy Verification    Patient name: Christine Malhotra  Dx: Breast CA    Regimen: Weekly PACLitaxel  PACLitaxel 80 mg/m2 IV over 60 minutes Day 1,8,15   C5: dose dec to 60 mg/m2 for tolerance / neuropathy   C6 dose decreased to 50 mg/m2 for increased neuropathy per Dr. Jarvis   Weekly until DP or UT  *Dosing Reference*  NCCN guidelines for breast cancer V.1.2019  Bay EA, et al. J Clin Oncol 2001;19(22):4216-23    Allergies:Patient has no known allergies.  /57   Pulse 90   Temp 36.4 °C (97.6 °F) (Temporal)   Resp 18   Ht 1.47 m (4' 9.87\")   Wt 67 kg (147 lb 11.3 oz)   SpO2 98%   BMI 31.01 kg/m²  Body surface area is 1.65 meters squared.     All lab results 7/19/21 within treatment parameters.     Drug Order   (Drug name, dose, route, IV Fluid & volume, frequency, number of doses) Cycle 10 Day 1  Previous treatment = 7/5/21   Medication = PACLitaxel (Taxol)   Base Dose = 50 mg/m²  Calc Dose: Base Dose x 1.65m² = 82.5 mg  Final Dose = 82.5mg  Route = IV  Fluid & Volume =  mL  Admin Duration = Over 60 minutes            Okay to treat with final dose     By my signature below, I confirm this process was performed independently with the BSA and all final chemotherapy dosing calculations congruent. I have reviewed the above chemotherapy order and that my calculation of the final dose and BSA (when applicable) corroborate those calculations of the  pharmacist. Discrepancies of 10% or greater in the written dose have been addressed and documented within the [facility] Progress notes.    ALBERT Urbina Pharm.D.    "

## 2021-07-19 NOTE — PROGRESS NOTES
Chemotherapy Verification - SECONDARY RN       Height = 57.87in  Weight = 67kg  BSA = 1.65m2       Medication: Taxol  Dose: 50mg/m2  (62.5% of original dose of 80mg/m2) Calculated Dose: 82.5mg                             (In mg/m2, AUC, mg/kg)         I confirm that this process was performed independently.

## 2021-07-19 NOTE — PROGRESS NOTES
Chemotherapy Verification - PRIMARY RN      Height = 1.47 m  Weight = 67 kg  BSA = 1.65 m^2      Medication: paclitaxel  Dose: 50 mg/m^2  Calculated Dose: 82.5 mg                             (In mg/m2, AUC, mg/kg)       I confirm this process was performed independently with the BSA and all final chemotherapy dosing calculations congruent.  Any discrepancies of 10% or greater have been addressed with the chemotherapy pharmacist. The resolution of the discrepancy has been documented in the EPIC progress notes.

## 2021-07-19 NOTE — PROGRESS NOTES
"Pharmacy Chemotherapy Calculations Note:    Patient Name: Christine Malhotra        Dx: Breast CA, Stage IV (ER/NV +, HER2-)  Cycle:  10, Day 1 Previous treatment: C9D15 = 7/5/21   Protocol:  Weekly PACLitaxel  PACLitaxel 80 mg/m² IV over 60 minutes Day 1, 8, 15  3/1/21 C5 - dose reduced to 60 mg/m² for tolerance/neuropathy  3/29/21 C6 - dose reduced to 50 mg/m² for tolerance/neuropathy  Weekly until disease progression or unacceptable toxicity    NCCN guidelines for breast cancer V.2.2021.  Bay EA, et al. J Clin Oncol 2001;19(22):4216-23.        /57   Pulse 90   Temp 36.4 °C (97.6 °F) (Temporal)   Resp 18   Ht 1.47 m (4' 9.87\")   Wt 67 kg (147 lb 11.3 oz)   SpO2 98%   BMI 31.01 kg/m²  Body surface area is 1.65 meters squared.  ANC~ 3950  Plt = 294 k   SCr = 0.6 mg/dL CrCl = 86 mL/min (using min SCr 0.7 mg/dL and current weight)  LFT's = WNL  TBili = 0.3     PACLitaxel (Taxol) 50 mg/m² x 1.65 m² = 82.5 mg   <10% difference, ok to treat with final written dose = 82.5 mg    Bubba Blake, PharmD, PharmD, BCPS             "

## 2021-07-19 NOTE — PROGRESS NOTES
Pt presents to [facility] for paclitaxel infusion. US PIV established PIV in L forearm; brisk blood return observed and pt tolerated well. Labs drawn and within treatable parameters. Pre meds administered and paclitaxel infused with no s/s of adverse reactions. PIV flushed and brisk blood return observed before removing; gauze/coband dressing applied. Next appointment confirmed and education provided. Pt discharged to self care with all personal belongings and in NAD.

## 2021-07-26 ENCOUNTER — OUTPATIENT INFUSION SERVICES (OUTPATIENT)
Dept: ONCOLOGY | Facility: MEDICAL CENTER | Age: 66
End: 2021-07-26
Attending: INTERNAL MEDICINE
Payer: MEDICAID

## 2021-07-26 ENCOUNTER — HOSPITAL ENCOUNTER (OUTPATIENT)
Dept: RADIOLOGY | Facility: MEDICAL CENTER | Age: 66
End: 2021-07-26
Attending: INTERNAL MEDICINE
Payer: MEDICAID

## 2021-07-26 VITALS
SYSTOLIC BLOOD PRESSURE: 123 MMHG | BODY MASS INDEX: 30.54 KG/M2 | DIASTOLIC BLOOD PRESSURE: 74 MMHG | OXYGEN SATURATION: 98 % | WEIGHT: 145.5 LBS | HEIGHT: 58 IN | HEART RATE: 92 BPM | RESPIRATION RATE: 18 BRPM | TEMPERATURE: 98 F

## 2021-07-26 DIAGNOSIS — C50.811 MALIGNANT NEOPLASM OF OVERLAPPING SITES OF RIGHT BREAST IN FEMALE, ESTROGEN RECEPTOR POSITIVE (HCC): ICD-10-CM

## 2021-07-26 DIAGNOSIS — Z17.0 MALIGNANT NEOPLASM OF OVERLAPPING SITES OF RIGHT BREAST IN FEMALE, ESTROGEN RECEPTOR POSITIVE (HCC): ICD-10-CM

## 2021-07-26 DIAGNOSIS — C50.011 MALIGNANT NEOPLASM OF NIPPLE OF RIGHT BREAST IN FEMALE, UNSPECIFIED ESTROGEN RECEPTOR STATUS (HCC): ICD-10-CM

## 2021-07-26 LAB
BASOPHILS # BLD AUTO: 1 % (ref 0–1.8)
BASOPHILS # BLD: 0.05 K/UL (ref 0–0.12)
EOSINOPHIL # BLD AUTO: 0.17 K/UL (ref 0–0.51)
EOSINOPHIL NFR BLD: 3.3 % (ref 0–6.9)
ERYTHROCYTE [DISTWIDTH] IN BLOOD BY AUTOMATED COUNT: 52.5 FL (ref 35.9–50)
HCT VFR BLD AUTO: 41.3 % (ref 37–47)
HGB BLD-MCNC: 13.7 G/DL (ref 12–16)
IMM GRANULOCYTES # BLD AUTO: 0.19 K/UL (ref 0–0.11)
IMM GRANULOCYTES NFR BLD AUTO: 3.6 % (ref 0–0.9)
LYMPHOCYTES # BLD AUTO: 1.36 K/UL (ref 1–4.8)
LYMPHOCYTES NFR BLD: 26.1 % (ref 22–41)
MCH RBC QN AUTO: 31.6 PG (ref 27–33)
MCHC RBC AUTO-ENTMCNC: 33.2 G/DL (ref 33.6–35)
MCV RBC AUTO: 95.4 FL (ref 81.4–97.8)
MONOCYTES # BLD AUTO: 0.28 K/UL (ref 0–0.85)
MONOCYTES NFR BLD AUTO: 5.4 % (ref 0–13.4)
NEUTROPHILS # BLD AUTO: 3.17 K/UL (ref 2–7.15)
NEUTROPHILS NFR BLD: 60.6 % (ref 44–72)
NRBC # BLD AUTO: 0 K/UL
NRBC BLD-RTO: 0 /100 WBC
PLATELET # BLD AUTO: 309 K/UL (ref 164–446)
PMV BLD AUTO: 9.4 FL (ref 9–12.9)
RBC # BLD AUTO: 4.33 M/UL (ref 4.2–5.4)
WBC # BLD AUTO: 5.2 K/UL (ref 4.8–10.8)

## 2021-07-26 PROCEDURE — 700105 HCHG RX REV CODE 258: Performed by: INTERNAL MEDICINE

## 2021-07-26 PROCEDURE — 96409 CHEMO IV PUSH SNGL DRUG: CPT

## 2021-07-26 PROCEDURE — 96375 TX/PRO/DX INJ NEW DRUG ADDON: CPT

## 2021-07-26 PROCEDURE — 85025 COMPLETE CBC W/AUTO DIFF WBC: CPT

## 2021-07-26 PROCEDURE — 700111 HCHG RX REV CODE 636 W/ 250 OVERRIDE (IP): Performed by: INTERNAL MEDICINE

## 2021-07-26 RX ADMIN — DEXAMETHASONE SODIUM PHOSPHATE 12 MG: 4 INJECTION, SOLUTION INTRA-ARTICULAR; INTRALESIONAL; INTRAMUSCULAR; INTRAVENOUS; SOFT TISSUE at 11:50

## 2021-07-26 RX ADMIN — FAMOTIDINE 20 MG: 10 INJECTION INTRAVENOUS at 11:49

## 2021-07-26 RX ADMIN — DIPHENHYDRAMINE HYDROCHLORIDE 25 MG: 50 INJECTION, SOLUTION INTRAMUSCULAR; INTRAVENOUS at 12:05

## 2021-07-26 RX ADMIN — PACLITAXEL 82 MG: 6 INJECTION, SOLUTION INTRAVENOUS at 12:44

## 2021-07-26 ASSESSMENT — FIBROSIS 4 INDEX: FIB4 SCORE: 1.57

## 2021-07-26 NOTE — PROGRESS NOTES
Pt arrived ambulatory to [facility] for day 8 weekly Taxol.  POC discussed via .  PIV started to LFA, by VAT team, blood return confirmed and labs drawn as ordered.  Results reviewed, pt meets parameters for treatment today.  Premedications given and Taxol infused as ordered without adverse reaction.  PIV flushed, removed, and site covered with gauze and coban.  Next appointment confirmed.  Pt discharged from [facility] in NAD under self care.

## 2021-07-26 NOTE — PROGRESS NOTES
Chemotherapy Verification - PRIMARY RN      Height = 147 cm  Weight = 66 kg  BSA = 1.64 m2       Medication: Taxol  Dose: 50 mg/m2  Calculated Dose: 82 mg                             (In mg/m2, AUC, mg/kg)       I confirm this process was performed independently with the BSA and all final chemotherapy dosing calculations congruent.  Any discrepancies of 10% or greater have been addressed with the chemotherapy pharmacist. The resolution of the discrepancy has been documented in the EPIC progress notes.

## 2021-07-26 NOTE — PROGRESS NOTES
"Pharmacy Chemotherapy Verification    Patient name: Christine Malhotra  Dx: Breast CA    Regimen: Weekly PACLitaxel  PACLitaxel 80 mg/m2 IV over 60 minutes Day 1,8,15   C5: dose dec to 60 mg/m2 for tolerance / neuropathy   C6 dose decreased to 50 mg/m2 for increased neuropathy per Dr. Jarvis   Weekly until DP or UT  *Dosing Reference*  NCCN guidelines for breast cancer V.1.2019  Bay EA, et al. J Clin Oncol 2001;19(22):4216-23    Allergies:Patient has no known allergies.  /74   Pulse 92   Temp 36.7 °C (98 °F) (Temporal)   Resp 18   Ht 1.47 m (4' 9.87\")   Wt 66 kg (145 lb 8.1 oz)   SpO2 98%   BMI 30.54 kg/m²  Body surface area is 1.64 meters squared.     All lab results 7/26/21 within treatment parameters.     Drug Order   (Drug name, dose, route, IV Fluid & volume, frequency, number of doses) Cycle 10 Day 8  Previous treatment = 7/19/21   Medication = PACLitaxel (Taxol)   Base Dose = 50 mg/m²  Calc Dose: Base Dose x 1.64m² = 82mg  Final Dose = 82mg  Route = IV  Fluid & Volume =  mL  Admin Duration = Over 60 minutes            Okay to treat with final dose     By my signature below, I confirm this process was performed independently with the BSA and all final chemotherapy dosing calculations congruent. I have reviewed the above chemotherapy order and that my calculation of the final dose and BSA (when applicable) corroborate those calculations of the  pharmacist. Discrepancies of 10% or greater in the written dose have been addressed and documented within the [facility] Progress notes.    ALBERT Urbina Pharm.D.    "

## 2021-07-26 NOTE — PROGRESS NOTES
Chemotherapy Verification - SECONDARY RN     D8C1    Height = 149.5 cm  Weight = 62.2 kg  BSA = 1.61 m2       Medication: Eribulin mesylate ([place])  Dose: 1.4 mg/m2  Calculated Dose: 2.254 mg                              I confirm that this process was performed independently.    Diagnosis: imbalance   Visit (# authorized):  5 per POC [facility])                        Referring Physician: Dandre Martinez    Precautions: at risk for falls     Medication changes since last visit?:  No    Subjective: No new information to report since previous

## 2021-07-26 NOTE — PROGRESS NOTES
"Pharmacy Chemotherapy Calculations Note:    Patient Name: Christine Malhotra        Dx: Breast CA, Stage IV (ER/SC +, HER2-)  Cycle:  10, Day 8 Previous treatment: C10D1 = 7/12/21     Protocol:  Weekly PACLitaxel  PACLitaxel 80 mg/m² IV over 60 minutes Day 1, 8, 15  3/1/21 C5 - dose reduced to 60 mg/m² for tolerance/neuropathy  3/29/21 C6 - dose reduced to 50 mg/m² for tolerance/neuropathy  Weekly until disease progression or unacceptable toxicity    NCCN guidelines for breast cancer V.2.2021.  Bay EA, et al. J Clin Oncol 2001;19(22):4216-23.        /74   Pulse 92   Temp 36.7 °C (98 °F) (Temporal)   Resp 18   Ht 1.47 m (4' 9.87\")   Wt 66 kg (145 lb 8.1 oz)   SpO2 98%   BMI 30.54 kg/m²  Body surface area is 1.64 meters squared.  ANC~ 3170  Plt = 309 k   Labs form 7/19:  SCr = 0.6 mg/dL CrCl = 85 mL/min (using min SCr 0.7 mg/dL and current weight)  LFT's = WNL  TBili = 0.3     PACLitaxel (Taxol) 50 mg/m² x 1.64 m² = 82 mg   <10% difference, ok to treat with final written dose = 82 mg    Bubba Blake, PharmD, PharmD, BCPS             "

## 2021-08-02 ENCOUNTER — OUTPATIENT INFUSION SERVICES (OUTPATIENT)
Dept: ONCOLOGY | Facility: MEDICAL CENTER | Age: 66
End: 2021-08-02
Attending: INTERNAL MEDICINE
Payer: MEDICAID

## 2021-08-02 ENCOUNTER — HOSPITAL ENCOUNTER (OUTPATIENT)
Dept: RADIOLOGY | Facility: MEDICAL CENTER | Age: 66
End: 2021-08-02
Attending: INTERNAL MEDICINE
Payer: MEDICAID

## 2021-08-02 VITALS
RESPIRATION RATE: 18 BRPM | HEART RATE: 80 BPM | WEIGHT: 146.83 LBS | OXYGEN SATURATION: 95 % | HEIGHT: 57 IN | SYSTOLIC BLOOD PRESSURE: 131 MMHG | TEMPERATURE: 97.5 F | BODY MASS INDEX: 31.68 KG/M2 | DIASTOLIC BLOOD PRESSURE: 69 MMHG

## 2021-08-02 DIAGNOSIS — Z17.0 MALIGNANT NEOPLASM OF OVERLAPPING SITES OF RIGHT BREAST IN FEMALE, ESTROGEN RECEPTOR POSITIVE (HCC): ICD-10-CM

## 2021-08-02 DIAGNOSIS — C50.811 MALIGNANT NEOPLASM OF OVERLAPPING SITES OF RIGHT BREAST IN FEMALE, ESTROGEN RECEPTOR POSITIVE (HCC): ICD-10-CM

## 2021-08-02 DIAGNOSIS — C50.011 MALIGNANT NEOPLASM OF NIPPLE OF RIGHT BREAST IN FEMALE, UNSPECIFIED ESTROGEN RECEPTOR STATUS (HCC): ICD-10-CM

## 2021-08-02 LAB
BASOPHILS # BLD AUTO: 0.7 % (ref 0–1.8)
BASOPHILS # BLD: 0.03 K/UL (ref 0–0.12)
EOSINOPHIL # BLD AUTO: 0.08 K/UL (ref 0–0.51)
EOSINOPHIL NFR BLD: 1.8 % (ref 0–6.9)
ERYTHROCYTE [DISTWIDTH] IN BLOOD BY AUTOMATED COUNT: 53.1 FL (ref 35.9–50)
HCT VFR BLD AUTO: 39.7 % (ref 37–47)
HGB BLD-MCNC: 12.9 G/DL (ref 12–16)
IMM GRANULOCYTES # BLD AUTO: 0.07 K/UL (ref 0–0.11)
IMM GRANULOCYTES NFR BLD AUTO: 1.6 % (ref 0–0.9)
LYMPHOCYTES # BLD AUTO: 1.12 K/UL (ref 1–4.8)
LYMPHOCYTES NFR BLD: 25.5 % (ref 22–41)
MCH RBC QN AUTO: 31.2 PG (ref 27–33)
MCHC RBC AUTO-ENTMCNC: 32.5 G/DL (ref 33.6–35)
MCV RBC AUTO: 96.1 FL (ref 81.4–97.8)
MONOCYTES # BLD AUTO: 0.28 K/UL (ref 0–0.85)
MONOCYTES NFR BLD AUTO: 6.4 % (ref 0–13.4)
NEUTROPHILS # BLD AUTO: 2.81 K/UL (ref 2–7.15)
NEUTROPHILS NFR BLD: 64 % (ref 44–72)
NRBC # BLD AUTO: 0 K/UL
NRBC BLD-RTO: 0 /100 WBC
PLATELET # BLD AUTO: 265 K/UL (ref 164–446)
PMV BLD AUTO: 9.7 FL (ref 9–12.9)
RBC # BLD AUTO: 4.13 M/UL (ref 4.2–5.4)
WBC # BLD AUTO: 4.4 K/UL (ref 4.8–10.8)

## 2021-08-02 PROCEDURE — 85025 COMPLETE CBC W/AUTO DIFF WBC: CPT

## 2021-08-02 PROCEDURE — 700105 HCHG RX REV CODE 258: Performed by: INTERNAL MEDICINE

## 2021-08-02 PROCEDURE — 96415 CHEMO IV INFUSION ADDL HR: CPT

## 2021-08-02 PROCEDURE — 304540 HCHG NITRO SET VENT 2ND TUB

## 2021-08-02 PROCEDURE — 700111 HCHG RX REV CODE 636 W/ 250 OVERRIDE (IP): Performed by: INTERNAL MEDICINE

## 2021-08-02 PROCEDURE — 96413 CHEMO IV INFUSION 1 HR: CPT

## 2021-08-02 PROCEDURE — 96375 TX/PRO/DX INJ NEW DRUG ADDON: CPT

## 2021-08-02 RX ORDER — 0.9 % SODIUM CHLORIDE 0.9 %
10 VIAL (ML) INJECTION PRN
Status: CANCELLED | OUTPATIENT
Start: 2021-08-15

## 2021-08-02 RX ORDER — 0.9 % SODIUM CHLORIDE 0.9 %
3 VIAL (ML) INJECTION PRN
Status: CANCELLED | OUTPATIENT
Start: 2021-08-16

## 2021-08-02 RX ORDER — EPINEPHRINE 1 MG/ML(1)
0.5 AMPUL (ML) INJECTION PRN
Status: CANCELLED | OUTPATIENT
Start: 2021-08-16

## 2021-08-02 RX ORDER — 0.9 % SODIUM CHLORIDE 0.9 %
VIAL (ML) INJECTION PRN
Status: CANCELLED | OUTPATIENT
Start: 2021-08-30

## 2021-08-02 RX ORDER — 0.9 % SODIUM CHLORIDE 0.9 %
3 VIAL (ML) INJECTION PRN
Status: CANCELLED | OUTPATIENT
Start: 2021-08-23

## 2021-08-02 RX ORDER — 0.9 % SODIUM CHLORIDE 0.9 %
3 VIAL (ML) INJECTION PRN
Status: CANCELLED | OUTPATIENT
Start: 2021-08-30

## 2021-08-02 RX ORDER — HEPARIN SODIUM (PORCINE) LOCK FLUSH IV SOLN 100 UNIT/ML 100 UNIT/ML
500 SOLUTION INTRAVENOUS PRN
Status: CANCELLED | OUTPATIENT
Start: 2021-08-23

## 2021-08-02 RX ORDER — ONDANSETRON 8 MG/1
8 TABLET, ORALLY DISINTEGRATING ORAL PRN
Status: CANCELLED | OUTPATIENT
Start: 2021-08-23

## 2021-08-02 RX ORDER — DIPHENHYDRAMINE HYDROCHLORIDE 50 MG/ML
50 INJECTION INTRAMUSCULAR; INTRAVENOUS PRN
Status: CANCELLED | OUTPATIENT
Start: 2021-08-30

## 2021-08-02 RX ORDER — HEPARIN SODIUM (PORCINE) LOCK FLUSH IV SOLN 100 UNIT/ML 100 UNIT/ML
500 SOLUTION INTRAVENOUS PRN
Status: CANCELLED | OUTPATIENT
Start: 2021-08-15

## 2021-08-02 RX ORDER — 0.9 % SODIUM CHLORIDE 0.9 %
VIAL (ML) INJECTION PRN
Status: CANCELLED | OUTPATIENT
Start: 2021-08-15

## 2021-08-02 RX ORDER — 0.9 % SODIUM CHLORIDE 0.9 %
10 VIAL (ML) INJECTION PRN
Status: CANCELLED | OUTPATIENT
Start: 2021-08-16

## 2021-08-02 RX ORDER — METHYLPREDNISOLONE SODIUM SUCCINATE 125 MG/2ML
125 INJECTION, POWDER, LYOPHILIZED, FOR SOLUTION INTRAMUSCULAR; INTRAVENOUS PRN
Status: CANCELLED | OUTPATIENT
Start: 2021-08-23

## 2021-08-02 RX ORDER — ONDANSETRON 2 MG/ML
4 INJECTION INTRAMUSCULAR; INTRAVENOUS PRN
Status: CANCELLED | OUTPATIENT
Start: 2021-08-16

## 2021-08-02 RX ORDER — DIPHENHYDRAMINE HYDROCHLORIDE 50 MG/ML
50 INJECTION INTRAMUSCULAR; INTRAVENOUS PRN
Status: CANCELLED | OUTPATIENT
Start: 2021-08-23

## 2021-08-02 RX ORDER — 0.9 % SODIUM CHLORIDE 0.9 %
VIAL (ML) INJECTION PRN
Status: CANCELLED | OUTPATIENT
Start: 2021-08-16

## 2021-08-02 RX ORDER — EPINEPHRINE 1 MG/ML(1)
0.5 AMPUL (ML) INJECTION PRN
Status: CANCELLED | OUTPATIENT
Start: 2021-08-30

## 2021-08-02 RX ORDER — 0.9 % SODIUM CHLORIDE 0.9 %
10 VIAL (ML) INJECTION PRN
Status: CANCELLED | OUTPATIENT
Start: 2021-08-30

## 2021-08-02 RX ORDER — METHYLPREDNISOLONE SODIUM SUCCINATE 125 MG/2ML
125 INJECTION, POWDER, LYOPHILIZED, FOR SOLUTION INTRAMUSCULAR; INTRAVENOUS PRN
Status: CANCELLED | OUTPATIENT
Start: 2021-08-30

## 2021-08-02 RX ORDER — 0.9 % SODIUM CHLORIDE 0.9 %
3 VIAL (ML) INJECTION PRN
Status: CANCELLED | OUTPATIENT
Start: 2021-08-15

## 2021-08-02 RX ORDER — 0.9 % SODIUM CHLORIDE 0.9 %
10 VIAL (ML) INJECTION PRN
Status: CANCELLED | OUTPATIENT
Start: 2021-08-23

## 2021-08-02 RX ORDER — ONDANSETRON 2 MG/ML
4 INJECTION INTRAMUSCULAR; INTRAVENOUS PRN
Status: CANCELLED | OUTPATIENT
Start: 2021-08-30

## 2021-08-02 RX ORDER — SODIUM CHLORIDE 9 MG/ML
INJECTION, SOLUTION INTRAVENOUS CONTINUOUS
Status: CANCELLED | OUTPATIENT
Start: 2021-08-16

## 2021-08-02 RX ORDER — PROCHLORPERAZINE MALEATE 10 MG
10 TABLET ORAL EVERY 6 HOURS PRN
Status: CANCELLED | OUTPATIENT
Start: 2021-08-23

## 2021-08-02 RX ORDER — METHYLPREDNISOLONE SODIUM SUCCINATE 125 MG/2ML
125 INJECTION, POWDER, LYOPHILIZED, FOR SOLUTION INTRAMUSCULAR; INTRAVENOUS PRN
Status: CANCELLED | OUTPATIENT
Start: 2021-08-16

## 2021-08-02 RX ORDER — ONDANSETRON 8 MG/1
8 TABLET, ORALLY DISINTEGRATING ORAL PRN
Status: CANCELLED | OUTPATIENT
Start: 2021-08-16

## 2021-08-02 RX ORDER — ONDANSETRON 2 MG/ML
4 INJECTION INTRAMUSCULAR; INTRAVENOUS PRN
Status: CANCELLED | OUTPATIENT
Start: 2021-08-23

## 2021-08-02 RX ORDER — SODIUM CHLORIDE 9 MG/ML
INJECTION, SOLUTION INTRAVENOUS CONTINUOUS
Status: CANCELLED | OUTPATIENT
Start: 2021-08-23

## 2021-08-02 RX ORDER — 0.9 % SODIUM CHLORIDE 0.9 %
VIAL (ML) INJECTION PRN
Status: CANCELLED | OUTPATIENT
Start: 2021-08-23

## 2021-08-02 RX ORDER — PROCHLORPERAZINE MALEATE 10 MG
10 TABLET ORAL EVERY 6 HOURS PRN
Status: CANCELLED | OUTPATIENT
Start: 2021-08-16

## 2021-08-02 RX ORDER — HEPARIN SODIUM (PORCINE) LOCK FLUSH IV SOLN 100 UNIT/ML 100 UNIT/ML
500 SOLUTION INTRAVENOUS PRN
Status: CANCELLED | OUTPATIENT
Start: 2021-08-16

## 2021-08-02 RX ORDER — DIPHENHYDRAMINE HYDROCHLORIDE 50 MG/ML
50 INJECTION INTRAMUSCULAR; INTRAVENOUS PRN
Status: CANCELLED | OUTPATIENT
Start: 2021-08-16

## 2021-08-02 RX ORDER — EPINEPHRINE 1 MG/ML(1)
0.5 AMPUL (ML) INJECTION PRN
Status: CANCELLED | OUTPATIENT
Start: 2021-08-23

## 2021-08-02 RX ORDER — SODIUM CHLORIDE 9 MG/ML
INJECTION, SOLUTION INTRAVENOUS CONTINUOUS
Status: CANCELLED | OUTPATIENT
Start: 2021-08-30

## 2021-08-02 RX ORDER — HEPARIN SODIUM (PORCINE) LOCK FLUSH IV SOLN 100 UNIT/ML 100 UNIT/ML
500 SOLUTION INTRAVENOUS PRN
Status: CANCELLED | OUTPATIENT
Start: 2021-08-30

## 2021-08-02 RX ORDER — PROCHLORPERAZINE MALEATE 10 MG
10 TABLET ORAL EVERY 6 HOURS PRN
Status: CANCELLED | OUTPATIENT
Start: 2021-08-30

## 2021-08-02 RX ORDER — ONDANSETRON 8 MG/1
8 TABLET, ORALLY DISINTEGRATING ORAL PRN
Status: CANCELLED | OUTPATIENT
Start: 2021-08-30

## 2021-08-02 RX ADMIN — DIPHENHYDRAMINE HYDROCHLORIDE 25 MG: 50 INJECTION, SOLUTION INTRAMUSCULAR; INTRAVENOUS at 12:48

## 2021-08-02 RX ADMIN — FAMOTIDINE 20 MG: 10 INJECTION INTRAVENOUS at 12:30

## 2021-08-02 RX ADMIN — DEXAMETHASONE SODIUM PHOSPHATE 12 MG: 4 INJECTION, SOLUTION INTRA-ARTICULAR; INTRALESIONAL; INTRAMUSCULAR; INTRAVENOUS; SOFT TISSUE at 12:35

## 2021-08-02 RX ADMIN — PACLITAXEL 82 MG: 6 INJECTION, SOLUTION INTRAVENOUS at 13:25

## 2021-08-02 ASSESSMENT — FIBROSIS 4 INDEX: FIB4 SCORE: 1.49

## 2021-08-02 NOTE — PROGRESS NOTES
Chemotherapy Verification - PRIMARY RN    D15C10    Height = 146cm  Weight = 66.6kg  BSA = 1.64m2       Medication: Paclitaxel (Taxol)  Dose: 50mg/m2  Calculated Dose: 82mg                                  I confirm this process was performed independently with the BSA and all final chemotherapy dosing calculations congruent.  Any discrepancies of 10% or greater have been addressed with the chemotherapy pharmacist. The resolution of the discrepancy has been documented in the EPIC progress notes.

## 2021-08-02 NOTE — PROGRESS NOTES
[facility] D15C10 Taxol. POC discussed with pt using . Pt agrees with plan. VAT team to obtained PIV LFA, brisk blood return noted. CBC drawn and sent to lab. Results reviewed, ok to proceed with treatment. Pt medicated per MAR. Pt tolerated treatment without s/s adverse reaction. PIV dc'd catheter tip intact, gauze and coban dressing applied. Christine discharged to self care, NAD. Pt's next appt 8/16/2021.

## 2021-08-02 NOTE — PROGRESS NOTES
"Pharmacy Chemotherapy Verification    Patient name: Christine Malhotra  Dx: Breast CA    Regimen: Weekly PACLitaxel  PACLitaxel 80 mg/m2 IV over 60 minutes Day 1,8,15   C5: dose dec to 60 mg/m2 for tolerance / neuropathy   C6 dose decreased to 50 mg/m2 for increased neuropathy per Dr. Jarvis   Weekly until DP or UT  *Dosing Reference*  NCCN guidelines for breast cancer V.1.2019  Bay EA, et al. J Clin Oncol 2001;19(22):4216-23    Allergies:Patient has no known allergies.  /69   Pulse 80   Temp 36.4 °C (97.5 °F) (Temporal)   Resp 18   Ht 1.46 m (4' 9.48\")   Wt 66.6 kg (146 lb 13.2 oz)   SpO2 95%   BMI 31.24 kg/m²  Body surface area is 1.64 meters squared.     Labs 8/2/21:  ANC~ 2810 Plt = 265k   Hgb = 12.9       Drug Order   (Drug name, dose, route, IV Fluid & volume, frequency, number of doses) Cycle 10 Day 15  Previous treatment = 7/26/21   Medication = PACLitaxel (Taxol)   Base Dose = 50 mg/m²  Calc Dose: Base Dose x 1.64 m² = 82 mg  Final Dose = 82 mg  Route = IV  Fluid & Volume =  mL  Admin Duration = Over 60 minutes            <10% difference, okay to treat with final dose     By my signature below, I confirm this process was performed independently with the BSA and all final chemotherapy dosing calculations congruent. I have reviewed the above chemotherapy order and that my calculation of the final dose and BSA (when applicable) corroborate those calculations of the  pharmacist. Discrepancies of 10% or greater in the written dose have been addressed and documented within the [facility] Progress notes.    Matt Humphries, PharmD    "

## 2021-08-02 NOTE — PROGRESS NOTES
Chemotherapy Verification - SECONDARY RN       Height = 146 cm  Weight = 66.6 kg  BSA = 1.64 m2       Medication: Taxol  Dose: 50 mg/m2  Calculated Dose: 82 mg                             (In mg/m2, AUC, mg/kg)     I confirm that this process was performed independently.

## 2021-08-02 NOTE — PROGRESS NOTES
"Pharmacy Chemotherapy Verification    Patient Name: Christine Malhotra        Dx: Breast CA, Stage IV (ER/IN +, HER2-)  Cycle:  10, Day 15 Previous treatment: C10D8 7/26/21     Protocol:  Weekly PACLitaxel  PACLitaxel 80 mg/m² IV over 60 minutes Day 1, 8, 15   -3/1/21 C5 - dose reduced to 60 mg/m² for tolerance/neuropathy   -3/29/21 C6 - dose reduced to 50 mg/m² for tolerance/neuropathy  Weekly until disease progression or unacceptable toxicity    NCCN guidelines for breast cancer V.2.2021.  Bay COPPOLA, et al. J Clin Oncol 2001;19(22):4216-23.        Allergies:Patient has no known allergies.  /69   Pulse 80   Temp 36.4 °C (97.5 °F) (Temporal)   Resp 18   Ht 1.46 m (4' 9.48\")   Wt 66.6 kg (146 lb 13.2 oz)   SpO2 95%   BMI 31.24 kg/m²  Body surface area is 1.64 meters squared.     Labs 8/2/21  ANC 2810 Hgb 12.9 Plt 265k    Labs 7/19/21  SCr 0.6 CrCl 85.3 mL/min   AST/ALT/AP = 25/12/64 Tbili = 0.3    PACLitaxel (Taxol) 50 mg/m² x 1.64 m² = 82 mg   <10% difference, ok to treat with final written dose = 82 mg    Roseanne Gross, PharmD, BCOP               "

## 2021-08-15 NOTE — PROGRESS NOTES
"Pharmacy Chemotherapy Verification    Patient Name: Christine Malhotra        Dx: Breast CA, Stage IV (ER/PA +, HER2-)  Cycle:  11, Day 1 Previous treatment: C10D15 8/2/21     Protocol:  Weekly PACLitaxel  PACLitaxel 80 mg/m² IV over 60 minutes Day 1, 8, 15   -3/1/21 C5 - dose reduced to 60 mg/m² for tolerance/neuropathy   -3/29/21 C6 - dose reduced to 50 mg/m² for tolerance/neuropathy  Weekly until disease progression or unacceptable toxicity    NCCN guidelines for breast cancer V.2.2021.  Bay COPPOLA, et al. J Clin Oncol 2001;19(22):4216-23.        Allergies:Patient has no known allergies.  /74   Pulse 76   Temp 36.7 °C (98 °F) (Temporal)   Resp 18   Ht 1.46 m (4' 9.48\")   Wt 67.5 kg (148 lb 13 oz)   SpO2 98%   BMI 31.67 kg/m²  Body surface area is 1.65 meters squared.     Labs 8/16/21  ANC 2450 Hgb 13.2 Plt 268k  SCr 0.51 CrCl 86.4 mL/min   AST/ALT/AP = 18/14/72 Tbil = 0.2    PACLitaxel (Taxol) 50 mg/m² x 1.65 m² = 82.5 mg   <10% difference, ok to treat with final written dose = 82.5 mg IV    Roseanne Gross, PharmD, BCOP               "

## 2021-08-16 ENCOUNTER — HOSPITAL ENCOUNTER (OUTPATIENT)
Dept: RADIOLOGY | Facility: MEDICAL CENTER | Age: 66
End: 2021-08-16
Attending: INTERNAL MEDICINE
Payer: MEDICAID

## 2021-08-16 ENCOUNTER — OUTPATIENT INFUSION SERVICES (OUTPATIENT)
Dept: ONCOLOGY | Facility: MEDICAL CENTER | Age: 66
End: 2021-08-16
Attending: INTERNAL MEDICINE
Payer: MEDICAID

## 2021-08-16 VITALS
OXYGEN SATURATION: 98 % | SYSTOLIC BLOOD PRESSURE: 155 MMHG | WEIGHT: 148.81 LBS | RESPIRATION RATE: 18 BRPM | HEART RATE: 76 BPM | BODY MASS INDEX: 32.1 KG/M2 | HEIGHT: 57 IN | DIASTOLIC BLOOD PRESSURE: 74 MMHG | TEMPERATURE: 98 F

## 2021-08-16 DIAGNOSIS — C50.811 MALIGNANT NEOPLASM OF OVERLAPPING SITES OF RIGHT BREAST IN FEMALE, ESTROGEN RECEPTOR POSITIVE (HCC): ICD-10-CM

## 2021-08-16 DIAGNOSIS — Z17.0 MALIGNANT NEOPLASM OF OVERLAPPING SITES OF RIGHT BREAST IN FEMALE, ESTROGEN RECEPTOR POSITIVE (HCC): ICD-10-CM

## 2021-08-16 DIAGNOSIS — C50.011 MALIGNANT NEOPLASM OF NIPPLE OF RIGHT BREAST IN FEMALE, UNSPECIFIED ESTROGEN RECEPTOR STATUS (HCC): ICD-10-CM

## 2021-08-16 LAB
ALBUMIN SERPL BCP-MCNC: 3.9 G/DL (ref 3.2–4.9)
ALBUMIN/GLOB SERPL: 1.3 G/DL
ALP SERPL-CCNC: 72 U/L (ref 30–99)
ALT SERPL-CCNC: 14 U/L (ref 2–50)
ANION GAP SERPL CALC-SCNC: 10 MMOL/L (ref 7–16)
AST SERPL-CCNC: 18 U/L (ref 12–45)
BASOPHILS # BLD AUTO: 0.7 % (ref 0–1.8)
BASOPHILS # BLD: 0.03 K/UL (ref 0–0.12)
BILIRUB SERPL-MCNC: 0.2 MG/DL (ref 0.1–1.5)
BUN SERPL-MCNC: 14 MG/DL (ref 8–22)
CALCIUM SERPL-MCNC: 8.8 MG/DL (ref 8.5–10.5)
CHLORIDE SERPL-SCNC: 108 MMOL/L (ref 96–112)
CO2 SERPL-SCNC: 22 MMOL/L (ref 20–33)
CREAT SERPL-MCNC: 0.51 MG/DL (ref 0.5–1.4)
EOSINOPHIL # BLD AUTO: 0.12 K/UL (ref 0–0.51)
EOSINOPHIL NFR BLD: 2.8 % (ref 0–6.9)
ERYTHROCYTE [DISTWIDTH] IN BLOOD BY AUTOMATED COUNT: 53 FL (ref 35.9–50)
GLOBULIN SER CALC-MCNC: 2.9 G/DL (ref 1.9–3.5)
GLUCOSE SERPL-MCNC: 104 MG/DL (ref 65–99)
HCT VFR BLD AUTO: 40.2 % (ref 37–47)
HGB BLD-MCNC: 13.2 G/DL (ref 12–16)
IMM GRANULOCYTES # BLD AUTO: 0.04 K/UL (ref 0–0.11)
IMM GRANULOCYTES NFR BLD AUTO: 0.9 % (ref 0–0.9)
LYMPHOCYTES # BLD AUTO: 1.16 K/UL (ref 1–4.8)
LYMPHOCYTES NFR BLD: 27 % (ref 22–41)
MCH RBC QN AUTO: 31.8 PG (ref 27–33)
MCHC RBC AUTO-ENTMCNC: 32.8 G/DL (ref 33.6–35)
MCV RBC AUTO: 96.9 FL (ref 81.4–97.8)
MONOCYTES # BLD AUTO: 0.49 K/UL (ref 0–0.85)
MONOCYTES NFR BLD AUTO: 11.4 % (ref 0–13.4)
NEUTROPHILS # BLD AUTO: 2.45 K/UL (ref 2–7.15)
NEUTROPHILS NFR BLD: 57.2 % (ref 44–72)
NRBC # BLD AUTO: 0 K/UL
NRBC BLD-RTO: 0 /100 WBC
PLATELET # BLD AUTO: 268 K/UL (ref 164–446)
PMV BLD AUTO: 9.7 FL (ref 9–12.9)
POTASSIUM SERPL-SCNC: 4.2 MMOL/L (ref 3.6–5.5)
PROT SERPL-MCNC: 6.8 G/DL (ref 6–8.2)
RBC # BLD AUTO: 4.15 M/UL (ref 4.2–5.4)
SODIUM SERPL-SCNC: 140 MMOL/L (ref 135–145)
WBC # BLD AUTO: 4.3 K/UL (ref 4.8–10.8)

## 2021-08-16 PROCEDURE — 96413 CHEMO IV INFUSION 1 HR: CPT

## 2021-08-16 PROCEDURE — 96375 TX/PRO/DX INJ NEW DRUG ADDON: CPT

## 2021-08-16 PROCEDURE — 85025 COMPLETE CBC W/AUTO DIFF WBC: CPT

## 2021-08-16 PROCEDURE — 80053 COMPREHEN METABOLIC PANEL: CPT

## 2021-08-16 PROCEDURE — 700111 HCHG RX REV CODE 636 W/ 250 OVERRIDE (IP): Performed by: INTERNAL MEDICINE

## 2021-08-16 PROCEDURE — 700105 HCHG RX REV CODE 258: Performed by: INTERNAL MEDICINE

## 2021-08-16 RX ORDER — SODIUM CHLORIDE 9 MG/ML
INJECTION, SOLUTION INTRAVENOUS CONTINUOUS
Status: DISCONTINUED | OUTPATIENT
Start: 2021-08-16 | End: 2021-08-16 | Stop reason: HOSPADM

## 2021-08-16 RX ADMIN — PACLITAXEL 82.5 MG: 6 INJECTION, SOLUTION INTRAVENOUS at 11:02

## 2021-08-16 RX ADMIN — DIPHENHYDRAMINE HYDROCHLORIDE 25 MG: 50 INJECTION, SOLUTION INTRAMUSCULAR; INTRAVENOUS at 10:20

## 2021-08-16 RX ADMIN — DEXAMETHASONE SODIUM PHOSPHATE 12 MG: 4 INJECTION, SOLUTION INTRA-ARTICULAR; INTRALESIONAL; INTRAMUSCULAR; INTRAVENOUS; SOFT TISSUE at 10:03

## 2021-08-16 RX ADMIN — FAMOTIDINE 20 MG: 10 INJECTION INTRAVENOUS at 10:42

## 2021-08-16 RX ADMIN — SODIUM CHLORIDE: 9 INJECTION, SOLUTION INTRAVENOUS at 10:01

## 2021-08-16 ASSESSMENT — FIBROSIS 4 INDEX: FIB4 SCORE: 1.74

## 2021-08-16 NOTE — PROGRESS NOTES
"Pharmacy Chemotherapy Verification  Patient name: Christine Malhotra  Dx: Breast CA  Regimen: Weekly PACLitaxel    PACLitaxel 80 mg/m2 IV over 60 minutes Day 1,8,15   C5: dose dec to 60 mg/m2 for tolerance / neuropathy   C6 dose decreased to 50 mg/m2 for increased neuropathy per Dr. Jarvis   Weekly until DP or UT  *Dosing Reference*  NCCN guidelines for breast cancer V.1.2019  Bay EA, et al. J Clin Oncol 2001;19(22):0514-23    Allergies:Patient has no known allergies.  /74   Pulse 76   Temp 36.7 °C (98 °F) (Temporal)   Resp 18   Ht 1.46 m (4' 9.48\")   Wt 67.5 kg (148 lb 13 oz)   SpO2 98%   BMI 31.67 kg/m²  Body surface area is 1.65 meters squared.     Labs: 8/16/21   Meet treatment parameters    Drug Order   (Drug name, dose, route, IV Fluid & volume, frequency, number of doses) Cycle 11 Day 1  Previous treatment = 8/2/21   Medication = PACLitaxel (Taxol)   Base Dose = 50 mg/m²  Calc Dose: Base Dose x 1.65 m² = 82.5 mg  Final Dose = 82.5 mg  Route = IV  Fluid & Volume =  mL  Final [conc] ~ 0.31 mg/mL  Admin Duration = Over 60 minutes            Okay to treat with final dose     "

## 2021-08-16 NOTE — PROGRESS NOTES
Chemotherapy Verification - SECONDARY RN       Height = 146 cm  Weight = 67.5 kg  BSA = 1.65 m2       Medication: Taxol  Dose: 50 mg/m2  Calculated Dose: 82.5 mg                             (In mg/m2, AUC, mg/kg)     I confirm that this process was performed independently.

## 2021-08-16 NOTE — PROGRESS NOTES
Chemotherapy Verification - PRIMARY RN      Height = 146 cm  Weight = 67.5 kg  BSA = 1.65 m2       Medication: Paclitaxel  Dose: 50 mg/m2    Calculated Dose: 82.5 mg                             (In mg/m2, AUC, mg/kg)       I confirm this process was performed independently with the BSA and all final chemotherapy dosing calculations congruent.  Any discrepancies of 10% or greater have been addressed with the chemotherapy pharmacist. The resolution of the discrepancy has been documented in the EPIC progress notes.

## 2021-08-16 NOTE — PROGRESS NOTES
Patient presented to \[facility]\"" for C11D1 Taxol. Interpretive services used for this visit.   Christine denied current illness/infection or change in condition.  Peripheral IV placed by VAT team, flushed easily, janie briskly. Pre-treatment labs drawn. Results within treatable parameters. Patient received pre-meds as ordered.  Taxol infused over 1 hour through inline filter. Treatment tolerated treatment well.  Peripheral IV removed, catheter tip remained intact. Gauze and coban to site. Patient left \[facility]\"" in no apparent distress.  Copy of appointment schedule given to patient prior to departure.

## 2021-08-22 NOTE — PROGRESS NOTES
"Pharmacy Chemotherapy Verification  Patient name: Christine Malhotra  Dx: Breast CA  Regimen: Weekly PACLitaxel    PACLitaxel 80 mg/m2 IV over 60 minutes Day 1,8,15   C5: dose dec to 60 mg/m2 for tolerance / neuropathy   C6 dose decreased to 50 mg/m2 for increased neuropathy per Dr. Jarvis   Weekly until DP or UT  *Dosing Reference*  NCCN guidelines for breast cancer V.1.2019  Bay EA, et al. J Clin Oncol 2001;19(22):4216-23    Allergies:Patient has no known allergies.  /82   Pulse 74   Temp 36.3 °C (97.4 °F) (Temporal)   Resp 17   Ht 1.48 m (4' 10.27\")   Wt 66.6 kg (146 lb 13.2 oz)   SpO2 96%   BMI 30.41 kg/m²  Body surface area is 1.65 meters squared.     Labs 8/23/21:  ANC~ 3950 Plt = 327k   Hgb = 13.5       Drug Order   (Drug name, dose, route, IV Fluid & volume, frequency, number of doses) Cycle 11 Day 8  Previous treatment = 8/16/21   Medication = PACLitaxel (Taxol)   Base Dose = 50 mg/m²  Calc Dose: Base Dose x 1.65 m² = 82.5 mg  Final Dose = 82.5 mg  Route = IV  Fluid & Volume =  mL  Admin Duration = Over 60 minutes            <10% difference, okay to treat with final dose     By my signature below, I confirm this process was performed independently with the BSA and all final chemotherapy dosing calculations congruent. I have reviewed the above chemotherapy order and that my calculation of the final dose and BSA (when applicable) corroborate those calculations of the  pharmacist. Discrepancies of 10% or greater in the written dose have been addressed and documented within the [facility] Progress notes.    Matt Humphries, PharmD    "

## 2021-08-23 ENCOUNTER — HOSPITAL ENCOUNTER (OUTPATIENT)
Dept: RADIOLOGY | Facility: MEDICAL CENTER | Age: 66
End: 2021-08-23
Attending: INTERNAL MEDICINE
Payer: MEDICAID

## 2021-08-23 ENCOUNTER — OUTPATIENT INFUSION SERVICES (OUTPATIENT)
Dept: ONCOLOGY | Facility: MEDICAL CENTER | Age: 66
End: 2021-08-23
Attending: INTERNAL MEDICINE
Payer: MEDICAID

## 2021-08-23 VITALS
SYSTOLIC BLOOD PRESSURE: 142 MMHG | WEIGHT: 146.83 LBS | TEMPERATURE: 97.4 F | OXYGEN SATURATION: 96 % | BODY MASS INDEX: 30.82 KG/M2 | HEART RATE: 74 BPM | HEIGHT: 58 IN | DIASTOLIC BLOOD PRESSURE: 82 MMHG | RESPIRATION RATE: 17 BRPM

## 2021-08-23 DIAGNOSIS — C50.811 MALIGNANT NEOPLASM OF OVERLAPPING SITES OF RIGHT BREAST IN FEMALE, ESTROGEN RECEPTOR POSITIVE (HCC): ICD-10-CM

## 2021-08-23 DIAGNOSIS — Z17.0 MALIGNANT NEOPLASM OF OVERLAPPING SITES OF RIGHT BREAST IN FEMALE, ESTROGEN RECEPTOR POSITIVE (HCC): ICD-10-CM

## 2021-08-23 LAB
BASOPHILS # BLD AUTO: 0.6 % (ref 0–1.8)
BASOPHILS # BLD: 0.04 K/UL (ref 0–0.12)
EOSINOPHIL # BLD AUTO: 0.17 K/UL (ref 0–0.51)
EOSINOPHIL NFR BLD: 2.7 % (ref 0–6.9)
ERYTHROCYTE [DISTWIDTH] IN BLOOD BY AUTOMATED COUNT: 51.8 FL (ref 35.9–50)
HCT VFR BLD AUTO: 40.5 % (ref 37–47)
HGB BLD-MCNC: 13.5 G/DL (ref 12–16)
IMM GRANULOCYTES # BLD AUTO: 0.23 K/UL (ref 0–0.11)
IMM GRANULOCYTES NFR BLD AUTO: 3.7 % (ref 0–0.9)
LYMPHOCYTES # BLD AUTO: 1.53 K/UL (ref 1–4.8)
LYMPHOCYTES NFR BLD: 24.3 % (ref 22–41)
MCH RBC QN AUTO: 31.9 PG (ref 27–33)
MCHC RBC AUTO-ENTMCNC: 33.3 G/DL (ref 33.6–35)
MCV RBC AUTO: 95.7 FL (ref 81.4–97.8)
MONOCYTES # BLD AUTO: 0.37 K/UL (ref 0–0.85)
MONOCYTES NFR BLD AUTO: 5.9 % (ref 0–13.4)
NEUTROPHILS # BLD AUTO: 3.95 K/UL (ref 2–7.15)
NEUTROPHILS NFR BLD: 62.8 % (ref 44–72)
NRBC # BLD AUTO: 0 K/UL
NRBC BLD-RTO: 0 /100 WBC
PLATELET # BLD AUTO: 327 K/UL (ref 164–446)
PMV BLD AUTO: 9.4 FL (ref 9–12.9)
RBC # BLD AUTO: 4.23 M/UL (ref 4.2–5.4)
WBC # BLD AUTO: 6.3 K/UL (ref 4.8–10.8)

## 2021-08-23 PROCEDURE — 85025 COMPLETE CBC W/AUTO DIFF WBC: CPT

## 2021-08-23 PROCEDURE — 96367 TX/PROPH/DG ADDL SEQ IV INF: CPT

## 2021-08-23 PROCEDURE — 700105 HCHG RX REV CODE 258: Performed by: INTERNAL MEDICINE

## 2021-08-23 PROCEDURE — 96413 CHEMO IV INFUSION 1 HR: CPT

## 2021-08-23 PROCEDURE — 700111 HCHG RX REV CODE 636 W/ 250 OVERRIDE (IP): Performed by: INTERNAL MEDICINE

## 2021-08-23 PROCEDURE — 304540 HCHG NITRO SET VENT 2ND TUB

## 2021-08-23 PROCEDURE — 96375 TX/PRO/DX INJ NEW DRUG ADDON: CPT

## 2021-08-23 RX ADMIN — DIPHENHYDRAMINE HYDROCHLORIDE 25 MG: 50 INJECTION, SOLUTION INTRAMUSCULAR; INTRAVENOUS at 11:54

## 2021-08-23 RX ADMIN — DEXAMETHASONE SODIUM PHOSPHATE 12 MG: 4 INJECTION, SOLUTION INTRA-ARTICULAR; INTRALESIONAL; INTRAMUSCULAR; INTRAVENOUS; SOFT TISSUE at 12:10

## 2021-08-23 RX ADMIN — PACLITAXEL 82.5 MG: 6 INJECTION, SOLUTION INTRAVENOUS at 12:51

## 2021-08-23 RX ADMIN — FAMOTIDINE 20 MG: 10 INJECTION INTRAVENOUS at 11:58

## 2021-08-23 ASSESSMENT — FIBROSIS 4 INDEX: FIB4 SCORE: 1.148824442847436161

## 2021-08-23 NOTE — PROGRESS NOTES
"Pharmacy Chemotherapy Verification    Patient Name: Christine Malhotra        Dx: Breast CA, Stage IV (ER/SC +, HER2-)  Cycle:  11, Day 8 Previous treatment: C11D1 = 8/16/21     Protocol:  Weekly PACLitaxel  PACLitaxel 80 mg/m² IV over 60 minutes Day 1, 8, 15   -3/1/21 C5 - dose reduced to 60 mg/m² for tolerance/neuropathy   -3/29/21 C6 - dose reduced to 50 mg/m² for tolerance/neuropathy  Weekly until disease progression or unacceptable toxicity    NCCN guidelines for breast cancer V.2.2021.  Bay COPPOLA, et al. J Clin Oncol 2001;19(22):4216-23.        Allergies:Patient has no known allergies.  /82   Pulse 74   Temp 36.3 °C (97.4 °F) (Temporal)   Resp 17   Ht 1.48 m (4' 10.27\")   Wt 66.6 kg (146 lb 13.2 oz)   SpO2 96%   BMI 30.41 kg/m²  Body surface area is 1.65 meters squared.     Labs from 8/23/21 reviewed- results within treatment parameters     PACLitaxel (Taxol) 50 mg/m² x 1.65 m² = 82.5 mg   <10% difference, ok to treat with final written dose = 82.5 mg IV    Caleb Padilla, PharmD    "

## 2021-08-23 NOTE — PROGRESS NOTES
Chemotherapy Verification - SECONDARY RN     D8C11    Height = 148cm  Weight = 66kg  BSA = 1.65m2       Medication: Paclitaxel (Taxol)  Dose: 50mg/m2  Calculated Dose: 82.5mg                               I confirm that this process was performed independently.

## 2021-08-23 NOTE — PROGRESS NOTES
Chemotherapy Verification - PRIMARY RN  11 D8      Height = 1.48m  Weight = 66.6 kg  BSA = 1.65 m2       Medication: paclitaxel  Dose: 50 mg/m2  Calculated Dose: 82.5mg                             (In mg/m2, AUC, mg/kg)       I confirm this process was performed independently with the BSA and all final chemotherapy dosing calculations congruent.  Any discrepancies of 10% or greater have been addressed with the chemotherapy pharmacist. The resolution of the discrepancy has been documented in the EPIC progress notes.

## 2021-08-23 NOTE — PROGRESS NOTES
Name:  Antolin   ID Number:  [id number]    Content Discussed:  POC, medication allergies, home medications, pain and fall scales, distress screening, head-to-toe assessment, pt questions answered, pt needs.    Pt ambulatory to [facility] for C11 D8 of weekly Taxol for breast cancer.  Pt w/ no s/s of infection, pt has no complaints at this time.  PIV established in LFA by VAT team, brisk blood return noted, lab drawn per order, flushed per protocol.  Pt lab results meet parameters for tx today.  Pre-meds given prior to chemo.  Taxol infused over 1 hour w/ filter in place w/ no AE.  PIV w/ brisk blood return post-chemo, flushed and removed, gauze and coban dressing applied.  Pt left on foot in care of self in NAD.  Pt given printout of future appts.

## 2021-08-30 ENCOUNTER — APPOINTMENT (OUTPATIENT)
Dept: ONCOLOGY | Facility: MEDICAL CENTER | Age: 66
End: 2021-08-30
Attending: INTERNAL MEDICINE
Payer: MEDICAID

## 2021-08-31 ENCOUNTER — HOSPITAL ENCOUNTER (OUTPATIENT)
Dept: CARDIOLOGY | Facility: MEDICAL CENTER | Age: 66
End: 2021-08-31
Attending: INTERNAL MEDICINE
Payer: MEDICAID

## 2021-08-31 DIAGNOSIS — C50.611 MALIGNANT NEOPLASM OF AXILLARY TAIL OF RIGHT FEMALE BREAST, UNSPECIFIED ESTROGEN RECEPTOR STATUS (HCC): ICD-10-CM

## 2021-08-31 DIAGNOSIS — Z51.11 ENCOUNTER FOR ANTINEOPLASTIC CHEMOTHERAPY: ICD-10-CM

## 2021-08-31 DIAGNOSIS — Z79.899 ENCOUNTER FOR LONG-TERM (CURRENT) USE OF MEDICATIONS: ICD-10-CM

## 2021-08-31 DIAGNOSIS — R97.8 ABNORMAL TUMOR MARKERS: ICD-10-CM

## 2021-08-31 DIAGNOSIS — I26.99 OTHER ACUTE PULMONARY EMBOLISM WITHOUT ACUTE COR PULMONALE (HCC): ICD-10-CM

## 2021-08-31 LAB
LV EJECT FRACT  99904: 55
LV EJECT FRACT MOD 2C 99903: 46.41
LV EJECT FRACT MOD 4C 99902: 57.96
LV EJECT FRACT MOD BP 99901: 51.14

## 2021-08-31 PROCEDURE — 93306 TTE W/DOPPLER COMPLETE: CPT

## 2021-08-31 PROCEDURE — 93306 TTE W/DOPPLER COMPLETE: CPT | Mod: 26 | Performed by: INTERNAL MEDICINE

## 2021-09-02 RX ORDER — 0.9 % SODIUM CHLORIDE 0.9 %
10 VIAL (ML) INJECTION PRN
Status: CANCELLED | OUTPATIENT
Start: 2021-09-14

## 2021-09-02 RX ORDER — HEPARIN SODIUM (PORCINE) LOCK FLUSH IV SOLN 100 UNIT/ML 100 UNIT/ML
500 SOLUTION INTRAVENOUS PRN
Status: CANCELLED | OUTPATIENT
Start: 2021-09-16

## 2021-09-02 RX ORDER — PROCHLORPERAZINE MALEATE 10 MG
10 TABLET ORAL EVERY 6 HOURS PRN
Status: CANCELLED | OUTPATIENT
Start: 2021-09-16

## 2021-09-02 RX ORDER — 0.9 % SODIUM CHLORIDE 0.9 %
VIAL (ML) INJECTION PRN
Status: CANCELLED | OUTPATIENT
Start: 2021-09-14

## 2021-09-02 RX ORDER — 0.9 % SODIUM CHLORIDE 0.9 %
10 VIAL (ML) INJECTION PRN
Status: CANCELLED | OUTPATIENT
Start: 2021-09-16

## 2021-09-02 RX ORDER — ONDANSETRON 2 MG/ML
4 INJECTION INTRAMUSCULAR; INTRAVENOUS PRN
Status: CANCELLED | OUTPATIENT
Start: 2021-09-16

## 2021-09-02 RX ORDER — HEPARIN SODIUM (PORCINE) LOCK FLUSH IV SOLN 100 UNIT/ML 100 UNIT/ML
500 SOLUTION INTRAVENOUS PRN
Status: CANCELLED | OUTPATIENT
Start: 2021-09-14

## 2021-09-02 RX ORDER — 0.9 % SODIUM CHLORIDE 0.9 %
VIAL (ML) INJECTION PRN
Status: CANCELLED | OUTPATIENT
Start: 2021-09-16

## 2021-09-02 RX ORDER — 0.9 % SODIUM CHLORIDE 0.9 %
3 VIAL (ML) INJECTION PRN
Status: CANCELLED | OUTPATIENT
Start: 2021-09-16

## 2021-09-02 RX ORDER — ONDANSETRON 8 MG/1
8 TABLET, ORALLY DISINTEGRATING ORAL PRN
Status: CANCELLED | OUTPATIENT
Start: 2021-09-16

## 2021-09-02 RX ORDER — SODIUM CHLORIDE 9 MG/ML
INJECTION, SOLUTION INTRAVENOUS CONTINUOUS
Status: CANCELLED | OUTPATIENT
Start: 2021-09-16

## 2021-09-02 RX ORDER — 0.9 % SODIUM CHLORIDE 0.9 %
3 VIAL (ML) INJECTION PRN
Status: CANCELLED | OUTPATIENT
Start: 2021-09-14

## 2021-09-15 ENCOUNTER — OUTPATIENT INFUSION SERVICES (OUTPATIENT)
Dept: ONCOLOGY | Facility: MEDICAL CENTER | Age: 66
End: 2021-09-15
Attending: INTERNAL MEDICINE
Payer: MEDICAID

## 2021-09-15 VITALS
HEART RATE: 74 BPM | BODY MASS INDEX: 31.19 KG/M2 | SYSTOLIC BLOOD PRESSURE: 135 MMHG | WEIGHT: 148.59 LBS | HEIGHT: 58 IN | OXYGEN SATURATION: 93 % | DIASTOLIC BLOOD PRESSURE: 81 MMHG | TEMPERATURE: 97.1 F | RESPIRATION RATE: 18 BRPM

## 2021-09-15 DIAGNOSIS — C50.811 MALIGNANT NEOPLASM OF OVERLAPPING SITES OF RIGHT BREAST IN FEMALE, ESTROGEN RECEPTOR POSITIVE (HCC): ICD-10-CM

## 2021-09-15 DIAGNOSIS — Z17.0 MALIGNANT NEOPLASM OF OVERLAPPING SITES OF RIGHT BREAST IN FEMALE, ESTROGEN RECEPTOR POSITIVE (HCC): ICD-10-CM

## 2021-09-15 LAB
ALBUMIN SERPL BCP-MCNC: 3.7 G/DL (ref 3.2–4.9)
ALBUMIN/GLOB SERPL: 1.5 G/DL
ALP SERPL-CCNC: 63 U/L (ref 30–99)
ALT SERPL-CCNC: 11 U/L (ref 2–50)
ANION GAP SERPL CALC-SCNC: 10 MMOL/L (ref 7–16)
AST SERPL-CCNC: 19 U/L (ref 12–45)
BASOPHILS # BLD AUTO: 0.5 % (ref 0–1.8)
BASOPHILS # BLD: 0.03 K/UL (ref 0–0.12)
BILIRUB SERPL-MCNC: 0.2 MG/DL (ref 0.1–1.5)
BUN SERPL-MCNC: 13 MG/DL (ref 8–22)
CALCIUM SERPL-MCNC: 9.2 MG/DL (ref 8.5–10.5)
CHLORIDE SERPL-SCNC: 104 MMOL/L (ref 96–112)
CO2 SERPL-SCNC: 25 MMOL/L (ref 20–33)
CREAT SERPL-MCNC: 0.54 MG/DL (ref 0.5–1.4)
EOSINOPHIL # BLD AUTO: 0.16 K/UL (ref 0–0.51)
EOSINOPHIL NFR BLD: 2.6 % (ref 0–6.9)
ERYTHROCYTE [DISTWIDTH] IN BLOOD BY AUTOMATED COUNT: 50.4 FL (ref 35.9–50)
GLOBULIN SER CALC-MCNC: 2.5 G/DL (ref 1.9–3.5)
GLUCOSE SERPL-MCNC: 109 MG/DL (ref 65–99)
HCT VFR BLD AUTO: 37.9 % (ref 37–47)
HGB BLD-MCNC: 12.3 G/DL (ref 12–16)
IMM GRANULOCYTES # BLD AUTO: 0.03 K/UL (ref 0–0.11)
IMM GRANULOCYTES NFR BLD AUTO: 0.5 % (ref 0–0.9)
LYMPHOCYTES # BLD AUTO: 1.49 K/UL (ref 1–4.8)
LYMPHOCYTES NFR BLD: 23.9 % (ref 22–41)
MCH RBC QN AUTO: 30.8 PG (ref 27–33)
MCHC RBC AUTO-ENTMCNC: 32.5 G/DL (ref 33.6–35)
MCV RBC AUTO: 95 FL (ref 81.4–97.8)
MONOCYTES # BLD AUTO: 0.48 K/UL (ref 0–0.85)
MONOCYTES NFR BLD AUTO: 7.7 % (ref 0–13.4)
NEUTROPHILS # BLD AUTO: 4.04 K/UL (ref 2–7.15)
NEUTROPHILS NFR BLD: 64.8 % (ref 44–72)
NRBC # BLD AUTO: 0 K/UL
NRBC BLD-RTO: 0 /100 WBC
PLATELET # BLD AUTO: 287 K/UL (ref 164–446)
PMV BLD AUTO: 9.7 FL (ref 9–12.9)
POTASSIUM SERPL-SCNC: 3.8 MMOL/L (ref 3.6–5.5)
PROT SERPL-MCNC: 6.2 G/DL (ref 6–8.2)
RBC # BLD AUTO: 3.99 M/UL (ref 4.2–5.4)
SODIUM SERPL-SCNC: 139 MMOL/L (ref 135–145)
WBC # BLD AUTO: 6.2 K/UL (ref 4.8–10.8)

## 2021-09-15 PROCEDURE — 96413 CHEMO IV INFUSION 1 HR: CPT

## 2021-09-15 PROCEDURE — 96367 TX/PROPH/DG ADDL SEQ IV INF: CPT

## 2021-09-15 PROCEDURE — 700105 HCHG RX REV CODE 258: Performed by: INTERNAL MEDICINE

## 2021-09-15 PROCEDURE — 700111 HCHG RX REV CODE 636 W/ 250 OVERRIDE (IP): Performed by: INTERNAL MEDICINE

## 2021-09-15 PROCEDURE — 96375 TX/PRO/DX INJ NEW DRUG ADDON: CPT

## 2021-09-15 PROCEDURE — A4212 NON CORING NEEDLE OR STYLET: HCPCS

## 2021-09-15 PROCEDURE — 80053 COMPREHEN METABOLIC PANEL: CPT

## 2021-09-15 PROCEDURE — 85025 COMPLETE CBC W/AUTO DIFF WBC: CPT

## 2021-09-15 RX ORDER — HEPARIN SODIUM (PORCINE) LOCK FLUSH IV SOLN 100 UNIT/ML 100 UNIT/ML
500 SOLUTION INTRAVENOUS PRN
Status: DISCONTINUED | OUTPATIENT
Start: 2021-09-15 | End: 2021-09-15 | Stop reason: HOSPADM

## 2021-09-15 RX ADMIN — SODIUM CHLORIDE 150 MG: 9 INJECTION, SOLUTION INTRAVENOUS at 16:40

## 2021-09-15 RX ADMIN — HEPARIN 500 UNITS: 100 SYRINGE at 18:06

## 2021-09-15 RX ADMIN — DEXAMETHASONE SODIUM PHOSPHATE 12 MG: 4 INJECTION, SOLUTION INTRA-ARTICULAR; INTRALESIONAL; INTRAMUSCULAR; INTRAVENOUS; SOFT TISSUE at 16:05

## 2021-09-15 RX ADMIN — ONDANSETRON 16 MG: 2 INJECTION INTRAMUSCULAR; INTRAVENOUS at 16:20

## 2021-09-15 RX ADMIN — DOXORUBICIN HYDROCHLORIDE 99.6 MG: 2 INJECTION, SOLUTION INTRAVENOUS at 17:19

## 2021-09-15 ASSESSMENT — FIBROSIS 4 INDEX: FIB4 SCORE: 0.94

## 2021-09-15 NOTE — PROGRESS NOTES
Chemotherapy Verification - PRIMARY RN      Height = 146.5 cm  Weight = 67.4 kg  BSA = 1.66 m2       Medication: Doxorubicin cm  Dose: 60 mg/m2  Calculated Dose: 99.6 mg                                   I confirm this process was performed independently with the BSA and all final chemotherapy dosing calculations congruent.  Any discrepancies of 10% or greater have been addressed with the chemotherapy pharmacist. The resolution of the discrepancy has been documented in the EPIC progress notes.

## 2021-09-15 NOTE — PROGRESS NOTES
"Pharmacy Chemotherapy Verification  Patient name: Christine Malhotra  Dx: recurrent Breast CA, stage IV  Regimen: doxorubicin monotherapy  Cycle 1, Day 1  Previous treatment: 8/23/21  1st line TC (2014), Arimidex (2015), Ibrance/Faslodex ([id number]), Xeloda (2018), Erubilin (2020), gemcitabine (2020), weekly paclitaxel (2020 / 2021)    Doxorubicin 60 mg/m2 IV over 10-20 minutes Day 1   Weekly until DP or UT  *Dosing Reference*  NCCN Guidelines Breast Cancer v8.2021    Allergies:Patient has no known allergies.  /81   Pulse 74   Temp 36.2 °C (97.1 °F) (Temporal)   Resp 18   Ht 1.465 m (4' 9.68\")   Wt 67.4 kg (148 lb 9.4 oz)   SpO2 93%   BMI 31.40 kg/m²  Body surface area is 1.66 meters squared.     Labs: 9/15/21   Meet treatment parameters    TTE 8/31/21 LVEF ~ 55%    Path  ER+, [place]+  HER2-    Doxorubicin 60 mg/m2 x 1.66 m2 = 99.6 mg  OK to treat with final dose = 99.6 mg   Lifetime dose, DOX: 60 mg/m2     "

## 2021-09-15 NOTE — PROGRESS NOTES
Chemotherapy Verification - SECONDARY RN       Height = 146.5 cm  Weight = 67.4 kg  BSA = 1.66 m2       Medication: Doxorubicin  Dose: 60 mg/m2  Calculated Dose: 99.6 mg                             (In mg/m2, AUC, mg/kg)       I confirm that this process was performed independently.

## 2021-09-15 NOTE — PROGRESS NOTES
"Pharmacy Chemotherapy verification:    Patient Name: Christine Malhotra   Dx: Recurrent Breast Cancer         Protocol: doxorubicin     Doxorubicin 60-75mg/m2 IV on day 1  Q21 days until DP/UT- including reaching the maximum lifetime cumulative dose  NCCN Guidelines for Breast Cancer V.2.2021.  Tootie CEJA, et al. Am J Clin Oncol. 1989;12(1):57-62.  Rodri S, et al. J Clin Oncol. 1999;17(8):2341-54.    Allergies:  Patient has no known allergies.     /81   Pulse 74   Temp 36.2 °C (97.1 °F) (Temporal)   Resp 18   Ht 1.465 m (4' 9.68\")   Wt 67.4 kg (148 lb 9.4 oz)   SpO2 93%   BMI 31.40 kg/m²  Body surface area is 1.66 meters squared.     All lab results 9/15/21 within treatment parameters.  8/31/21- ECHO- LVEF ~ 55%    Drug Order   (Drug name, dose, route, IV Fluid & volume, frequency, number of doses) Cycle: 1      Previous treatment: 8/23/21 taxol     Medication = doxorubicin  Base Dose= 60mg/m2  Calc Dose: Base Dose x 1.66m2 = 99.6mg  Final Dose = 99.6mg  Route = IV  Fluid & Volume = empty bag 2mg/mL = 49.8mL  Admin Duration = Over 20 min          <10% difference, ok to treat with final dose   By my signature below, I confirm this process was performed independently with the BSA and all final chemotherapy dosing calculations congruent. I have reviewed the above chemotherapy order and that my calculation of the final dose and BSA (when applicable) corroborate those calculations of the  pharmacist. Discrepancies of 10% or greater in the written dose have been addressed and documented within the [facility] Progress notes.    Jameel VázquezD.          "

## 2021-09-16 NOTE — PROGRESS NOTES
Christine to [facility] for Day 1 Cycle 1 Doxorubicin.   services via Ipad utilized.   POC reviewed with Christine, including education regarding Doxorubicin.  Christine voices no complaints, denies s/s active infection.  Right upper chest port accessed in sterile fashion, labs collected and reviewed.  Pt within treatable parameters.  Pre-medications and doxorubicin administered per MAR, Christine tolerated all treatments well.  Port flushed per policy, heparin instilled.  Port de-accessed, sterile gauze and tape to site.  Christine discharged in no apparent distress, copy of schedule provided to Christine, next appointment confirmed.

## 2021-09-22 ENCOUNTER — APPOINTMENT (OUTPATIENT)
Dept: RADIOLOGY | Facility: MEDICAL CENTER | Age: 66
End: 2021-09-22
Attending: INTERNAL MEDICINE
Payer: MEDICAID

## 2021-09-22 DIAGNOSIS — Z51.11 ENCOUNTER FOR ANTINEOPLASTIC CHEMOTHERAPY: ICD-10-CM

## 2021-09-22 DIAGNOSIS — Z79.899 ENCOUNTER FOR LONG-TERM (CURRENT) USE OF MEDICATIONS: ICD-10-CM

## 2021-09-22 DIAGNOSIS — R97.8 ABNORMAL TUMOR MARKERS: ICD-10-CM

## 2021-09-22 DIAGNOSIS — I26.99 OTHER PULMONARY EMBOLISM WITHOUT ACUTE COR PULMONALE, UNSPECIFIED CHRONICITY (HCC): ICD-10-CM

## 2021-09-22 DIAGNOSIS — C50.611 MALIGNANT NEOPLASM OF AXILLARY TAIL OF RIGHT FEMALE BREAST, UNSPECIFIED ESTROGEN RECEPTOR STATUS (HCC): ICD-10-CM

## 2021-09-22 PROCEDURE — 700117 HCHG RX CONTRAST REV CODE 255: Performed by: INTERNAL MEDICINE

## 2021-09-22 PROCEDURE — A9576 INJ PROHANCE MULTIPACK: HCPCS | Performed by: INTERNAL MEDICINE

## 2021-09-22 PROCEDURE — 74183 MRI ABD W/O CNTR FLWD CNTR: CPT

## 2021-09-22 RX ADMIN — GADOTERIDOL 15 ML: 279.3 INJECTION, SOLUTION INTRAVENOUS at 09:23

## 2021-09-28 RX ORDER — SODIUM CHLORIDE 9 MG/ML
INJECTION, SOLUTION INTRAVENOUS CONTINUOUS
Status: CANCELLED | OUTPATIENT
Start: 2021-10-06

## 2021-09-28 RX ORDER — 0.9 % SODIUM CHLORIDE 0.9 %
10 VIAL (ML) INJECTION PRN
Status: CANCELLED | OUTPATIENT
Start: 2021-10-05

## 2021-09-28 RX ORDER — ONDANSETRON 8 MG/1
8 TABLET, ORALLY DISINTEGRATING ORAL PRN
Status: CANCELLED | OUTPATIENT
Start: 2021-10-06

## 2021-09-28 RX ORDER — ONDANSETRON 2 MG/ML
4 INJECTION INTRAMUSCULAR; INTRAVENOUS PRN
Status: CANCELLED | OUTPATIENT
Start: 2021-10-06

## 2021-09-28 RX ORDER — 0.9 % SODIUM CHLORIDE 0.9 %
VIAL (ML) INJECTION PRN
Status: CANCELLED | OUTPATIENT
Start: 2021-10-06

## 2021-09-28 RX ORDER — 0.9 % SODIUM CHLORIDE 0.9 %
3 VIAL (ML) INJECTION PRN
Status: CANCELLED | OUTPATIENT
Start: 2021-10-06

## 2021-09-28 RX ORDER — HEPARIN SODIUM (PORCINE) LOCK FLUSH IV SOLN 100 UNIT/ML 100 UNIT/ML
500 SOLUTION INTRAVENOUS PRN
Status: CANCELLED | OUTPATIENT
Start: 2021-10-06

## 2021-09-28 RX ORDER — 0.9 % SODIUM CHLORIDE 0.9 %
VIAL (ML) INJECTION PRN
Status: CANCELLED | OUTPATIENT
Start: 2021-10-05

## 2021-09-28 RX ORDER — PROCHLORPERAZINE MALEATE 10 MG
10 TABLET ORAL EVERY 6 HOURS PRN
Status: CANCELLED | OUTPATIENT
Start: 2021-10-06

## 2021-09-28 RX ORDER — 0.9 % SODIUM CHLORIDE 0.9 %
3 VIAL (ML) INJECTION PRN
Status: CANCELLED | OUTPATIENT
Start: 2021-10-05

## 2021-09-28 RX ORDER — 0.9 % SODIUM CHLORIDE 0.9 %
10 VIAL (ML) INJECTION PRN
Status: CANCELLED | OUTPATIENT
Start: 2021-10-06

## 2021-09-28 RX ORDER — HEPARIN SODIUM (PORCINE) LOCK FLUSH IV SOLN 100 UNIT/ML 100 UNIT/ML
500 SOLUTION INTRAVENOUS PRN
Status: CANCELLED | OUTPATIENT
Start: 2021-10-05

## 2021-10-04 ENCOUNTER — TELEPHONE (OUTPATIENT)
Dept: ONCOLOGY | Facility: MEDICAL CENTER | Age: 66
End: 2021-10-04

## 2021-10-04 NOTE — TELEPHONE ENCOUNTER
Pt did not show for her appointment today. RN unsuccessful at reaching pt on home phone; voicemail is not setup. Message left with CCS nurse at this time.

## 2021-10-05 ENCOUNTER — OUTPATIENT INFUSION SERVICES (OUTPATIENT)
Dept: ONCOLOGY | Facility: MEDICAL CENTER | Age: 66
End: 2021-10-05
Attending: INTERNAL MEDICINE
Payer: MEDICAID

## 2021-10-05 VITALS
OXYGEN SATURATION: 94 % | DIASTOLIC BLOOD PRESSURE: 58 MMHG | HEIGHT: 57 IN | HEART RATE: 86 BPM | SYSTOLIC BLOOD PRESSURE: 128 MMHG | RESPIRATION RATE: 18 BRPM | WEIGHT: 143.3 LBS | TEMPERATURE: 97.8 F | BODY MASS INDEX: 30.92 KG/M2

## 2021-10-05 DIAGNOSIS — C50.811 MALIGNANT NEOPLASM OF OVERLAPPING SITES OF RIGHT BREAST IN FEMALE, ESTROGEN RECEPTOR POSITIVE (HCC): ICD-10-CM

## 2021-10-05 DIAGNOSIS — Z17.0 MALIGNANT NEOPLASM OF OVERLAPPING SITES OF RIGHT BREAST IN FEMALE, ESTROGEN RECEPTOR POSITIVE (HCC): ICD-10-CM

## 2021-10-05 LAB
ALBUMIN SERPL BCP-MCNC: 3.9 G/DL (ref 3.2–4.9)
ALBUMIN/GLOB SERPL: 1.4 G/DL
ALP SERPL-CCNC: 56 U/L (ref 30–99)
ALT SERPL-CCNC: 14 U/L (ref 2–50)
ANION GAP SERPL CALC-SCNC: 12 MMOL/L (ref 7–16)
AST SERPL-CCNC: 26 U/L (ref 12–45)
BASOPHILS # BLD AUTO: 0.9 % (ref 0–1.8)
BASOPHILS # BLD: 0.05 K/UL (ref 0–0.12)
BILIRUB SERPL-MCNC: <0.2 MG/DL (ref 0.1–1.5)
BUN SERPL-MCNC: 13 MG/DL (ref 8–22)
CALCIUM SERPL-MCNC: 9.1 MG/DL (ref 8.5–10.5)
CHLORIDE SERPL-SCNC: 106 MMOL/L (ref 96–112)
CO2 SERPL-SCNC: 24 MMOL/L (ref 20–33)
CREAT SERPL-MCNC: 0.59 MG/DL (ref 0.5–1.4)
EOSINOPHIL # BLD AUTO: 0 K/UL (ref 0–0.51)
EOSINOPHIL NFR BLD: 0 % (ref 0–6.9)
ERYTHROCYTE [DISTWIDTH] IN BLOOD BY AUTOMATED COUNT: 50.1 FL (ref 35.9–50)
GLOBULIN SER CALC-MCNC: 2.8 G/DL (ref 1.9–3.5)
GLUCOSE SERPL-MCNC: 121 MG/DL (ref 65–99)
HCT VFR BLD AUTO: 37.8 % (ref 37–47)
HGB BLD-MCNC: 12.6 G/DL (ref 12–16)
IMM GRANULOCYTES # BLD AUTO: 0.1 K/UL (ref 0–0.11)
IMM GRANULOCYTES NFR BLD AUTO: 1.9 % (ref 0–0.9)
LYMPHOCYTES # BLD AUTO: 1.45 K/UL (ref 1–4.8)
LYMPHOCYTES NFR BLD: 27.4 % (ref 22–41)
MCH RBC QN AUTO: 31.4 PG (ref 27–33)
MCHC RBC AUTO-ENTMCNC: 33.3 G/DL (ref 33.6–35)
MCV RBC AUTO: 94.3 FL (ref 81.4–97.8)
MONOCYTES # BLD AUTO: 0.71 K/UL (ref 0–0.85)
MONOCYTES NFR BLD AUTO: 13.4 % (ref 0–13.4)
NEUTROPHILS # BLD AUTO: 2.98 K/UL (ref 2–7.15)
NEUTROPHILS NFR BLD: 56.4 % (ref 44–72)
NRBC # BLD AUTO: 0 K/UL
NRBC BLD-RTO: 0 /100 WBC
PLATELET # BLD AUTO: 477 K/UL (ref 164–446)
PMV BLD AUTO: 8.9 FL (ref 9–12.9)
POTASSIUM SERPL-SCNC: 3.4 MMOL/L (ref 3.6–5.5)
PROT SERPL-MCNC: 6.7 G/DL (ref 6–8.2)
RBC # BLD AUTO: 4.01 M/UL (ref 4.2–5.4)
SODIUM SERPL-SCNC: 142 MMOL/L (ref 135–145)
WBC # BLD AUTO: 5.3 K/UL (ref 4.8–10.8)

## 2021-10-05 PROCEDURE — 96413 CHEMO IV INFUSION 1 HR: CPT

## 2021-10-05 PROCEDURE — 80053 COMPREHEN METABOLIC PANEL: CPT

## 2021-10-05 PROCEDURE — 85025 COMPLETE CBC W/AUTO DIFF WBC: CPT

## 2021-10-05 PROCEDURE — 96375 TX/PRO/DX INJ NEW DRUG ADDON: CPT

## 2021-10-05 PROCEDURE — 96367 TX/PROPH/DG ADDL SEQ IV INF: CPT

## 2021-10-05 PROCEDURE — 700105 HCHG RX REV CODE 258: Performed by: INTERNAL MEDICINE

## 2021-10-05 PROCEDURE — A4212 NON CORING NEEDLE OR STYLET: HCPCS

## 2021-10-05 PROCEDURE — 700111 HCHG RX REV CODE 636 W/ 250 OVERRIDE (IP): Performed by: INTERNAL MEDICINE

## 2021-10-05 RX ORDER — LIDOCAINE AND PRILOCAINE 25; 25 MG/G; MG/G
CREAM TOPICAL PRN
COMMUNITY
Start: 2021-08-25

## 2021-10-05 RX ORDER — HEPARIN SODIUM (PORCINE) LOCK FLUSH IV SOLN 100 UNIT/ML 100 UNIT/ML
500 SOLUTION INTRAVENOUS PRN
Status: DISCONTINUED | OUTPATIENT
Start: 2021-10-05 | End: 2021-10-05 | Stop reason: HOSPADM

## 2021-10-05 RX ADMIN — DEXAMETHASONE SODIUM PHOSPHATE 12 MG: 4 INJECTION, SOLUTION INTRA-ARTICULAR; INTRALESIONAL; INTRAMUSCULAR; INTRAVENOUS; SOFT TISSUE at 16:48

## 2021-10-05 RX ADMIN — HEPARIN 500 UNITS: 100 SYRINGE at 18:50

## 2021-10-05 RX ADMIN — SODIUM CHLORIDE 150 MG: 9 INJECTION, SOLUTION INTRAVENOUS at 17:16

## 2021-10-05 RX ADMIN — ONDANSETRON 16 MG: 2 INJECTION INTRAMUSCULAR; INTRAVENOUS at 17:00

## 2021-10-05 RX ADMIN — DOXORUBICIN HYDROCHLORIDE 97.2 MG: 2 INJECTION, SOLUTION INTRAVENOUS at 17:56

## 2021-10-05 ASSESSMENT — FIBROSIS 4 INDEX: FIB4 SCORE: 1.28

## 2021-10-05 NOTE — PROGRESS NOTES
"Pharmacy Chemotherapy Verification  Patient name: Christine Malhotra  Dx: recurrent Breast CA, stage IV  Regimen: doxorubicin monotherapy  Cycle 2, Day 1  Previous treatment: 9/15/21  1st line TC (2014), Arimidex (2015), Ibrance/Faslodex ([id number]), Xeloda (2018), Erubilin (2020), gemcitabine (2020), weekly paclitaxel (2020 / 2021)    Doxorubicin 60 mg/m2 IV over 10-20 minutes Day 1   Weekly until DP or UT  *Dosing Reference*  NCCN Guidelines Breast Cancer v8.2021    Allergies:Patient has no known allergies.  /58   Pulse 86   Temp 36.6 °C (97.8 °F) (Temporal)   Resp 18   Ht 1.455 m (4' 9.28\")   Wt 65 kg (143 lb 4.8 oz)   SpO2 94%   BMI 30.70 kg/m²  Body surface area is 1.62 meters squared.     Labs: 10/5/21   Meet treatment parameters    TTE 8/31/21 LVEF ~ 55%    Path  ER+, [place]+  HER2-    Doxorubicin 60 mg/m2 x 1.62 m2 = 97.2 mg  OK to treat with final dose = 97.2 mg   Lifetime dose, DOX: 120 mg/m2     "

## 2021-10-05 NOTE — PROGRESS NOTES
"Pharmacy Chemotherapy Verification    Patient Name: Christine Malhotra   Dx: Recurrent Breast Cancer         Protocol: DOXOrubicin     DOXOrubicin 60-75 mg/m2 IV on day 1  Q21 days until DP/UT- including reaching the maximum lifetime cumulative dose  NCCN Guidelines for Breast Cancer V.2.2021.  Tootie CEJA, et al. Am J Clin Oncol. 1989;12(1):57-62.  Rodri S, et al. J Clin Oncol. 1999;17(8):2341-54.    Allergies:  Patient has no known allergies.     /58   Pulse 86   Temp 36.6 °C (97.8 °F) (Temporal)   Resp 18   Ht 1.455 m (4' 9.28\")   Wt 65 kg (143 lb 4.8 oz)   SpO2 94%   BMI 30.70 kg/m²  Body surface area is 1.62 meters squared.     Labs 10/5/21  ANC 2980 Hgb 12.6 Plt 477k  SCr 0.59 CrCl 83  AST/ALT/AP = 26/14/56 Tbili<0.2    ECHO 8/31/21  LVEF ~ 55%    Drug Order   (Drug name, dose, route, IV Fluid & volume, frequency, number of doses) Cycle: 2  Previous treatment: 9/15/21     Medication = DOXOrubicin  Base Dose= 60 mg/m2  Calc Dose: Base Dose x 1.62 m2 = 97.2 mg  Final Dose = 97.2 mg  Route = IV  Fluid & Volume = empty bag 2 mg/mL = 48.6 mL  Admin Duration = Over 20 minutes          <10% difference, ok to treat with final dose   By my signature below, I confirm this process was performed independently with the BSA and all final chemotherapy dosing calculations congruent. I have reviewed the above chemotherapy order and that my calculation of the final dose and BSA (when applicable) corroborate those calculations of the  pharmacist. Discrepancies of 10% or greater in the written dose have been addressed and documented within the [facility] Progress notes.    Roseanne Gross, PharmD, BCOP            "

## 2021-10-05 NOTE — PROGRESS NOTES
Chemotherapy Verification - SECONDARY RN       Height = 1.45m  Weight = 65kg  BSA = 1.62m2       Medication: doxorubicin  Dose: 60mg/m2  Calculated Dose: 97.2mg                             (In mg/m2, AUC, mg/kg)       I confirm that this process was performed independently.

## 2021-10-05 NOTE — PROGRESS NOTES
Chemotherapy Verification - PRIMARY RN    C2 D1    Height = 145.5 cm  Weight = 656 kg  BSA = 1.62 m^2       Medication: Doxorubicin (Adriamycin)  Dose: 60 mg/m^2  Calculated Dose: 97.2 mg                             (In mg/m2, AUC, mg/kg)     I confirm this process was performed independently with the BSA and all final chemotherapy dosing calculations congruent.  Any discrepancies of 10% or greater have been addressed with the chemotherapy pharmacist. The resolution of the discrepancy has been documented in the EPIC progress notes.

## 2021-10-06 ENCOUNTER — OUTPATIENT INFUSION SERVICES (OUTPATIENT)
Dept: ONCOLOGY | Facility: MEDICAL CENTER | Age: 66
End: 2021-10-06
Attending: INTERNAL MEDICINE
Payer: MEDICAID

## 2021-10-06 VITALS
HEIGHT: 57 IN | BODY MASS INDEX: 30.92 KG/M2 | TEMPERATURE: 98 F | DIASTOLIC BLOOD PRESSURE: 65 MMHG | OXYGEN SATURATION: 96 % | WEIGHT: 143.3 LBS | HEART RATE: 78 BPM | SYSTOLIC BLOOD PRESSURE: 134 MMHG | RESPIRATION RATE: 18 BRPM

## 2021-10-06 DIAGNOSIS — C50.811 MALIGNANT NEOPLASM OF OVERLAPPING SITES OF RIGHT BREAST IN FEMALE, ESTROGEN RECEPTOR POSITIVE (HCC): ICD-10-CM

## 2021-10-06 DIAGNOSIS — Z17.0 MALIGNANT NEOPLASM OF OVERLAPPING SITES OF RIGHT BREAST IN FEMALE, ESTROGEN RECEPTOR POSITIVE (HCC): ICD-10-CM

## 2021-10-06 PROCEDURE — 96372 THER/PROPH/DIAG INJ SC/IM: CPT

## 2021-10-06 PROCEDURE — 700111 HCHG RX REV CODE 636 W/ 250 OVERRIDE (IP): Performed by: INTERNAL MEDICINE

## 2021-10-06 RX ORDER — LORATADINE 10 MG/1
10 TABLET ORAL DAILY
COMMUNITY

## 2021-10-06 RX ADMIN — PEGFILGRASTIM-CBQV 6 MG: 6 INJECTION, SOLUTION SUBCUTANEOUS at 18:35

## 2021-10-06 ASSESSMENT — FIBROSIS 4 INDEX: FIB4 SCORE: 0.93

## 2021-10-06 NOTE — PROGRESS NOTES
Pt arrives to IS for cycle 2 of Doxorubicin.  Armenian interpretor used to discuss plan of care and complete assessment.  Pt denies s/sx of infection.  RUC port accessed with sterile technique, flushed with NS and brisk blood return observed.  Labs drawn via port.  Labs reviewed and pt meets parameters for treatment.  Pre-medications given.  Port is positive for blood return prior to starting Doxorubicin.  Doxorubicin infusing at this time.  Gave pt a copy of new schedule and Udenyca handout in Armenian.  Report given to BELLO Lee to assume care of pt.

## 2021-10-06 NOTE — PROGRESS NOTES
Took ever care from [facility]. Doxorubicin infused with no s/s of infusion reaction. PORT flushed with + blood return. SAS performed and port de-accessed. Gauze and paper tape applied as a dressing. Patient has appointment tomorrow. Patient to home in care of self.

## 2021-10-07 NOTE — PROGRESS NOTES
Pt ambulated into department for Udenyca. Christine  completed chemo at 1825 and tolerated well. Pt afebrile and denied S/S of infection at this time. Udenyca given as prescribed in back of Left arm, gauze and paper tape applied after. RN reviewed neutropenic precautions, and S/S of when to follow up with their MD or the ED. Education done regarding possible side effect of bone pain, Pt verbalized understanding.  Future appointments confirmed prior to leaving, left department appearing in good spirits and NAD.

## 2021-10-08 ENCOUNTER — PATIENT OUTREACH (OUTPATIENT)
Dept: OTHER | Facility: MEDICAL CENTER | Age: 66
End: 2021-10-08

## 2021-10-08 NOTE — PROGRESS NOTES
On October 8, 2021, Oncology Social Worker Lisa Brown attempted telephone contact with pt.  Pt.'s voicemail not set up.  OSW Kevin unable to leave voicemail message.

## 2021-10-15 ENCOUNTER — PATIENT OUTREACH (OUTPATIENT)
Dept: OTHER | Facility: MEDICAL CENTER | Age: 66
End: 2021-10-15

## 2021-10-15 NOTE — PROGRESS NOTES
On October 15, 2021, Oncology Social Worker Lisa Brown and Oncology Nurse Navigator Tanvi Agustin attempted telephone contact with pt.  OSW Kevin spoke in Latvian throughout encounter.  Pt.'s daughter Reta answered call and stated she was not with pt. as Reta is currently at work.  OSW Brown asked Reta to please relay message to pt. to call OSW Kevin at earliest convenience.  Reta agreed to pass message to pt.

## 2021-10-19 RX ORDER — 0.9 % SODIUM CHLORIDE 0.9 %
VIAL (ML) INJECTION PRN
Status: CANCELLED | OUTPATIENT
Start: 2021-10-26

## 2021-10-19 RX ORDER — 0.9 % SODIUM CHLORIDE 0.9 %
3 VIAL (ML) INJECTION PRN
Status: CANCELLED | OUTPATIENT
Start: 2021-10-26

## 2021-10-19 RX ORDER — 0.9 % SODIUM CHLORIDE 0.9 %
10 VIAL (ML) INJECTION PRN
Status: CANCELLED | OUTPATIENT
Start: 2021-10-26

## 2021-10-19 RX ORDER — ONDANSETRON 2 MG/ML
4 INJECTION INTRAMUSCULAR; INTRAVENOUS PRN
Status: CANCELLED | OUTPATIENT
Start: 2021-10-27

## 2021-10-19 RX ORDER — HEPARIN SODIUM (PORCINE) LOCK FLUSH IV SOLN 100 UNIT/ML 100 UNIT/ML
500 SOLUTION INTRAVENOUS PRN
Status: CANCELLED | OUTPATIENT
Start: 2021-10-26

## 2021-10-19 RX ORDER — ONDANSETRON 8 MG/1
8 TABLET, ORALLY DISINTEGRATING ORAL PRN
Status: CANCELLED | OUTPATIENT
Start: 2021-10-27

## 2021-10-19 RX ORDER — PROCHLORPERAZINE MALEATE 10 MG
10 TABLET ORAL EVERY 6 HOURS PRN
Status: CANCELLED | OUTPATIENT
Start: 2021-10-27

## 2021-10-19 RX ORDER — HEPARIN SODIUM (PORCINE) LOCK FLUSH IV SOLN 100 UNIT/ML 100 UNIT/ML
500 SOLUTION INTRAVENOUS PRN
Status: CANCELLED | OUTPATIENT
Start: 2021-10-27

## 2021-10-19 RX ORDER — 0.9 % SODIUM CHLORIDE 0.9 %
10 VIAL (ML) INJECTION PRN
Status: CANCELLED | OUTPATIENT
Start: 2021-10-27

## 2021-10-19 RX ORDER — 0.9 % SODIUM CHLORIDE 0.9 %
3 VIAL (ML) INJECTION PRN
Status: CANCELLED | OUTPATIENT
Start: 2021-10-27

## 2021-10-19 RX ORDER — 0.9 % SODIUM CHLORIDE 0.9 %
VIAL (ML) INJECTION PRN
Status: CANCELLED | OUTPATIENT
Start: 2021-10-27

## 2021-10-19 RX ORDER — SODIUM CHLORIDE 9 MG/ML
INJECTION, SOLUTION INTRAVENOUS CONTINUOUS
Status: CANCELLED | OUTPATIENT
Start: 2021-10-27

## 2021-10-26 ENCOUNTER — OUTPATIENT INFUSION SERVICES (OUTPATIENT)
Dept: ONCOLOGY | Facility: MEDICAL CENTER | Age: 66
End: 2021-10-26
Attending: INTERNAL MEDICINE
Payer: MEDICAID

## 2021-10-26 VITALS
SYSTOLIC BLOOD PRESSURE: 122 MMHG | TEMPERATURE: 98 F | OXYGEN SATURATION: 98 % | DIASTOLIC BLOOD PRESSURE: 75 MMHG | HEART RATE: 90 BPM | BODY MASS INDEX: 30.44 KG/M2 | HEIGHT: 57 IN | RESPIRATION RATE: 16 BRPM | WEIGHT: 141.09 LBS

## 2021-10-26 DIAGNOSIS — Z17.0 MALIGNANT NEOPLASM OF OVERLAPPING SITES OF RIGHT BREAST IN FEMALE, ESTROGEN RECEPTOR POSITIVE (HCC): ICD-10-CM

## 2021-10-26 DIAGNOSIS — C50.811 MALIGNANT NEOPLASM OF OVERLAPPING SITES OF RIGHT BREAST IN FEMALE, ESTROGEN RECEPTOR POSITIVE (HCC): ICD-10-CM

## 2021-10-26 LAB
ALBUMIN SERPL BCP-MCNC: 3.9 G/DL (ref 3.2–4.9)
ALBUMIN/GLOB SERPL: 1.6 G/DL
ALP SERPL-CCNC: 57 U/L (ref 30–99)
ALT SERPL-CCNC: 16 U/L (ref 2–50)
ANION GAP SERPL CALC-SCNC: 10 MMOL/L (ref 7–16)
AST SERPL-CCNC: 22 U/L (ref 12–45)
BASOPHILS # BLD AUTO: 0.8 % (ref 0–1.8)
BASOPHILS # BLD: 0.05 K/UL (ref 0–0.12)
BILIRUB SERPL-MCNC: 0.2 MG/DL (ref 0.1–1.5)
BUN SERPL-MCNC: 13 MG/DL (ref 8–22)
CALCIUM SERPL-MCNC: 9.2 MG/DL (ref 8.5–10.5)
CHLORIDE SERPL-SCNC: 104 MMOL/L (ref 96–112)
CO2 SERPL-SCNC: 26 MMOL/L (ref 20–33)
CREAT SERPL-MCNC: 0.55 MG/DL (ref 0.5–1.4)
EOSINOPHIL # BLD AUTO: 0.01 K/UL (ref 0–0.51)
EOSINOPHIL NFR BLD: 0.2 % (ref 0–6.9)
ERYTHROCYTE [DISTWIDTH] IN BLOOD BY AUTOMATED COUNT: 56.6 FL (ref 35.9–50)
GLOBULIN SER CALC-MCNC: 2.4 G/DL (ref 1.9–3.5)
GLUCOSE SERPL-MCNC: 98 MG/DL (ref 65–99)
HCT VFR BLD AUTO: 34.6 % (ref 37–47)
HGB BLD-MCNC: 11.4 G/DL (ref 12–16)
IMM GRANULOCYTES # BLD AUTO: 0.03 K/UL (ref 0–0.11)
IMM GRANULOCYTES NFR BLD AUTO: 0.5 % (ref 0–0.9)
LYMPHOCYTES # BLD AUTO: 1.21 K/UL (ref 1–4.8)
LYMPHOCYTES NFR BLD: 20.2 % (ref 22–41)
MCH RBC QN AUTO: 31.5 PG (ref 27–33)
MCHC RBC AUTO-ENTMCNC: 32.9 G/DL (ref 33.6–35)
MCV RBC AUTO: 95.6 FL (ref 81.4–97.8)
MONOCYTES # BLD AUTO: 0.87 K/UL (ref 0–0.85)
MONOCYTES NFR BLD AUTO: 14.5 % (ref 0–13.4)
NEUTROPHILS # BLD AUTO: 3.82 K/UL (ref 2–7.15)
NEUTROPHILS NFR BLD: 63.8 % (ref 44–72)
NRBC # BLD AUTO: 0 K/UL
NRBC BLD-RTO: 0 /100 WBC
PLATELET # BLD AUTO: 383 K/UL (ref 164–446)
PMV BLD AUTO: 9.4 FL (ref 9–12.9)
POTASSIUM SERPL-SCNC: 4.1 MMOL/L (ref 3.6–5.5)
PROT SERPL-MCNC: 6.3 G/DL (ref 6–8.2)
RBC # BLD AUTO: 3.62 M/UL (ref 4.2–5.4)
SODIUM SERPL-SCNC: 140 MMOL/L (ref 135–145)
WBC # BLD AUTO: 6 K/UL (ref 4.8–10.8)

## 2021-10-26 PROCEDURE — 96375 TX/PRO/DX INJ NEW DRUG ADDON: CPT

## 2021-10-26 PROCEDURE — 700105 HCHG RX REV CODE 258: Performed by: INTERNAL MEDICINE

## 2021-10-26 PROCEDURE — 700111 HCHG RX REV CODE 636 W/ 250 OVERRIDE (IP): Performed by: INTERNAL MEDICINE

## 2021-10-26 PROCEDURE — 80053 COMPREHEN METABOLIC PANEL: CPT

## 2021-10-26 PROCEDURE — 96413 CHEMO IV INFUSION 1 HR: CPT

## 2021-10-26 PROCEDURE — A4212 NON CORING NEEDLE OR STYLET: HCPCS

## 2021-10-26 PROCEDURE — 96367 TX/PROPH/DG ADDL SEQ IV INF: CPT

## 2021-10-26 PROCEDURE — 85025 COMPLETE CBC W/AUTO DIFF WBC: CPT

## 2021-10-26 RX ORDER — LIDOCAINE HYDROCHLORIDE 10 MG/ML
INJECTION, SOLUTION EPIDURAL; INFILTRATION; INTRACAUDAL; PERINEURAL
Status: DISCONTINUED
Start: 2021-10-26 | End: 2021-10-26 | Stop reason: HOSPADM

## 2021-10-26 RX ORDER — HEPARIN SODIUM (PORCINE) LOCK FLUSH IV SOLN 100 UNIT/ML 100 UNIT/ML
500 SOLUTION INTRAVENOUS PRN
Status: DISCONTINUED | OUTPATIENT
Start: 2021-10-26 | End: 2021-10-26 | Stop reason: HOSPADM

## 2021-10-26 RX ADMIN — ONDANSETRON 16 MG: 2 INJECTION INTRAMUSCULAR; INTRAVENOUS at 16:30

## 2021-10-26 RX ADMIN — SODIUM CHLORIDE 150 MG: 9 INJECTION, SOLUTION INTRAVENOUS at 16:51

## 2021-10-26 RX ADMIN — DOXORUBICIN HYDROCHLORIDE 96.6 MG: 2 INJECTION, SOLUTION INTRAVENOUS at 17:30

## 2021-10-26 RX ADMIN — DEXAMETHASONE SODIUM PHOSPHATE 12 MG: 4 INJECTION, SOLUTION INTRA-ARTICULAR; INTRALESIONAL; INTRAMUSCULAR; INTRAVENOUS; SOFT TISSUE at 16:14

## 2021-10-26 RX ADMIN — HEPARIN 500 UNITS: 100 SYRINGE at 18:09

## 2021-10-26 ASSESSMENT — FIBROSIS 4 INDEX: FIB4 SCORE: 0.93

## 2021-10-26 NOTE — PROGRESS NOTES
"Pharmacy Chemotherapy Verification    Patient Name: Christine Malhotra   Dx: Recurrent Breast Cancer         Protocol: DOXOrubicin     DOXOrubicin 60-75 mg/m2 IV on day 1  Q21 days until DP/UT- including reaching the maximum lifetime cumulative dose  NCCN Guidelines for Breast Cancer V.2.2021.  Tootie CEJA, et al. Am J Clin Oncol. 1989;12(1):57-62.  Rodri S, et al. J Clin Oncol. 1999;17(8):2341-54.    Allergies:  Patient has no known allergies.     /75   Pulse 90   Temp 36.7 °C (98 °F) (Temporal)   Resp 16   Ht 1.455 m (4' 9.28\")   Wt 64 kg (141 lb 1.5 oz)   SpO2 98%   BMI 30.23 kg/m²  Body surface area is 1.61 meters squared.     All lab results 10/26/21 within treatment parameters.     Drug Order   (Drug name, dose, route, IV Fluid & volume, frequency, number of doses) Cycle: 3  Previous treatment: 10/5/21     Medication = DOXOrubicin  Base Dose= 60 mg/m2  Calc Dose: Base Dose x 1.61m2 = 96.6mg  Final Dose = 96.6mg  Route = IV  Fluid & Volume = 2 mg/mL = 48.3mL  Admin Duration = Over 20 minutes          <10% difference, ok to treat with final dose   By my signature below, I confirm this process was performed independently with the BSA and all final chemotherapy dosing calculations congruent. I have reviewed the above chemotherapy order and that my calculation of the final dose and BSA (when applicable) corroborate those calculations of the  pharmacist. Discrepancies of 10% or greater in the written dose have been addressed and documented within the [facility] Progress notes.    ALBERT Urbina Pharm.D.              "

## 2021-10-26 NOTE — PROGRESS NOTES
"Pharmacy Chemotherapy Verification  Patient name: Christine Malhotra  Dx: recurrent Breast CA, stage IV  Regimen: doxorubicin monotherapy  Cycle 3, Day 1  Previous treatment: 10/5/21  1st line TC (2014), Arimidex (2015), Ibrance/Faslodex ([id number]), Xeloda (2018), Erubilin (2020), gemcitabine (2020), weekly paclitaxel (2020 / 2021)    Doxorubicin 60 mg/m2 IV over 10-20 minutes Day 1   Weekly until DP or UT  *Dosing Reference*  NCCN Guidelines Breast Cancer v8.2021    Allergies: Patient has no known allergies.  /75   Pulse 90   Temp 36.7 °C (98 °F) (Temporal)   Resp 16   Ht 1.455 m (4' 9.28\")   Wt 64 kg (141 lb 1.5 oz)   SpO2 98%   BMI 30.23 kg/m²  Body surface area is 1.61 meters squared.     Labs: 10/26/21   Meet treatment parameters    TTE 8/31/21 LVEF ~ 55%    Path  ER+, [place]+  HER2-    Doxorubicin 60 mg/m2 x 1.61 m2 = 96.6 mg  OK to treat with final dose = 96.6 mg   Lifetime dose, DOX: 180 mg/m2     "

## 2021-10-26 NOTE — PROGRESS NOTES
Chemotherapy Verification - PRIMARY RN      Height = 145.5 cm  Weight = 64 kg  BSA = 1.6 m^2       Medication: DOXOrubicin (ADRIAMYCIN)  Dose: 60 mg/m^2  Calculated Dose: 96 mg                             (In mg/m2, AUC, mg/kg)     I confirm this process was performed independently with the BSA and all final chemotherapy dosing calculations congruent.  Any discrepancies of 10% or greater have been addressed with the chemotherapy pharmacist. The resolution of the discrepancy has been documented in the EPIC progress notes.

## 2021-10-27 ENCOUNTER — OUTPATIENT INFUSION SERVICES (OUTPATIENT)
Dept: ONCOLOGY | Facility: MEDICAL CENTER | Age: 66
End: 2021-10-27
Attending: INTERNAL MEDICINE
Payer: MEDICAID

## 2021-10-27 VITALS
HEART RATE: 68 BPM | OXYGEN SATURATION: 98 % | SYSTOLIC BLOOD PRESSURE: 111 MMHG | TEMPERATURE: 98 F | HEIGHT: 57 IN | BODY MASS INDEX: 30.44 KG/M2 | WEIGHT: 141.09 LBS | DIASTOLIC BLOOD PRESSURE: 90 MMHG | RESPIRATION RATE: 18 BRPM

## 2021-10-27 DIAGNOSIS — C50.811 MALIGNANT NEOPLASM OF OVERLAPPING SITES OF RIGHT BREAST IN FEMALE, ESTROGEN RECEPTOR POSITIVE (HCC): ICD-10-CM

## 2021-10-27 DIAGNOSIS — Z17.0 MALIGNANT NEOPLASM OF OVERLAPPING SITES OF RIGHT BREAST IN FEMALE, ESTROGEN RECEPTOR POSITIVE (HCC): ICD-10-CM

## 2021-10-27 PROCEDURE — 700111 HCHG RX REV CODE 636 W/ 250 OVERRIDE (IP): Performed by: INTERNAL MEDICINE

## 2021-10-27 PROCEDURE — 96372 THER/PROPH/DIAG INJ SC/IM: CPT

## 2021-10-27 RX ADMIN — PEGFILGRASTIM-CBQV 6 MG: 6 INJECTION, SOLUTION SUBCUTANEOUS at 18:11

## 2021-10-27 ASSESSMENT — FIBROSIS 4 INDEX: FIB4 SCORE: 0.92

## 2021-10-27 NOTE — PROGRESS NOTES
Patient here for Day 1, Cycle 3 DOXOrubicin (ADRIAMYCIN). Plan of care discussed with patient via  services. Right upper chest port accessed using sterile technique; labs drawn as ordered. Labs results within parameters to proceed with treatment. Pre-medications given per MAR. DOXOrubicin given per MAR; blood return noted before and after administration. Heparin instilled per MAR prior to de-accessing port. Port de-accessed; gauze/tape applied to site. Next appointment scheduled. Discharged to self care; no apparent distress noted.

## 2021-10-28 NOTE — PROGRESS NOTES
Pt arrived to [facility] for Udenyca injection. Pt medicated per MAR. Pt tolerated treatment without s/s adverse reaction. Pt discharged to self care, NAD. Pt's next appt 11/16/2021.

## 2021-11-09 RX ORDER — ONDANSETRON 8 MG/1
8 TABLET, ORALLY DISINTEGRATING ORAL PRN
Status: CANCELLED | OUTPATIENT
Start: 2021-11-27

## 2021-11-09 RX ORDER — ONDANSETRON 8 MG/1
8 TABLET, ORALLY DISINTEGRATING ORAL PRN
Status: CANCELLED | OUTPATIENT
Start: 2021-12-06

## 2021-11-09 RX ORDER — SODIUM CHLORIDE 9 MG/ML
INJECTION, SOLUTION INTRAVENOUS CONTINUOUS
Status: CANCELLED | OUTPATIENT
Start: 2021-12-06

## 2021-11-09 RX ORDER — 0.9 % SODIUM CHLORIDE 0.9 %
3 VIAL (ML) INJECTION PRN
Status: CANCELLED | OUTPATIENT
Start: 2021-11-27

## 2021-11-09 RX ORDER — HEPARIN SODIUM (PORCINE) LOCK FLUSH IV SOLN 100 UNIT/ML 100 UNIT/ML
500 SOLUTION INTRAVENOUS PRN
Status: CANCELLED | OUTPATIENT
Start: 2021-12-06

## 2021-11-09 RX ORDER — PROCHLORPERAZINE MALEATE 10 MG
10 TABLET ORAL EVERY 6 HOURS PRN
Status: CANCELLED | OUTPATIENT
Start: 2021-11-16

## 2021-11-09 RX ORDER — SODIUM CHLORIDE 9 MG/ML
INJECTION, SOLUTION INTRAVENOUS CONTINUOUS
Status: CANCELLED | OUTPATIENT
Start: 2021-11-16

## 2021-11-09 RX ORDER — PROCHLORPERAZINE MALEATE 10 MG
10 TABLET ORAL EVERY 6 HOURS PRN
Status: CANCELLED | OUTPATIENT
Start: 2021-12-06

## 2021-11-09 RX ORDER — 0.9 % SODIUM CHLORIDE 0.9 %
3 VIAL (ML) INJECTION PRN
Status: CANCELLED | OUTPATIENT
Start: 2021-12-06

## 2021-11-09 RX ORDER — HEPARIN SODIUM (PORCINE) LOCK FLUSH IV SOLN 100 UNIT/ML 100 UNIT/ML
500 SOLUTION INTRAVENOUS PRN
Status: CANCELLED | OUTPATIENT
Start: 2021-11-23

## 2021-11-09 RX ORDER — 0.9 % SODIUM CHLORIDE 0.9 %
10 VIAL (ML) INJECTION PRN
Status: CANCELLED | OUTPATIENT
Start: 2021-12-06

## 2021-11-09 RX ORDER — 0.9 % SODIUM CHLORIDE 0.9 %
VIAL (ML) INJECTION PRN
Status: CANCELLED | OUTPATIENT
Start: 2021-12-06

## 2021-11-09 RX ORDER — SODIUM CHLORIDE 9 MG/ML
INJECTION, SOLUTION INTRAVENOUS CONTINUOUS
Status: CANCELLED | OUTPATIENT
Start: 2021-11-27

## 2021-11-09 RX ORDER — HEPARIN SODIUM (PORCINE) LOCK FLUSH IV SOLN 100 UNIT/ML 100 UNIT/ML
500 SOLUTION INTRAVENOUS PRN
Status: CANCELLED | OUTPATIENT
Start: 2021-11-16

## 2021-11-09 RX ORDER — 0.9 % SODIUM CHLORIDE 0.9 %
VIAL (ML) INJECTION PRN
Status: CANCELLED | OUTPATIENT
Start: 2021-11-16

## 2021-11-09 RX ORDER — 0.9 % SODIUM CHLORIDE 0.9 %
3 VIAL (ML) INJECTION PRN
Status: CANCELLED | OUTPATIENT
Start: 2021-11-16

## 2021-11-09 RX ORDER — 0.9 % SODIUM CHLORIDE 0.9 %
10 VIAL (ML) INJECTION PRN
Status: CANCELLED | OUTPATIENT
Start: 2021-11-27

## 2021-11-09 RX ORDER — 0.9 % SODIUM CHLORIDE 0.9 %
10 VIAL (ML) INJECTION PRN
Status: CANCELLED | OUTPATIENT
Start: 2021-11-15

## 2021-11-09 RX ORDER — 0.9 % SODIUM CHLORIDE 0.9 %
3 VIAL (ML) INJECTION PRN
Status: CANCELLED | OUTPATIENT
Start: 2021-11-15

## 2021-11-09 RX ORDER — 0.9 % SODIUM CHLORIDE 0.9 %
VIAL (ML) INJECTION PRN
Status: CANCELLED | OUTPATIENT
Start: 2021-11-15

## 2021-11-09 RX ORDER — ONDANSETRON 8 MG/1
8 TABLET, ORALLY DISINTEGRATING ORAL PRN
Status: CANCELLED | OUTPATIENT
Start: 2021-11-16

## 2021-11-09 RX ORDER — ONDANSETRON 2 MG/ML
4 INJECTION INTRAMUSCULAR; INTRAVENOUS PRN
Status: CANCELLED | OUTPATIENT
Start: 2021-12-06

## 2021-11-09 RX ORDER — ONDANSETRON 2 MG/ML
4 INJECTION INTRAMUSCULAR; INTRAVENOUS PRN
Status: CANCELLED | OUTPATIENT
Start: 2021-11-27

## 2021-11-09 RX ORDER — HEPARIN SODIUM (PORCINE) LOCK FLUSH IV SOLN 100 UNIT/ML 100 UNIT/ML
500 SOLUTION INTRAVENOUS PRN
Status: CANCELLED | OUTPATIENT
Start: 2021-11-15

## 2021-11-09 RX ORDER — ONDANSETRON 2 MG/ML
4 INJECTION INTRAMUSCULAR; INTRAVENOUS PRN
Status: CANCELLED | OUTPATIENT
Start: 2021-11-16

## 2021-11-09 RX ORDER — 0.9 % SODIUM CHLORIDE 0.9 %
VIAL (ML) INJECTION PRN
Status: CANCELLED | OUTPATIENT
Start: 2021-11-23

## 2021-11-09 RX ORDER — 0.9 % SODIUM CHLORIDE 0.9 %
10 VIAL (ML) INJECTION PRN
Status: CANCELLED | OUTPATIENT
Start: 2021-11-16

## 2021-11-09 RX ORDER — PROCHLORPERAZINE MALEATE 10 MG
10 TABLET ORAL EVERY 6 HOURS PRN
Status: CANCELLED | OUTPATIENT
Start: 2021-11-27

## 2021-11-16 ENCOUNTER — OUTPATIENT INFUSION SERVICES (OUTPATIENT)
Dept: ONCOLOGY | Facility: MEDICAL CENTER | Age: 66
End: 2021-11-16
Attending: INTERNAL MEDICINE
Payer: MEDICAID

## 2021-11-16 VITALS
HEART RATE: 91 BPM | HEIGHT: 58 IN | DIASTOLIC BLOOD PRESSURE: 72 MMHG | BODY MASS INDEX: 29.11 KG/M2 | WEIGHT: 138.67 LBS | SYSTOLIC BLOOD PRESSURE: 132 MMHG | RESPIRATION RATE: 18 BRPM | TEMPERATURE: 97.5 F | OXYGEN SATURATION: 100 %

## 2021-11-16 DIAGNOSIS — Z17.0 MALIGNANT NEOPLASM OF OVERLAPPING SITES OF RIGHT BREAST IN FEMALE, ESTROGEN RECEPTOR POSITIVE (HCC): ICD-10-CM

## 2021-11-16 DIAGNOSIS — C50.811 MALIGNANT NEOPLASM OF OVERLAPPING SITES OF RIGHT BREAST IN FEMALE, ESTROGEN RECEPTOR POSITIVE (HCC): ICD-10-CM

## 2021-11-16 LAB
ALBUMIN SERPL BCP-MCNC: 3.9 G/DL (ref 3.2–4.9)
ALBUMIN/GLOB SERPL: 1.6 G/DL
ALP SERPL-CCNC: 63 U/L (ref 30–99)
ALT SERPL-CCNC: 13 U/L (ref 2–50)
ANION GAP SERPL CALC-SCNC: 10 MMOL/L (ref 7–16)
AST SERPL-CCNC: 26 U/L (ref 12–45)
BASOPHILS # BLD AUTO: 0.6 % (ref 0–1.8)
BASOPHILS # BLD: 0.05 K/UL (ref 0–0.12)
BILIRUB SERPL-MCNC: 0.2 MG/DL (ref 0.1–1.5)
BUN SERPL-MCNC: 22 MG/DL (ref 8–22)
CALCIUM SERPL-MCNC: 9.1 MG/DL (ref 8.5–10.5)
CHLORIDE SERPL-SCNC: 107 MMOL/L (ref 96–112)
CO2 SERPL-SCNC: 26 MMOL/L (ref 20–33)
CREAT SERPL-MCNC: 0.45 MG/DL (ref 0.5–1.4)
EOSINOPHIL # BLD AUTO: 0.01 K/UL (ref 0–0.51)
EOSINOPHIL NFR BLD: 0.1 % (ref 0–6.9)
ERYTHROCYTE [DISTWIDTH] IN BLOOD BY AUTOMATED COUNT: 64.2 FL (ref 35.9–50)
GLOBULIN SER CALC-MCNC: 2.5 G/DL (ref 1.9–3.5)
GLUCOSE SERPL-MCNC: 99 MG/DL (ref 65–99)
HCT VFR BLD AUTO: 36.9 % (ref 37–47)
HGB BLD-MCNC: 12 G/DL (ref 12–16)
IMM GRANULOCYTES # BLD AUTO: 0.05 K/UL (ref 0–0.11)
IMM GRANULOCYTES NFR BLD AUTO: 0.6 % (ref 0–0.9)
LYMPHOCYTES # BLD AUTO: 1.22 K/UL (ref 1–4.8)
LYMPHOCYTES NFR BLD: 15.8 % (ref 22–41)
MCH RBC QN AUTO: 31.7 PG (ref 27–33)
MCHC RBC AUTO-ENTMCNC: 32.5 G/DL (ref 33.6–35)
MCV RBC AUTO: 97.4 FL (ref 81.4–97.8)
MONOCYTES # BLD AUTO: 1.03 K/UL (ref 0–0.85)
MONOCYTES NFR BLD AUTO: 13.3 % (ref 0–13.4)
NEUTROPHILS # BLD AUTO: 5.36 K/UL (ref 2–7.15)
NEUTROPHILS NFR BLD: 69.6 % (ref 44–72)
NRBC # BLD AUTO: 0 K/UL
NRBC BLD-RTO: 0 /100 WBC
PLATELET # BLD AUTO: 361 K/UL (ref 164–446)
PMV BLD AUTO: 9.4 FL (ref 9–12.9)
POTASSIUM SERPL-SCNC: 4.3 MMOL/L (ref 3.6–5.5)
PROT SERPL-MCNC: 6.4 G/DL (ref 6–8.2)
RBC # BLD AUTO: 3.79 M/UL (ref 4.2–5.4)
SODIUM SERPL-SCNC: 143 MMOL/L (ref 135–145)
WBC # BLD AUTO: 7.7 K/UL (ref 4.8–10.8)

## 2021-11-16 PROCEDURE — 85025 COMPLETE CBC W/AUTO DIFF WBC: CPT

## 2021-11-16 PROCEDURE — A4212 NON CORING NEEDLE OR STYLET: HCPCS

## 2021-11-16 PROCEDURE — 700111 HCHG RX REV CODE 636 W/ 250 OVERRIDE (IP): Performed by: INTERNAL MEDICINE

## 2021-11-16 PROCEDURE — 80053 COMPREHEN METABOLIC PANEL: CPT

## 2021-11-16 PROCEDURE — 96409 CHEMO IV PUSH SNGL DRUG: CPT

## 2021-11-16 PROCEDURE — 700105 HCHG RX REV CODE 258: Performed by: INTERNAL MEDICINE

## 2021-11-16 RX ORDER — HEPARIN SODIUM (PORCINE) LOCK FLUSH IV SOLN 100 UNIT/ML 100 UNIT/ML
500 SOLUTION INTRAVENOUS PRN
Status: DISCONTINUED | OUTPATIENT
Start: 2021-11-16 | End: 2021-11-16 | Stop reason: HOSPADM

## 2021-11-16 RX ADMIN — HEPARIN 500 UNITS: 100 SYRINGE at 16:47

## 2021-11-16 RX ADMIN — VINORELBINE 48 MG: 10 INJECTION, SOLUTION INTRAVENOUS at 16:21

## 2021-11-16 ASSESSMENT — FIBROSIS 4 INDEX: FIB4 SCORE: 0.92

## 2021-11-16 NOTE — PROGRESS NOTES
"Pharmacy Chemotherapy Verification  Patient name: Christine Malhotra  Dx: recurrent Breast CA, stage IV  Regimen: vinorelbine monotherapy   1st line TC (2014), Arimidex (2015), Ibrance/Faslodex ([id number]), Xeloda (2018), Erubilin (2020), gemcitabine (2020), weekly paclitaxel (2020 / 2021), DOX 2021    Vinorelbine 30mg/m2 IV over 10 minutes Day 1,8,15   [id number] until progression or toxicity   *Dosing Reference*  NCCN Guidelines Breast Cancer v8.2021  Selene YAON et al. Weekly vinorelbine is an effective palliative regimen after failure with anthracyclines and taxanes in metastatic breast carcinoma. Cancer. 2001 Nov 1;92(2):2266-92.    Allergies: Patient has no known allergies.  /72   Pulse 91   Temp 36.4 °C (97.5 °F) (Temporal)   Resp 18   Ht 1.465 m (4' 9.68\")   Wt 62.9 kg (138 lb 10.7 oz)   SpO2 100%   BMI 29.31 kg/m²  Body surface area is 1.6 meters squared.     Labs: 11/16/21   Meet treatment parameters    TTE 8/31/21 LVEF ~ 55%    Path  ER+, [place]+  HER2-    Drug Order   (Drug name, dose, route, IV Fluid & volume, frequency, number of doses) Cycle 1, Day 1  Previous treatment: 10/26/21      Medication = Vinorelbine   Base Dose = 30 mg/m2  Calc Dose: Base Dose x 1.6 m2 = 48 mg  Final Dose = 48 mg   Route = IV  Fluid & Volume = NS 25 mL  Admin Duration = Over 6-10 minutes            Ok to treat with final dose       "

## 2021-11-16 NOTE — PROGRESS NOTES
Chemotherapy Verification - SECONDARY RN       Height = 146.5 cm  Weight = 62.9 kg  BSA = 1.6 m2       Medication: Navelbine  Dose: 30 mg/m2    Calculated Dose: 48 mg                             I confirm that this process was performed independently.

## 2021-11-16 NOTE — PROGRESS NOTES
Christine is here for vinorelbine (NAVELBINE). Right upper chest port accessed using sterile technique; labs drawn as order. Using  services, plan of care of discussed. Vinorelbine (NAVELBINE) given per MAR. Line flushed line with 100 ml of NS after vinORELBine. Heparin instilled prior to de-accessing port. Port de-accessed; gauze/tape applied to site. Next appointment scheduled. Discharged to self care; no apparent distress noted.

## 2021-11-16 NOTE — PROGRESS NOTES
"Pharmacy Chemotherapy calculation:    DX: Recurrent Breast Cancer     Cycle 1    Day 1  Previous treatment = Multiple therapies, most recent = doxorubicin 10/26/21    Regimen: vinorelbine  Vinorelbine 25-30mg/m2 IV over 5-10 min On days 1, 8 and 15  q28 days until DP/UT  NCCN Guidelines for breast cancer V.2.2021  Selene AYON et al -  Cancer 2001 Nov 1;92(9):2262-72.   Michelle MICHAEL et al - Curr Oncol 2014 Oct;21(5):e723-7.     /72   Pulse 91   Temp 36.4 °C (97.5 °F) (Temporal)   Resp 18   Ht 1.465 m (4' 9.68\")   Wt 62.9 kg (138 lb 10.7 oz)   SpO2 100%   BMI 29.31 kg/m²   Body surface area is 1.6 meters squared.     All lab results 11/16/21 within treatment parameters.     Vinorelbine 30mg/m2 x 1.6m2 = 48mg   <10% difference, ok to treat with final dose = 48mg IV      ALBERT Urbina, Pharm.D.      "

## 2021-11-17 ENCOUNTER — APPOINTMENT (OUTPATIENT)
Dept: ONCOLOGY | Facility: MEDICAL CENTER | Age: 66
End: 2021-11-17
Attending: INTERNAL MEDICINE
Payer: MEDICAID

## 2021-11-17 NOTE — PROGRESS NOTES
Chemotherapy Verification - PRIMARY RN      Height = 146.5 cm  Weight = 62.9 kg  BSA = 1.6 m^2       Medication: vinorelbine (NAVELVINE)  Dose: 30 mg/m^2  Calculated Dose: 48 mg                             (In mg/m2, AUC, mg/kg)     I confirm this process was performed independently with the BSA and all final chemotherapy dosing calculations congruent.  Any discrepancies of 10% or greater have been addressed with the chemotherapy pharmacist. The resolution of the discrepancy has been documented in the EPIC progress notes.

## 2021-11-23 ENCOUNTER — OUTPATIENT INFUSION SERVICES (OUTPATIENT)
Dept: ONCOLOGY | Facility: MEDICAL CENTER | Age: 66
End: 2021-11-23
Attending: INTERNAL MEDICINE
Payer: MEDICAID

## 2021-11-23 VITALS
SYSTOLIC BLOOD PRESSURE: 128 MMHG | TEMPERATURE: 97 F | HEART RATE: 86 BPM | HEIGHT: 58 IN | WEIGHT: 138.67 LBS | RESPIRATION RATE: 18 BRPM | OXYGEN SATURATION: 93 % | DIASTOLIC BLOOD PRESSURE: 59 MMHG | BODY MASS INDEX: 29.11 KG/M2

## 2021-11-23 DIAGNOSIS — Z17.0 MALIGNANT NEOPLASM OF OVERLAPPING SITES OF RIGHT BREAST IN FEMALE, ESTROGEN RECEPTOR POSITIVE (HCC): ICD-10-CM

## 2021-11-23 DIAGNOSIS — C50.811 MALIGNANT NEOPLASM OF OVERLAPPING SITES OF RIGHT BREAST IN FEMALE, ESTROGEN RECEPTOR POSITIVE (HCC): ICD-10-CM

## 2021-11-23 LAB
BASOPHILS # BLD AUTO: 1.2 % (ref 0–1.8)
BASOPHILS # BLD: 0.05 K/UL (ref 0–0.12)
EOSINOPHIL # BLD AUTO: 0.01 K/UL (ref 0–0.51)
EOSINOPHIL NFR BLD: 0.2 % (ref 0–6.9)
ERYTHROCYTE [DISTWIDTH] IN BLOOD BY AUTOMATED COUNT: 59.5 FL (ref 35.9–50)
HCT VFR BLD AUTO: 33.8 % (ref 37–47)
HGB BLD-MCNC: 11.2 G/DL (ref 12–16)
IMM GRANULOCYTES # BLD AUTO: 0.02 K/UL (ref 0–0.11)
IMM GRANULOCYTES NFR BLD AUTO: 0.5 % (ref 0–0.9)
LYMPHOCYTES # BLD AUTO: 1.32 K/UL (ref 1–4.8)
LYMPHOCYTES NFR BLD: 31.3 % (ref 22–41)
MCH RBC QN AUTO: 31.6 PG (ref 27–33)
MCHC RBC AUTO-ENTMCNC: 33.1 G/DL (ref 33.6–35)
MCV RBC AUTO: 95.5 FL (ref 81.4–97.8)
MONOCYTES # BLD AUTO: 0.26 K/UL (ref 0–0.85)
MONOCYTES NFR BLD AUTO: 6.2 % (ref 0–13.4)
NEUTROPHILS # BLD AUTO: 2.56 K/UL (ref 2–7.15)
NEUTROPHILS NFR BLD: 60.6 % (ref 44–72)
NRBC # BLD AUTO: 0 K/UL
NRBC BLD-RTO: 0 /100 WBC
PLATELET # BLD AUTO: 300 K/UL (ref 164–446)
PMV BLD AUTO: 10.2 FL (ref 9–12.9)
RBC # BLD AUTO: 3.54 M/UL (ref 4.2–5.4)
WBC # BLD AUTO: 4.2 K/UL (ref 4.8–10.8)

## 2021-11-23 PROCEDURE — 700111 HCHG RX REV CODE 636 W/ 250 OVERRIDE (IP): Performed by: INTERNAL MEDICINE

## 2021-11-23 PROCEDURE — 85025 COMPLETE CBC W/AUTO DIFF WBC: CPT

## 2021-11-23 PROCEDURE — 36591 DRAW BLOOD OFF VENOUS DEVICE: CPT

## 2021-11-23 PROCEDURE — A4212 NON CORING NEEDLE OR STYLET: HCPCS

## 2021-11-23 RX ORDER — 0.9 % SODIUM CHLORIDE 0.9 %
VIAL (ML) INJECTION PRN
Status: CANCELLED | OUTPATIENT
Start: 2021-11-27

## 2021-11-23 RX ORDER — 0.9 % SODIUM CHLORIDE 0.9 %
VIAL (ML) INJECTION PRN
Status: DISCONTINUED | OUTPATIENT
Start: 2021-11-23 | End: 2021-11-23 | Stop reason: HOSPADM

## 2021-11-23 RX ORDER — HEPARIN SODIUM (PORCINE) LOCK FLUSH IV SOLN 100 UNIT/ML 100 UNIT/ML
500 SOLUTION INTRAVENOUS PRN
Status: CANCELLED | OUTPATIENT
Start: 2021-11-27

## 2021-11-23 RX ORDER — HEPARIN SODIUM (PORCINE) LOCK FLUSH IV SOLN 100 UNIT/ML 100 UNIT/ML
500 SOLUTION INTRAVENOUS PRN
Status: DISCONTINUED | OUTPATIENT
Start: 2021-11-23 | End: 2021-11-23 | Stop reason: HOSPADM

## 2021-11-23 RX ADMIN — HEPARIN 500 UNITS: 100 SYRINGE at 17:49

## 2021-11-23 ASSESSMENT — FIBROSIS 4 INDEX: FIB4 SCORE: 1.28

## 2021-11-23 NOTE — PROGRESS NOTES
"Pharmacy Chemotherapy Verification  **PATIENT RESCHEDULED DUE TO DRUG AVAILABILITY**    DX: Recurrent Breast Cancer     Cycle 1    Day 8  Previous treatment = 11/16/21    Regimen: vinorelbine  Vinorelbine 25-30 mg/m2 IV over 5-10 min On days 1, 8 and 15  q28 days until DP/UT  NCCN Guidelines for breast cancer V.2.2021  Selene L, et al -  Cancer 2001 Nov 1;92(9):2265-72.   Michelle MICHAEL et al - Curr Oncol 2014 Oct;21(5):e723-7.     Allergies:Patient has no known allergies.  /59   Pulse 86   Temp 36.1 °C (97 °F) (Temporal)   Resp 18   Ht 1.465 m (4' 9.68\")   Wt 62.9 kg (138 lb 10.7 oz)   SpO2 93%   BMI 29.31 kg/m²   Body surface area is 1.6 meters squared.     Labs 11/23/21  ANC 2560 Hgb 11.2 Plt 300k    Labs 11/16/21  SCr 0.45 CrCl 80.2 mL/min   AST/ALT/AP = 26/13/63 Tbili = 0.2    vinorelbine 30 mg/m2 x 1.6 m2 = 48 mg   <10% difference, ok to treat with final dose = 0 mg IV    Roseanne Gross, PharmD, BCOP      "

## 2021-11-24 NOTE — PROGRESS NOTES
Chemotherapy Verification - SECONDARY RN       Height = 1.465 m  Weight = 62.9 kg  BSA = 1.6 m^2       Medication: vinorelbine  Dose: 30 mg /m^2  Calculated Dose: 48 mg                             (In mg/m2, AUC, mg/kg)       I confirm that this process was performed independently.

## 2021-11-24 NOTE — PROGRESS NOTES
"Pharmacy Chemotherapy Verification Note:  ADDENDUM: NO CHEMO GIVEN 11/23/21, DRUG NOT IN STOCK  Dx: recurrent Breast Cancer        Regimen: vinorelbine  Vinorelbine 25-30mg/m2 IV over 5-10 min On days 1, 8 and 15  28-day cycle until disease progression or unacceptable toxicity  NCCN Guidelines for breast cancer V.2.2021  Selene AYON et al -  Cancer 2001 Nov 1;92(9):2267-72.   werner Dejesus - Curr Oncol 2014 Oct;21(5):e723-7.    Allergies:  Patient has no known allergies.     /59   Pulse 86   Temp 36.1 °C (97 °F) (Temporal)   Resp 18   Ht 1.465 m (4' 9.68\")   Wt 62.9 kg (138 lb 10.7 oz)   SpO2 93%   BMI 29.31 kg/m²  Body surface area is 1.6 meters squared.    Labs from 11/23/21 reviewed - all within treatment parameters.     Drug Order   (Drug name, dose, route, IV Fluid & volume, frequency, number of doses)   Previous treatment: C1D1 11/16/21               <10% difference, okay to treat with final dose     By my signature below, I confirm this process was performed independently with the BSA and all final chemotherapy dosing calculations congruent. I have reviewed the above chemotherapy order and that my calculation of the final dose and BSA (when applicable) corroborate those calculations of the  pharmacist.     Anny Camilo, PharmD, BCOP     "

## 2021-11-24 NOTE — PROGRESS NOTES
Christine is here for Day 8 vinorelbine (NAVELBINE). Right upper chest port accessed using sterile technique; labs drawn as order. Using  services, plan of care of discussed. Using  services, dicussed that chemotherapy is not available to administer today. Appointment rescheduled to Saturday. Appointment handout provided to Christine. Heparin instilled prior to de-accessing port. Port de-accessed; gauze/tape applied to site. Next appointment scheduled. Discharged to self care; no apparent distress noted.

## 2021-11-24 NOTE — PROGRESS NOTES
Chemotherapy Verification - PRIMARY RN      Height = 146.5 cm  Weight = 62.9 kg  BSA = 1.6 m^2      Medication: vinorelbine (NAVELBINE)  Dose: 30 mg/m^2  Calculated Dose: 28.4 mg                             (In mg/m2, AUC, mg/kg)     I confirm this process was performed independently with the BSA and all final chemotherapy dosing calculations congruent.  Any discrepancies of 10% or greater have been addressed with the chemotherapy pharmacist. The resolution of the discrepancy has been documented in the EPIC progress notes.

## 2021-11-27 ENCOUNTER — OUTPATIENT INFUSION SERVICES (OUTPATIENT)
Dept: ONCOLOGY | Facility: MEDICAL CENTER | Age: 66
End: 2021-11-27
Attending: INTERNAL MEDICINE
Payer: MEDICAID

## 2021-11-27 VITALS
HEART RATE: 100 BPM | WEIGHT: 139.33 LBS | TEMPERATURE: 98.1 F | BODY MASS INDEX: 30.06 KG/M2 | OXYGEN SATURATION: 97 % | RESPIRATION RATE: 18 BRPM | SYSTOLIC BLOOD PRESSURE: 149 MMHG | DIASTOLIC BLOOD PRESSURE: 75 MMHG | HEIGHT: 57 IN

## 2021-11-27 DIAGNOSIS — C50.811 MALIGNANT NEOPLASM OF OVERLAPPING SITES OF RIGHT BREAST IN FEMALE, ESTROGEN RECEPTOR POSITIVE (HCC): ICD-10-CM

## 2021-11-27 DIAGNOSIS — Z17.0 MALIGNANT NEOPLASM OF OVERLAPPING SITES OF RIGHT BREAST IN FEMALE, ESTROGEN RECEPTOR POSITIVE (HCC): ICD-10-CM

## 2021-11-27 PROCEDURE — 96409 CHEMO IV PUSH SNGL DRUG: CPT

## 2021-11-27 PROCEDURE — A4212 NON CORING NEEDLE OR STYLET: HCPCS

## 2021-11-27 PROCEDURE — 700111 HCHG RX REV CODE 636 W/ 250 OVERRIDE (IP): Performed by: INTERNAL MEDICINE

## 2021-11-27 PROCEDURE — 700111 HCHG RX REV CODE 636 W/ 250 OVERRIDE (IP)

## 2021-11-27 PROCEDURE — 700105 HCHG RX REV CODE 258: Performed by: INTERNAL MEDICINE

## 2021-11-27 RX ORDER — HEPARIN SODIUM (PORCINE) LOCK FLUSH IV SOLN 100 UNIT/ML 100 UNIT/ML
500 SOLUTION INTRAVENOUS PRN
Status: DISCONTINUED | OUTPATIENT
Start: 2021-11-27 | End: 2021-11-27 | Stop reason: HOSPADM

## 2021-11-27 RX ORDER — HEPARIN SODIUM (PORCINE) LOCK FLUSH IV SOLN 100 UNIT/ML 100 UNIT/ML
SOLUTION INTRAVENOUS
Status: COMPLETED
Start: 2021-11-27 | End: 2021-11-27

## 2021-11-27 RX ADMIN — HEPARIN 500 UNITS: 100 SYRINGE at 16:59

## 2021-11-27 RX ADMIN — VINORELBINE 48 MG: 10 INJECTION, SOLUTION INTRAVENOUS at 16:30

## 2021-11-27 ASSESSMENT — FIBROSIS 4 INDEX: FIB4 SCORE: 1.54

## 2021-11-27 NOTE — PROGRESS NOTES
"Pharmacy Chemotherapy Verification Note:    Dx: recurrent Breast Cancer        Regimen: vinorelbine  Vinorelbine 25-30mg/m2 IV over 5-10 min On days 1, 8 and 15  28-day cycle until disease progression or unacceptable toxicity  NCCN Guidelines for breast cancer V.2.2021  Selene L et al -  Cancer 2001 Nov 1;92(9):2267-72.   werner Dejesus - Curr Oncol 2014 Oct;21(5):e723-7.    Allergies:  Patient has no known allergies.     /75   Pulse 100   Temp 36.7 °C (98.1 °F) (Temporal)   Resp 18   Ht 1.45 m (4' 9.09\")   Wt 63.2 kg (139 lb 5.3 oz)   SpO2 97%   BMI 30.06 kg/m²  Body surface area is 1.6 meters squared.     Labs from 11/23/21 reviewed - all within treatment parameters.     Drug Order   (Drug name, dose, route, IV Fluid & volume, frequency, number of doses) Cycle: 1 Day 8 delayed due to medication availability/national shortage  Previous treatment: C1D1 = 11/16/21     Medication = vinorelbine  Base Dose = 30 mg/m2  Calc Dose: Base Dose x 1.60 m2 = 48 mg  Final Dose = 48 mg  Route = IV  Fluid & Volume = NS 25 mL  Admin Duration = Over 6-10 mins          <10% difference, okay to treat with final dose     By my signature below, I confirm this process was performed independently with the BSA and all final chemotherapy dosing calculations congruent. I have reviewed the above chemotherapy order and that my calculation of the final dose and BSA (when applicable) corroborate those calculations of the  pharmacist.     Caleb Padilla, PharmD    "

## 2021-11-27 NOTE — PROGRESS NOTES
"Pharmacy Chemotherapy Verification  Patient name: Christine Malhotra  Dx: recurrent Breast CA, stage IV  Regimen: vinorelbine monotherapy  1st line TC (2014), Arimidex (2015), Ibrance/Faslodex ([id number]), Xeloda (2018), Erubilin (2020), gemcitabine (2020), weekly paclitaxel (2020 / 2021), DOX 2021  Cycle 1, Day 8  Previous treatment: 11/16/21    Vinorelbine 30mg/m2 IV over 10 minutes Day 1,8,15   [id number] until progression or toxicity   *Dosing Reference*  NCCN Guidelines Breast Cancer v8.2021  Selene AYON, et al. Weekly vinorelbine is an effective palliative regimen after failure with anthracyclines and taxanes in metastatic breast carcinoma. Cancer. 2001 Nov 1;92():2268-14.    Allergies: Patient has no known allergies.  /75   Pulse 100   Temp 36.7 °C (98.1 °F) (Temporal)   Resp 18   Ht 1.45 m (4' 9.09\")   Wt 63.2 kg (139 lb 5.3 oz)   SpO2 97%   BMI 30.06 kg/m²  Body surface area is 1.6 meters squared.     Labs: 11/23/21   Meet treatment parameters    TTE 8/31/21 LVEF ~ 55%    Path  ER+, [place]+  HER2-    vinorelbine 30 mg/m2 x 1.6 m2 = 48 mg   Ok to treat with final dose = 48 mg IV    "

## 2021-11-28 NOTE — PROGRESS NOTES
Chemotherapy Verification - PRIMARY RN      Height = 145 cm  Weight = 63.2 kg  BSA = 1.6 m^2       Medication: Vinorelbine  Dose: 30 mg/m^2  Calculated Dose: 48 mg                             (In mg/m2, AUC, mg/kg)       I confirm this process was performed independently with the BSA and all final chemotherapy dosing calculations congruent.  Any discrepancies of 10% or greater have been addressed with the chemotherapy pharmacist. The resolution of the discrepancy has been documented in the EPIC progress notes.

## 2021-11-28 NOTE — PROGRESS NOTES
Chemotherapy Verification - SECONDARY RN       Height = 145 cm  Weight = 63.2 kg  BSA = 1.6 m2       Medication: Vinorelbine  Dose: 30 mg/m2  Calculated Dose: 48 mg                             (In mg/m2, AUC, mg/kg)       I confirm that this process was performed independently.

## 2021-11-28 NOTE — PROGRESS NOTES
Pt presented to infusion for Venorelbine. She could not get treated on Tuesday as there was no drug available. Per José PharmD, Ok to use labs drawn Tues for today's tx. Pt denied any s/s of infection. Port accessed in sterile fashion, brisk blood return observed. Venorelbine infused over 10 mins without issue. Rinsed with 100 ml NS per MAR instructions. Port flushed, heparinized and de-accessed with needle intact, gauze drsg placed. Pt has next appt but is aware that we have a national shortage of drug. Dr. Jarvis aware and to make new plan with pt this week. Triage RN Ashley and Bubba PharmD also made aware. Pt left on foot to self care.

## 2021-11-30 ENCOUNTER — HOSPITAL ENCOUNTER (OUTPATIENT)
Dept: RADIOLOGY | Facility: MEDICAL CENTER | Age: 66
End: 2021-11-30
Attending: INTERNAL MEDICINE
Payer: MEDICAID

## 2021-11-30 ENCOUNTER — APPOINTMENT (OUTPATIENT)
Dept: ONCOLOGY | Facility: MEDICAL CENTER | Age: 66
End: 2021-11-30
Attending: INTERNAL MEDICINE
Payer: MEDICAID

## 2021-11-30 ENCOUNTER — APPOINTMENT (OUTPATIENT)
Dept: LAB | Facility: MEDICAL CENTER | Age: 66
End: 2021-11-30
Payer: MEDICAID

## 2021-11-30 DIAGNOSIS — C50.611 MALIGNANT NEOPLASM OF AXILLARY TAIL OF RIGHT FEMALE BREAST, UNSPECIFIED ESTROGEN RECEPTOR STATUS (HCC): ICD-10-CM

## 2021-11-30 DIAGNOSIS — I26.99 PULMONARY EMBOLISM, UNSPECIFIED CHRONICITY, UNSPECIFIED PULMONARY EMBOLISM TYPE, UNSPECIFIED WHETHER ACUTE COR PULMONALE PRESENT (HCC): ICD-10-CM

## 2021-11-30 DIAGNOSIS — Z79.899 NEED FOR PROPHYLACTIC CHEMOTHERAPY: ICD-10-CM

## 2021-11-30 DIAGNOSIS — Z51.11 ENCOUNTER FOR ANTINEOPLASTIC CHEMOTHERAPY: ICD-10-CM

## 2021-11-30 DIAGNOSIS — R97.8 ABNORMAL TUMOR MARKERS: ICD-10-CM

## 2021-11-30 PROCEDURE — 71101 X-RAY EXAM UNILAT RIBS/CHEST: CPT | Mod: RT

## 2021-12-05 NOTE — PROGRESS NOTES
"Pharmacy Chemotherapy Verification  Patient name: Christine Malhotra  Dx: recurrent Breast CA, stage IV  Regimen: vinorelbine monotherapy  1st line TC (2014), Arimidex (2015), Ibrance/Faslodex ([id number]), Xeloda (2018), Erubilin (2020), gemcitabine (2020), weekly paclitaxel (2020 / 2021), DOX 2021  Cycle 1, Day 15  Previous treatment: 11/27/21    Vinorelbine 30mg/m2 IV over 10 minutes Day 1,8,15   [id number] until progression or toxicity   *Dosing Reference*  NCCN Guidelines Breast Cancer v8.2021  Selene AYON, et al. Weekly vinorelbine is an effective palliative regimen after failure with anthracyclines and taxanes in metastatic breast carcinoma. Cancer. 2001 Nov 1;92(4):2266-16.    Allergies: Patient has no known allergies.  BP (!) 164/78   Pulse 86   Temp 36.7 °C (98 °F) (Temporal)   Resp 18   Ht 1.46 m (4' 9.48\")   Wt 63.2 kg (139 lb 5.3 oz)   SpO2 98%   BMI 29.65 kg/m²  Body surface area is 1.6 meters squared.     Labs: 12/6/21   Meet treatment parameters    TTE 8/31/21 LVEF ~ 55%    Path:  ER+, [place]+  HER2-    vinorelbine 30 mg/m2 x 1.6 m2 = 48 mg   Ok to treat with final dose = 48 mg IV    "

## 2021-12-06 ENCOUNTER — OUTPATIENT INFUSION SERVICES (OUTPATIENT)
Dept: ONCOLOGY | Facility: MEDICAL CENTER | Age: 66
End: 2021-12-06
Attending: INTERNAL MEDICINE
Payer: MEDICAID

## 2021-12-06 VITALS
HEART RATE: 86 BPM | BODY MASS INDEX: 30.06 KG/M2 | DIASTOLIC BLOOD PRESSURE: 78 MMHG | WEIGHT: 139.33 LBS | OXYGEN SATURATION: 98 % | HEIGHT: 57 IN | TEMPERATURE: 98 F | RESPIRATION RATE: 18 BRPM | SYSTOLIC BLOOD PRESSURE: 164 MMHG

## 2021-12-06 DIAGNOSIS — C50.811 MALIGNANT NEOPLASM OF OVERLAPPING SITES OF RIGHT BREAST IN FEMALE, ESTROGEN RECEPTOR POSITIVE (HCC): ICD-10-CM

## 2021-12-06 DIAGNOSIS — Z17.0 MALIGNANT NEOPLASM OF OVERLAPPING SITES OF RIGHT BREAST IN FEMALE, ESTROGEN RECEPTOR POSITIVE (HCC): ICD-10-CM

## 2021-12-06 LAB
BASOPHILS # BLD AUTO: 0.7 % (ref 0–1.8)
BASOPHILS # BLD: 0.03 K/UL (ref 0–0.12)
EOSINOPHIL # BLD AUTO: 0.13 K/UL (ref 0–0.51)
EOSINOPHIL NFR BLD: 3.1 % (ref 0–6.9)
ERYTHROCYTE [DISTWIDTH] IN BLOOD BY AUTOMATED COUNT: 58 FL (ref 35.9–50)
HCT VFR BLD AUTO: 33.4 % (ref 37–47)
HGB BLD-MCNC: 10.8 G/DL (ref 12–16)
IMM GRANULOCYTES # BLD AUTO: 0.01 K/UL (ref 0–0.11)
IMM GRANULOCYTES NFR BLD AUTO: 0.2 % (ref 0–0.9)
LYMPHOCYTES # BLD AUTO: 1.47 K/UL (ref 1–4.8)
LYMPHOCYTES NFR BLD: 35.5 % (ref 22–41)
MCH RBC QN AUTO: 30.7 PG (ref 27–33)
MCHC RBC AUTO-ENTMCNC: 32.3 G/DL (ref 33.6–35)
MCV RBC AUTO: 94.9 FL (ref 81.4–97.8)
MONOCYTES # BLD AUTO: 0.29 K/UL (ref 0–0.85)
MONOCYTES NFR BLD AUTO: 7 % (ref 0–13.4)
NEUTROPHILS # BLD AUTO: 2.21 K/UL (ref 2–7.15)
NEUTROPHILS NFR BLD: 53.5 % (ref 44–72)
NRBC # BLD AUTO: 0 K/UL
NRBC BLD-RTO: 0 /100 WBC
PLATELET # BLD AUTO: 283 K/UL (ref 164–446)
PMV BLD AUTO: 10.1 FL (ref 9–12.9)
RBC # BLD AUTO: 3.52 M/UL (ref 4.2–5.4)
WBC # BLD AUTO: 4.1 K/UL (ref 4.8–10.8)

## 2021-12-06 PROCEDURE — 85025 COMPLETE CBC W/AUTO DIFF WBC: CPT

## 2021-12-06 PROCEDURE — 700105 HCHG RX REV CODE 258: Performed by: INTERNAL MEDICINE

## 2021-12-06 PROCEDURE — 700111 HCHG RX REV CODE 636 W/ 250 OVERRIDE (IP): Performed by: INTERNAL MEDICINE

## 2021-12-06 PROCEDURE — 81003 URINALYSIS AUTO W/O SCOPE: CPT

## 2021-12-06 PROCEDURE — 96409 CHEMO IV PUSH SNGL DRUG: CPT

## 2021-12-06 RX ORDER — HEPARIN SODIUM (PORCINE) LOCK FLUSH IV SOLN 100 UNIT/ML 100 UNIT/ML
500 SOLUTION INTRAVENOUS PRN
Status: DISCONTINUED | OUTPATIENT
Start: 2021-12-06 | End: 2021-12-06 | Stop reason: HOSPADM

## 2021-12-06 RX ADMIN — VINORELBINE 48 MG: 10 INJECTION, SOLUTION INTRAVENOUS at 17:25

## 2021-12-06 RX ADMIN — HEPARIN 500 UNITS: 100 SYRINGE at 17:45

## 2021-12-06 ASSESSMENT — FIBROSIS 4 INDEX: FIB4 SCORE: 1.54

## 2021-12-06 NOTE — PROGRESS NOTES
"Pharmacy Chemotherapy Verification Note:    Dx: recurrent Breast Cancer        Regimen: vinorelbine  Vinorelbine 25-30mg/m2 IV over 5-10 min On days 1, 8 and 15  28-day cycle until disease progression or unacceptable toxicity  NCCN Guidelines for breast cancer V.2.2021  Selene L, et al -  Cancer 2001 Nov 1;92(9):2267-72.   werner Dejesus - Curr Oncol 2014 Oct;21(5):e723-7.    Allergies:  Patient has no known allergies.     BP (!) 164/78   Pulse 86   Temp 36.7 °C (98 °F) (Temporal)   Resp 18   Ht 1.46 m (4' 9.48\")   Wt 63.2 kg (139 lb 5.3 oz)   SpO2 98%   BMI 29.65 kg/m²  Body surface area is 1.6 meters squared.     Labs 12/6/21:  ANC~ 2210 Plt = 283k   Hgb = 10.8        Drug Order   (Drug name, dose, route, IV Fluid & volume, frequency, number of doses) Cycle 1, Day 15  Previous treatment: C1D8 on 11/27/21     Medication = vinorelbine (Navelbine)   Base Dose = 30 mg/m2  Calc Dose: Base Dose x 1.6 m2 = 48 mg  Final Dose = 48 mg  Route = IV  Fluid & Volume = NS 25 mL  Admin Duration = Over 6-10 minutes          <10% difference, okay to treat with final dose     By my signature below, I confirm this process was performed independently with the BSA and all final chemotherapy dosing calculations congruent. I have reviewed the above chemotherapy order and that my calculation of the final dose and BSA (when applicable) corroborate those calculations of the  pharmacist.     Matt Humphries, PharmD    "

## 2021-12-06 NOTE — PROGRESS NOTES
Chemotherapy Verification - PRIMARY RN      Height = 146 cm  Weight = 63.2 kg  BSA = 1.6 m2       Medication: Vinorelbine  Dose: 30 mg/m2  Calculated Dose: 48 mg                             (In mg/m2, AUC, mg/kg)         I confirm this process was performed independently with the BSA and all final chemotherapy dosing calculations congruent.  Any discrepancies of 10% or greater have been addressed with the chemotherapy pharmacist. The resolution of the discrepancy has been documented in the EPIC progress notes.

## 2021-12-07 ENCOUNTER — APPOINTMENT (OUTPATIENT)
Dept: ONCOLOGY | Facility: MEDICAL CENTER | Age: 66
End: 2021-12-07
Attending: INTERNAL MEDICINE
Payer: MEDICAID

## 2021-12-07 LAB
APPEARANCE UR: CLEAR
BILIRUB UR QL STRIP.AUTO: NEGATIVE
COLOR UR: YELLOW
GLUCOSE UR STRIP.AUTO-MCNC: NEGATIVE MG/DL
KETONES UR STRIP.AUTO-MCNC: NEGATIVE MG/DL
LEUKOCYTE ESTERASE UR QL STRIP.AUTO: NEGATIVE
MICRO URNS: NORMAL
NITRITE UR QL STRIP.AUTO: NEGATIVE
PH UR STRIP.AUTO: 6 [PH] (ref 5–8)
PROT UR QL STRIP: NEGATIVE MG/DL
RBC UR QL AUTO: NEGATIVE
SP GR UR STRIP.AUTO: 1.01
UROBILINOGEN UR STRIP.AUTO-MCNC: 0.2 MG/DL

## 2021-12-07 NOTE — PROGRESS NOTES
Christine arrived ambulatory to [facility] for Day 15 Cycle 1 Venorelbine.  Interpretor Ipad utilized to discuss POC.  Christine reports concerns about a possible UTI due to foul-smelling urine, denies pain or frequency with urination.  Telephone order received for UA/ possible urine culture from Dr Jarvis, as well as to proceed with treatment.  Right chest port accessed in sterile fashion, brisk blood return observed, CBC collected and reviewed.  Parameters met for treatment today.  Venorelbine administered per MAR, Christine tolerated well. Port flushed with NS, heparin instilled, port de-accessed, sterile gauze and tape dressing to site.  Christine discharged in NAD, next appointment confirmed.

## 2021-12-07 NOTE — PROGRESS NOTES
Chemotherapy Verification - SECONDARY RN       Height = 146 cm  Weight = 63.2 kg  BSA = 1.6 m^2       Medication: vinorelbine (NAVELBINE)  Dose: 30 mg/m^2  Calculated Dose: 48 mg                             (In mg/m2, AUC, mg/kg)     I confirm that this process was performed independently.

## 2021-12-08 ENCOUNTER — HOSPITAL ENCOUNTER (OUTPATIENT)
Dept: RADIOLOGY | Facility: MEDICAL CENTER | Age: 66
End: 2021-12-08
Payer: MEDICAID

## 2021-12-14 ENCOUNTER — APPOINTMENT (OUTPATIENT)
Dept: ONCOLOGY | Facility: MEDICAL CENTER | Age: 66
End: 2021-12-14
Attending: INTERNAL MEDICINE
Payer: MEDICAID

## 2021-12-14 RX ORDER — ONDANSETRON 2 MG/ML
4 INJECTION INTRAMUSCULAR; INTRAVENOUS PRN
Status: CANCELLED | OUTPATIENT
Start: 2021-12-21

## 2021-12-14 RX ORDER — 0.9 % SODIUM CHLORIDE 0.9 %
3 VIAL (ML) INJECTION PRN
Status: CANCELLED | OUTPATIENT
Start: 2022-01-10

## 2021-12-14 RX ORDER — 0.9 % SODIUM CHLORIDE 0.9 %
10 VIAL (ML) INJECTION PRN
Status: CANCELLED | OUTPATIENT
Start: 2022-01-10

## 2021-12-14 RX ORDER — 0.9 % SODIUM CHLORIDE 0.9 %
VIAL (ML) INJECTION PRN
Status: CANCELLED | OUTPATIENT
Start: 2022-01-03

## 2021-12-14 RX ORDER — SODIUM CHLORIDE 9 MG/ML
INJECTION, SOLUTION INTRAVENOUS CONTINUOUS
Status: CANCELLED | OUTPATIENT
Start: 2022-01-03

## 2021-12-14 RX ORDER — ONDANSETRON 2 MG/ML
4 INJECTION INTRAMUSCULAR; INTRAVENOUS PRN
Status: CANCELLED | OUTPATIENT
Start: 2022-01-10

## 2021-12-14 RX ORDER — SODIUM CHLORIDE 9 MG/ML
INJECTION, SOLUTION INTRAVENOUS CONTINUOUS
Status: CANCELLED | OUTPATIENT
Start: 2021-12-21

## 2021-12-14 RX ORDER — PROCHLORPERAZINE MALEATE 10 MG
10 TABLET ORAL EVERY 6 HOURS PRN
Status: CANCELLED | OUTPATIENT
Start: 2022-01-03

## 2021-12-14 RX ORDER — PROCHLORPERAZINE MALEATE 10 MG
10 TABLET ORAL EVERY 6 HOURS PRN
Status: CANCELLED | OUTPATIENT
Start: 2021-12-21

## 2021-12-14 RX ORDER — 0.9 % SODIUM CHLORIDE 0.9 %
VIAL (ML) INJECTION PRN
Status: CANCELLED | OUTPATIENT
Start: 2021-12-19

## 2021-12-14 RX ORDER — HEPARIN SODIUM (PORCINE) LOCK FLUSH IV SOLN 100 UNIT/ML 100 UNIT/ML
500 SOLUTION INTRAVENOUS PRN
Status: CANCELLED | OUTPATIENT
Start: 2022-01-10

## 2021-12-14 RX ORDER — 0.9 % SODIUM CHLORIDE 0.9 %
VIAL (ML) INJECTION PRN
Status: CANCELLED | OUTPATIENT
Start: 2021-12-21

## 2021-12-14 RX ORDER — ONDANSETRON 8 MG/1
8 TABLET, ORALLY DISINTEGRATING ORAL PRN
Status: CANCELLED | OUTPATIENT
Start: 2021-12-21

## 2021-12-14 RX ORDER — HEPARIN SODIUM (PORCINE) LOCK FLUSH IV SOLN 100 UNIT/ML 100 UNIT/ML
500 SOLUTION INTRAVENOUS PRN
Status: CANCELLED | OUTPATIENT
Start: 2022-01-03

## 2021-12-14 RX ORDER — 0.9 % SODIUM CHLORIDE 0.9 %
10 VIAL (ML) INJECTION PRN
Status: CANCELLED | OUTPATIENT
Start: 2022-01-03

## 2021-12-14 RX ORDER — PROCHLORPERAZINE MALEATE 10 MG
10 TABLET ORAL EVERY 6 HOURS PRN
Status: CANCELLED | OUTPATIENT
Start: 2022-01-10

## 2021-12-14 RX ORDER — 0.9 % SODIUM CHLORIDE 0.9 %
VIAL (ML) INJECTION PRN
Status: CANCELLED | OUTPATIENT
Start: 2022-01-10

## 2021-12-14 RX ORDER — 0.9 % SODIUM CHLORIDE 0.9 %
3 VIAL (ML) INJECTION PRN
Status: CANCELLED | OUTPATIENT
Start: 2021-12-19

## 2021-12-14 RX ORDER — HEPARIN SODIUM (PORCINE) LOCK FLUSH IV SOLN 100 UNIT/ML 100 UNIT/ML
500 SOLUTION INTRAVENOUS PRN
Status: CANCELLED | OUTPATIENT
Start: 2021-12-19

## 2021-12-14 RX ORDER — 0.9 % SODIUM CHLORIDE 0.9 %
10 VIAL (ML) INJECTION PRN
Status: CANCELLED | OUTPATIENT
Start: 2021-12-21

## 2021-12-14 RX ORDER — 0.9 % SODIUM CHLORIDE 0.9 %
3 VIAL (ML) INJECTION PRN
Status: CANCELLED | OUTPATIENT
Start: 2022-01-03

## 2021-12-14 RX ORDER — 0.9 % SODIUM CHLORIDE 0.9 %
3 VIAL (ML) INJECTION PRN
Status: CANCELLED | OUTPATIENT
Start: 2021-12-21

## 2021-12-14 RX ORDER — SODIUM CHLORIDE 9 MG/ML
INJECTION, SOLUTION INTRAVENOUS CONTINUOUS
Status: CANCELLED | OUTPATIENT
Start: 2022-01-10

## 2021-12-14 RX ORDER — 0.9 % SODIUM CHLORIDE 0.9 %
10 VIAL (ML) INJECTION PRN
Status: CANCELLED | OUTPATIENT
Start: 2021-12-19

## 2021-12-14 RX ORDER — ONDANSETRON 2 MG/ML
4 INJECTION INTRAMUSCULAR; INTRAVENOUS PRN
Status: CANCELLED | OUTPATIENT
Start: 2022-01-03

## 2021-12-14 RX ORDER — HEPARIN SODIUM (PORCINE) LOCK FLUSH IV SOLN 100 UNIT/ML 100 UNIT/ML
500 SOLUTION INTRAVENOUS PRN
Status: CANCELLED | OUTPATIENT
Start: 2021-12-21

## 2021-12-14 RX ORDER — ONDANSETRON 8 MG/1
8 TABLET, ORALLY DISINTEGRATING ORAL PRN
Status: CANCELLED | OUTPATIENT
Start: 2022-01-10

## 2021-12-14 RX ORDER — ONDANSETRON 8 MG/1
8 TABLET, ORALLY DISINTEGRATING ORAL PRN
Status: CANCELLED | OUTPATIENT
Start: 2022-01-03

## 2021-12-19 NOTE — PROGRESS NOTES
"Pharmacy Chemotherapy Verification Note:    Dx: recurrent Breast Cancer        Regimen: vinorelbine  Vinorelbine 25-30mg/m2 IV over 5-10 min On days 1, 8 and 15  28-day cycle until disease progression or unacceptable toxicity  NCCN Guidelines for breast cancer V.2.2021  Selene L et al -  Cancer 2001 Nov 1;92(9):2267-72.   werner Dejesus - Curr Oncol 2014 Oct;21(5):e723-7.    Allergies:  Patient has no known allergies.     /63   Pulse 87   Temp 36.1 °C (97 °F) (Temporal)   Resp 18   Ht 1.47 m (4' 9.87\")   Wt 62.9 kg (138 lb 10.7 oz)   SpO2 94%   BMI 29.11 kg/m²  Body surface area is 1.6 meters squared.     Labs 12/20/21:  ANC~ 2110 Plt = 358k   Hgb = 11.3     SCr = 0.6 mg/dL CrCl ~ 81 mL/min   LFT's = WNL TBili = 0.2      Drug Order   (Drug name, dose, route, IV Fluid & volume, frequency, number of doses) Cycle 2, Day 1  Previous treatment: C1D15 on 12/6/21     Medication = vinorelbine (Navelbine)   Base Dose = 30 mg/m2  Calc Dose: Base Dose x 1.6 m2 = 48 mg  Final Dose = 48 mg  Route = IV  Fluid & Volume = NS 25 mL  Admin Duration = Over 6-10 minutes          <10% difference, okay to treat with final dose     By my signature below, I confirm this process was performed independently with the BSA and all final chemotherapy dosing calculations congruent. I have reviewed the above chemotherapy order and that my calculation of the final dose and BSA (when applicable) corroborate those calculations of the  pharmacist.     Matt Humphries, PharmD    "

## 2021-12-20 ENCOUNTER — OUTPATIENT INFUSION SERVICES (OUTPATIENT)
Dept: ONCOLOGY | Facility: MEDICAL CENTER | Age: 66
End: 2021-12-20
Attending: INTERNAL MEDICINE
Payer: MEDICAID

## 2021-12-20 VITALS
SYSTOLIC BLOOD PRESSURE: 136 MMHG | WEIGHT: 138.67 LBS | HEIGHT: 58 IN | OXYGEN SATURATION: 94 % | RESPIRATION RATE: 18 BRPM | HEART RATE: 87 BPM | BODY MASS INDEX: 29.11 KG/M2 | DIASTOLIC BLOOD PRESSURE: 63 MMHG | TEMPERATURE: 97 F

## 2021-12-20 DIAGNOSIS — Z17.0 MALIGNANT NEOPLASM OF OVERLAPPING SITES OF RIGHT BREAST IN FEMALE, ESTROGEN RECEPTOR POSITIVE (HCC): ICD-10-CM

## 2021-12-20 DIAGNOSIS — C50.811 MALIGNANT NEOPLASM OF OVERLAPPING SITES OF RIGHT BREAST IN FEMALE, ESTROGEN RECEPTOR POSITIVE (HCC): ICD-10-CM

## 2021-12-20 LAB
ALBUMIN SERPL BCP-MCNC: 3.9 G/DL (ref 3.2–4.9)
ALBUMIN/GLOB SERPL: 1.7 G/DL
ALP SERPL-CCNC: 63 U/L (ref 30–99)
ALT SERPL-CCNC: 16 U/L (ref 2–50)
ANION GAP SERPL CALC-SCNC: 13 MMOL/L (ref 7–16)
AST SERPL-CCNC: 22 U/L (ref 12–45)
BASOPHILS # BLD AUTO: 0.7 % (ref 0–1.8)
BASOPHILS # BLD: 0.03 K/UL (ref 0–0.12)
BILIRUB SERPL-MCNC: 0.2 MG/DL (ref 0.1–1.5)
BUN SERPL-MCNC: 13 MG/DL (ref 8–22)
CALCIUM SERPL-MCNC: 9.3 MG/DL (ref 8.5–10.5)
CHLORIDE SERPL-SCNC: 107 MMOL/L (ref 96–112)
CO2 SERPL-SCNC: 25 MMOL/L (ref 20–33)
CREAT SERPL-MCNC: 0.6 MG/DL (ref 0.5–1.4)
EOSINOPHIL # BLD AUTO: 0.1 K/UL (ref 0–0.51)
EOSINOPHIL NFR BLD: 2.4 % (ref 0–6.9)
ERYTHROCYTE [DISTWIDTH] IN BLOOD BY AUTOMATED COUNT: 59.4 FL (ref 35.9–50)
GLOBULIN SER CALC-MCNC: 2.3 G/DL (ref 1.9–3.5)
GLUCOSE SERPL-MCNC: 112 MG/DL (ref 65–99)
HCT VFR BLD AUTO: 35.1 % (ref 37–47)
HGB BLD-MCNC: 11.3 G/DL (ref 12–16)
IMM GRANULOCYTES # BLD AUTO: 0.02 K/UL (ref 0–0.11)
IMM GRANULOCYTES NFR BLD AUTO: 0.5 % (ref 0–0.9)
LYMPHOCYTES # BLD AUTO: 1.15 K/UL (ref 1–4.8)
LYMPHOCYTES NFR BLD: 27.4 % (ref 22–41)
MCH RBC QN AUTO: 30.6 PG (ref 27–33)
MCHC RBC AUTO-ENTMCNC: 32.2 G/DL (ref 33.6–35)
MCV RBC AUTO: 95.1 FL (ref 81.4–97.8)
MONOCYTES # BLD AUTO: 0.78 K/UL (ref 0–0.85)
MONOCYTES NFR BLD AUTO: 18.6 % (ref 0–13.4)
NEUTROPHILS # BLD AUTO: 2.11 K/UL (ref 2–7.15)
NEUTROPHILS NFR BLD: 50.4 % (ref 44–72)
NRBC # BLD AUTO: 0 K/UL
NRBC BLD-RTO: 0 /100 WBC
PLATELET # BLD AUTO: 358 K/UL (ref 164–446)
PMV BLD AUTO: 9.6 FL (ref 9–12.9)
POTASSIUM SERPL-SCNC: 4.1 MMOL/L (ref 3.6–5.5)
PROT SERPL-MCNC: 6.2 G/DL (ref 6–8.2)
RBC # BLD AUTO: 3.69 M/UL (ref 4.2–5.4)
SODIUM SERPL-SCNC: 145 MMOL/L (ref 135–145)
WBC # BLD AUTO: 4.2 K/UL (ref 4.8–10.8)

## 2021-12-20 PROCEDURE — 96409 CHEMO IV PUSH SNGL DRUG: CPT

## 2021-12-20 PROCEDURE — 85025 COMPLETE CBC W/AUTO DIFF WBC: CPT

## 2021-12-20 PROCEDURE — 700111 HCHG RX REV CODE 636 W/ 250 OVERRIDE (IP): Performed by: INTERNAL MEDICINE

## 2021-12-20 PROCEDURE — A4212 NON CORING NEEDLE OR STYLET: HCPCS

## 2021-12-20 PROCEDURE — 700105 HCHG RX REV CODE 258: Performed by: INTERNAL MEDICINE

## 2021-12-20 PROCEDURE — 80053 COMPREHEN METABOLIC PANEL: CPT

## 2021-12-20 RX ORDER — HEPARIN SODIUM (PORCINE) LOCK FLUSH IV SOLN 100 UNIT/ML 100 UNIT/ML
500 SOLUTION INTRAVENOUS PRN
Status: DISCONTINUED | OUTPATIENT
Start: 2021-12-20 | End: 2021-12-20 | Stop reason: HOSPADM

## 2021-12-20 RX ADMIN — VINORELBINE 48 MG: 10 INJECTION, SOLUTION INTRAVENOUS at 17:02

## 2021-12-20 RX ADMIN — HEPARIN 500 UNITS: 100 SYRINGE at 17:19

## 2021-12-20 ASSESSMENT — FIBROSIS 4 INDEX: FIB4 SCORE: 1.63

## 2021-12-20 NOTE — PROGRESS NOTES
Chemotherapy Verification - PRIMARY RN      Height = 147 cm  Weight = 62.9 kg  BSA = 1.6 m2       Medication: venorelbine Dose: 30 mg/m2  Calculated Dose: 48 mg                             (In mg/m2, AUC, mg/kg)         I confirm this process was performed independently with the BSA and all final chemotherapy dosing calculations congruent.  Any discrepancies of 10% or greater have been addressed with the chemotherapy pharmacist. The resolution of the discrepancy has been documented in the EPIC progress notes.

## 2021-12-20 NOTE — PROGRESS NOTES
"Pharmacy Chemotherapy Verification  Patient name: Christine Malhotra  Dx: recurrent Breast CA, stage IV  Regimen: vinorelbine monotherapy  1st line TC (2014), Arimidex (2015), Ibrance/Faslodex ([id number]), Xeloda (2018), Erubilin (2020), gemcitabine (2020), weekly paclitaxel (2020 / 2021), DOX 2021  Cycle 2, Day 1  Previous treatment: 12/6/21    Vinorelbine 30mg/m2 IV over 10 minutes Day 1,8,15   [id number] until progression or toxicity   *Dosing Reference*  NCCN Guidelines Breast Cancer v8.2021  Selene AYON, et al. Weekly vinorelbine is an effective palliative regimen after failure with anthracyclines and taxanes in metastatic breast carcinoma. Cancer. 2001 Nov 1;92(5):2269-73.    Allergies: Patient has no known allergies.  /63   Pulse 87   Temp 36.1 °C (97 °F) (Temporal)   Resp 18   Ht 1.47 m (4' 9.87\")   Wt 62.9 kg (138 lb 10.7 oz)   SpO2 94%   BMI 29.11 kg/m²  Body surface area is 1.6 meters squared.     Labs: 12/20/21   Meet treatment parameters    TTE 8/31/21 LVEF ~ 55%    Path:  ER+, [place]+  HER2-    vinorelbine 30 mg/m2 x 1.6 m2 = 48 mg   Ok to treat with final dose = 48 mg IV    "

## 2021-12-21 ENCOUNTER — APPOINTMENT (OUTPATIENT)
Dept: ONCOLOGY | Facility: MEDICAL CENTER | Age: 66
End: 2021-12-21
Attending: INTERNAL MEDICINE
Payer: MEDICAID

## 2021-12-21 NOTE — PROGRESS NOTES
Christine arrived ambulatory to [facility] for Day 1 Cycle 2 Venorelbine.  Interpretor Ipad utilized to discuss POC. Christien voices no complaints.  Right chest port accessed in sterile fashion, brisk blood return observed, Labs collected and reviewed.  Parameters met for treatment today.  Venorelbine administered per MAR, Christine tolerated well. Port flushed with NS, heparin instilled, port de-accessed, sterile gauze and tape dressing to site.  Christine discharged in NAD, next appointment confirmed.

## 2021-12-21 NOTE — PROGRESS NOTES
Chemotherapy Verification - SECONDARY RN       Height = 147 cm  Weight = 62.9 kg  BSA = 1.6 m2       Medication: Navelbine  Dose: 30 mg/m2  Calculated Dose: 48 mg                             (In mg/m2, AUC, mg/kg)     I confirm that this process was performed independently.

## 2022-01-03 ENCOUNTER — OUTPATIENT INFUSION SERVICES (OUTPATIENT)
Dept: ONCOLOGY | Facility: MEDICAL CENTER | Age: 67
End: 2022-01-03
Attending: INTERNAL MEDICINE
Payer: MEDICAID

## 2022-01-03 VITALS
TEMPERATURE: 98 F | SYSTOLIC BLOOD PRESSURE: 145 MMHG | BODY MASS INDEX: 28.46 KG/M2 | OXYGEN SATURATION: 98 % | RESPIRATION RATE: 18 BRPM | HEART RATE: 82 BPM | WEIGHT: 135.58 LBS | HEIGHT: 58 IN | DIASTOLIC BLOOD PRESSURE: 81 MMHG

## 2022-01-03 DIAGNOSIS — C50.811 MALIGNANT NEOPLASM OF OVERLAPPING SITES OF RIGHT BREAST IN FEMALE, ESTROGEN RECEPTOR POSITIVE (HCC): ICD-10-CM

## 2022-01-03 DIAGNOSIS — Z17.0 MALIGNANT NEOPLASM OF OVERLAPPING SITES OF RIGHT BREAST IN FEMALE, ESTROGEN RECEPTOR POSITIVE (HCC): ICD-10-CM

## 2022-01-03 LAB
BASOPHILS # BLD AUTO: 0.8 % (ref 0–1.8)
BASOPHILS # BLD: 0.04 K/UL (ref 0–0.12)
EOSINOPHIL # BLD AUTO: 0.1 K/UL (ref 0–0.51)
EOSINOPHIL NFR BLD: 2 % (ref 0–6.9)
ERYTHROCYTE [DISTWIDTH] IN BLOOD BY AUTOMATED COUNT: 58.3 FL (ref 35.9–50)
HCT VFR BLD AUTO: 35.2 % (ref 37–47)
HGB BLD-MCNC: 11.1 G/DL (ref 12–16)
IMM GRANULOCYTES # BLD AUTO: 0.02 K/UL (ref 0–0.11)
IMM GRANULOCYTES NFR BLD AUTO: 0.4 % (ref 0–0.9)
LYMPHOCYTES # BLD AUTO: 1.41 K/UL (ref 1–4.8)
LYMPHOCYTES NFR BLD: 27.8 % (ref 22–41)
MCH RBC QN AUTO: 30.1 PG (ref 27–33)
MCHC RBC AUTO-ENTMCNC: 31.5 G/DL (ref 33.6–35)
MCV RBC AUTO: 95.4 FL (ref 81.4–97.8)
MONOCYTES # BLD AUTO: 0.83 K/UL (ref 0–0.85)
MONOCYTES NFR BLD AUTO: 16.3 % (ref 0–13.4)
NEUTROPHILS # BLD AUTO: 2.68 K/UL (ref 2–7.15)
NEUTROPHILS NFR BLD: 52.7 % (ref 44–72)
NRBC # BLD AUTO: 0 K/UL
NRBC BLD-RTO: 0 /100 WBC
PLATELET # BLD AUTO: 372 K/UL (ref 164–446)
PMV BLD AUTO: 9.7 FL (ref 9–12.9)
RBC # BLD AUTO: 3.69 M/UL (ref 4.2–5.4)
WBC # BLD AUTO: 5.1 K/UL (ref 4.8–10.8)

## 2022-01-03 PROCEDURE — 700111 HCHG RX REV CODE 636 W/ 250 OVERRIDE (IP): Performed by: INTERNAL MEDICINE

## 2022-01-03 PROCEDURE — 96409 CHEMO IV PUSH SNGL DRUG: CPT

## 2022-01-03 PROCEDURE — 85025 COMPLETE CBC W/AUTO DIFF WBC: CPT

## 2022-01-03 PROCEDURE — 700105 HCHG RX REV CODE 258: Performed by: INTERNAL MEDICINE

## 2022-01-03 PROCEDURE — A4212 NON CORING NEEDLE OR STYLET: HCPCS

## 2022-01-03 RX ORDER — HEPARIN SODIUM (PORCINE) LOCK FLUSH IV SOLN 100 UNIT/ML 100 UNIT/ML
500 SOLUTION INTRAVENOUS PRN
Status: DISCONTINUED | OUTPATIENT
Start: 2022-01-03 | End: 2022-01-03 | Stop reason: HOSPADM

## 2022-01-03 RX ADMIN — VINORELBINE 47.4 MG: 10 INJECTION, SOLUTION INTRAVENOUS at 17:14

## 2022-01-03 RX ADMIN — HEPARIN 500 UNITS: 100 SYRINGE at 17:47

## 2022-01-03 ASSESSMENT — FIBROSIS 4 INDEX: FIB4 SCORE: 0.98

## 2022-01-03 NOTE — PROGRESS NOTES
"Pharmacy Chemotherapy Verification  Patient name: Christine Malhotra  Dx: recurrent Breast CA, stage IV  Regimen: vinorelbine monotherapy  1st line TC (2014), Arimidex (2015), Ibrance/Faslodex ([id number]), Xeloda (2018), Erubilin (2020), gemcitabine (2020), weekly paclitaxel (2020 / 2021), DOX 2021  Cycle 2, Day 8  Previous treatment: 12/20/21    Vinorelbine 30mg/m2 IV over 10 minutes Day 1,8,15   [id number] until progression or toxicity   *Dosing Reference*  NCCN Guidelines Breast Cancer v8.2021  Selene AYON, et al. Weekly vinorelbine is an effective palliative regimen after failure with anthracyclines and taxanes in metastatic breast carcinoma. Cancer. 2001 Nov 1;92(2):2269-28.    Allergies: Patient has no known allergies.  /81   Pulse 82   Temp 36.7 °C (98 °F) (Temporal)   Resp 18   Ht 1.47 m (4' 9.87\")   Wt 61.5 kg (135 lb 9.3 oz)   SpO2 98%   BMI 28.46 kg/m²  Body surface area is 1.58 meters squared.     Labs: 1/3/21   Meet treatment parameters    TTE 8/31/21 LVEF ~ 55%    Path:  ER+, [place]+  HER2-    vinorelbine 30 mg/m2 x 1.58 m2 = 47.4 mg   Ok to treat with final dose = 47.4 mg IV    "

## 2022-01-04 NOTE — PROGRESS NOTES
[facility]ine into [facility] for Day 8/ Cycle 2 of vinorelbine. Pt denied having any new or acute complaints today, reports tolerating past treatments well. Clarified day and cycle with Dr. Jarvis since patient missed previous appointment and ok to treat with redness to chest. Port accessed in a sterile manner, had + blood return, flushed briskly. Pt given vinorelbine over 6 minutes as prescribed, tolerated well, denied having any complaints during or after infusion. Port had + blood return after, flushed per [facility] policy, de-accessed, needle intact, insertion site covered with sterile gauze and paper tape. Next appointment scheduled, Christine discharged home to self care.

## 2022-01-04 NOTE — PROGRESS NOTES
"Pharmacy Chemotherapy Verification Note:    Dx: recurrent Breast Cancer        Regimen: vinorelbine  Vinorelbine 25-30mg/m2 IV over 5-10 min On days 1, 8 and 15  28-day cycle until disease progression or unacceptable toxicity  NCCN Guidelines for breast cancer V.2.2021  Selene L, et al -  Cancer 2001 Nov 1;92(9):2267-72.   werner Dejesus - Curr Oncol 2014 Oct;21(5):e723-7.    Allergies:  Patient has no known allergies.     /81   Pulse 82   Temp 36.7 °C (98 °F) (Temporal)   Resp 18   Ht 1.47 m (4' 9.87\")   Wt 61.5 kg (135 lb 9.3 oz)   SpO2 98%   BMI 28.46 kg/m²  Body surface area is 1.58 meters squared.     Labs from 1/3/22 reviewed - all within treatment parameters.      Drug Order   (Drug name, dose, route, IV Fluid & volume, frequency, number of doses) Cycle 2, Day 8 (delayed)  Previous treatment: C1D15 on 12/20/21     Medication = vinorelbine (Navelbine)   Base Dose = 30 mg/m2  Calc Dose: Base Dose x 1.58 m2 = 47.4 mg  Final Dose = 47.4 mg  Route = IV  Fluid & Volume = NS 25 mL  Admin Duration = Over 6-10 minutes          <10% difference, okay to treat with final dose     By my signature below, I confirm this process was performed independently with the BSA and all final chemotherapy dosing calculations congruent. I have reviewed the above chemotherapy order and that my calculation of the final dose and BSA (when applicable) corroborate those calculations of the  pharmacist.     Anny Camilo, PharmD, BCOP    "

## 2022-01-04 NOTE — PROGRESS NOTES
Chemotherapy Verification - SECONDARY RN       Height = 147cm  Weight = 61.5kg  BSA = 1.58m^2       Medication: Vinorelbine (Navelbine)  Dose: 30mg/m^2  Calculated Dose: 47.4mg                                  I confirm that this process was performed independently.

## 2022-01-04 NOTE — PROGRESS NOTES
Chemotherapy Verification - PRIMARY RN      Height = 147 cm  Weight = 61.5 kg  BSA = 1.58 m^2       Medication: vinorelbine  Dose: 30 mg/m^2  Calculated Dose: 47.4 mg                             (In mg/m2, AUC, mg/kg)           I confirm this process was performed independently with the BSA and all final chemotherapy dosing calculations congruent.  Any discrepancies of 10% or greater have been addressed with the chemotherapy pharmacist. The resolution of the discrepancy has been documented in the EPIC progress notes.

## 2022-01-09 NOTE — PROGRESS NOTES
"Pharmacy Chemotherapy Verification    Patient name: Christine Malhotra  Dx: recurrent Breast CA, stage IV    Cycle 2, Day 15  Previous treatment: 1/3/21  1st line TC (2014), Arimidex (2015), Ibrance/Faslodex ([id number]), Xeloda (2018), Erubilin (2020), gemcitabine (2020), weekly paclitaxel (2020 / 2021), DOX 2021    Regimen: vinorelbine monotherapy  Vinorelbine 30mg/m2 IV over 10 minutes Day 1,8,15  [id number] until progression or toxicity   *Dosing Reference*  NCCN Guidelines Breast Cancer v8.2021  Selene AYON, et al. Weekly vinorelbine is an effective palliative regimen after failure with anthracyclines and taxanes in metastatic breast carcinoma. Cancer. 2001 Nov 1;92(9):226-07.    Allergies: Patient has no known allergies.  /80   Pulse 93   Temp 36.2 °C (97.2 °F) (Temporal)   Resp 18   Ht 1.46 m (4' 9.48\")   Wt 60.1 kg (132 lb 7.9 oz)   SpO2 95%   BMI 28.20 kg/m²  Body surface area is 1.56 meters squared.     All lab results 1/10/22 within treatment parameters.       vinorelbine 30 mg/m2 x 1.56m2 = 46.8 mg   Ok to treat with final dose = 46.8mg IV      ALBERT Urbina Pharm.D.    "

## 2022-01-09 NOTE — PROGRESS NOTES
"Pharmacy Chemotherapy Verification    Dx: recurrent Breast Cancer        Regimen: vinORELbine  vinORELbine 25-30 mg/m2 IV over 5-10 min On days 1, 8 and 15  28-day cycle until disease progression or unacceptable toxicity  NCCN Guidelines for breast cancer V.2.2021  Selene L, et al -  Cancer 2001 Nov 1;92(9):2267-72.   werner Dejesus - Curr Oncol 2014 Oct;21(5):e723-7.    Allergies:  Patient has no known allergies.     /80   Pulse 93   Temp 36.2 °C (97.2 °F) (Temporal)   Resp 18   Ht 1.46 m (4' 9.48\")   Wt 60.1 kg (132 lb 7.9 oz)   SpO2 95%   BMI 28.20 kg/m²  Body surface area is 1.56 meters squared.     Labs 1/10/22  ANC 2820 Hgb 11 Plt 281k    Labs 12/20/21  SCr 0.6 CrCl 76.5 mL/min   AST/ALT/AP = 22/16/63 Tbili = 0.2    Drug Order   (Drug name, dose, route, IV Fluid & volume, frequency, number of doses) Cycle 2, Day 15  Previous treatment: C2D8 on 1/3/21     Medication = vinORELbine (Navelbine)   Base Dose = 30 mg/m2  Calc Dose: Base Dose x 1.56 m2 = 46.8 mg  Final Dose = 46.8 mg  Route = IV  Fluid & Volume = NS 25 mL  Admin Duration = Over 6-10 minutes          <10% difference, okay to treat with final dose     By my signature below, I confirm this process was performed independently with the BSA and all final chemotherapy dosing calculations congruent. I have reviewed the above chemotherapy order and that my calculation of the final dose and BSA (when applicable) corroborate those calculations of the  pharmacist.     Roseanne Gross, PharmD, BCOP    "

## 2022-01-10 ENCOUNTER — OUTPATIENT INFUSION SERVICES (OUTPATIENT)
Dept: ONCOLOGY | Facility: MEDICAL CENTER | Age: 67
End: 2022-01-10
Attending: INTERNAL MEDICINE
Payer: MEDICAID

## 2022-01-10 VITALS
DIASTOLIC BLOOD PRESSURE: 80 MMHG | SYSTOLIC BLOOD PRESSURE: 147 MMHG | TEMPERATURE: 97.2 F | OXYGEN SATURATION: 95 % | HEART RATE: 93 BPM | WEIGHT: 132.5 LBS | RESPIRATION RATE: 18 BRPM | HEIGHT: 57 IN | BODY MASS INDEX: 28.59 KG/M2

## 2022-01-10 DIAGNOSIS — C50.811 MALIGNANT NEOPLASM OF OVERLAPPING SITES OF RIGHT BREAST IN FEMALE, ESTROGEN RECEPTOR POSITIVE (HCC): ICD-10-CM

## 2022-01-10 DIAGNOSIS — Z17.0 MALIGNANT NEOPLASM OF OVERLAPPING SITES OF RIGHT BREAST IN FEMALE, ESTROGEN RECEPTOR POSITIVE (HCC): ICD-10-CM

## 2022-01-10 LAB
ANISOCYTOSIS BLD QL SMEAR: ABNORMAL
BASOPHILS # BLD AUTO: 1.8 % (ref 0–1.8)
BASOPHILS # BLD: 0.07 K/UL (ref 0–0.12)
EOSINOPHIL # BLD AUTO: 0.03 K/UL (ref 0–0.51)
EOSINOPHIL NFR BLD: 0.9 % (ref 0–6.9)
ERYTHROCYTE [DISTWIDTH] IN BLOOD BY AUTOMATED COUNT: 54.6 FL (ref 35.9–50)
GIANT PLATELETS BLD QL SMEAR: NORMAL
HCT VFR BLD AUTO: 34.4 % (ref 37–47)
HGB BLD-MCNC: 11 G/DL (ref 12–16)
LYMPHOCYTES # BLD AUTO: 0.87 K/UL (ref 1–4.8)
LYMPHOCYTES NFR BLD: 23 % (ref 22–41)
MANUAL DIFF BLD: NORMAL
MCH RBC QN AUTO: 29.8 PG (ref 27–33)
MCHC RBC AUTO-ENTMCNC: 32 G/DL (ref 33.6–35)
MCV RBC AUTO: 93.2 FL (ref 81.4–97.8)
MICROCYTES BLD QL SMEAR: ABNORMAL
MONOCYTES # BLD AUTO: 0 K/UL (ref 0–0.85)
MONOCYTES NFR BLD AUTO: 0 % (ref 0–13.4)
MORPHOLOGY BLD-IMP: NORMAL
NEUTROPHILS # BLD AUTO: 2.82 K/UL (ref 2–7.15)
NEUTROPHILS NFR BLD: 72.5 % (ref 44–72)
NEUTS BAND NFR BLD MANUAL: 1.8 % (ref 0–10)
NRBC # BLD AUTO: 0 K/UL
NRBC BLD-RTO: 0 /100 WBC
PLATELET # BLD AUTO: 281 K/UL (ref 164–446)
PLATELET BLD QL SMEAR: NORMAL
PMV BLD AUTO: 10.2 FL (ref 9–12.9)
POIKILOCYTOSIS BLD QL SMEAR: NORMAL
RBC # BLD AUTO: 3.69 M/UL (ref 4.2–5.4)
RBC BLD AUTO: PRESENT
SPHEROCYTES BLD QL SMEAR: NORMAL
TOXIC GRANULES BLD QL SMEAR: SLIGHT
WBC # BLD AUTO: 3.8 K/UL (ref 4.8–10.8)

## 2022-01-10 PROCEDURE — 85027 COMPLETE CBC AUTOMATED: CPT

## 2022-01-10 PROCEDURE — 85007 BL SMEAR W/DIFF WBC COUNT: CPT

## 2022-01-10 PROCEDURE — 700111 HCHG RX REV CODE 636 W/ 250 OVERRIDE (IP): Performed by: INTERNAL MEDICINE

## 2022-01-10 PROCEDURE — 700105 HCHG RX REV CODE 258: Performed by: INTERNAL MEDICINE

## 2022-01-10 PROCEDURE — 96409 CHEMO IV PUSH SNGL DRUG: CPT

## 2022-01-10 PROCEDURE — A4212 NON CORING NEEDLE OR STYLET: HCPCS

## 2022-01-10 RX ORDER — HEPARIN SODIUM (PORCINE) LOCK FLUSH IV SOLN 100 UNIT/ML 100 UNIT/ML
500 SOLUTION INTRAVENOUS PRN
Status: DISCONTINUED | OUTPATIENT
Start: 2022-01-10 | End: 2022-01-10 | Stop reason: HOSPADM

## 2022-01-10 RX ADMIN — HEPARIN 500 UNITS: 100 SYRINGE at 13:37

## 2022-01-10 RX ADMIN — VINORELBINE 46.8 MG: 10 INJECTION, SOLUTION INTRAVENOUS at 12:57

## 2022-01-10 ASSESSMENT — FIBROSIS 4 INDEX: FIB4 SCORE: 0.95

## 2022-01-10 NOTE — PROGRESS NOTES
Chemotherapy Verification - PRIMARY RN      Height = 146 cm  Weight = 60.1 kg  BSA = 1.56 m^2       Medication: vinorelbine  Dose: 30 mg/m^2  Calculated Dose: 46.8 mg                             (In mg/m2, AUC, mg/kg)           I confirm this process was performed independently with the BSA and all final chemotherapy dosing calculations congruent.  Any discrepancies of 10% or greater have been addressed with the chemotherapy pharmacist. The resolution of the discrepancy has been documented in the EPIC progress notes.

## 2022-01-10 NOTE — PROGRESS NOTES
Christine into [facility] for Day 15/ Cycle 2 of vinorelbine. Pt denied having any new or acute complaints today, reports tolerating past treatments well. Reports no changes from week prior and states she will see provider at [facility] on 1/18/22 and has list of additional appointments with their team. Christine aware of need to see provider for appointments. Port accessed in a sterile manner, had + blood return, flushed briskly. Pt given vinorelbine as prescribed, tolerated well, denied having any complaints during or after infusion. Port had + blood return after, flushed per [facility] policy, de-accessed, needle intact, insertion site covered with sterile gauze and paper tape. Next appointment scheduled, Christine discharged home to self care.

## 2022-01-10 NOTE — PROGRESS NOTES
Chemotherapy Verification - SECONDARY RN       Height = 146 cm  Weight = 60.1 kg  BSA = 1.56 m2       Medication: Navelbine  Dose: 30 mg/m2  Calculated Dose: 46.8 mg                             (In mg/m2, AUC, mg/kg)     I confirm that this process was performed independently.

## 2022-01-18 RX ORDER — 0.9 % SODIUM CHLORIDE 0.9 %
VIAL (ML) INJECTION PRN
Status: CANCELLED | OUTPATIENT
Start: 2022-01-24

## 2022-01-18 RX ORDER — HEPARIN SODIUM (PORCINE) LOCK FLUSH IV SOLN 100 UNIT/ML 100 UNIT/ML
500 SOLUTION INTRAVENOUS PRN
Status: CANCELLED | OUTPATIENT
Start: 2022-01-31

## 2022-01-18 RX ORDER — 0.9 % SODIUM CHLORIDE 0.9 %
10 VIAL (ML) INJECTION PRN
Status: CANCELLED | OUTPATIENT
Start: 2022-01-31

## 2022-01-18 RX ORDER — 0.9 % SODIUM CHLORIDE 0.9 %
10 VIAL (ML) INJECTION PRN
Status: CANCELLED | OUTPATIENT
Start: 2022-01-24

## 2022-01-18 RX ORDER — HEPARIN SODIUM (PORCINE) LOCK FLUSH IV SOLN 100 UNIT/ML 100 UNIT/ML
500 SOLUTION INTRAVENOUS PRN
Status: CANCELLED | OUTPATIENT
Start: 2022-01-24

## 2022-01-18 RX ORDER — ONDANSETRON 8 MG/1
8 TABLET, ORALLY DISINTEGRATING ORAL PRN
Status: CANCELLED | OUTPATIENT
Start: 2022-01-31

## 2022-01-18 RX ORDER — 0.9 % SODIUM CHLORIDE 0.9 %
10 VIAL (ML) INJECTION PRN
Status: CANCELLED | OUTPATIENT
Start: 2022-01-23

## 2022-01-18 RX ORDER — SODIUM CHLORIDE 9 MG/ML
INJECTION, SOLUTION INTRAVENOUS CONTINUOUS
Status: CANCELLED | OUTPATIENT
Start: 2022-02-07

## 2022-01-18 RX ORDER — PROCHLORPERAZINE MALEATE 10 MG
10 TABLET ORAL EVERY 6 HOURS PRN
Status: CANCELLED | OUTPATIENT
Start: 2022-01-31

## 2022-01-18 RX ORDER — 0.9 % SODIUM CHLORIDE 0.9 %
3 VIAL (ML) INJECTION PRN
Status: CANCELLED | OUTPATIENT
Start: 2022-02-07

## 2022-01-18 RX ORDER — SODIUM CHLORIDE 9 MG/ML
INJECTION, SOLUTION INTRAVENOUS CONTINUOUS
Status: CANCELLED | OUTPATIENT
Start: 2022-01-31

## 2022-01-18 RX ORDER — ONDANSETRON 2 MG/ML
4 INJECTION INTRAMUSCULAR; INTRAVENOUS PRN
Status: CANCELLED | OUTPATIENT
Start: 2022-01-24

## 2022-01-18 RX ORDER — 0.9 % SODIUM CHLORIDE 0.9 %
3 VIAL (ML) INJECTION PRN
Status: CANCELLED | OUTPATIENT
Start: 2022-01-23

## 2022-01-18 RX ORDER — 0.9 % SODIUM CHLORIDE 0.9 %
3 VIAL (ML) INJECTION PRN
Status: CANCELLED | OUTPATIENT
Start: 2022-01-31

## 2022-01-18 RX ORDER — 0.9 % SODIUM CHLORIDE 0.9 %
VIAL (ML) INJECTION PRN
Status: CANCELLED | OUTPATIENT
Start: 2022-01-31

## 2022-01-18 RX ORDER — HEPARIN SODIUM (PORCINE) LOCK FLUSH IV SOLN 100 UNIT/ML 100 UNIT/ML
500 SOLUTION INTRAVENOUS PRN
Status: CANCELLED | OUTPATIENT
Start: 2022-01-23

## 2022-01-18 RX ORDER — 0.9 % SODIUM CHLORIDE 0.9 %
3 VIAL (ML) INJECTION PRN
Status: CANCELLED | OUTPATIENT
Start: 2022-01-24

## 2022-01-18 RX ORDER — ONDANSETRON 8 MG/1
8 TABLET, ORALLY DISINTEGRATING ORAL PRN
Status: CANCELLED | OUTPATIENT
Start: 2022-02-07

## 2022-01-18 RX ORDER — HEPARIN SODIUM (PORCINE) LOCK FLUSH IV SOLN 100 UNIT/ML 100 UNIT/ML
500 SOLUTION INTRAVENOUS PRN
Status: CANCELLED | OUTPATIENT
Start: 2022-02-07

## 2022-01-18 RX ORDER — ONDANSETRON 2 MG/ML
4 INJECTION INTRAMUSCULAR; INTRAVENOUS PRN
Status: CANCELLED | OUTPATIENT
Start: 2022-02-07

## 2022-01-18 RX ORDER — SODIUM CHLORIDE 9 MG/ML
INJECTION, SOLUTION INTRAVENOUS CONTINUOUS
Status: CANCELLED | OUTPATIENT
Start: 2022-01-24

## 2022-01-18 RX ORDER — 0.9 % SODIUM CHLORIDE 0.9 %
10 VIAL (ML) INJECTION PRN
Status: CANCELLED | OUTPATIENT
Start: 2022-02-07

## 2022-01-18 RX ORDER — 0.9 % SODIUM CHLORIDE 0.9 %
VIAL (ML) INJECTION PRN
Status: CANCELLED | OUTPATIENT
Start: 2022-02-07

## 2022-01-18 RX ORDER — 0.9 % SODIUM CHLORIDE 0.9 %
VIAL (ML) INJECTION PRN
Status: CANCELLED | OUTPATIENT
Start: 2022-01-23

## 2022-01-18 RX ORDER — ONDANSETRON 8 MG/1
8 TABLET, ORALLY DISINTEGRATING ORAL PRN
Status: CANCELLED | OUTPATIENT
Start: 2022-01-24

## 2022-01-18 RX ORDER — PROCHLORPERAZINE MALEATE 10 MG
10 TABLET ORAL EVERY 6 HOURS PRN
Status: CANCELLED | OUTPATIENT
Start: 2022-02-07

## 2022-01-18 RX ORDER — ONDANSETRON 2 MG/ML
4 INJECTION INTRAMUSCULAR; INTRAVENOUS PRN
Status: CANCELLED | OUTPATIENT
Start: 2022-01-31

## 2022-01-18 RX ORDER — PROCHLORPERAZINE MALEATE 10 MG
10 TABLET ORAL EVERY 6 HOURS PRN
Status: CANCELLED | OUTPATIENT
Start: 2022-01-24

## 2022-01-20 ENCOUNTER — OFFICE VISIT (OUTPATIENT)
Dept: URGENT CARE | Facility: CLINIC | Age: 67
End: 2022-01-20
Payer: MEDICAID

## 2022-01-20 ENCOUNTER — HOSPITAL ENCOUNTER (OUTPATIENT)
Facility: MEDICAL CENTER | Age: 67
End: 2022-01-20
Attending: INTERNAL MEDICINE
Payer: MEDICAID

## 2022-01-20 VITALS
DIASTOLIC BLOOD PRESSURE: 60 MMHG | RESPIRATION RATE: 20 BRPM | BODY MASS INDEX: 28.48 KG/M2 | TEMPERATURE: 98.7 F | HEART RATE: 72 BPM | SYSTOLIC BLOOD PRESSURE: 104 MMHG | OXYGEN SATURATION: 94 % | WEIGHT: 132 LBS | HEIGHT: 57 IN

## 2022-01-20 DIAGNOSIS — E03.9 PRIMARY HYPOTHYROIDISM: ICD-10-CM

## 2022-01-20 DIAGNOSIS — Z85.3 HISTORY OF INVASIVE BREAST CANCER: ICD-10-CM

## 2022-01-20 DIAGNOSIS — R53.81 MALAISE AND FATIGUE: ICD-10-CM

## 2022-01-20 DIAGNOSIS — Z92.21 PERSONAL HISTORY OF ANTINEOPLASTIC CHEMOTHERAPY: ICD-10-CM

## 2022-01-20 DIAGNOSIS — Z20.822 SUSPECTED COVID-19 VIRUS INFECTION: ICD-10-CM

## 2022-01-20 DIAGNOSIS — R19.7 DIARRHEA, UNSPECIFIED TYPE: ICD-10-CM

## 2022-01-20 DIAGNOSIS — R53.83 MALAISE AND FATIGUE: ICD-10-CM

## 2022-01-20 PROCEDURE — U0005 INFEC AGEN DETEC AMPLI PROBE: HCPCS

## 2022-01-20 PROCEDURE — 99213 OFFICE O/P EST LOW 20 MIN: CPT | Mod: CS | Performed by: INTERNAL MEDICINE

## 2022-01-20 PROCEDURE — U0003 INFECTIOUS AGENT DETECTION BY NUCLEIC ACID (DNA OR RNA); SEVERE ACUTE RESPIRATORY SYNDROME CORONAVIRUS 2 (SARS-COV-2) (CORONAVIRUS DISEASE [COVID-19]), AMPLIFIED PROBE TECHNIQUE, MAKING USE OF HIGH THROUGHPUT TECHNOLOGIES AS DESCRIBED BY CMS-2020-01-R: HCPCS

## 2022-01-20 RX ORDER — LOPERAMIDE HYDROCHLORIDE 2 MG/1
2 CAPSULE ORAL 4 TIMES DAILY PRN
Qty: 30 CAPSULE | Refills: 0 | Status: SHIPPED | OUTPATIENT
Start: 2022-01-20

## 2022-01-20 ASSESSMENT — ENCOUNTER SYMPTOMS
COUGH: 0
FEVER: 0
VOMITING: 0
PALPITATIONS: 0
ABDOMINAL PAIN: 0
NAUSEA: 0
SHORTNESS OF BREATH: 0

## 2022-01-20 ASSESSMENT — FIBROSIS 4 INDEX: FIB4 SCORE: 1.25

## 2022-01-21 DIAGNOSIS — Z20.822 SUSPECTED COVID-19 VIRUS INFECTION: ICD-10-CM

## 2022-01-21 LAB — COVID ORDER STATUS COVID19: NORMAL

## 2022-01-22 LAB
SARS-COV-2 RNA RESP QL NAA+PROBE: DETECTED
SPECIMEN SOURCE: ABNORMAL

## 2022-01-23 RX ORDER — PROCHLORPERAZINE MALEATE 10 MG
10 TABLET ORAL EVERY 6 HOURS PRN
Status: CANCELLED | OUTPATIENT
Start: 2022-01-24

## 2022-01-23 RX ORDER — 0.9 % SODIUM CHLORIDE 0.9 %
10 VIAL (ML) INJECTION PRN
Status: CANCELLED | OUTPATIENT
Start: 2022-01-24

## 2022-01-23 RX ORDER — DIPHENHYDRAMINE HYDROCHLORIDE 50 MG/ML
50 INJECTION INTRAMUSCULAR; INTRAVENOUS PRN
Status: CANCELLED | OUTPATIENT
Start: 2022-01-24

## 2022-01-23 RX ORDER — 0.9 % SODIUM CHLORIDE 0.9 %
10 VIAL (ML) INJECTION PRN
Status: CANCELLED | OUTPATIENT
Start: 2022-01-23

## 2022-01-23 RX ORDER — ONDANSETRON 2 MG/ML
4 INJECTION INTRAMUSCULAR; INTRAVENOUS PRN
Status: CANCELLED | OUTPATIENT
Start: 2022-01-24

## 2022-01-23 RX ORDER — ONDANSETRON 8 MG/1
8 TABLET, ORALLY DISINTEGRATING ORAL PRN
Status: CANCELLED | OUTPATIENT
Start: 2022-01-24

## 2022-01-23 RX ORDER — HEPARIN SODIUM (PORCINE) LOCK FLUSH IV SOLN 100 UNIT/ML 100 UNIT/ML
500 SOLUTION INTRAVENOUS PRN
Status: CANCELLED | OUTPATIENT
Start: 2022-01-24

## 2022-01-23 RX ORDER — 0.9 % SODIUM CHLORIDE 0.9 %
VIAL (ML) INJECTION PRN
Status: CANCELLED | OUTPATIENT
Start: 2022-01-24

## 2022-01-23 RX ORDER — METHYLPREDNISOLONE SODIUM SUCCINATE 125 MG/2ML
125 INJECTION, POWDER, LYOPHILIZED, FOR SOLUTION INTRAMUSCULAR; INTRAVENOUS PRN
Status: CANCELLED | OUTPATIENT
Start: 2022-01-24

## 2022-01-23 RX ORDER — EPINEPHRINE 1 MG/ML(1)
0.5 AMPUL (ML) INJECTION PRN
Status: CANCELLED | OUTPATIENT
Start: 2022-01-24

## 2022-01-23 RX ORDER — SODIUM CHLORIDE, SODIUM LACTATE, POTASSIUM CHLORIDE, CALCIUM CHLORIDE 600; 310; 30; 20 MG/100ML; MG/100ML; MG/100ML; MG/100ML
500 INJECTION, SOLUTION INTRAVENOUS CONTINUOUS
Status: CANCELLED | OUTPATIENT
Start: 2022-01-24

## 2022-01-23 RX ORDER — ONDANSETRON 2 MG/ML
8 INJECTION INTRAMUSCULAR; INTRAVENOUS ONCE
Status: CANCELLED | OUTPATIENT
Start: 2022-01-24 | End: 2022-01-24

## 2022-01-23 RX ORDER — 0.9 % SODIUM CHLORIDE 0.9 %
VIAL (ML) INJECTION PRN
Status: CANCELLED | OUTPATIENT
Start: 2022-01-23

## 2022-01-23 RX ORDER — 0.9 % SODIUM CHLORIDE 0.9 %
3 VIAL (ML) INJECTION PRN
Status: CANCELLED | OUTPATIENT
Start: 2022-01-23

## 2022-01-23 RX ORDER — HEPARIN SODIUM (PORCINE) LOCK FLUSH IV SOLN 100 UNIT/ML 100 UNIT/ML
500 SOLUTION INTRAVENOUS PRN
Status: CANCELLED | OUTPATIENT
Start: 2022-01-23

## 2022-01-23 RX ORDER — 0.9 % SODIUM CHLORIDE 0.9 %
3 VIAL (ML) INJECTION PRN
Status: CANCELLED | OUTPATIENT
Start: 2022-01-24

## 2022-01-24 ENCOUNTER — APPOINTMENT (OUTPATIENT)
Dept: ONCOLOGY | Facility: MEDICAL CENTER | Age: 67
End: 2022-01-24
Attending: INTERNAL MEDICINE
Payer: MEDICAID

## 2022-01-24 ENCOUNTER — TELEPHONE (OUTPATIENT)
Dept: URGENT CARE | Facility: CLINIC | Age: 67
End: 2022-01-24

## 2022-02-01 ENCOUNTER — OUTPATIENT INFUSION SERVICES (OUTPATIENT)
Dept: ONCOLOGY | Facility: MEDICAL CENTER | Age: 67
End: 2022-02-01
Attending: INTERNAL MEDICINE
Payer: MEDICAID

## 2022-02-01 VITALS
SYSTOLIC BLOOD PRESSURE: 139 MMHG | WEIGHT: 132.28 LBS | DIASTOLIC BLOOD PRESSURE: 59 MMHG | RESPIRATION RATE: 18 BRPM | TEMPERATURE: 97 F | HEART RATE: 80 BPM | BODY MASS INDEX: 28.54 KG/M2 | OXYGEN SATURATION: 94 % | HEIGHT: 57 IN

## 2022-02-01 DIAGNOSIS — Z17.0 MALIGNANT NEOPLASM OF OVERLAPPING SITES OF RIGHT BREAST IN FEMALE, ESTROGEN RECEPTOR POSITIVE (HCC): ICD-10-CM

## 2022-02-01 DIAGNOSIS — C50.811 MALIGNANT NEOPLASM OF OVERLAPPING SITES OF RIGHT BREAST IN FEMALE, ESTROGEN RECEPTOR POSITIVE (HCC): ICD-10-CM

## 2022-02-01 PROCEDURE — 96415 CHEMO IV INFUSION ADDL HR: CPT

## 2022-02-01 PROCEDURE — A4212 NON CORING NEEDLE OR STYLET: HCPCS

## 2022-02-01 PROCEDURE — 304540 HCHG NITRO SET VENT 2ND TUB

## 2022-02-01 PROCEDURE — 96413 CHEMO IV INFUSION 1 HR: CPT

## 2022-02-01 PROCEDURE — 700111 HCHG RX REV CODE 636 W/ 250 OVERRIDE (IP): Performed by: INTERNAL MEDICINE

## 2022-02-01 PROCEDURE — 96375 TX/PRO/DX INJ NEW DRUG ADDON: CPT

## 2022-02-01 PROCEDURE — 700105 HCHG RX REV CODE 258: Performed by: INTERNAL MEDICINE

## 2022-02-01 RX ORDER — HEPARIN SODIUM (PORCINE) LOCK FLUSH IV SOLN 100 UNIT/ML 100 UNIT/ML
500 SOLUTION INTRAVENOUS PRN
Status: DISCONTINUED | OUTPATIENT
Start: 2022-02-01 | End: 2022-02-01 | Stop reason: HOSPADM

## 2022-02-01 RX ORDER — ONDANSETRON 2 MG/ML
8 INJECTION INTRAMUSCULAR; INTRAVENOUS ONCE
Status: COMPLETED | OUTPATIENT
Start: 2022-02-01 | End: 2022-02-01

## 2022-02-01 RX ADMIN — DIPHENHYDRAMINE HYDROCHLORIDE 25 MG: 50 INJECTION, SOLUTION INTRAMUSCULAR; INTRAVENOUS at 11:40

## 2022-02-01 RX ADMIN — IXABEPILONE 62 MG: KIT INTRAVENOUS at 12:31

## 2022-02-01 RX ADMIN — HEPARIN SODIUM (PORCINE) LOCK FLUSH IV SOLN 100 UNIT/ML 500 UNITS: 100 SOLUTION at 16:03

## 2022-02-01 RX ADMIN — FAMOTIDINE 20 MG: 10 INJECTION INTRAVENOUS at 11:36

## 2022-02-01 RX ADMIN — ONDANSETRON 8 MG: 2 INJECTION INTRAMUSCULAR; INTRAVENOUS at 11:37

## 2022-02-01 ASSESSMENT — FIBROSIS 4 INDEX: FIB4 SCORE: 1.25

## 2022-02-01 NOTE — PROGRESS NOTES
"Pharmacy Chemotherapy Verification    Patient name: Christine Malhotra  Dx: recurrent Breast CA, stage IV    Cycle 1  Previous treatment: S/p 2 cycles vinorelbine 1/10/22  1st line TC (2014), Arimidex (2015), Ibrance/Faslodex ([id number]), Xeloda (2018), Erubilin (2020), gemcitabine (2020), weekly paclitaxel (2020 / 2021), DOX 2021, Vinorelbine (2022)    Regimen: Ixabepilone   *Dosing Reference*  Ixabepilone 40 mg/m2 (maximum 88 mg) IV over 3 hours on Day 1   21 Day cycle until DP or UT  NCCN Guidelines for Breast Cancer V.2.2021.   Bay EA, et al. J Clin Oncol. 2007;25(23):3407-14.    Allergies: Patient has no known allergies.  /59   Pulse 80   Temp 36.1 °C (97 °F) (Temporal)   Resp 18   Ht 1.46 m (4' 9.48\")   Wt 60 kg (132 lb 4.4 oz)   SpO2 94%   BMI 28.15 kg/m²  Body surface area is 1.56 meters squared.     Labs 1/31/22 ([facility]):  ANC~ 6960 Plt = 437k   Hgb = 10.7     SCr = 0.6 mg/dL CrCl ~ 77 mL/min   LFT's = WNL TBili = 0.3     Ixabepilone (Ixempra) 40 mg/m2 x 1.56 m2 = 62.4 mg   <10% difference, okay to treat with final dose = 62 mg IV      Matt Humphries, PharmD      "

## 2022-02-01 NOTE — PROGRESS NOTES
Chemotherapy Verification - PRIMARY RN      Height = 146 cm  Weight = 60 kg  BSA = 1.56 m^2       Medication: ixabepilone  Dose: 40 mg/m^2  Calculated Dose: 62.4 mg                             (In mg/m2, AUC, mg/kg)           I confirm this process was performed independently with the BSA and all final chemotherapy dosing calculations congruent.  Any discrepancies of 10% or greater have been addressed with the chemotherapy pharmacist. The resolution of the discrepancy has been documented in the EPIC progress notes.

## 2022-02-01 NOTE — PROGRESS NOTES
[facility]ine [facility] for Day 1/ Cycle 1 of ixempra. Pt denied having any new or acute complaints today, reports tolerating past treatments well. Port accessed in a sterile manner, had + blood return, flushed briskly. Pt given pre-medications and ixempra via filter as prescribed, tolerated well, denied having any complaints during or after infusion. Port had + blood return after, flushed per [facility] policy, de-accessed, needle intact, insertion site covered with sterile gauze and paper tape. Next appointment scheduled, Christine discharged home to self care.

## 2022-02-01 NOTE — PROGRESS NOTES
"Pharmacy Chemotherapy Verification    Dx: recurrent Breast Cancer        Regimen: ixabepilone  Ixabepilone 40 mg/m2 (max 88 mg) IV over 3 hrs on Day 1  21-day cycle until disease progression or unacceptable toxicity  NCCN Guidelines for Breast Cancer. V.2.2021.  Bay COPPOLA, et al. JCO. 2007;25(23):340-14.    Allergies:  Patient has no known allergies.     /59   Pulse 80   Temp 36.1 °C (97 °F) (Temporal)   Resp 18   Ht 1.46 m (4' 9.48\")   Wt 60 kg (132 lb 4.4 oz)   SpO2 94%   BMI 28.15 kg/m²  Body surface area is 1.56 meters squared.     Labs from 1/31/22 ([facility]) reviewed - all within treatment parameters.    Drug Order   (Drug name, dose, route, IV Fluid & volume, frequency, number of doses) Cycle 1  Previous treatment: s/p 2 cycles vinorelbine, last C2D15 on 1/10/22     Medication = ixabepilone   Base Dose = 40 mg/m2  Calc Dose: Base Dose x 1.56 m2 = 62.4 mg  Final Dose = 62 mg  Route = IV  Fluid & Volume =  mL w/ filter  Admin Duration = Over 3 hrs          <10% difference, okay to treat with final dose     By my signature below, I confirm this process was performed independently with the BSA and all final chemotherapy dosing calculations congruent. I have reviewed the above chemotherapy order and that my calculation of the final dose and BSA (when applicable) corroborate those calculations of the  pharmacist.     Anny Camilo, PharmD, BCOP    "

## 2022-02-01 NOTE — PROGRESS NOTES
Chemotherapy Verification - SECONDARY RN       Height = 146 cm  Weight = 60 kg  BSA = 1.56 m2       Medication: Ixempra  Dose: 40 mg/m2  Calculated Dose: 62.4 mg                             (In mg/m2, AUC, mg/kg)     I confirm that this process was performed independently.

## 2022-02-07 ENCOUNTER — APPOINTMENT (OUTPATIENT)
Dept: ONCOLOGY | Facility: MEDICAL CENTER | Age: 67
End: 2022-02-07
Attending: INTERNAL MEDICINE
Payer: MEDICAID

## 2022-02-07 RX ORDER — METHYLPREDNISOLONE SODIUM SUCCINATE 125 MG/2ML
125 INJECTION, POWDER, LYOPHILIZED, FOR SOLUTION INTRAMUSCULAR; INTRAVENOUS PRN
Status: CANCELLED | OUTPATIENT
Start: 2022-02-22

## 2022-02-07 RX ORDER — 0.9 % SODIUM CHLORIDE 0.9 %
10 VIAL (ML) INJECTION PRN
Status: CANCELLED | OUTPATIENT
Start: 2022-02-21

## 2022-02-07 RX ORDER — 0.9 % SODIUM CHLORIDE 0.9 %
VIAL (ML) INJECTION PRN
Status: CANCELLED | OUTPATIENT
Start: 2022-02-21

## 2022-02-07 RX ORDER — 0.9 % SODIUM CHLORIDE 0.9 %
10 VIAL (ML) INJECTION PRN
Status: CANCELLED | OUTPATIENT
Start: 2022-02-22

## 2022-02-07 RX ORDER — DIPHENHYDRAMINE HYDROCHLORIDE 50 MG/ML
50 INJECTION INTRAMUSCULAR; INTRAVENOUS PRN
Status: CANCELLED | OUTPATIENT
Start: 2022-02-22

## 2022-02-07 RX ORDER — HEPARIN SODIUM (PORCINE) LOCK FLUSH IV SOLN 100 UNIT/ML 100 UNIT/ML
500 SOLUTION INTRAVENOUS PRN
Status: CANCELLED | OUTPATIENT
Start: 2022-02-21

## 2022-02-07 RX ORDER — ONDANSETRON 2 MG/ML
8 INJECTION INTRAMUSCULAR; INTRAVENOUS ONCE
Status: CANCELLED | OUTPATIENT
Start: 2022-02-22 | End: 2022-02-14

## 2022-02-07 RX ORDER — ONDANSETRON 8 MG/1
8 TABLET, ORALLY DISINTEGRATING ORAL PRN
Status: CANCELLED | OUTPATIENT
Start: 2022-02-22

## 2022-02-07 RX ORDER — 0.9 % SODIUM CHLORIDE 0.9 %
3 VIAL (ML) INJECTION PRN
Status: CANCELLED | OUTPATIENT
Start: 2022-02-21

## 2022-02-07 RX ORDER — SODIUM CHLORIDE, SODIUM LACTATE, POTASSIUM CHLORIDE, CALCIUM CHLORIDE 600; 310; 30; 20 MG/100ML; MG/100ML; MG/100ML; MG/100ML
500 INJECTION, SOLUTION INTRAVENOUS CONTINUOUS
Status: CANCELLED | OUTPATIENT
Start: 2022-02-22

## 2022-02-07 RX ORDER — EPINEPHRINE 1 MG/ML(1)
0.5 AMPUL (ML) INJECTION PRN
Status: CANCELLED | OUTPATIENT
Start: 2022-02-22

## 2022-02-07 RX ORDER — 0.9 % SODIUM CHLORIDE 0.9 %
3 VIAL (ML) INJECTION PRN
Status: CANCELLED | OUTPATIENT
Start: 2022-02-22

## 2022-02-07 RX ORDER — ONDANSETRON 2 MG/ML
4 INJECTION INTRAMUSCULAR; INTRAVENOUS PRN
Status: CANCELLED | OUTPATIENT
Start: 2022-02-22

## 2022-02-07 RX ORDER — HEPARIN SODIUM (PORCINE) LOCK FLUSH IV SOLN 100 UNIT/ML 100 UNIT/ML
500 SOLUTION INTRAVENOUS PRN
Status: CANCELLED | OUTPATIENT
Start: 2022-02-22

## 2022-02-07 RX ORDER — 0.9 % SODIUM CHLORIDE 0.9 %
VIAL (ML) INJECTION PRN
Status: CANCELLED | OUTPATIENT
Start: 2022-02-22

## 2022-02-07 RX ORDER — PROCHLORPERAZINE MALEATE 10 MG
10 TABLET ORAL EVERY 6 HOURS PRN
Status: CANCELLED | OUTPATIENT
Start: 2022-02-22

## 2022-02-14 ENCOUNTER — APPOINTMENT (OUTPATIENT)
Dept: ONCOLOGY | Facility: MEDICAL CENTER | Age: 67
End: 2022-02-14
Attending: INTERNAL MEDICINE
Payer: MEDICAID

## 2022-02-18 ENCOUNTER — OFFICE VISIT (OUTPATIENT)
Dept: WOUND CARE | Facility: MEDICAL CENTER | Age: 67
End: 2022-02-18
Attending: INTERNAL MEDICINE
Payer: MEDICAID

## 2022-02-18 VITALS
SYSTOLIC BLOOD PRESSURE: 137 MMHG | DIASTOLIC BLOOD PRESSURE: 70 MMHG | HEART RATE: 87 BPM | RESPIRATION RATE: 18 BRPM | TEMPERATURE: 97.9 F | OXYGEN SATURATION: 97 %

## 2022-02-18 DIAGNOSIS — Z85.3 HISTORY OF INVASIVE BREAST CANCER: ICD-10-CM

## 2022-02-18 DIAGNOSIS — S21.002D OPEN WOUND OF LEFT BREAST, SUBSEQUENT ENCOUNTER: ICD-10-CM

## 2022-02-18 DIAGNOSIS — R52 PAIN ASSOCIATED WITH WOUND: ICD-10-CM

## 2022-02-18 DIAGNOSIS — T14.8XXA PAIN ASSOCIATED WITH WOUND: ICD-10-CM

## 2022-02-18 PROCEDURE — 99213 OFFICE O/P EST LOW 20 MIN: CPT

## 2022-02-18 PROCEDURE — 99214 OFFICE O/P EST MOD 30 MIN: CPT | Performed by: NURSE PRACTITIONER

## 2022-02-18 ASSESSMENT — ENCOUNTER SYMPTOMS
BACK PAIN: 0
COUGH: 0
DIARRHEA: 0
DIZZINESS: 1
VOMITING: 0
CHILLS: 1
DEPRESSION: 1
HEADACHES: 1
SHORTNESS OF BREATH: 0
NAUSEA: 1
CONSTIPATION: 1
FEVER: 0

## 2022-02-18 NOTE — PROGRESS NOTES
Provider Encounter- Full Thickness wound    HISTORY OF PRESENT ILLNESS  Wound History:    START OF CARE IN CLINIC: 2/18/2022    REFERRING PROVIDER: Fab Jarvis Iii      WOUND- Full Thickness Wound   LOCATION: Left breast   HISTORY: Patient with stage IV recurrent breast cancer referred to [facility] for full-thickness wounds to her left breast/chest.  Patient states these wounds started appearing in early January of this year.  She has been covering them with simple bandages, changing daily when she showers.  She is currently undergoing palliative chemotherapy.  She lives with her adult daughter.    Pertinent Medical History: Metastatic breast cancer, right mastectomy, pulmonary embolism    TOBACCO USE: She has never smoked or use smokeless tobacco    Patient's problem list, allergies, and current medications reviewed and updated in Epic    Interval History:  2/18/2022 Initial clinic visit with HANY Tejada, FRANTZ, SLYN, JENNIE.   tablet used for today's visit.  Patient states she is having quite a bit of pain from her wounds.  She was prescribed pain medication by her oncologist, however states she took 1 and did not like the way it made her feel.  She has opted to take Tylenol instead, and reports this is not very effective.  She states that her wound sometimes drain a lot of blood, and tends to be very painful when open to air.  She was told that she has cancer in this breast.  It was unclear whether or not she had received radiation therapy to her chest in the past.   She states that she has been changing her dressings herself, but would appreciate help if available.  Will see if she can get home health services.      REVIEW OF SYSTEMS:   Review of Systems   Constitutional: Positive for chills. Negative for fever.        Intermittent chills since starting chemotherapy on 2/1   Respiratory: Negative for cough and shortness of breath.    Cardiovascular: Negative for chest pain.   Gastrointestinal:  Positive for constipation and nausea. Negative for diarrhea and vomiting.        Intermittent nausea  Frequent constipation   Musculoskeletal: Negative for back pain and joint pain.   Skin:        Spontaneous eruption of wounds to left breast beginning in January 2022   Neurological: Positive for dizziness and headaches.        Sometimes has dizziness and headaches, since starting chemotherapy   Psychiatric/Behavioral: Positive for depression.        Depression related to current health issues       PHYSICAL EXAMINATION:   /70 (BP Location: Left arm, Patient Position: Sitting)   Pulse 87   Temp 36.6 °C (97.9 °F) (Temporal)   Resp 18   SpO2 97%     Physical Exam  Constitutional:       Comments: Thin   HENT:      Head: Normocephalic.   Eyes:      Pupils: Pupils are equal, round, and reactive to light.   Cardiovascular:      Rate and Rhythm: Normal rate.   Pulmonary:      Effort: Pulmonary effort is normal.   Skin:     Comments: Multiple, scattered, diffuse full and partial-thickness wounds to left breast and upper chest  Refer to wound flowsheet and photos   Neurological:      Mental Status: She is alert.         WOUND ASSESSMENT  Wound 02/18/22 Full Thickness Wound Chest Upper Left --Left Upper Chest (Active)   Wound Image   02/18/22 1000   Site Assessment Red;Yellow;Brown 02/18/22 1000   Periwound Assessment Scar tissue 02/18/22 1000   Margins Attached edges 02/18/22 1000   Drainage Amount Moderate 02/18/22 1000   Drainage Description Serosanguineous 02/18/22 1000   Treatments Cleansed 02/18/22 1000   Wound Cleansing Normal Saline Irrigation 02/18/22 1000   Periwound Protectant Skin Protectant Wipes to Periwound 02/18/22 1000   Dressing Cleansing/Solutions Not Applicable 02/18/22 1000   Dressing Options Mepilex 02/18/22 1000   Dressing Changed New 02/18/22 1000   Dressing Change/Treatment Frequency Every 48 hrs, and As Needed 02/18/22 1000   Non-staged Wound Description Full thickness 02/18/22 1000   Wound  Length (cm) 3 cm 02/18/22 1000   Wound Width (cm) 6.5 cm 02/18/22 1000   Wound Depth (cm) 0.3 cm 02/18/22 1000   Wound Surface Area (cm^2) 19.5 cm^2 02/18/22 1000   Wound Volume (cm^3) 5.85 cm^3 02/18/22 1000   Wound Odor None 02/18/22 1000   Exposed Structures GWEN 02/18/22 1000   Number of days: 0       Wound 02/18/22 Full Thickness Wound Breast Left --Left Breast (Active)   Wound Image   02/18/22 1000   Site Assessment Red;Yellow;Tan 02/18/22 1000   Periwound Assessment Scar tissue 02/18/22 1000   Margins Attached edges 02/18/22 1000   Drainage Amount Moderate 02/18/22 1000   Drainage Description Serosanguineous 02/18/22 1000   Treatments Cleansed 02/18/22 1000   Wound Cleansing Normal Saline Irrigation 02/18/22 1000   Periwound Protectant Skin Protectant Wipes to Periwound 02/18/22 1000   Dressing Cleansing/Solutions Not Applicable 02/18/22 1000   Dressing Options Mepilex 02/18/22 1000   Dressing Changed New 02/18/22 1000   Dressing Change/Treatment Frequency Every 48 hrs, and As Needed 02/18/22 1000   Non-staged Wound Description Full thickness 02/18/22 1000   Wound Length (cm) 12.5 cm 02/18/22 1000   Wound Width (cm) 14.5 cm 02/18/22 1000   Wound Depth (cm) 0.1 cm 02/18/22 1000   Wound Surface Area (cm^2) 181.25 cm^2 02/18/22 1000   Wound Volume (cm^3) 18.125 cm^3 02/18/22 1000   Wound Odor None 02/18/22 1000   Exposed Structures GWEN 02/18/22 1000   Number of days: 0       PROCEDURE:   -2% viscous lidocaine applied topically to wound bed for approximately 5 minutes prior to assessment  -No debridement today, contraindicated.  Patient being treated for active cancer in this breast  -Wound care completed by wound RN, refer to flowsheet  -Patient tolerated the procedure well, without c/o pain or discomfort.       Pertinent Labs and Diagnostics:    Labs:     A1c: No results found for: HBA1C       IMAGING: None found in epic    VASCULAR STUDIES: N/A    LAST  WOUND CULTURE:  DATE : No recent cultures found in  epic        ASSESSMENT AND PLAN:     1. Open wound of left breast, subsequent encounter  Comments: Multiple shallow, diffuse wounds.  Per patient spontaneously appeared in January 2022.  Complicated by metastatic breast cancer diagnoses    2/18/2022: Initial clinic visit.  Patient has multiple painful wounds.  -Debridement contraindicated.  Patient is currently being treated for active cancer.  Unfortunately, treatment will be palliative only, as these wounds are not likely to resolve  -Referral to home health to assist with wound care  -Patient to return to clinic weekly for reassessment    Wound care: Mepilex to manage exudate, and to protect wound.  Change every other day, or as needed for drainage    2. History of invasive breast cancer  Comments: History of right breast cancer for which she underwent mastectomy, and chemotherapy.  Now has recurrence to left breast    2/18/2022: Patient states she began her chemotherapy on 2/1/2022.  Has had problems with nausea, diarrhea, and general malaise  -Encouraged her to speak with her oncologist regarding chemotherapy side effects    3. Pain associated with wound    2/18/2022: Patient reports that her wounds are quite painful, especially when open to air  -Lidocaine applied to all wounds prior to assessment  -No sharp debridement        PATIENT EDUCATION  - Importance of adequate nutrition for wound healing  -Advised to go to ER for any increased redness, swelling, drainage, or odor, or if patient develops fever, chills, nausea or vomiting.     My total time spent caring for the patient on the day of the encounter was 30 minutes.   This does not include time spent on separately billable procedures/tests.       Please note that this note may have been created using voice recognition software. I have worked with technical experts from [employer] to optimize the interface.  I have made every reasonable attempt to correct obvious errors, but there may be errors of  grammar and possibly content that I did not discover before finalizing the note.    N

## 2022-02-21 ENCOUNTER — TELEPHONE (OUTPATIENT)
Dept: WOUND CARE | Facility: MEDICAL CENTER | Age: 67
End: 2022-02-21
Payer: MEDICAID

## 2022-02-22 ENCOUNTER — OUTPATIENT INFUSION SERVICES (OUTPATIENT)
Dept: ONCOLOGY | Facility: MEDICAL CENTER | Age: 67
End: 2022-02-22
Attending: INTERNAL MEDICINE
Payer: MEDICAID

## 2022-02-22 VITALS
SYSTOLIC BLOOD PRESSURE: 141 MMHG | HEIGHT: 57 IN | TEMPERATURE: 98 F | BODY MASS INDEX: 27.3 KG/M2 | DIASTOLIC BLOOD PRESSURE: 76 MMHG | RESPIRATION RATE: 18 BRPM | OXYGEN SATURATION: 98 % | HEART RATE: 94 BPM | WEIGHT: 126.54 LBS

## 2022-02-22 DIAGNOSIS — C50.811 MALIGNANT NEOPLASM OF OVERLAPPING SITES OF RIGHT BREAST IN FEMALE, ESTROGEN RECEPTOR POSITIVE (HCC): ICD-10-CM

## 2022-02-22 DIAGNOSIS — Z17.0 MALIGNANT NEOPLASM OF OVERLAPPING SITES OF RIGHT BREAST IN FEMALE, ESTROGEN RECEPTOR POSITIVE (HCC): ICD-10-CM

## 2022-02-22 LAB
ALBUMIN SERPL BCP-MCNC: 3.9 G/DL (ref 3.2–4.9)
ALBUMIN/GLOB SERPL: 1.3 G/DL
ALP SERPL-CCNC: 62 U/L (ref 30–99)
ALT SERPL-CCNC: 10 U/L (ref 2–50)
ANION GAP SERPL CALC-SCNC: 13 MMOL/L (ref 7–16)
AST SERPL-CCNC: 28 U/L (ref 12–45)
BASOPHILS # BLD AUTO: 0.7 % (ref 0–1.8)
BASOPHILS # BLD: 0.05 K/UL (ref 0–0.12)
BILIRUB SERPL-MCNC: 0.2 MG/DL (ref 0.1–1.5)
BUN SERPL-MCNC: 15 MG/DL (ref 8–22)
CALCIUM SERPL-MCNC: 9.4 MG/DL (ref 8.5–10.5)
CHLORIDE SERPL-SCNC: 104 MMOL/L (ref 96–112)
CO2 SERPL-SCNC: 25 MMOL/L (ref 20–33)
CREAT SERPL-MCNC: 0.53 MG/DL (ref 0.5–1.4)
EOSINOPHIL # BLD AUTO: 0.05 K/UL (ref 0–0.51)
EOSINOPHIL NFR BLD: 0.7 % (ref 0–6.9)
ERYTHROCYTE [DISTWIDTH] IN BLOOD BY AUTOMATED COUNT: 62.3 FL (ref 35.9–50)
GLOBULIN SER CALC-MCNC: 3.1 G/DL (ref 1.9–3.5)
GLUCOSE SERPL-MCNC: 91 MG/DL (ref 65–99)
HCT VFR BLD AUTO: 34.2 % (ref 37–47)
HGB BLD-MCNC: 10.5 G/DL (ref 12–16)
IMM GRANULOCYTES # BLD AUTO: 0.04 K/UL (ref 0–0.11)
IMM GRANULOCYTES NFR BLD AUTO: 0.6 % (ref 0–0.9)
LYMPHOCYTES # BLD AUTO: 1.35 K/UL (ref 1–4.8)
LYMPHOCYTES NFR BLD: 19.1 % (ref 22–41)
MCH RBC QN AUTO: 28.5 PG (ref 27–33)
MCHC RBC AUTO-ENTMCNC: 30.7 G/DL (ref 33.6–35)
MCV RBC AUTO: 92.9 FL (ref 81.4–97.8)
MONOCYTES # BLD AUTO: 0.7 K/UL (ref 0–0.85)
MONOCYTES NFR BLD AUTO: 9.9 % (ref 0–13.4)
NEUTROPHILS # BLD AUTO: 4.88 K/UL (ref 2–7.15)
NEUTROPHILS NFR BLD: 69 % (ref 44–72)
NRBC # BLD AUTO: 0 K/UL
NRBC BLD-RTO: 0 /100 WBC
OUTPT INFUS CBC COMMENT OICOM: ABNORMAL
PLATELET # BLD AUTO: 435 K/UL (ref 164–446)
PMV BLD AUTO: 9.3 FL (ref 9–12.9)
POTASSIUM SERPL-SCNC: 4.4 MMOL/L (ref 3.6–5.5)
PROT SERPL-MCNC: 7 G/DL (ref 6–8.2)
RBC # BLD AUTO: 3.68 M/UL (ref 4.2–5.4)
SODIUM SERPL-SCNC: 142 MMOL/L (ref 135–145)
WBC # BLD AUTO: 7.1 K/UL (ref 4.8–10.8)

## 2022-02-22 PROCEDURE — 80053 COMPREHEN METABOLIC PANEL: CPT

## 2022-02-22 PROCEDURE — 700111 HCHG RX REV CODE 636 W/ 250 OVERRIDE (IP): Performed by: INTERNAL MEDICINE

## 2022-02-22 PROCEDURE — 96375 TX/PRO/DX INJ NEW DRUG ADDON: CPT

## 2022-02-22 PROCEDURE — A4212 NON CORING NEEDLE OR STYLET: HCPCS

## 2022-02-22 PROCEDURE — 304540 HCHG NITRO SET VENT 2ND TUB

## 2022-02-22 PROCEDURE — 96415 CHEMO IV INFUSION ADDL HR: CPT

## 2022-02-22 PROCEDURE — 85025 COMPLETE CBC W/AUTO DIFF WBC: CPT

## 2022-02-22 PROCEDURE — 96413 CHEMO IV INFUSION 1 HR: CPT

## 2022-02-22 PROCEDURE — 700105 HCHG RX REV CODE 258: Performed by: INTERNAL MEDICINE

## 2022-02-22 RX ORDER — ONDANSETRON 2 MG/ML
8 INJECTION INTRAMUSCULAR; INTRAVENOUS ONCE
Status: COMPLETED | OUTPATIENT
Start: 2022-02-22 | End: 2022-02-22

## 2022-02-22 RX ORDER — HEPARIN SODIUM (PORCINE) LOCK FLUSH IV SOLN 100 UNIT/ML 100 UNIT/ML
500 SOLUTION INTRAVENOUS PRN
Status: DISCONTINUED | OUTPATIENT
Start: 2022-02-22 | End: 2022-02-22 | Stop reason: HOSPADM

## 2022-02-22 RX ORDER — SODIUM CHLORIDE, SODIUM LACTATE, POTASSIUM CHLORIDE, CALCIUM CHLORIDE 600; 310; 30; 20 MG/100ML; MG/100ML; MG/100ML; MG/100ML
500 INJECTION, SOLUTION INTRAVENOUS CONTINUOUS
Status: DISCONTINUED | OUTPATIENT
Start: 2022-02-22 | End: 2022-02-22 | Stop reason: HOSPADM

## 2022-02-22 RX ADMIN — FAMOTIDINE 20 MG: 10 INJECTION INTRAVENOUS at 14:56

## 2022-02-22 RX ADMIN — HEPARIN 500 UNITS: 100 SYRINGE at 19:56

## 2022-02-22 RX ADMIN — DIPHENHYDRAMINE HYDROCHLORIDE 25 MG: 50 INJECTION, SOLUTION INTRAMUSCULAR; INTRAVENOUS at 14:56

## 2022-02-22 RX ADMIN — SODIUM CHLORIDE, POTASSIUM CHLORIDE, SODIUM LACTATE AND CALCIUM CHLORIDE 500 ML: 600; 310; 30; 20 INJECTION, SOLUTION INTRAVENOUS at 16:05

## 2022-02-22 RX ADMIN — ONDANSETRON 8 MG: 2 INJECTION INTRAMUSCULAR; INTRAVENOUS at 14:56

## 2022-02-22 RX ADMIN — IXABEPILONE 61 MG: KIT INTRAVENOUS at 16:12

## 2022-02-22 ASSESSMENT — FIBROSIS 4 INDEX: FIB4 SCORE: 1.25

## 2022-02-22 NOTE — PROGRESS NOTES
Chemotherapy Verification - SECONDARY RN       Height = 146 cm  Weight = 57.4 kg  BSA = 1.53 m2       Medication: Ixempra  Dose: 40 mg/m2  Calculated Dose: 61.2 mg                             (In mg/m2, AUC, mg/kg)     I confirm that this process was performed independently.

## 2022-02-22 NOTE — PROGRESS NOTES
"Pharmacy Chemotherapy Verification  Patient name: Christine Malhotra  Dx: recurrent Breast CA, stage IV  Regimen: Ixabepilone monotherapy  1st line TC (2014), Arimidex (2015), Ibrance/Faslodex ([id number]), Xeloda (2018), Erubilin (2020), gemcitabine (2020), weekly paclitaxel (2020 / 2021), DOX 2021, vinorelbine 21/22  Cycle 2  Previous treatment: 2/1/22    Ixabepilone 40 mg/m2 (maximum 88 mg) IV over 3 hours on Day 1   [id number] until progression or toxicity   *Dosing Reference*  NCCN Guidelines Breast Cancer v2.2022      Allergies: Patient has no known allergies.  /76   Pulse 94   Temp 36.7 °C (98 °F) (Temporal)   Resp 18   Ht 1.46 m (4' 9.48\")   Wt 57.4 kg (126 lb 8.7 oz)   SpO2 98%   BMI 26.93 kg/m²  Body surface area is 1.53 meters squared.     Labs: 2/22/211  Meet treatment parameters    TTE 8/31/21 LVEF ~ 55%    Path:  ER+, NH+  HER2-    Ixabepilone 40 mg/m2 x 1.53 m2 = 61.2 mg   Ok to treat with final dose = 61 mg IV    "

## 2022-02-22 NOTE — PROGRESS NOTES
Chemotherapy Verification - PRIMARY RN      Height = 146 cm  Weight = 57.4 kg  BSA = 1.53 m^2       Medication: ixabepilone (IXEMPRA)  Dose: 40 mg/m^2  Calculated Dose: 61.2 mg                             (In mg/m2, AUC, mg/kg)     I confirm this process was performed independently with the BSA and all final chemotherapy dosing calculations congruent.  Any discrepancies of 10% or greater have been addressed with the chemotherapy pharmacist. The resolution of the discrepancy has been documented in the EPIC progress notes.

## 2022-02-22 NOTE — PROGRESS NOTES
"Pharmacy Chemotherapy Verification    Dx: recurrent Breast Cancer        Regimen: ixabepilone  Ixabepilone 40 mg/m2 (max 88 mg) IV over 3 hrs on Day 1  21-day cycle until disease progression or unacceptable toxicity  NCCN Guidelines for Breast Cancer. V.2.2021.  Bay COPPOLA, et al. JCO. 2007;25(23):3403-14.    Allergies:  Patient has no known allergies.     /76   Pulse 94   Temp 36.7 °C (98 °F) (Temporal)   Resp 18   Ht 1.46 m (4' 9.48\")   Wt 57.4 kg (126 lb 8.7 oz)   SpO2 98%   BMI 26.93 kg/m²  Body surface area is 1.53 meters squared.     Labs from 2/22/22 reviewed - all within treatment parameters.    Drug Order   (Drug name, dose, route, IV Fluid & volume, frequency, number of doses) Cycle 2  Previous treatment: Cycle 1 on 2/1/22 (s/p 2 cycles vinorelbine, last C2D15 on 1/10/22)     Medication = ixabepilone   Base Dose = 40 mg/m2  Calc Dose: Base Dose x 1.53 m2 = 61.2 mg  Final Dose = 61 mg  Route = IV  Fluid & Volume =  mL w/ filter  Admin Duration = Over 3 hrs          <10% difference, okay to treat with final dose     By my signature below, I confirm this process was performed independently with the BSA and all final chemotherapy dosing calculations congruent. I have reviewed the above chemotherapy order and that my calculation of the final dose and BSA (when applicable) corroborate those calculations of the  pharmacist.     Irina Calvin, PharmD, BCOP       "

## 2022-02-23 NOTE — PROGRESS NOTES
Report received from BELLO Hardy. Pt presents to [facility] for ixabepilone infusion.  services in use. Ixabepilone finished infusing with no s/s of adverse reactions. Brisk blood return observed from port before flushed, heparin locked, and de-accessed; gauze and tape dressing applied. Next appointment confirmed and education provided. Pt discharged to self care with all personal belongings and in NAD.

## 2022-02-23 NOTE — PROGRESS NOTES
Christine is here for Day 1, Cycle 2 Ixabepilone (IXEMPRA) for recurrent breast cancer. Using  services, plan of care discussed. Right upper chest port accessed using sterile technique; labs drawn as ordered. Labs reviewed and within parameters to proceed with treatment. Pre-medications given per MAR. Ixabepilone (IXEMPRA) given per MAR; with filter/DHEP-free tubing. Report given to Magalie MONSALVE.

## 2022-02-25 ENCOUNTER — OFFICE VISIT (OUTPATIENT)
Dept: WOUND CARE | Facility: MEDICAL CENTER | Age: 67
End: 2022-02-25
Attending: INTERNAL MEDICINE
Payer: MEDICAID

## 2022-02-25 VITALS
HEART RATE: 95 BPM | DIASTOLIC BLOOD PRESSURE: 73 MMHG | SYSTOLIC BLOOD PRESSURE: 127 MMHG | RESPIRATION RATE: 16 BRPM | TEMPERATURE: 97.4 F | OXYGEN SATURATION: 95 %

## 2022-02-25 DIAGNOSIS — Z85.3 HISTORY OF INVASIVE BREAST CANCER: ICD-10-CM

## 2022-02-25 DIAGNOSIS — T14.8XXA PAIN ASSOCIATED WITH WOUND: ICD-10-CM

## 2022-02-25 DIAGNOSIS — S21.002D OPEN WOUND OF LEFT BREAST, SUBSEQUENT ENCOUNTER: ICD-10-CM

## 2022-02-25 DIAGNOSIS — R52 PAIN ASSOCIATED WITH WOUND: ICD-10-CM

## 2022-02-25 PROCEDURE — 99214 OFFICE O/P EST MOD 30 MIN: CPT | Performed by: NURSE PRACTITIONER

## 2022-02-25 PROCEDURE — 99213 OFFICE O/P EST LOW 20 MIN: CPT

## 2022-02-25 ASSESSMENT — ENCOUNTER SYMPTOMS
CONSTIPATION: 1
NAUSEA: 1
DIZZINESS: 1
VOMITING: 0
BACK PAIN: 0
COUGH: 0
HEADACHES: 1
DEPRESSION: 1
FEVER: 0
CHILLS: 1
DIARRHEA: 0
SHORTNESS OF BREATH: 0

## 2022-02-25 NOTE — PATIENT INSTRUCTIONS
-Keep your wound dressing clean, dry, and intact.    -Change your dressing if it becomes soiled, soaked, or falls off.    -Should you experience any significant changes in your wound(s), such as infection (redness, swelling, localized heat, increased pain, fever > 101 F, chills) or have any questions regarding your home care instructions, please contact the wound center at [phone]. If after hours, contact your primary care physician or go to the hospital emergency room.

## 2022-02-25 NOTE — NON-PROVIDER
Patient states she has not received wound care supplies. [facility] stated that they have already delivered the supplies. Current address that the patient gave this RN was different from the address [facility] had on file. It is difficult to determining wether the patient can retrieve supplies form old address since she is only [employer]. New [facility] wound care supply order with new address faxed to [facility].

## 2022-02-25 NOTE — PROGRESS NOTES
Provider Encounter- Full Thickness wound    HISTORY OF PRESENT ILLNESS  Wound History:    START OF CARE IN CLINIC: 2/18/2022    REFERRING PROVIDER: Fab Jarvis Iii      WOUND- Full Thickness Wound   LOCATION: Left breast   HISTORY: Patient with stage IV recurrent breast cancer referred to [facility] for full-thickness wounds to her left breast/chest.  Patient states these wounds started appearing in early January of this year.  She has been covering them with simple bandages, changing daily when she showers.  She is currently undergoing palliative chemotherapy.  She lives with her adult daughter.    Pertinent Medical History: Metastatic breast cancer, right mastectomy, pulmonary embolism    TOBACCO USE: She has never smoked or use smokeless tobacco    Patient's problem list, allergies, and current medications reviewed and updated in Epic    Interval History:  2/18/2022 Initial clinic visit with HANY Tejada, FRANTZ, HASMUKH, JENNIE.   tablet used for today's visit.  Patient states she is having quite a bit of pain from her wounds.  She was prescribed pain medication by her oncologist, however states she took 1 and did not like the way it made her feel.  She has opted to take Tylenol instead, and reports this is not very effective.  She states that her wound sometimes drain a lot of blood, and tends to be very painful when open to air.  She was told that she has cancer in this breast.  It was unclear whether or not she had received radiation therapy to her chest in the past.   She states that she has been changing her dressings herself, but would appreciate help if available.  Will see if she can get home health services.    2/25/2022 : Clinic visit with HANY Tejada, FRANTZ, HASMUKH, JENNIE.   services used for today's visit.  Patient still having quite a bit of pain from her wounds.  Thus far she is only taking Tylenol, as instructed by her oncologist.  She sees oncologist again in a few  days, I advised her to address her pain issues with him/her.  Patient has been changing her own dressings without difficulty.  She has not heard from home health.  I elected the status of referral after she left, found that referral information had been sent to home health care of [facility].  Will contact patient's daughter with this information.      REVIEW OF SYSTEMS:   Review of Systems   Constitutional: Positive for chills. Negative for fever.        Intermittent chills since starting chemotherapy on 2/1   Respiratory: Negative for cough and shortness of breath.    Cardiovascular: Negative for chest pain.   Gastrointestinal: Positive for constipation and nausea. Negative for diarrhea and vomiting.        Intermittent nausea  Frequent constipation   Musculoskeletal: Negative for back pain and joint pain.   Skin:        Spontaneous eruption of wounds to left breast beginning in January 2022   Neurological: Positive for dizziness and headaches.        Sometimes has dizziness and headaches, since starting chemotherapy   Psychiatric/Behavioral: Positive for depression.        Depression related to current health issues       PHYSICAL EXAMINATION:   /73   Pulse 95   Temp 36.3 °C (97.4 °F)   Resp 16   SpO2 95%     Physical Exam  Constitutional:       Comments: Thin   HENT:      Head: Normocephalic.   Eyes:      Pupils: Pupils are equal, round, and reactive to light.   Cardiovascular:      Rate and Rhythm: Normal rate.   Pulmonary:      Effort: Pulmonary effort is normal.   Skin:     Comments: Multiple, scattered, diffuse full and partial-thickness wounds to left breast and upper chest  Refer to wound flowsheet and photos   Neurological:      Mental Status: She is alert.         WOUND ASSESSMENT  Wound 02/18/22 Full Thickness Wound Chest Upper Left --Left Upper Chest (Active)   Wound Image   02/25/22 0900   Site Assessment Yellow;Brown 02/25/22 0900   Periwound Assessment Scar tissue 02/25/22 0900    Margins Attached edges 02/25/22 0900   Drainage Amount Moderate 02/25/22 0900   Drainage Description Serosanguineous 02/25/22 0900   Treatments Cleansed;Site care 02/25/22 0900   Wound Cleansing Normal Saline Irrigation 02/25/22 0900   Periwound Protectant Skin Protectant Wipes to Periwound 02/25/22 0900   Dressing Cleansing/Solutions Not Applicable 02/25/22 0900   Dressing Options Mepilex 02/25/22 0900   Dressing Changed Changed 02/25/22 0900   Dressing Change/Treatment Frequency Every 48 hrs, and As Needed 02/18/22 1000   Non-staged Wound Description Full thickness 02/25/22 0900   Wound Length (cm) 2.5 cm 02/25/22 0900   Wound Width (cm) 6 cm 02/25/22 0900   Wound Depth (cm) 0.1 cm 02/25/22 0900   Wound Surface Area (cm^2) 15 cm^2 02/25/22 0900   Wound Volume (cm^3) 1.5 cm^3 02/25/22 0900   Wound Healing % 74 02/25/22 0900   Wound Odor None 02/25/22 0900   Exposed Structures GWEN 02/25/22 0900   Number of days: 7       Wound 02/18/22 Full Thickness Wound Breast Left --Left Breast (Active)   Wound Image   02/25/22 0900   Site Assessment Red;Yellow;Tan 02/25/22 0900   Periwound Assessment Scar tissue 02/25/22 0900   Margins Attached edges 02/25/22 0900   Drainage Amount Moderate 02/25/22 0900   Drainage Description Serosanguineous 02/25/22 0900   Treatments Cleansed;Site care 02/25/22 0900   Wound Cleansing Normal Saline Irrigation 02/25/22 0900   Periwound Protectant Skin Protectant Wipes to Periwound 02/25/22 0900   Dressing Cleansing/Solutions Not Applicable 02/25/22 0900   Dressing Options Mepilex 02/25/22 0900   Dressing Changed Changed 02/25/22 0900   Dressing Change/Treatment Frequency Every 48 hrs, and As Needed 02/18/22 1000   Non-staged Wound Description Full thickness 02/18/22 1000   Wound Length (cm) 12.3 cm 02/25/22 0900   Wound Width (cm) 14.5 cm 02/25/22 0900   Wound Depth (cm) 0.1 cm 02/25/22 0900   Wound Surface Area (cm^2) 178.35 cm^2 02/25/22 0900   Wound Volume (cm^3) 17.835 cm^3 02/25/22 0900    Wound Healing % 2 02/25/22 0900   Wound Odor None 02/25/22 0900   Exposed Structures GWEN 02/25/22 0900   Number of days: 7       PROCEDURE:   -2% viscous lidocaine applied topically to wound bed for approximately 5 minutes prior to assessment  -No debridement today, contraindicated.  Patient being treated for active cancer in this breast  -Wound care completed by wound RN, refer to flowsheet  -Patient tolerated the procedure well, without c/o pain or discomfort.       Pertinent Labs and Diagnostics:    Labs:     A1c: No results found for: HBA1C       IMAGING: None found in epic    VASCULAR STUDIES: N/A    LAST  WOUND CULTURE:  DATE : No recent cultures found in epic        ASSESSMENT AND PLAN:     1. Open wound of left breast, subsequent encounter  Comments: Multiple shallow, diffuse wounds.  Per patient spontaneously appeared in January 2022.  Complicated by metastatic breast cancer diagnoses    2/25/2022: Multiple painful wounds due to cancer.  -Debridement contraindicated.  Patient is currently being treated for active cancer.  Unfortunately, treatment will be palliative only, as these wounds are not likely to resolve  -Referral to home health to assist with wound care sent last week.  Patient has not heard from them.  I will contact patient's daughter with referral information.  -Patient to return to clinic weekly for reassessment  -Supplies to be sent to patient's home.  She did provide us with a new address today.    Wound care: Mepilex to manage exudate, and to protect wound.  Change every other day, or as needed for drainage    2. History of invasive breast cancer  Comments: History of right breast cancer for which she underwent mastectomy, and chemotherapy.  Now has recurrence to left breast    2/25/2022: Patient states she began her chemotherapy on 2/1/2022.  Has had problems with nausea, diarrhea, and general malaise  -Encouraged her to speak with her oncologist regarding chemotherapy side effects    3.  Pain associated with wound    2/25/2022: Patient reports that her wounds are quite painful, especially when open to air.  She is taking Tylenol as instructed by her oncologist, but states this is not adequate.  -Lidocaine applied to all wounds prior to assessment  -No sharp debridement  -Patient to discuss pain issues with her oncologist.  Advised her to make sure he knows that she is not getting adequate relief with Tylenol        PATIENT EDUCATION  - Importance of adequate nutrition for wound healing  -Advised to go to ER for any increased redness, swelling, drainage, or odor, or if patient develops fever, chills, nausea or vomiting.     My total time spent caring for the patient on the day of the encounter was 30 minutes.   This does not include time spent on separately billable procedures/tests.       Please note that this note may have been created using voice recognition software. I have worked with technical experts from [employer] to optimize the interface.  I have made every reasonable attempt to correct obvious errors, but there may be errors of grammar and possibly content that I did not discover before finalizing the note.    N

## 2022-02-25 NOTE — NON-PROVIDER
[employer] Order:     Wound 02/18/22 Full Thickness Wound Chest Upper Left --Left Upper Chest (Active)   Wound Image   02/25/22 0900   Site Assessment Yellow;Brown 02/25/22 0900   Periwound Assessment Scar tissue 02/25/22 0900   Margins Attached edges 02/25/22 0900   Drainage Amount Moderate 02/25/22 0900   Drainage Description Serosanguineous 02/25/22 0900   Treatments Cleansed;Site care 02/25/22 0900   Wound Cleansing Normal Saline Irrigation 02/25/22 0900   Periwound Protectant Skin Protectant Wipes to Periwound 02/25/22 0900   Dressing Cleansing/Solutions Not Applicable 02/25/22 0900   Dressing Options Mepilex 02/25/22 0900   Dressing Changed Changed 02/25/22 0900   Dressing Change/Treatment Frequency Every 48 hrs, and As Needed 02/18/22 1000   Non-staged Wound Description Full thickness 02/25/22 0900   Wound Length (cm) 2.5 cm 02/25/22 0900   Wound Width (cm) 6 cm 02/25/22 0900   Wound Depth (cm) 0.1 cm 02/25/22 0900   Wound Surface Area (cm^2) 15 cm^2 02/25/22 0900   Wound Volume (cm^3) 1.5 cm^3 02/25/22 0900   Wound Healing % 74 02/25/22 0900   Wound Odor None 02/25/22 0900   Exposed Structures GWEN 02/25/22 0900       Wound 02/18/22 Full Thickness Wound Breast Left --Left Breast (Active)   Wound Image   02/25/22 0900   Site Assessment Red;Yellow;Tan 02/25/22 0900   Periwound Assessment Scar tissue 02/25/22 0900   Margins Attached edges 02/25/22 0900   Drainage Amount Moderate 02/25/22 0900   Drainage Description Serosanguineous 02/25/22 0900   Treatments Cleansed;Site care 02/25/22 0900   Wound Cleansing Normal Saline Irrigation 02/25/22 0900   Periwound Protectant Skin Protectant Wipes to Periwound 02/25/22 0900   Dressing Cleansing/Solutions Not Applicable 02/25/22 0900   Dressing Options Mepilex 02/25/22 0900   Dressing Changed Changed 02/25/22 0900   Dressing Change/Treatment Frequency Every 48 hrs, and As Needed 02/18/22 1000   Non-staged Wound Description Full thickness 02/18/22 1000   Wound Length  (cm) 12.3 cm 02/25/22 0900   Wound Width (cm) 14.5 cm 02/25/22 0900   Wound Depth (cm) 0.1 cm 02/25/22 0900   Wound Surface Area (cm^2) 178.35 cm^2 02/25/22 0900   Wound Volume (cm^3) 17.835 cm^3 02/25/22 0900   Wound Healing % 2 02/25/22 0900   Wound Odor None 02/25/22 0900   Exposed Structures GWEN 02/25/22 0900       ass

## 2022-03-01 RX ORDER — 0.9 % SODIUM CHLORIDE 0.9 %
10 VIAL (ML) INJECTION PRN
Status: CANCELLED | OUTPATIENT
Start: 2022-03-15

## 2022-03-01 RX ORDER — HEPARIN SODIUM (PORCINE) LOCK FLUSH IV SOLN 100 UNIT/ML 100 UNIT/ML
500 SOLUTION INTRAVENOUS PRN
Status: CANCELLED | OUTPATIENT
Start: 2022-03-14

## 2022-03-01 RX ORDER — ONDANSETRON 2 MG/ML
8 INJECTION INTRAMUSCULAR; INTRAVENOUS ONCE
Status: CANCELLED | OUTPATIENT
Start: 2022-03-15 | End: 2022-03-15

## 2022-03-01 RX ORDER — 0.9 % SODIUM CHLORIDE 0.9 %
VIAL (ML) INJECTION PRN
Status: CANCELLED | OUTPATIENT
Start: 2022-03-15

## 2022-03-01 RX ORDER — METHYLPREDNISOLONE SODIUM SUCCINATE 125 MG/2ML
125 INJECTION, POWDER, LYOPHILIZED, FOR SOLUTION INTRAMUSCULAR; INTRAVENOUS PRN
Status: CANCELLED | OUTPATIENT
Start: 2022-03-15

## 2022-03-01 RX ORDER — EPINEPHRINE 1 MG/ML(1)
0.5 AMPUL (ML) INJECTION PRN
Status: CANCELLED | OUTPATIENT
Start: 2022-03-15

## 2022-03-01 RX ORDER — 0.9 % SODIUM CHLORIDE 0.9 %
10 VIAL (ML) INJECTION PRN
Status: CANCELLED | OUTPATIENT
Start: 2022-03-14

## 2022-03-01 RX ORDER — 0.9 % SODIUM CHLORIDE 0.9 %
3 VIAL (ML) INJECTION PRN
Status: CANCELLED | OUTPATIENT
Start: 2022-03-15

## 2022-03-01 RX ORDER — DIPHENHYDRAMINE HYDROCHLORIDE 50 MG/ML
50 INJECTION INTRAMUSCULAR; INTRAVENOUS PRN
Status: CANCELLED | OUTPATIENT
Start: 2022-03-15

## 2022-03-01 RX ORDER — 0.9 % SODIUM CHLORIDE 0.9 %
VIAL (ML) INJECTION PRN
Status: CANCELLED | OUTPATIENT
Start: 2022-03-14

## 2022-03-01 RX ORDER — ONDANSETRON 2 MG/ML
4 INJECTION INTRAMUSCULAR; INTRAVENOUS PRN
Status: CANCELLED | OUTPATIENT
Start: 2022-03-15

## 2022-03-01 RX ORDER — 0.9 % SODIUM CHLORIDE 0.9 %
3 VIAL (ML) INJECTION PRN
Status: CANCELLED | OUTPATIENT
Start: 2022-03-14

## 2022-03-01 RX ORDER — HEPARIN SODIUM (PORCINE) LOCK FLUSH IV SOLN 100 UNIT/ML 100 UNIT/ML
500 SOLUTION INTRAVENOUS PRN
Status: CANCELLED | OUTPATIENT
Start: 2022-03-15

## 2022-03-01 RX ORDER — SODIUM CHLORIDE, SODIUM LACTATE, POTASSIUM CHLORIDE, CALCIUM CHLORIDE 600; 310; 30; 20 MG/100ML; MG/100ML; MG/100ML; MG/100ML
500 INJECTION, SOLUTION INTRAVENOUS CONTINUOUS
Status: CANCELLED | OUTPATIENT
Start: 2022-03-15

## 2022-03-01 RX ORDER — PROCHLORPERAZINE MALEATE 10 MG
10 TABLET ORAL EVERY 6 HOURS PRN
Status: CANCELLED | OUTPATIENT
Start: 2022-03-15

## 2022-03-01 RX ORDER — ONDANSETRON 8 MG/1
8 TABLET, ORALLY DISINTEGRATING ORAL PRN
Status: CANCELLED | OUTPATIENT
Start: 2022-03-15

## 2022-03-15 ENCOUNTER — OUTPATIENT INFUSION SERVICES (OUTPATIENT)
Dept: ONCOLOGY | Facility: MEDICAL CENTER | Age: 67
End: 2022-03-15
Attending: INTERNAL MEDICINE
Payer: MEDICAID

## 2022-03-15 VITALS
OXYGEN SATURATION: 92 % | TEMPERATURE: 97.5 F | HEIGHT: 58 IN | DIASTOLIC BLOOD PRESSURE: 75 MMHG | RESPIRATION RATE: 18 BRPM | SYSTOLIC BLOOD PRESSURE: 136 MMHG | WEIGHT: 125.88 LBS | HEART RATE: 87 BPM | BODY MASS INDEX: 26.42 KG/M2

## 2022-03-15 DIAGNOSIS — Z17.0 MALIGNANT NEOPLASM OF OVERLAPPING SITES OF RIGHT BREAST IN FEMALE, ESTROGEN RECEPTOR POSITIVE (HCC): ICD-10-CM

## 2022-03-15 DIAGNOSIS — C50.811 MALIGNANT NEOPLASM OF OVERLAPPING SITES OF RIGHT BREAST IN FEMALE, ESTROGEN RECEPTOR POSITIVE (HCC): ICD-10-CM

## 2022-03-15 LAB
ALBUMIN SERPL BCP-MCNC: 3.6 G/DL (ref 3.2–4.9)
ALBUMIN/GLOB SERPL: 1.1 G/DL
ALP SERPL-CCNC: 67 U/L (ref 30–99)
ALT SERPL-CCNC: 10 U/L (ref 2–50)
ANION GAP SERPL CALC-SCNC: 12 MMOL/L (ref 7–16)
AST SERPL-CCNC: 24 U/L (ref 12–45)
BASOPHILS # BLD AUTO: 1.2 % (ref 0–1.8)
BASOPHILS # BLD: 0.05 K/UL (ref 0–0.12)
BILIRUB SERPL-MCNC: 0.2 MG/DL (ref 0.1–1.5)
BUN SERPL-MCNC: 10 MG/DL (ref 8–22)
CALCIUM SERPL-MCNC: 9.1 MG/DL (ref 8.5–10.5)
CHLORIDE SERPL-SCNC: 106 MMOL/L (ref 96–112)
CO2 SERPL-SCNC: 25 MMOL/L (ref 20–33)
CREAT SERPL-MCNC: 0.4 MG/DL (ref 0.5–1.4)
EOSINOPHIL # BLD AUTO: 0.09 K/UL (ref 0–0.51)
EOSINOPHIL NFR BLD: 2.1 % (ref 0–6.9)
ERYTHROCYTE [DISTWIDTH] IN BLOOD BY AUTOMATED COUNT: 63 FL (ref 35.9–50)
GFR SERPLBLD CREATININE-BSD FMLA CKD-EPI: 110 ML/MIN/1.73 M 2
GLOBULIN SER CALC-MCNC: 3.2 G/DL (ref 1.9–3.5)
GLUCOSE SERPL-MCNC: 86 MG/DL (ref 65–99)
HCT VFR BLD AUTO: 34.7 % (ref 37–47)
HGB BLD-MCNC: 10.9 G/DL (ref 12–16)
IMM GRANULOCYTES # BLD AUTO: 0.03 K/UL (ref 0–0.11)
IMM GRANULOCYTES NFR BLD AUTO: 0.7 % (ref 0–0.9)
LYMPHOCYTES # BLD AUTO: 1.3 K/UL (ref 1–4.8)
LYMPHOCYTES NFR BLD: 30.7 % (ref 22–41)
MCH RBC QN AUTO: 28.8 PG (ref 27–33)
MCHC RBC AUTO-ENTMCNC: 31.4 G/DL (ref 33.6–35)
MCV RBC AUTO: 91.8 FL (ref 81.4–97.8)
MONOCYTES # BLD AUTO: 0.53 K/UL (ref 0–0.85)
MONOCYTES NFR BLD AUTO: 12.5 % (ref 0–13.4)
NEUTROPHILS # BLD AUTO: 2.23 K/UL (ref 2–7.15)
NEUTROPHILS NFR BLD: 52.8 % (ref 44–72)
NRBC # BLD AUTO: 0 K/UL
NRBC BLD-RTO: 0 /100 WBC
OUTPT INFUS CBC COMMENT OICOM: ABNORMAL
PLATELET # BLD AUTO: 366 K/UL (ref 164–446)
PMV BLD AUTO: 9.2 FL (ref 9–12.9)
POTASSIUM SERPL-SCNC: 3.9 MMOL/L (ref 3.6–5.5)
PROT SERPL-MCNC: 6.8 G/DL (ref 6–8.2)
RBC # BLD AUTO: 3.78 M/UL (ref 4.2–5.4)
SODIUM SERPL-SCNC: 143 MMOL/L (ref 135–145)
WBC # BLD AUTO: 4.2 K/UL (ref 4.8–10.8)

## 2022-03-15 PROCEDURE — 700111 HCHG RX REV CODE 636 W/ 250 OVERRIDE (IP): Performed by: INTERNAL MEDICINE

## 2022-03-15 PROCEDURE — 96415 CHEMO IV INFUSION ADDL HR: CPT

## 2022-03-15 PROCEDURE — 304540 HCHG NITRO SET VENT 2ND TUB

## 2022-03-15 PROCEDURE — 80053 COMPREHEN METABOLIC PANEL: CPT

## 2022-03-15 PROCEDURE — 96413 CHEMO IV INFUSION 1 HR: CPT

## 2022-03-15 PROCEDURE — A4212 NON CORING NEEDLE OR STYLET: HCPCS

## 2022-03-15 PROCEDURE — 700105 HCHG RX REV CODE 258: Performed by: INTERNAL MEDICINE

## 2022-03-15 PROCEDURE — 96375 TX/PRO/DX INJ NEW DRUG ADDON: CPT

## 2022-03-15 PROCEDURE — 85025 COMPLETE CBC W/AUTO DIFF WBC: CPT

## 2022-03-15 RX ORDER — ONDANSETRON 2 MG/ML
8 INJECTION INTRAMUSCULAR; INTRAVENOUS ONCE
Status: COMPLETED | OUTPATIENT
Start: 2022-03-15 | End: 2022-03-15

## 2022-03-15 RX ORDER — HEPARIN SODIUM (PORCINE) LOCK FLUSH IV SOLN 100 UNIT/ML 100 UNIT/ML
SOLUTION INTRAVENOUS
Status: DISCONTINUED
Start: 2022-03-15 | End: 2022-03-15 | Stop reason: HOSPADM

## 2022-03-15 RX ORDER — HEPARIN SODIUM (PORCINE) LOCK FLUSH IV SOLN 100 UNIT/ML 100 UNIT/ML
500 SOLUTION INTRAVENOUS PRN
Status: DISCONTINUED | OUTPATIENT
Start: 2022-03-15 | End: 2022-03-15 | Stop reason: HOSPADM

## 2022-03-15 RX ORDER — SODIUM CHLORIDE, SODIUM LACTATE, POTASSIUM CHLORIDE, CALCIUM CHLORIDE 600; 310; 30; 20 MG/100ML; MG/100ML; MG/100ML; MG/100ML
500 INJECTION, SOLUTION INTRAVENOUS CONTINUOUS
Status: DISCONTINUED | OUTPATIENT
Start: 2022-03-15 | End: 2022-03-15 | Stop reason: HOSPADM

## 2022-03-15 RX ADMIN — SODIUM CHLORIDE, POTASSIUM CHLORIDE, SODIUM LACTATE AND CALCIUM CHLORIDE 500 ML: 600; 310; 30; 20 INJECTION, SOLUTION INTRAVENOUS at 12:00

## 2022-03-15 RX ADMIN — IXABEPILONE 61 MG: KIT INTRAVENOUS at 12:56

## 2022-03-15 RX ADMIN — ONDANSETRON 8 MG: 2 INJECTION INTRAMUSCULAR; INTRAVENOUS at 12:10

## 2022-03-15 RX ADMIN — DIPHENHYDRAMINE HYDROCHLORIDE 25 MG: 50 INJECTION, SOLUTION INTRAMUSCULAR; INTRAVENOUS at 12:10

## 2022-03-15 RX ADMIN — FAMOTIDINE 20 MG: 10 INJECTION INTRAVENOUS at 12:11

## 2022-03-15 RX ADMIN — HEPARIN 500 UNITS: 100 SYRINGE at 16:15

## 2022-03-15 ASSESSMENT — FIBROSIS 4 INDEX: FIB4 SCORE: 1.3

## 2022-03-15 NOTE — PROGRESS NOTES
[facility] for Ixempra for breast cancer. Redness noted throughout patient's upper chest. Patient states she also has blistering on breast for which she goes to wound care. Port accessed using sterile technique, brisk blood return noted, and labs drawn as ordered. Labs meet parameters for treatment. Pre-meds given as ordered. Ixempra infused as ordered. No s/s of reaction. Port flushed, positive blood return noted, heparin instilled, port de-accessed, gauze and tape to site. Scheduling emailed about next appointments.

## 2022-03-15 NOTE — PROGRESS NOTES
"Pharmacy Chemotherapy Verification  Patient name: Christine Malhotra  Dx: recurrent Breast CA, stage IV  Regimen: Ixabepilone monotherapy  1st line TC (2014), Arimidex (2015), Ibrance/Faslodex ([id number]), Xeloda (2018), Erubilin (2020), gemcitabine (2020), weekly paclitaxel (2020 / 2021), DOX 2021, vinorelbine 21/22    Ixabepilone 40 mg/m2 (maximum 88 mg) IV over 3 hours on Day 1   [id number] until progression or toxicity   *Dosing Reference*  NCCN Guidelines Breast Cancer v2.2022      Allergies: Patient has no known allergies.  /75   Pulse 87   Temp 36.4 °C (97.5 °F) (Temporal)   Resp 18   Ht 1.48 m (4' 10.27\")   Wt 57.1 kg (125 lb 14.1 oz)   SpO2 92%   BMI 26.07 kg/m²  Body surface area is 1.53 meters squared.     Labs: 3/15/22  Meet treatment parameters    TTE 8/31/21 LVEF ~ 55%    Path:  ER+, HI+  HER2-      Drug Order   (Drug name, dose, route, IV Fluid & volume, frequency, number of doses) Cycle 3  Previous treatment: 2/22/22      Medication = ixabepilone   Base Dose = 40 mg/m2  Calc Dose: Base Dose x 1.53 m2 = 61.2 mg  Final Dose = 61 mg  Route = IV  Fluid & Volume =  mL w/ filter  Admin Duration = Over 3 hrs            Okay to treat with final dose       "

## 2022-03-15 NOTE — PROGRESS NOTES
"Pharmacy Chemotherapy Verification    Patient name: Christine Malhotra  Dx: recurrent Breast CA, stage IV    Cycle 3  Previous treatment: C2 on 2/22/22    Regimen: Ixabepilone   *Dosing Reference*  Ixabepilone 40 mg/m2 (maximum 88 mg) IV over 3 hours on Day 1   21 Day cycle until DP or UT  NCCN Guidelines for Breast Cancer V.2.2021.   Bay EA, et al. J Clin Oncol. 2007;25(23):3407-14.    Allergies: Patient has no known allergies.  /75   Pulse 87   Temp 36.4 °C (97.5 °F) (Temporal)   Resp 18   Ht 1.48 m (4' 10.27\")   Wt 57.1 kg (125 lb 14.1 oz)   SpO2 92%   BMI 26.07 kg/m²  Body surface area is 1.53 meters squared.     Labs 3/15/22:  ANC~ 2230 Plt = 366k   Hgb = 10.9     SCr = 0.4 mg/dL CrCl ~ 73 mL/min   LFT's = WNL TBili = 0.2     Ixabepilone (Ixempra) 40 mg/m2 x 1.53 m2 = 61.2 mg   <10% difference, okay to treat with final dose = 61 mg IV    Matt Humphries, PharmD      "

## 2022-03-15 NOTE — PROGRESS NOTES
Chemotherapy Verification - SECONDARY RN       Height = 148 cm  Weight = 57.1 kg  BSA = 1.53 m2       Medication: Ixempra  Dose: 40  mg/m2  Calculated Dose: 61.5 mg                             (In mg/m2, AUC, mg/kg)     I confirm that this process was performed independently.

## 2022-03-15 NOTE — PROGRESS NOTES
Chemotherapy Verification - PRIMARY RN      Height = 1.48m Weight = 57.1kg  BSA = 1.53m^2       Medication: ixabepilone (IXEMPRA) Dose: 40mg/m^2  Calculated Dose: 61.2mg                            (In mg/m2, AUC, mg/kg)       I confirm this process was performed independently with the BSA and all final chemotherapy dosing calculations congruent.  Any discrepancies of 10% or greater have been addressed with the chemotherapy pharmacist. The resolution of the discrepancy has been documented in the EPIC progress notes.

## 2022-03-16 ENCOUNTER — OFFICE VISIT (OUTPATIENT)
Dept: WOUND CARE | Facility: MEDICAL CENTER | Age: 67
End: 2022-03-16
Attending: INTERNAL MEDICINE
Payer: MEDICAID

## 2022-03-16 VITALS
HEART RATE: 89 BPM | RESPIRATION RATE: 18 BRPM | DIASTOLIC BLOOD PRESSURE: 85 MMHG | SYSTOLIC BLOOD PRESSURE: 130 MMHG | OXYGEN SATURATION: 91 % | TEMPERATURE: 98.8 F

## 2022-03-16 DIAGNOSIS — Z85.3 HISTORY OF INVASIVE BREAST CANCER: ICD-10-CM

## 2022-03-16 DIAGNOSIS — R52 PAIN ASSOCIATED WITH WOUND: ICD-10-CM

## 2022-03-16 DIAGNOSIS — S21.002D OPEN WOUND OF LEFT BREAST, SUBSEQUENT ENCOUNTER: Primary | ICD-10-CM

## 2022-03-16 DIAGNOSIS — T14.8XXA PAIN ASSOCIATED WITH WOUND: ICD-10-CM

## 2022-03-16 PROCEDURE — 99213 OFFICE O/P EST LOW 20 MIN: CPT | Performed by: NURSE PRACTITIONER

## 2022-03-16 PROCEDURE — 99214 OFFICE O/P EST MOD 30 MIN: CPT

## 2022-03-16 ASSESSMENT — ENCOUNTER SYMPTOMS
FEVER: 0
CONSTIPATION: 1
CHILLS: 1
NAUSEA: 1
SHORTNESS OF BREATH: 0
VOMITING: 0
HEADACHES: 1
DEPRESSION: 1
COUGH: 0
DIARRHEA: 0
DIZZINESS: 1
BACK PAIN: 0

## 2022-03-16 NOTE — PROGRESS NOTES
Provider Encounter- Full Thickness wound    HISTORY OF PRESENT ILLNESS  Wound History:    START OF CARE IN CLINIC: 2/18/2022    REFERRING PROVIDER: Fab Jarvis Iii      WOUND- Full Thickness Wound   LOCATION: Left breast   HISTORY: Patient with stage IV recurrent breast cancer referred to [facility] for full-thickness wounds to her left breast/chest.  Patient states these wounds started appearing in early January of this year.  She has been covering them with simple bandages, changing daily when she showers.  She is currently undergoing palliative chemotherapy.  She lives with her adult daughter.    Pertinent Medical History: Metastatic breast cancer, right mastectomy, pulmonary embolism    TOBACCO USE: She has never smoked or use smokeless tobacco    Patient's problem list, allergies, and current medications reviewed and updated in Epic    Interval History:  2/18/2022 Initial clinic visit with HANY Tejada, FRANTZ, HASMUKH, JENNIE.   tablet used for today's visit.  Patient states she is having quite a bit of pain from her wounds.  She was prescribed pain medication by her oncologist, however states she took 1 and did not like the way it made her feel.  She has opted to take Tylenol instead, and reports this is not very effective.  She states that her wound sometimes drain a lot of blood, and tends to be very painful when open to air.  She was told that she has cancer in this breast.  It was unclear whether or not she had received radiation therapy to her chest in the past.   She states that she has been changing her dressings herself, but would appreciate help if available.  Will see if she can get home health services.    2/25/2022 : Clinic visit with HANY Tejada, FRANTZ, HASMUKH, JENNIE.   services used for today's visit.  Patient still having quite a bit of pain from her wounds.  Thus far she is only taking Tylenol, as instructed by her oncologist.  She sees oncologist again in a few  "days, I advised her to address her pain issues with him/her.  Patient has been changing her own dressings without difficulty.  She has not heard from home health.  I elected the status of referral after she left, found that referral information had been sent to home health care of [facility].  Will contact patient's daughter with this information.    3/16/2022 : Clinic visit with Tena Pelaez, HANY, FNEDEN-BC, CWIZABELN, CFCN.   services used for today's visit.  Patient is still having quite a bit of pain from her wound, describes it as, \"burning\".  She is currently not receiving radiation therapy, but continues with chemotherapy.  She has not been applying any type of ointment or moisturizer to her radiated skin.  She has still not heard from home health.      REVIEW OF SYSTEMS:   Review of Systems   Constitutional: Positive for chills. Negative for fever.        Intermittent chills since starting chemotherapy on 2/1   Respiratory: Negative for cough and shortness of breath.    Cardiovascular: Negative for chest pain.   Gastrointestinal: Positive for constipation and nausea. Negative for diarrhea and vomiting.        Intermittent nausea  Frequent constipation   Musculoskeletal: Negative for back pain and joint pain.   Skin:        Spontaneous eruption of wounds to left breast beginning in January 2022   Neurological: Positive for dizziness and headaches.        Sometimes has dizziness and headaches, since starting chemotherapy   Psychiatric/Behavioral: Positive for depression.        Depression related to current health issues       PHYSICAL EXAMINATION:   /85   Pulse 89   Temp 37.1 °C (98.8 °F)   Resp 18   SpO2 91%     Physical Exam  Constitutional:       Comments: Thin   HENT:      Head: Normocephalic.   Eyes:      Pupils: Pupils are equal, round, and reactive to light.   Cardiovascular:      Rate and Rhythm: Normal rate.   Pulmonary:      Effort: Pulmonary effort is normal.   Skin:     " Comments: Multiple, scattered, diffuse full and partial-thickness wounds to left breast and upper chest  Refer to wound flowsheet and photos   Neurological:      Mental Status: She is alert.         WOUND ASSESSMENT  Wound 02/18/22 Full Thickness Wound Chest Upper Left --Left Upper Chest (Active)   Wound Image   03/16/22 1440   Site Assessment Yellow;Brown 03/16/22 1440   Periwound Assessment Scar tissue 03/16/22 1440   Margins Attached edges 03/16/22 1440   Drainage Amount Moderate 03/16/22 1440   Drainage Description Serosanguineous 03/16/22 1440   Treatments Cleansed;Site care 03/16/22 1440   Wound Cleansing Normal Saline Irrigation 03/16/22 1440   Periwound Protectant Skin Moisturizer 03/16/22 1440   Dressing Cleansing/Solutions Not Applicable 03/16/22 1440   Dressing Options Mepilex 03/16/22 1440   Dressing Changed Changed 02/25/22 0900   Dressing Change/Treatment Frequency Every 48 hrs, and As Needed 02/18/22 1000   Non-staged Wound Description Full thickness 03/16/22 1440   Wound Length (cm) 2 cm 03/16/22 1440   Wound Width (cm) 5 cm 03/16/22 1440   Wound Depth (cm) 0.2 cm 03/16/22 1440   Wound Surface Area (cm^2) 10 cm^2 03/16/22 1440   Wound Volume (cm^3) 2 cm^3 03/16/22 1440   Wound Healing % 66 03/16/22 1440   Tunneling (cm) 0 cm 03/16/22 1440   Undermining (cm) 0 cm 03/16/22 1440   Wound Odor None 03/16/22 1440   Exposed Structures None 03/16/22 1440   Number of days: 26       Wound 02/18/22 Full Thickness Wound Breast Left --Left Breast (Active)   Wound Image   03/16/22 1440   Site Assessment Red;Yellow 03/16/22 1440   Periwound Assessment Scar tissue 03/16/22 1440   Margins Attached edges;Undefined edges 03/16/22 1440   Drainage Amount Moderate 03/16/22 1440   Drainage Description Serosanguineous 03/16/22 1440   Treatments Cleansed;Site care 03/16/22 1440   Wound Cleansing Normal Saline Irrigation 03/16/22 1440   Periwound Protectant Skin Moisturizer 03/16/22 1440   Dressing Cleansing/Solutions Not  Applicable 03/16/22 1440   Dressing Options Mepilex 03/16/22 1440   Dressing Changed Changed 02/25/22 0900   Dressing Change/Treatment Frequency Every 48 hrs, and As Needed 02/18/22 1000   Non-staged Wound Description Full thickness 03/16/22 1440   Wound Length (cm) 15 cm 03/16/22 1440   Wound Width (cm) 10 cm 03/16/22 1440   Wound Depth (cm) 0.1 cm 03/16/22 1440   Wound Surface Area (cm^2) 150 cm^2 03/16/22 1440   Wound Volume (cm^3) 15 cm^3 03/16/22 1440   Wound Healing % 17 03/16/22 1440   Tunneling (cm) 0 cm 03/16/22 1440   Undermining (cm) 0 cm 03/16/22 1440   Wound Odor None 03/16/22 1440   Exposed Structures None 03/16/22 1440   Number of days: 26       PROCEDURE:     -No debridement today, contraindicated.  Patient being treated for active cancer in this breast  -Wound care completed by wound RN, refer to flowsheet  -Patient tolerated the procedure well, without c/o pain or discomfort.       Pertinent Labs and Diagnostics:    Labs:     A1c: No results found for: HBA1C       IMAGING: None found in epic    VASCULAR STUDIES: N/A    LAST  WOUND CULTURE:  DATE : No recent cultures found in epic        ASSESSMENT AND PLAN:     1. Open wound of left breast, subsequent encounter  Comments: Multiple shallow, diffuse wounds.  Per patient spontaneously appeared in January 2022.  Complicated by metastatic breast cancer diagnoses    3/16/2022: Multiple painful wounds due to cancer.  -Debridement contraindicated.  Patient is currently being treated for active cancer.  Unfortunately, treatment will be palliative only, as these wounds are not likely to resolve  -Patient instructed to apply Aquaphor or CeraVe to intact radiated skin  -Referral to home health to assist with wound care sent on initial visit.  Patient has not heard from them.  Per referral notes she has been referred to home health care of [facility].  Phone number was provided.  Patient instructed to have her daughter contact them.  -Patient to return to  clinic in 3 to 4 weeks for reassessment  -Additional supplies to be sent to patient's home if she does not receive home health services    Wound care: Sween 24 to contact skin of left breast, Mepilex to wound beds to manage exudate, and to protect wound.  Change every other day, or as needed for drainage    2. History of invasive breast cancer  Comments: History of right breast cancer for which she underwent mastectomy, and chemotherapy.  Now has recurrence to left breast    3/16/2022: Patient continues on chemotherapy, states she has had to have chemo drugs changed 3 times due to adverse side effects  -Encouraged her to speak with her oncologist regarding chemotherapy side effects    3. Pain associated with wound    3/16/2022: Patient reports that her wounds are quite painful, especially when open to air.  She is taking Tylenol as instructed by her oncologist, but states this is not adequate.  -Lidocaine applied to all wounds prior to assessment  -No sharp debridement  -Patient to discuss pain issues with her oncologist.  Advised her to make sure he knows that she is not getting adequate relief with Tylenol        PATIENT EDUCATION  - Importance of adequate nutrition for wound healing  -Advised to go to ER for any increased redness, swelling, drainage, or odor, or if patient develops fever, chills, nausea or vomiting.     My total time spent caring for the patient on the day of the encounter was 20 minutes.   This does not include time spent on separately billable procedures/tests.       Please note that this note may have been created using voice recognition software. I have worked with technical experts from [facility] to optimize the interface.  I have made every reasonable attempt to correct obvious errors, but there may be errors of grammar and possibly content that I did not discover before finalizing the note.    N

## 2022-03-29 RX ORDER — 0.9 % SODIUM CHLORIDE 0.9 %
10 VIAL (ML) INJECTION PRN
Status: CANCELLED | OUTPATIENT
Start: 2022-04-06

## 2022-03-29 RX ORDER — 0.9 % SODIUM CHLORIDE 0.9 %
VIAL (ML) INJECTION PRN
Status: CANCELLED | OUTPATIENT
Start: 2022-04-06

## 2022-03-29 RX ORDER — METHYLPREDNISOLONE SODIUM SUCCINATE 125 MG/2ML
125 INJECTION, POWDER, LYOPHILIZED, FOR SOLUTION INTRAMUSCULAR; INTRAVENOUS PRN
Status: CANCELLED | OUTPATIENT
Start: 2022-04-06

## 2022-03-29 RX ORDER — ONDANSETRON 8 MG/1
8 TABLET, ORALLY DISINTEGRATING ORAL PRN
Status: CANCELLED | OUTPATIENT
Start: 2022-04-06

## 2022-03-29 RX ORDER — ONDANSETRON 2 MG/ML
4 INJECTION INTRAMUSCULAR; INTRAVENOUS PRN
Status: CANCELLED | OUTPATIENT
Start: 2022-04-06

## 2022-03-29 RX ORDER — HEPARIN SODIUM (PORCINE) LOCK FLUSH IV SOLN 100 UNIT/ML 100 UNIT/ML
500 SOLUTION INTRAVENOUS PRN
Status: CANCELLED | OUTPATIENT
Start: 2022-04-06

## 2022-03-29 RX ORDER — SODIUM CHLORIDE, SODIUM LACTATE, POTASSIUM CHLORIDE, CALCIUM CHLORIDE 600; 310; 30; 20 MG/100ML; MG/100ML; MG/100ML; MG/100ML
500 INJECTION, SOLUTION INTRAVENOUS CONTINUOUS
Status: CANCELLED | OUTPATIENT
Start: 2022-04-06

## 2022-03-29 RX ORDER — ONDANSETRON 2 MG/ML
8 INJECTION INTRAMUSCULAR; INTRAVENOUS ONCE
Status: CANCELLED | OUTPATIENT
Start: 2022-04-06 | End: 2022-04-05

## 2022-03-29 RX ORDER — HEPARIN SODIUM (PORCINE) LOCK FLUSH IV SOLN 100 UNIT/ML 100 UNIT/ML
500 SOLUTION INTRAVENOUS PRN
Status: CANCELLED | OUTPATIENT
Start: 2022-04-04

## 2022-03-29 RX ORDER — 0.9 % SODIUM CHLORIDE 0.9 %
3 VIAL (ML) INJECTION PRN
Status: CANCELLED | OUTPATIENT
Start: 2022-04-06

## 2022-03-29 RX ORDER — 0.9 % SODIUM CHLORIDE 0.9 %
10 VIAL (ML) INJECTION PRN
Status: CANCELLED | OUTPATIENT
Start: 2022-04-04

## 2022-03-29 RX ORDER — EPINEPHRINE 1 MG/ML(1)
0.5 AMPUL (ML) INJECTION PRN
Status: CANCELLED | OUTPATIENT
Start: 2022-04-06

## 2022-03-29 RX ORDER — 0.9 % SODIUM CHLORIDE 0.9 %
3 VIAL (ML) INJECTION PRN
Status: CANCELLED | OUTPATIENT
Start: 2022-04-04

## 2022-03-29 RX ORDER — DIPHENHYDRAMINE HYDROCHLORIDE 50 MG/ML
50 INJECTION INTRAMUSCULAR; INTRAVENOUS PRN
Status: CANCELLED | OUTPATIENT
Start: 2022-04-06

## 2022-03-29 RX ORDER — PROCHLORPERAZINE MALEATE 10 MG
10 TABLET ORAL EVERY 6 HOURS PRN
Status: CANCELLED | OUTPATIENT
Start: 2022-04-06

## 2022-03-29 RX ORDER — 0.9 % SODIUM CHLORIDE 0.9 %
VIAL (ML) INJECTION PRN
Status: CANCELLED | OUTPATIENT
Start: 2022-04-04

## 2022-04-05 ENCOUNTER — OUTPATIENT INFUSION SERVICES (OUTPATIENT)
Dept: ONCOLOGY | Facility: MEDICAL CENTER | Age: 67
End: 2022-04-05
Attending: INTERNAL MEDICINE
Payer: MEDICAID

## 2022-04-05 VITALS
SYSTOLIC BLOOD PRESSURE: 150 MMHG | HEART RATE: 101 BPM | TEMPERATURE: 97 F | HEIGHT: 58 IN | DIASTOLIC BLOOD PRESSURE: 90 MMHG | OXYGEN SATURATION: 98 % | BODY MASS INDEX: 26.47 KG/M2 | WEIGHT: 126.1 LBS | RESPIRATION RATE: 18 BRPM

## 2022-04-05 DIAGNOSIS — Z17.0 MALIGNANT NEOPLASM OF OVERLAPPING SITES OF RIGHT BREAST IN FEMALE, ESTROGEN RECEPTOR POSITIVE (HCC): ICD-10-CM

## 2022-04-05 DIAGNOSIS — C50.811 MALIGNANT NEOPLASM OF OVERLAPPING SITES OF RIGHT BREAST IN FEMALE, ESTROGEN RECEPTOR POSITIVE (HCC): ICD-10-CM

## 2022-04-05 LAB
ALBUMIN SERPL BCP-MCNC: 3.2 G/DL (ref 3.2–4.9)
ALBUMIN/GLOB SERPL: 1.1 G/DL
ALP SERPL-CCNC: 56 U/L (ref 30–99)
ALT SERPL-CCNC: 8 U/L (ref 2–50)
ANION GAP SERPL CALC-SCNC: 12 MMOL/L (ref 7–16)
AST SERPL-CCNC: 22 U/L (ref 12–45)
BASOPHILS # BLD AUTO: 0.8 % (ref 0–1.8)
BASOPHILS # BLD: 0.04 K/UL (ref 0–0.12)
BILIRUB SERPL-MCNC: <0.2 MG/DL (ref 0.1–1.5)
BUN SERPL-MCNC: 10 MG/DL (ref 8–22)
CALCIUM SERPL-MCNC: 8.3 MG/DL (ref 8.5–10.5)
CHLORIDE SERPL-SCNC: 105 MMOL/L (ref 96–112)
CO2 SERPL-SCNC: 22 MMOL/L (ref 20–33)
CREAT SERPL-MCNC: 0.42 MG/DL (ref 0.5–1.4)
EOSINOPHIL # BLD AUTO: 0.04 K/UL (ref 0–0.51)
EOSINOPHIL NFR BLD: 0.8 % (ref 0–6.9)
ERYTHROCYTE [DISTWIDTH] IN BLOOD BY AUTOMATED COUNT: 62.8 FL (ref 35.9–50)
GFR SERPLBLD CREATININE-BSD FMLA CKD-EPI: 109 ML/MIN/1.73 M 2
GLOBULIN SER CALC-MCNC: 3 G/DL (ref 1.9–3.5)
GLUCOSE SERPL-MCNC: 108 MG/DL (ref 65–99)
HCT VFR BLD AUTO: 32.8 % (ref 37–47)
HGB BLD-MCNC: 9.9 G/DL (ref 12–16)
IMM GRANULOCYTES # BLD AUTO: 0.05 K/UL (ref 0–0.11)
IMM GRANULOCYTES NFR BLD AUTO: 1 % (ref 0–0.9)
LYMPHOCYTES # BLD AUTO: 1.3 K/UL (ref 1–4.8)
LYMPHOCYTES NFR BLD: 24.8 % (ref 22–41)
MCH RBC QN AUTO: 27.8 PG (ref 27–33)
MCHC RBC AUTO-ENTMCNC: 30.2 G/DL (ref 33.6–35)
MCV RBC AUTO: 92.1 FL (ref 81.4–97.8)
MONOCYTES # BLD AUTO: 0.71 K/UL (ref 0–0.85)
MONOCYTES NFR BLD AUTO: 13.5 % (ref 0–13.4)
NEUTROPHILS # BLD AUTO: 3.1 K/UL (ref 2–7.15)
NEUTROPHILS NFR BLD: 59.1 % (ref 44–72)
NRBC # BLD AUTO: 0 K/UL
NRBC BLD-RTO: 0 /100 WBC
OUTPT INFUS CBC COMMENT OICOM: ABNORMAL
PLATELET # BLD AUTO: 390 K/UL (ref 164–446)
PMV BLD AUTO: 9.1 FL (ref 9–12.9)
POTASSIUM SERPL-SCNC: 3.7 MMOL/L (ref 3.6–5.5)
PROT SERPL-MCNC: 6.2 G/DL (ref 6–8.2)
RBC # BLD AUTO: 3.56 M/UL (ref 4.2–5.4)
SODIUM SERPL-SCNC: 139 MMOL/L (ref 135–145)
WBC # BLD AUTO: 5.2 K/UL (ref 4.8–10.8)

## 2022-04-05 PROCEDURE — 304540 HCHG NITRO SET VENT 2ND TUB

## 2022-04-05 PROCEDURE — 85025 COMPLETE CBC W/AUTO DIFF WBC: CPT

## 2022-04-05 PROCEDURE — 96375 TX/PRO/DX INJ NEW DRUG ADDON: CPT

## 2022-04-05 PROCEDURE — 700105 HCHG RX REV CODE 258: Performed by: INTERNAL MEDICINE

## 2022-04-05 PROCEDURE — 96415 CHEMO IV INFUSION ADDL HR: CPT

## 2022-04-05 PROCEDURE — 80053 COMPREHEN METABOLIC PANEL: CPT

## 2022-04-05 PROCEDURE — 700111 HCHG RX REV CODE 636 W/ 250 OVERRIDE (IP): Performed by: INTERNAL MEDICINE

## 2022-04-05 PROCEDURE — A4212 NON CORING NEEDLE OR STYLET: HCPCS

## 2022-04-05 PROCEDURE — 96413 CHEMO IV INFUSION 1 HR: CPT

## 2022-04-05 RX ORDER — ONDANSETRON 2 MG/ML
8 INJECTION INTRAMUSCULAR; INTRAVENOUS ONCE
Status: COMPLETED | OUTPATIENT
Start: 2022-04-05 | End: 2022-04-05

## 2022-04-05 RX ORDER — BECLOMETHASONE DIPROPIONATE HFA 40 UG/1
AEROSOL, METERED RESPIRATORY (INHALATION)
COMMUNITY

## 2022-04-05 RX ORDER — HEPARIN SODIUM (PORCINE) LOCK FLUSH IV SOLN 100 UNIT/ML 100 UNIT/ML
500 SOLUTION INTRAVENOUS PRN
Status: DISCONTINUED | OUTPATIENT
Start: 2022-04-05 | End: 2022-04-05 | Stop reason: HOSPADM

## 2022-04-05 RX ORDER — BENZONATATE 100 MG/1
CAPSULE ORAL
COMMUNITY
Start: 2022-03-08

## 2022-04-05 RX ADMIN — IXABEPILONE 61 MG: KIT INTRAVENOUS at 13:57

## 2022-04-05 RX ADMIN — FAMOTIDINE 20 MG: 10 INJECTION INTRAVENOUS at 13:06

## 2022-04-05 RX ADMIN — HEPARIN 500 UNITS: 100 SYRINGE at 17:47

## 2022-04-05 RX ADMIN — ONDANSETRON 8 MG: 2 INJECTION INTRAMUSCULAR; INTRAVENOUS at 13:03

## 2022-04-05 RX ADMIN — DIPHENHYDRAMINE HYDROCHLORIDE 25 MG: 50 INJECTION, SOLUTION INTRAMUSCULAR; INTRAVENOUS at 13:15

## 2022-04-05 ASSESSMENT — FIBROSIS 4 INDEX: FIB4 SCORE: 1.33

## 2022-04-05 NOTE — PROGRESS NOTES
Chemotherapy Verification - PRIMARY RN      Height = 1.48m Weight = 57.2kg  BSA = 1.53m2       Medication: Ixempra  Dose: 40mg/m2  Calculated Dose: 61.2mg                             (In mg/m2, AUC, mg/kg)     I confirm this process was performed independently with the BSA and all final chemotherapy dosing calculations congruent.  Any discrepancies of 10% or greater have been addressed with the chemotherapy pharmacist. The resolution of the discrepancy has been documented in the EPIC progress notes.

## 2022-04-05 NOTE — PROGRESS NOTES
"Pharmacy Chemotherapy Verification  Patient name: Christine Malhotra  Dx: recurrent Breast CA, stage IV  Regimen: Ixabepilone monotherapy  1st line TC (2014), Arimidex (2015), Ibrance/Faslodex ([id number]), Xeloda (2018), Erubilin (2020), gemcitabine (2020), weekly paclitaxel (2020 / 2021), DOX 2021, vinorelbine 21/22    Ixabepilone 40 mg/m2 (maximum 88 mg) IV over 3 hours on Day 1   [id number] until progression or toxicity   *Dosing Reference*  NCCN Guidelines Breast Cancer v2.2022      Allergies: Patient has no known allergies.  /90   Pulse (!) 101   Temp 36.1 °C (97 °F) (Temporal)   Resp 18   Ht 1.48 m (4' 10.27\")   Wt 57.2 kg (126 lb 1.7 oz)   SpO2 98%   BMI 26.11 kg/m²  Body surface area is 1.53 meters squared.     Labs: 4/5/22  Meet treatment parameters    TTE 8/31/21 LVEF ~ 55%    Path:  ER+, CT+  HER2-      Drug Order   (Drug name, dose, route, IV Fluid & volume, frequency, number of doses) Cycle 4  Previous treatment: 3/15/22      Medication = ixabepilone   Base Dose = 40 mg/m2  Calc Dose: Base Dose x 1.53 m2 = 61.2 mg  Final Dose = 61 mg  Route = IV  Fluid & Volume =  mL w/ filter  Admin Duration = Over 3 hrs            Okay to treat with final dose       "

## 2022-04-05 NOTE — PROGRESS NOTES
Reason for visit: C4D1 Ixempra for metastatic breast cancer  Patient denies any new/acute discomfort. Continues to have wound on her chest/breast area and being followed by wound care. Denies any fevers or chills. Port accessed using sterile technique. Site asymptomatic. Flushes well with good blood return. Sensitive dressing used due to patient's chest wound. Labs were drawn and sent to the lab.Results within parameters. Pre-meds given as ordered. Tolerated chemotherapy infusion well.  Port flushed with saline and heparin per protocol then de-accessed. Site asymptomatic. RTC in 4 weeks for her next port flush. Left clinic w/o any evident distress. RTC: 4/26 as scheduled for next infusion.

## 2022-04-05 NOTE — PROGRESS NOTES
Chemotherapy Verification - SECONDARY RN       Height = 148 cm  Weight = 57.2 kg  BSA = 1.53 m^2       Medication: ixbepilone (IXEMPRA)  Dose: 40 mg/m^2  Calculated Dose: 61.2 mg                             (In mg/m2, AUC, mg/kg)     I confirm that this process was performed independently.

## 2022-04-05 NOTE — PROGRESS NOTES
"Pharmacy Chemotherapy Verification    Patient name: Christine Malhotra  Dx: recurrent Breast CA, stage IV    Cycle 4  Previous treatment: C3 on 3/15/22    Regimen: Ixabepilone   *Dosing Reference*  Ixabepilone 40 mg/m2 (maximum 88 mg) IV over 3 hours on Day 1   21 Day cycle until DP or UT  NCCN Guidelines for Breast Cancer V.2.2021.   Bay EA, et al. J Clin Oncol. 2007;25(23):3407-14.    Allergies: Patient has no known allergies.  /90   Pulse (!) 101   Temp 36.1 °C (97 °F) (Temporal)   Resp 18   Ht 1.48 m (4' 10.27\")   Wt 57.2 kg (126 lb 1.7 oz)   SpO2 98%   BMI 26.11 kg/m²  Body surface area is 1.53 meters squared.     Labs 4/5/22:  ANC~ 3100 Plt = 390k   Hgb = 9.9     SCr = 0.42 mg/dL CrCl ~ 73 mL/min   LFT's = WNL TBili = <0.2     Ixabepilone (Ixempra) 40 mg/m2 x 1.53 m2 = 61.2 mg   <10% difference, okay to treat with final dose = 61 mg IV    Matt Humphries, PharmD      "

## 2022-04-13 ENCOUNTER — OFFICE VISIT (OUTPATIENT)
Dept: WOUND CARE | Facility: MEDICAL CENTER | Age: 67
End: 2022-04-13
Attending: INTERNAL MEDICINE
Payer: MEDICAID

## 2022-04-13 VITALS
RESPIRATION RATE: 18 BRPM | SYSTOLIC BLOOD PRESSURE: 132 MMHG | OXYGEN SATURATION: 96 % | TEMPERATURE: 97.3 F | DIASTOLIC BLOOD PRESSURE: 75 MMHG | HEART RATE: 110 BPM

## 2022-04-13 DIAGNOSIS — Z85.3 HISTORY OF INVASIVE BREAST CANCER: ICD-10-CM

## 2022-04-13 DIAGNOSIS — S21.002D OPEN WOUND OF LEFT BREAST, SUBSEQUENT ENCOUNTER: ICD-10-CM

## 2022-04-13 DIAGNOSIS — T14.8XXA PAIN ASSOCIATED WITH WOUND: ICD-10-CM

## 2022-04-13 DIAGNOSIS — R52 PAIN ASSOCIATED WITH WOUND: ICD-10-CM

## 2022-04-13 PROCEDURE — 99213 OFFICE O/P EST LOW 20 MIN: CPT

## 2022-04-13 PROCEDURE — 99213 OFFICE O/P EST LOW 20 MIN: CPT | Performed by: NURSE PRACTITIONER

## 2022-04-13 ASSESSMENT — ENCOUNTER SYMPTOMS
NAUSEA: 1
DIZZINESS: 1
DEPRESSION: 1
CONSTIPATION: 1
HEADACHES: 1
DIARRHEA: 0
VOMITING: 0
BACK PAIN: 0
COUGH: 0
CHILLS: 1
FEVER: 0
SHORTNESS OF BREATH: 0

## 2022-04-13 NOTE — PROGRESS NOTES
Provider Encounter- Full Thickness wound    HISTORY OF PRESENT ILLNESS  Wound History:    START OF CARE IN CLINIC: 2/18/2022    REFERRING PROVIDER: Fab Jarvis Iii      WOUND- Full Thickness Wound   LOCATION: Left breast   HISTORY: Patient with stage IV recurrent breast cancer referred to [facility] for full-thickness wounds to her left breast/chest.  Patient states these wounds started appearing in early January of this year.  She has been covering them with simple bandages, changing daily when she showers.  She is currently undergoing palliative chemotherapy.  She lives with her adult daughter.    Pertinent Medical History: Metastatic breast cancer, right mastectomy, pulmonary embolism    TOBACCO USE: She has never smoked or use smokeless tobacco    Patient's problem list, allergies, and current medications reviewed and updated in Epic    Interval History:  2/18/2022 Initial clinic visit with HANY Tejada, FRANTZ, HASMUKH, JENNIE.   tablet used for today's visit.  Patient states she is having quite a bit of pain from her wounds.  She was prescribed pain medication by her oncologist, however states she took 1 and did not like the way it made her feel.  She has opted to take Tylenol instead, and reports this is not very effective.  She states that her wound sometimes drain a lot of blood, and tends to be very painful when open to air.  She was told that she has cancer in this breast.  It was unclear whether or not she had received radiation therapy to her chest in the past.   She states that she has been changing her dressings herself, but would appreciate help if available.  Will see if she can get home health services.    2/25/2022 : Clinic visit with HANY Tejada, FRANTZ, HASMUKH, JENNIE.   services used for today's visit.  Patient still having quite a bit of pain from her wounds.  Thus far she is only taking Tylenol, as instructed by her oncologist.  She sees oncologist again in a few  "days, I advised her to address her pain issues with him/her.  Patient has been changing her own dressings without difficulty.  She has not heard from home health.  I elected the status of referral after she left, found that referral information had been sent to home health care of [facility].  Will contact patient's daughter with this information.    3/16/2022 : Clinic visit with HANY Tejada, FRANTZ, HASMUKH, JENNIE.   services used for today's visit.  Patient is still having quite a bit of pain from her wound, describes it as, \"burning\".  She is currently not receiving radiation therapy, but continues with chemotherapy.  She has not been applying any type of ointment or moisturizer to her radiated skin.  She has still not heard from home health.    4/13/2022 : Clinic visit with HANY Tejada, FRANTZ, HASMUKH, JENNIE.   services used for today's visit.  Patient states she is still having good a bit of pain from her wound.  She has been using a cream that she obtained from [place], \"to dry out the wounds\".  I had not heard of this ointment before, asked her to bring it in with her next clinic visit.  She has still not heard from home health, unclear whether or not she called them after being provided with phone number last visit.  She has been receiving dressing supplies, delivered to her home.  She had another chemo treatment recently, she has not yet decided if she is going to continue.  She has new lesions to her abdomen and lateral chest, states that her oncologist told her it was more cancer.        REVIEW OF SYSTEMS:   Review of Systems   Constitutional: Positive for chills. Negative for fever.        Intermittent chills since starting chemotherapy on 2/1   Respiratory: Negative for cough and shortness of breath.    Cardiovascular: Negative for chest pain.   Gastrointestinal: Positive for constipation and nausea. Negative for diarrhea and vomiting.        Intermittent " nausea  Frequent constipation   Musculoskeletal: Negative for back pain and joint pain.   Skin:        Spontaneous eruption of wounds to left breast beginning in January 2022   Neurological: Positive for dizziness and headaches.        Sometimes has dizziness and headaches, since starting chemotherapy   Psychiatric/Behavioral: Positive for depression.        Depression related to current health issues       PHYSICAL EXAMINATION:   /75 (BP Location: Left arm, Patient Position: Sitting)   Pulse (!) 110 Comment: RN notified  Temp 36.3 °C (97.3 °F)   Resp 18   SpO2 96%     Physical Exam  Constitutional:       Comments: Thin   HENT:      Head: Normocephalic.   Eyes:      Pupils: Pupils are equal, round, and reactive to light.   Cardiovascular:      Rate and Rhythm: Normal rate.   Pulmonary:      Effort: Pulmonary effort is normal.   Skin:     Comments: Multiple, scattered, diffuse full and partial-thickness wounds to left breast and upper chest  Refer to wound flowsheet and photos   Neurological:      Mental Status: She is alert.         WOUND ASSESSMENT  Wound 02/18/22 Full Thickness Wound Chest Upper Left --Left Upper Chest (Active)   Wound Image   03/16/22 1440   Site Assessment Yellow;Tan 04/13/22 1330   Periwound Assessment Scar tissue 04/13/22 1330   Margins Attached edges 04/13/22 1330   Drainage Amount Moderate 04/13/22 1330   Drainage Description Serosanguineous 04/13/22 1330   Treatments Cleansed 04/13/22 1330   Wound Cleansing Normal Saline Irrigation 04/13/22 1330   Periwound Protectant Skin Protectant Wipes to Periwound;Skin Moisturizer 04/13/22 1330   Dressing Cleansing/Solutions Not Applicable 04/13/22 1330   Dressing Options Mepilex 04/13/22 1330   Dressing Changed Changed 02/25/22 0900   Dressing Change/Treatment Frequency Every 48 hrs, and As Needed 02/18/22 1000   Non-staged Wound Description Full thickness 04/13/22 1330   Wound Length (cm) 2 cm 03/16/22 1440   Wound Width (cm) 5 cm 03/16/22  1440   Wound Depth (cm) 0.2 cm 03/16/22 1440   Wound Surface Area (cm^2) 10 cm^2 03/16/22 1440   Wound Volume (cm^3) 2 cm^3 03/16/22 1440   Wound Healing % 66 03/16/22 1440   Tunneling (cm) 0 cm 04/13/22 1330   Undermining (cm) 0 cm 04/13/22 1330   Wound Odor None 04/13/22 1330   Exposed Structures GWEN 04/13/22 1330   Number of days: 54       Wound 02/18/22 Full Thickness Wound Breast Left --Left Breast (Active)   Wound Image    04/13/22 1330   Site Assessment Red;Yellow;Tan 04/13/22 1330   Periwound Assessment Scar tissue 04/13/22 1330   Margins Attached edges 04/13/22 1330   Drainage Amount Moderate 04/13/22 1330   Drainage Description Serosanguineous 04/13/22 1330   Treatments Cleansed 04/13/22 1330   Wound Cleansing Normal Saline Irrigation 04/13/22 1330   Periwound Protectant Skin Protectant Wipes to Periwound;Skin Moisturizer 04/13/22 1330   Dressing Cleansing/Solutions Not Applicable 04/13/22 1330   Dressing Options Mepilex 04/13/22 1330   Dressing Changed Changed 04/13/22 1330   Dressing Change/Treatment Frequency Every 48 hrs, and As Needed 02/18/22 1000   Non-staged Wound Description Full thickness 04/13/22 1330   Wound Length (cm) 11.5 cm 04/13/22 1330   Wound Width (cm) 13.5 cm 04/13/22 1330   Wound Depth (cm) 0.1 cm 04/13/22 1330   Wound Surface Area (cm^2) 155.25 cm^2 04/13/22 1330   Wound Volume (cm^3) 15.525 cm^3 04/13/22 1330   Wound Healing % 14 04/13/22 1330   Tunneling (cm) 0 cm 04/13/22 1330   Undermining (cm) 0 cm 04/13/22 1330   Wound Odor None 04/13/22 1330   Exposed Structures GWEN 04/13/22 1330   Number of days: 54       PROCEDURE:     -No debridement today, contraindicated.  Patient being treated for active cancer in this breast  -Wound care completed by wound RN, refer to flowsheet  -Patient tolerated the procedure well, without c/o pain or discomfort.       Pertinent Labs and Diagnostics:    Labs:     A1c: No results found for: HBA1C       IMAGING: None found in epic    VASCULAR STUDIES:  N/A    LAST  WOUND CULTURE:  DATE : No recent cultures found in epic        ASSESSMENT AND PLAN:     1. Open wound of left breast, subsequent encounter  Comments: Multiple shallow, diffuse wounds.  Per patient spontaneously appeared in January 2022.  Complicated by metastatic breast cancer diagnoses    4/13/2022: Multiple painful wounds due to cancer to left breast, lateral chest, and abdomen.  -Debridement contraindicated.  Patient is currently being treated for active cancer.  Unfortunately, treatment will be palliative only, as these wounds are not likely to resolve  -Patient instructed to apply Aquaphor or CeraVe to intact radiated skin  -Referral to home health to assist with wound care sent on initial visit.  Phone number was provided last visit, though unclear whether or not patient contacted them.  We will follow up on status of referral  -Patient to return to clinic in 3 to 4 weeks for reassessment  -Additional supplies to be sent to patient's home if she does not receive home health services  -Unfortunately, treatment is palliative only.  Explained to patient that healing of her wound is not likely.    Wound care: Sween 24 to contact skin of left breast, Mepilex to wound beds to manage exudate, and to protect wound.  Change every other day, or as needed for drainage    2. History of invasive breast cancer  Comments: History of right breast cancer for which she underwent mastectomy, and chemotherapy.  Now has recurrence to left breast    4/13/2022: Patient had chemo treatment recently, states she is not sure whether or not she is going to continue.      3. Pain associated with wound    4/13/2022: Patient reports that her wounds are quite painful, especially when open to air.  She is taking Tylenol as instructed by her oncologist, but states this is not adequate.  -Lidocaine applied to all wounds prior to assessment  -No sharp debridement  -Patient to discuss pain issues with her oncologist.  Advised her to  make sure he knows that she is not getting adequate relief with Tylenol        PATIENT EDUCATION  - Importance of adequate nutrition for wound healing  -Advised to go to ER for any increased redness, swelling, drainage, or odor, or if patient develops fever, chills, nausea or vomiting.     My total time spent caring for the patient on the day of the encounter was 20 minutes.   This does not include time spent on separately billable procedures/tests.       Please note that this note may have been created using voice recognition software. I have worked with technical experts from [facility] to optimize the interface.  I have made every reasonable attempt to correct obvious errors, but there may be errors of grammar and possibly content that I did not discover before finalizing the note.    N

## 2022-04-13 NOTE — PATIENT INSTRUCTIONS
-Keep dressings clean and dry. Change dressings if they become over saturated, soiled or fall off.     -Should you experience any significant changes in your wounds, such as signs of infection (increasing redness, swelling, localized heat, increased pain, fever > 101 F, chills) or have any questions regarding your home care instructions, please contact the wound center at [phone]. If after hours, contact your primary care physician or go to the hospital emergency room.     -If you are 5 or more minutes late for an appointment, we reserve the right to cancel and reschedule that appointment. Additionally, if you are habitually late or not showing (3 late cancellations and/or no shows), we reserve the right to cancel your remaining appointments and it will be your responsibility to obtain a new referral if services are still needed.

## 2022-04-14 RX ORDER — 0.9 % SODIUM CHLORIDE 0.9 %
VIAL (ML) INJECTION PRN
Status: CANCELLED | OUTPATIENT
Start: 2022-04-27

## 2022-04-14 RX ORDER — ONDANSETRON 8 MG/1
8 TABLET, ORALLY DISINTEGRATING ORAL PRN
Status: CANCELLED | OUTPATIENT
Start: 2022-04-27

## 2022-04-14 RX ORDER — 0.9 % SODIUM CHLORIDE 0.9 %
VIAL (ML) INJECTION PRN
Status: CANCELLED | OUTPATIENT
Start: 2022-04-26

## 2022-04-14 RX ORDER — SODIUM CHLORIDE, SODIUM LACTATE, POTASSIUM CHLORIDE, CALCIUM CHLORIDE 600; 310; 30; 20 MG/100ML; MG/100ML; MG/100ML; MG/100ML
500 INJECTION, SOLUTION INTRAVENOUS CONTINUOUS
Status: CANCELLED | OUTPATIENT
Start: 2022-04-27

## 2022-04-14 RX ORDER — PROCHLORPERAZINE MALEATE 10 MG
10 TABLET ORAL EVERY 6 HOURS PRN
Status: CANCELLED | OUTPATIENT
Start: 2022-04-27

## 2022-04-14 RX ORDER — 0.9 % SODIUM CHLORIDE 0.9 %
3 VIAL (ML) INJECTION PRN
Status: CANCELLED | OUTPATIENT
Start: 2022-04-26

## 2022-04-14 RX ORDER — HEPARIN SODIUM (PORCINE) LOCK FLUSH IV SOLN 100 UNIT/ML 100 UNIT/ML
500 SOLUTION INTRAVENOUS PRN
Status: CANCELLED | OUTPATIENT
Start: 2022-04-26

## 2022-04-14 RX ORDER — DIPHENHYDRAMINE HYDROCHLORIDE 50 MG/ML
50 INJECTION INTRAMUSCULAR; INTRAVENOUS PRN
Status: CANCELLED | OUTPATIENT
Start: 2022-04-27

## 2022-04-14 RX ORDER — 0.9 % SODIUM CHLORIDE 0.9 %
10 VIAL (ML) INJECTION PRN
Status: CANCELLED | OUTPATIENT
Start: 2022-04-27

## 2022-04-14 RX ORDER — ONDANSETRON 2 MG/ML
4 INJECTION INTRAMUSCULAR; INTRAVENOUS PRN
Status: CANCELLED | OUTPATIENT
Start: 2022-04-27

## 2022-04-14 RX ORDER — 0.9 % SODIUM CHLORIDE 0.9 %
10 VIAL (ML) INJECTION PRN
Status: CANCELLED | OUTPATIENT
Start: 2022-04-26

## 2022-04-14 RX ORDER — ONDANSETRON 2 MG/ML
8 INJECTION INTRAMUSCULAR; INTRAVENOUS ONCE
Status: CANCELLED | OUTPATIENT
Start: 2022-04-27 | End: 2022-04-27

## 2022-04-14 RX ORDER — HEPARIN SODIUM (PORCINE) LOCK FLUSH IV SOLN 100 UNIT/ML 100 UNIT/ML
500 SOLUTION INTRAVENOUS PRN
Status: CANCELLED | OUTPATIENT
Start: 2022-04-27

## 2022-04-14 RX ORDER — 0.9 % SODIUM CHLORIDE 0.9 %
3 VIAL (ML) INJECTION PRN
Status: CANCELLED | OUTPATIENT
Start: 2022-04-27

## 2022-04-14 RX ORDER — EPINEPHRINE 1 MG/ML(1)
0.5 AMPUL (ML) INJECTION PRN
Status: CANCELLED | OUTPATIENT
Start: 2022-04-27

## 2022-04-14 RX ORDER — METHYLPREDNISOLONE SODIUM SUCCINATE 125 MG/2ML
125 INJECTION, POWDER, LYOPHILIZED, FOR SOLUTION INTRAMUSCULAR; INTRAVENOUS PRN
Status: CANCELLED | OUTPATIENT
Start: 2022-04-27

## 2022-04-19 ENCOUNTER — HOSPITAL ENCOUNTER (OUTPATIENT)
Dept: RADIOLOGY | Facility: MEDICAL CENTER | Age: 67
End: 2022-04-19
Attending: INTERNAL MEDICINE
Payer: MEDICAID

## 2022-04-26 ENCOUNTER — APPOINTMENT (OUTPATIENT)
Dept: ONCOLOGY | Facility: MEDICAL CENTER | Age: 67
End: 2022-04-26
Attending: INTERNAL MEDICINE
Payer: MEDICAID

## 2022-04-27 ENCOUNTER — OFFICE VISIT (OUTPATIENT)
Dept: WOUND CARE | Facility: MEDICAL CENTER | Age: 67
End: 2022-04-27
Attending: INTERNAL MEDICINE
Payer: MEDICAID

## 2022-04-27 VITALS
TEMPERATURE: 97.2 F | RESPIRATION RATE: 19 BRPM | HEART RATE: 109 BPM | DIASTOLIC BLOOD PRESSURE: 71 MMHG | SYSTOLIC BLOOD PRESSURE: 129 MMHG | OXYGEN SATURATION: 94 %

## 2022-04-27 DIAGNOSIS — R52 PAIN ASSOCIATED WITH WOUND: ICD-10-CM

## 2022-04-27 DIAGNOSIS — S21.002D OPEN WOUND OF LEFT BREAST, SUBSEQUENT ENCOUNTER: ICD-10-CM

## 2022-04-27 DIAGNOSIS — Z85.3 HISTORY OF INVASIVE BREAST CANCER: ICD-10-CM

## 2022-04-27 DIAGNOSIS — T14.8XXA PAIN ASSOCIATED WITH WOUND: ICD-10-CM

## 2022-04-27 PROCEDURE — 99213 OFFICE O/P EST LOW 20 MIN: CPT

## 2022-04-27 PROCEDURE — 99213 OFFICE O/P EST LOW 20 MIN: CPT | Performed by: STUDENT IN AN ORGANIZED HEALTH CARE EDUCATION/TRAINING PROGRAM

## 2022-04-27 ASSESSMENT — ENCOUNTER SYMPTOMS
VOMITING: 0
DEPRESSION: 1
DIARRHEA: 0
CHILLS: 1
FEVER: 0
HEADACHES: 1
SHORTNESS OF BREATH: 0
BACK PAIN: 0
COUGH: 0
CONSTIPATION: 1
NAUSEA: 1
DIZZINESS: 1

## 2022-04-27 NOTE — PROGRESS NOTES
Provider Encounter- Full Thickness wound    HISTORY OF PRESENT ILLNESS  Wound History:    START OF CARE IN CLINIC: 2/18/2022    REFERRING PROVIDER: Fab Jarvis Iii      WOUND- Full Thickness Wound   LOCATION: Left breast   HISTORY: Patient with stage IV recurrent breast cancer referred to [facility] for full-thickness wounds to her left breast/chest.  Patient states these wounds started appearing in early January of this year.  She has been covering them with simple bandages, changing daily when she showers.  She is currently undergoing palliative chemotherapy.  She lives with her adult daughter.    Pertinent Medical History: Metastatic breast cancer, right mastectomy, pulmonary embolism    TOBACCO USE: She has never smoked or use smokeless tobacco    Patient's problem list, allergies, and current medications reviewed and updated in Epic    Interval History:  2/18/2022 Initial clinic visit with HANY Tejada, FRANTZ, HASMUKH, JENNIE.   tablet used for today's visit.  Patient states she is having quite a bit of pain from her wounds.  She was prescribed pain medication by her oncologist, however states she took 1 and did not like the way it made her feel.  She has opted to take Tylenol instead, and reports this is not very effective.  She states that her wound sometimes drain a lot of blood, and tends to be very painful when open to air.  She was told that she has cancer in this breast.  It was unclear whether or not she had received radiation therapy to her chest in the past.   She states that she has been changing her dressings herself, but would appreciate help if available.  Will see if she can get home health services.    2/25/2022 : Clinic visit with HANY Tejada, FRANTZ, HASMUKH, JENNIE.   services used for today's visit.  Patient still having quite a bit of pain from her wounds.  Thus far she is only taking Tylenol, as instructed by her oncologist.  She sees oncologist again in a few  "days, I advised her to address her pain issues with him/her.  Patient has been changing her own dressings without difficulty.  She has not heard from home health.  I elected the status of referral after she left, found that referral information had been sent to home health care of [facility].  Will contact patient's daughter with this information.    3/16/2022 : Clinic visit with HANY Tejada, FRANTZ, HASMUKH, JENNIE.   services used for today's visit.  Patient is still having quite a bit of pain from her wound, describes it as, \"burning\".  She is currently not receiving radiation therapy, but continues with chemotherapy.  She has not been applying any type of ointment or moisturizer to her radiated skin.  She has still not heard from home health.    4/13/2022 : Clinic visit with HANY Tejada, FRANTZ, HASMUKH, JENNIE.   services used for today's visit.  Patient states she is still having good a bit of pain from her wound.  She has been using a cream that she obtained from [employer], \"to dry out the wounds\".  I had not heard of this ointment before, asked her to bring it in with her next clinic visit.  She has still not heard from home health, unclear whether or not she called them after being provided with phone number last visit.  She has been receiving dressing supplies, delivered to her home.  She had another chemo treatment recently, she has not yet decided if she is going to continue.  She has new lesions to her abdomen and lateral chest, states that her oncologist told her it was more cancer.    4/27/2022: Clinic visit with Mor Mills MD.  services used for today's visit. Patient reports she is feeling ok 'more or less'. She reports pain in her left breast. Reports pain worse with showering and washing with soap and water. She has not been using cream from [employer] and did not bring in. She reports using 2 different creams with one being topical steroids? Recommending " she bring creams next visit to ensure correct treatment. Patient reports some chills, but denies any fevers or other infections.   Patient did not receive any shipment of supplies. Apparently daughter tried calling home health she was previously referred to, however they have not called them back. Patient reports no plans for further treatment from oncology, reports 'nothing more can be done'. Patient reports they have a family meeting with hospice on Monday. Will have her follow up next week to follow up on hospice discussion to see if she will be on hospice service and help coordinate dressing recommendations with hospice.      REVIEW OF SYSTEMS:   Review of Systems   Constitutional: Positive for chills. Negative for fever.        Intermittent chills since starting chemotherapy on 2/1   Respiratory: Negative for cough and shortness of breath.    Cardiovascular: Negative for chest pain.   Gastrointestinal: Positive for constipation and nausea. Negative for diarrhea and vomiting.        Intermittent nausea  Frequent constipation   Musculoskeletal: Negative for back pain and joint pain.   Skin:        Spontaneous eruption of wounds to left breast beginning in January 2022   Neurological: Positive for dizziness and headaches.        Sometimes has dizziness and headaches, since starting chemotherapy   Psychiatric/Behavioral: Positive for depression.        Depression related to current health issues       PHYSICAL EXAMINATION:   /71 (BP Location: Right arm, Patient Position: Sitting)   Pulse (!) 109 Comment: Rn notified  Temp 36.2 °C (97.2 °F) (Temporal)   Resp 19   SpO2 94%     Physical Exam  Constitutional:       Comments: Thin   HENT:      Head: Normocephalic.   Eyes:      Pupils: Pupils are equal, round, and reactive to light.   Cardiovascular:      Rate and Rhythm: Normal rate.   Pulmonary:      Effort: Pulmonary effort is normal.   Skin:     Comments: Multiple, scattered, diffuse full and  partial-thickness wounds to left breast and upper chest  Refer to wound flowsheet and photos   Neurological:      Mental Status: She is alert.         WOUND ASSESSMENT  Wound 02/18/22 Full Thickness Wound Chest Upper Left --Left Upper Chest (Active)   Wound Image   03/16/22 1440   Site Assessment Yellow;Tan 04/27/22 1400   Periwound Assessment Scar tissue 04/27/22 1400   Margins Attached edges 04/27/22 1400   Drainage Amount Small 04/27/22 1400   Drainage Description Serosanguineous 04/27/22 1400   Treatments Cleansed;Site care 04/27/22 1400   Wound Cleansing Normal Saline Irrigation 04/27/22 1400   Periwound Protectant Skin Protectant Wipes to Periwound;Skin Moisturizer 04/13/22 1330   Dressing Cleansing/Solutions Not Applicable 04/27/22 1400   Dressing Options Nonadhesive Foam 04/27/22 1400   Dressing Changed Changed 02/25/22 0900   Dressing Change/Treatment Frequency Every 48 hrs, and As Needed 02/18/22 1000   Non-staged Wound Description Full thickness 04/13/22 1330   Wound Length (cm) 2 cm 03/16/22 1440   Wound Width (cm) 5 cm 03/16/22 1440   Wound Depth (cm) 0.2 cm 03/16/22 1440   Wound Surface Area (cm^2) 10 cm^2 03/16/22 1440   Wound Volume (cm^3) 2 cm^3 03/16/22 1440   Wound Healing % 66 03/16/22 1440   Tunneling (cm) 0 cm 04/27/22 1400   Undermining (cm) 0 cm 04/27/22 1400   Wound Odor None 04/27/22 1400   Exposed Structures GWEN 04/27/22 1400   Number of days: 68       Wound 02/18/22 Full Thickness Wound Breast Left --Left Breast (Active)   Wound Image   04/27/22 1400   Site Assessment Yellow;Tan;Red 04/27/22 1400   Periwound Assessment Red;Purple;Scar tissue;Satellite lesions 04/27/22 1400   Margins Attached edges 04/27/22 1400   Drainage Amount Moderate 04/27/22 1400   Drainage Description Serosanguineous 04/27/22 1400   Treatments Cleansed;Site care;Moisturizing cream 04/27/22 1400   Wound Cleansing Normal Saline Irrigation 04/27/22 1400   Periwound Protectant Skin Moisturizer 04/27/22 1400   Dressing  Cleansing/Solutions Not Applicable 04/27/22 1400   Dressing Options Nonadhesive Foam 04/27/22 1400   Dressing Changed Changed 04/27/22 1400   Dressing Change/Treatment Frequency Every 48 hrs, and As Needed 02/18/22 1000   Non-staged Wound Description Full thickness 04/27/22 1400   Wound Length (cm) 12 cm 04/27/22 1400   Wound Width (cm) 11 cm 04/27/22 1400   Wound Depth (cm) 0.1 cm 04/27/22 1400   Wound Surface Area (cm^2) 132 cm^2 04/27/22 1400   Wound Volume (cm^3) 13.2 cm^3 04/27/22 1400   Wound Healing % 27 04/27/22 1400   Tunneling (cm) 0 cm 04/27/22 1400   Undermining (cm) 0 cm 04/27/22 1400   Wound Odor None 04/27/22 1400   Exposed Structures GWEN 04/27/22 1400   Number of days: 68     PROCEDURE:     -No debridement today, contraindicated.  Patient being treated for active cancer in this breast  -Wound care completed by wound RN, refer to flowsheet  -Patient tolerated the procedure well, without c/o pain or discomfort.       Pertinent Labs and Diagnostics:    Labs:     A1c: No results found for: HBA1C       IMAGING: None found in epic    VASCULAR STUDIES: N/A    LAST  WOUND CULTURE:  DATE : No recent cultures found in epic        ASSESSMENT AND PLAN:     1. Open wound of left breast, subsequent encounter  Comments: Multiple shallow, diffuse wounds.  Per patient spontaneously appeared in January 2022.  Complicated by metastatic breast cancer diagnoses    4/27/2022: Multiple painful wounds due to cancer to left breast, lateral chest, and abdomen.   -Debridement contraindicated.   - Patient reports she is no longer going to receive further treatment for cancer. She reports oncologist reports no further treatment will be offered and nothing further they can provider  - Patient has appointment for family meeting with hospice on Monday. Will follow up results of hospice meeting.  -Patient instructed to apply Aquaphor or CeraVe to intact radiated skin. She reports using creams but doesn't know name, recommend she  bring creams with her.  - Family has tried to call home health, have not heard response. They may be going with hospice.  -Unfortunately, treatment is palliative only.  Explained to patient that healing of her wound is not likely.  - Return to clinic next week to coordinate care with hospice. Has not received supplies and not on home health. Will need to coordinate with her and hospice if accepted under their care.    Wound care: Sween 24 to contact skin of left breast, foam and gauze to hold dressings in place. Change every other day, or as needed for drainage    2. History of invasive breast cancer  Comments: History of right breast cancer for which she underwent mastectomy, and chemotherapy.  Now has recurrence to left breast    4/27/2022:   - Patient reports no further treatment will be offered, reports oncology told her nothing further to do  - Patient and family have meeting with hospice on Monday, will follow up with results of meeting next week    3. Pain associated with wound    4/27/2022: Patient reports that her wounds are quite painful, especially when open to air.  She is taking Tylenol as instructed by her oncologist, but states this is not adequate.  -Lidocaine applied to all wounds prior to assessment  -No sharp debridement  -Patient to discuss pain issues with her oncologist. She has meeting with hospice Monday and recommend discussing pain control with them.        PATIENT EDUCATION  - Importance of adequate nutrition for wound healing  -Advised to go to ER for any increased redness, swelling, drainage, or odor, or if patient develops fever, chills, nausea or vomiting.     My total time spent caring for the patient on the day of the encounter was 20 minutes.   This does not include time spent on separately billable procedures/tests.       Please note that this note may have been created using voice recognition software. I have worked with technical experts from [email or web address] to optimize the  interface.  I have made every reasonable attempt to correct obvious errors, but there may be errors of grammar and possibly content that I did not discover before finalizing the note.    N
